# Patient Record
Sex: FEMALE | Race: WHITE | NOT HISPANIC OR LATINO | Employment: OTHER | URBAN - METROPOLITAN AREA
[De-identification: names, ages, dates, MRNs, and addresses within clinical notes are randomized per-mention and may not be internally consistent; named-entity substitution may affect disease eponyms.]

---

## 2017-01-03 ENCOUNTER — OFFICE VISIT - HEALTHEAST (OUTPATIENT)
Dept: ENDOCRINOLOGY | Facility: CLINIC | Age: 82
End: 2017-01-03

## 2017-01-03 DIAGNOSIS — E03.9 HYPOTHYROIDISM, UNSPECIFIED TYPE: ICD-10-CM

## 2017-01-03 DIAGNOSIS — M81.8 IDIOPATHIC OSTEOPOROSIS: ICD-10-CM

## 2017-01-13 ENCOUNTER — COMMUNICATION - HEALTHEAST (OUTPATIENT)
Dept: ENDOCRINOLOGY | Facility: CLINIC | Age: 82
End: 2017-01-13

## 2017-01-16 ENCOUNTER — COMMUNICATION - HEALTHEAST (OUTPATIENT)
Dept: FAMILY MEDICINE | Facility: CLINIC | Age: 82
End: 2017-01-16

## 2017-01-17 ENCOUNTER — OFFICE VISIT - HEALTHEAST (OUTPATIENT)
Dept: FAMILY MEDICINE | Facility: CLINIC | Age: 82
End: 2017-01-17

## 2017-01-17 DIAGNOSIS — N39.0 UTI (URINARY TRACT INFECTION): ICD-10-CM

## 2017-01-17 DIAGNOSIS — R35.0 URINE FREQUENCY: ICD-10-CM

## 2017-01-20 ENCOUNTER — COMMUNICATION - HEALTHEAST (OUTPATIENT)
Dept: FAMILY MEDICINE | Facility: CLINIC | Age: 82
End: 2017-01-20

## 2017-01-24 ENCOUNTER — HOSPITAL ENCOUNTER (OUTPATIENT)
Dept: CT IMAGING | Facility: HOSPITAL | Age: 82
Discharge: HOME | End: 2017-01-24
Attending: NEUROLOGICAL SURGERY

## 2017-01-24 DIAGNOSIS — M79.606 LEG PAIN: ICD-10-CM

## 2017-01-25 ENCOUNTER — COMMUNICATION - HEALTHEAST (OUTPATIENT)
Dept: FAMILY MEDICINE | Facility: CLINIC | Age: 82
End: 2017-01-25

## 2017-01-25 ENCOUNTER — AMBULATORY - HEALTHEAST (OUTPATIENT)
Dept: NEUROSURGERY | Facility: CLINIC | Age: 82
End: 2017-01-25

## 2017-01-25 ENCOUNTER — OFFICE VISIT - HEALTHEAST (OUTPATIENT)
Dept: NEUROSURGERY | Facility: CLINIC | Age: 82
End: 2017-01-25

## 2017-01-25 DIAGNOSIS — S12.9XXA: ICD-10-CM

## 2017-01-25 DIAGNOSIS — F33.42 MAJOR DEPRESSIVE DISORDER, RECURRENT EPISODE, IN FULL REMISSION (H): ICD-10-CM

## 2017-01-26 ENCOUNTER — COMMUNICATION - HEALTHEAST (OUTPATIENT)
Dept: ENDOCRINOLOGY | Facility: CLINIC | Age: 82
End: 2017-01-26

## 2017-01-26 DIAGNOSIS — M81.8 IDIOPATHIC OSTEOPOROSIS: ICD-10-CM

## 2017-01-26 DIAGNOSIS — S32.009A CLOSED FRACTURE OF LUMBAR VERTEBRA (H): ICD-10-CM

## 2017-01-27 ENCOUNTER — COMMUNICATION - HEALTHEAST (OUTPATIENT)
Dept: FAMILY MEDICINE | Facility: CLINIC | Age: 82
End: 2017-01-27

## 2017-01-27 ENCOUNTER — OFFICE VISIT - HEALTHEAST (OUTPATIENT)
Dept: FAMILY MEDICINE | Facility: CLINIC | Age: 82
End: 2017-01-27

## 2017-01-27 DIAGNOSIS — R35.0 URINARY FREQUENCY: ICD-10-CM

## 2017-02-01 ENCOUNTER — COMMUNICATION - HEALTHEAST (OUTPATIENT)
Dept: FAMILY MEDICINE | Facility: CLINIC | Age: 82
End: 2017-02-01

## 2017-02-01 ENCOUNTER — COMMUNICATION - HEALTHEAST (OUTPATIENT)
Dept: NEUROSURGERY | Facility: CLINIC | Age: 82
End: 2017-02-01

## 2017-02-21 ENCOUNTER — COMMUNICATION - HEALTHEAST (OUTPATIENT)
Dept: FAMILY MEDICINE | Facility: CLINIC | Age: 82
End: 2017-02-21

## 2017-02-21 DIAGNOSIS — F33.42 MAJOR DEPRESSIVE DISORDER, RECURRENT EPISODE, IN FULL REMISSION (H): ICD-10-CM

## 2017-02-27 ENCOUNTER — COMMUNICATION - HEALTHEAST (OUTPATIENT)
Dept: ADMINISTRATIVE | Facility: CLINIC | Age: 82
End: 2017-02-27

## 2017-02-28 ENCOUNTER — COMMUNICATION - HEALTHEAST (OUTPATIENT)
Dept: FAMILY MEDICINE | Facility: CLINIC | Age: 82
End: 2017-02-28

## 2017-02-28 DIAGNOSIS — G62.9 PERIPHERAL NEUROPATHY: ICD-10-CM

## 2017-03-01 ENCOUNTER — COMMUNICATION - HEALTHEAST (OUTPATIENT)
Dept: FAMILY MEDICINE | Facility: CLINIC | Age: 82
End: 2017-03-01

## 2017-03-01 DIAGNOSIS — I10 ESSENTIAL HYPERTENSION: ICD-10-CM

## 2017-03-16 ENCOUNTER — COMMUNICATION - HEALTHEAST (OUTPATIENT)
Dept: FAMILY MEDICINE | Facility: CLINIC | Age: 82
End: 2017-03-16

## 2017-03-24 ENCOUNTER — COMMUNICATION - HEALTHEAST (OUTPATIENT)
Dept: FAMILY MEDICINE | Facility: CLINIC | Age: 82
End: 2017-03-24

## 2017-03-24 DIAGNOSIS — F33.42 MAJOR DEPRESSIVE DISORDER, RECURRENT EPISODE, IN FULL REMISSION (H): ICD-10-CM

## 2017-04-29 ENCOUNTER — COMMUNICATION - HEALTHEAST (OUTPATIENT)
Dept: FAMILY MEDICINE | Facility: CLINIC | Age: 82
End: 2017-04-29

## 2017-04-29 DIAGNOSIS — F33.42 MAJOR DEPRESSIVE DISORDER, RECURRENT EPISODE, IN FULL REMISSION (H): ICD-10-CM

## 2017-05-19 ENCOUNTER — COMMUNICATION - HEALTHEAST (OUTPATIENT)
Dept: FAMILY MEDICINE | Facility: CLINIC | Age: 82
End: 2017-05-19

## 2017-05-19 DIAGNOSIS — G62.9 PERIPHERAL NEUROPATHY: ICD-10-CM

## 2017-05-23 ENCOUNTER — COMMUNICATION - HEALTHEAST (OUTPATIENT)
Dept: FAMILY MEDICINE | Facility: CLINIC | Age: 82
End: 2017-05-23

## 2017-05-23 DIAGNOSIS — E03.9 UNSPECIFIED HYPOTHYROIDISM: ICD-10-CM

## 2017-05-23 DIAGNOSIS — M62.838 MUSCLE SPASM: ICD-10-CM

## 2017-05-26 ENCOUNTER — COMMUNICATION - HEALTHEAST (OUTPATIENT)
Dept: FAMILY MEDICINE | Facility: CLINIC | Age: 82
End: 2017-05-26

## 2017-05-26 DIAGNOSIS — R25.1 TREMOR: ICD-10-CM

## 2017-06-26 ENCOUNTER — RECORDS - HEALTHEAST (OUTPATIENT)
Dept: ADMINISTRATIVE | Facility: OTHER | Age: 82
End: 2017-06-26

## 2017-06-30 ENCOUNTER — COMMUNICATION - HEALTHEAST (OUTPATIENT)
Dept: SCHEDULING | Facility: CLINIC | Age: 82
End: 2017-06-30

## 2017-06-30 ENCOUNTER — OFFICE VISIT - HEALTHEAST (OUTPATIENT)
Dept: FAMILY MEDICINE | Facility: CLINIC | Age: 82
End: 2017-06-30

## 2017-06-30 DIAGNOSIS — N89.8 VAGINAL DISCHARGE: ICD-10-CM

## 2017-06-30 DIAGNOSIS — R35.0 URINARY FREQUENCY: ICD-10-CM

## 2017-07-05 ENCOUNTER — COMMUNICATION - HEALTHEAST (OUTPATIENT)
Dept: FAMILY MEDICINE | Facility: CLINIC | Age: 82
End: 2017-07-05

## 2017-07-05 DIAGNOSIS — T85.43XA: ICD-10-CM

## 2017-07-05 DIAGNOSIS — H35.30 MACULAR DEGENERATION: ICD-10-CM

## 2017-07-17 ENCOUNTER — COMMUNICATION - HEALTHEAST (OUTPATIENT)
Dept: FAMILY MEDICINE | Facility: CLINIC | Age: 82
End: 2017-07-17

## 2017-07-20 ENCOUNTER — RECORDS - HEALTHEAST (OUTPATIENT)
Dept: ADMINISTRATIVE | Facility: OTHER | Age: 82
End: 2017-07-20

## 2017-07-25 ENCOUNTER — COMMUNICATION - HEALTHEAST (OUTPATIENT)
Dept: FAMILY MEDICINE | Facility: CLINIC | Age: 82
End: 2017-07-25

## 2017-07-26 ENCOUNTER — COMMUNICATION - HEALTHEAST (OUTPATIENT)
Dept: FAMILY MEDICINE | Facility: CLINIC | Age: 82
End: 2017-07-26

## 2017-07-27 ENCOUNTER — OFFICE VISIT - HEALTHEAST (OUTPATIENT)
Dept: FAMILY MEDICINE | Facility: CLINIC | Age: 82
End: 2017-07-27

## 2017-07-27 DIAGNOSIS — T85.43XA: ICD-10-CM

## 2017-07-27 DIAGNOSIS — G62.9 PERIPHERAL NEUROPATHY: ICD-10-CM

## 2017-07-27 DIAGNOSIS — T85.43XA RUPTURED LEFT BREAST IMPLANT: ICD-10-CM

## 2017-07-27 DIAGNOSIS — I10 HTN (HYPERTENSION): ICD-10-CM

## 2017-07-27 DIAGNOSIS — E03.9 HYPOTHYROIDISM: ICD-10-CM

## 2017-07-28 ENCOUNTER — COMMUNICATION - HEALTHEAST (OUTPATIENT)
Dept: FAMILY MEDICINE | Facility: CLINIC | Age: 82
End: 2017-07-28

## 2017-08-22 ENCOUNTER — COMMUNICATION - HEALTHEAST (OUTPATIENT)
Dept: FAMILY MEDICINE | Facility: CLINIC | Age: 82
End: 2017-08-22

## 2017-08-22 DIAGNOSIS — E03.9 UNSPECIFIED HYPOTHYROIDISM: ICD-10-CM

## 2017-08-22 DIAGNOSIS — M62.838 MUSCLE SPASM: ICD-10-CM

## 2017-09-13 ENCOUNTER — COMMUNICATION - HEALTHEAST (OUTPATIENT)
Dept: FAMILY MEDICINE | Facility: CLINIC | Age: 82
End: 2017-09-13

## 2017-09-13 DIAGNOSIS — I10 ESSENTIAL HYPERTENSION: ICD-10-CM

## 2017-10-16 ENCOUNTER — RECORDS - HEALTHEAST (OUTPATIENT)
Dept: ADMINISTRATIVE | Facility: OTHER | Age: 82
End: 2017-10-16

## 2017-10-25 ENCOUNTER — COMMUNICATION - HEALTHEAST (OUTPATIENT)
Dept: ADMINISTRATIVE | Facility: CLINIC | Age: 82
End: 2017-10-25

## 2017-10-30 ENCOUNTER — COMMUNICATION - HEALTHEAST (OUTPATIENT)
Dept: FAMILY MEDICINE | Facility: CLINIC | Age: 82
End: 2017-10-30

## 2017-10-30 DIAGNOSIS — F33.42 MAJOR DEPRESSIVE DISORDER, RECURRENT EPISODE, IN FULL REMISSION (H): ICD-10-CM

## 2017-11-21 ENCOUNTER — COMMUNICATION - HEALTHEAST (OUTPATIENT)
Dept: FAMILY MEDICINE | Facility: CLINIC | Age: 82
End: 2017-11-21

## 2017-11-21 DIAGNOSIS — M62.838 MUSCLE SPASM: ICD-10-CM

## 2017-11-24 ENCOUNTER — COMMUNICATION - HEALTHEAST (OUTPATIENT)
Dept: FAMILY MEDICINE | Facility: CLINIC | Age: 82
End: 2017-11-24

## 2017-11-24 DIAGNOSIS — G62.9 PERIPHERAL NEUROPATHY: ICD-10-CM

## 2017-11-25 ENCOUNTER — COMMUNICATION - HEALTHEAST (OUTPATIENT)
Dept: FAMILY MEDICINE | Facility: CLINIC | Age: 82
End: 2017-11-25

## 2017-11-25 DIAGNOSIS — F33.42 MAJOR DEPRESSIVE DISORDER, RECURRENT EPISODE, IN FULL REMISSION (H): ICD-10-CM

## 2017-12-05 ENCOUNTER — COMMUNICATION - HEALTHEAST (OUTPATIENT)
Dept: LAB | Facility: CLINIC | Age: 82
End: 2017-12-05

## 2017-12-05 ENCOUNTER — AMBULATORY - HEALTHEAST (OUTPATIENT)
Dept: LAB | Facility: CLINIC | Age: 82
End: 2017-12-05

## 2017-12-05 ENCOUNTER — COMMUNICATION - HEALTHEAST (OUTPATIENT)
Dept: FAMILY MEDICINE | Facility: CLINIC | Age: 82
End: 2017-12-05

## 2017-12-05 DIAGNOSIS — R30.0 DYSURIA: ICD-10-CM

## 2018-01-02 ENCOUNTER — AMBULATORY - HEALTHEAST (OUTPATIENT)
Dept: ENDOCRINOLOGY | Facility: CLINIC | Age: 83
End: 2018-01-02

## 2018-01-02 ENCOUNTER — COMMUNICATION - HEALTHEAST (OUTPATIENT)
Dept: ENDOCRINOLOGY | Facility: CLINIC | Age: 83
End: 2018-01-02

## 2018-01-02 DIAGNOSIS — E03.9 HYPOTHYROIDISM, UNSPECIFIED TYPE: ICD-10-CM

## 2018-01-02 DIAGNOSIS — M81.8 IDIOPATHIC OSTEOPOROSIS: ICD-10-CM

## 2018-01-18 ENCOUNTER — AMBULATORY - HEALTHEAST (OUTPATIENT)
Dept: LAB | Facility: CLINIC | Age: 83
End: 2018-01-18

## 2018-01-18 DIAGNOSIS — E03.9 HYPOTHYROIDISM, UNSPECIFIED TYPE: ICD-10-CM

## 2018-01-18 DIAGNOSIS — M81.8 IDIOPATHIC OSTEOPOROSIS: ICD-10-CM

## 2018-01-18 LAB
CALCIUM SERPL-MCNC: 9.2 MG/DL (ref 8.5–10.5)
T4 FREE SERPL-MCNC: 0.9 NG/DL (ref 0.7–1.8)
TSH SERPL DL<=0.005 MIU/L-ACNC: 2.32 UIU/ML (ref 0.3–5)

## 2018-01-19 LAB
25(OH)D3 SERPL-MCNC: 63.5 NG/ML (ref 30–80)
25(OH)D3 SERPL-MCNC: 63.5 NG/ML (ref 30–80)

## 2018-01-30 ENCOUNTER — COMMUNICATION - HEALTHEAST (OUTPATIENT)
Dept: FAMILY MEDICINE | Facility: CLINIC | Age: 83
End: 2018-01-30

## 2018-01-30 ENCOUNTER — AMBULATORY - HEALTHEAST (OUTPATIENT)
Dept: ENDOCRINOLOGY | Facility: CLINIC | Age: 83
End: 2018-01-30

## 2018-01-30 ENCOUNTER — OFFICE VISIT - HEALTHEAST (OUTPATIENT)
Dept: ENDOCRINOLOGY | Facility: CLINIC | Age: 83
End: 2018-01-30

## 2018-01-30 DIAGNOSIS — M81.8 IDIOPATHIC OSTEOPOROSIS: ICD-10-CM

## 2018-01-30 DIAGNOSIS — F33.42 MAJOR DEPRESSIVE DISORDER, RECURRENT EPISODE, IN FULL REMISSION (H): ICD-10-CM

## 2018-01-30 ASSESSMENT — MIFFLIN-ST. JEOR: SCORE: 1142.1

## 2018-02-05 ENCOUNTER — COMMUNICATION - HEALTHEAST (OUTPATIENT)
Dept: ADMINISTRATIVE | Facility: CLINIC | Age: 83
End: 2018-02-05

## 2018-02-05 ENCOUNTER — AMBULATORY - HEALTHEAST (OUTPATIENT)
Dept: ENDOCRINOLOGY | Facility: CLINIC | Age: 83
End: 2018-02-05

## 2018-02-05 DIAGNOSIS — E27.8 ADRENAL MASS, LEFT (H): ICD-10-CM

## 2018-02-05 DIAGNOSIS — E03.9 HYPOTHYROIDISM, UNSPECIFIED TYPE: ICD-10-CM

## 2018-02-08 ENCOUNTER — RECORDS - HEALTHEAST (OUTPATIENT)
Dept: ADMINISTRATIVE | Facility: OTHER | Age: 83
End: 2018-02-08

## 2018-02-14 ENCOUNTER — AMBULATORY - HEALTHEAST (OUTPATIENT)
Dept: LAB | Facility: CLINIC | Age: 83
End: 2018-02-14

## 2018-02-14 DIAGNOSIS — M81.8 IDIOPATHIC OSTEOPOROSIS: ICD-10-CM

## 2018-02-14 DIAGNOSIS — E27.8 ADRENAL MASS, LEFT (H): ICD-10-CM

## 2018-02-14 DIAGNOSIS — E03.9 HYPOTHYROIDISM, UNSPECIFIED TYPE: ICD-10-CM

## 2018-02-14 LAB
ANION GAP SERPL CALCULATED.3IONS-SCNC: 9 MMOL/L (ref 5–18)
CALCIUM SERPL-MCNC: 9.3 MG/DL (ref 8.5–10.5)
CHLORIDE BLD-SCNC: 101 MMOL/L (ref 98–107)
CO2 SERPL-SCNC: 27 MMOL/L (ref 22–31)
CORTIS SERPL-MCNC: 11.4 UG/DL
CREAT SERPL-MCNC: 0.79 MG/DL (ref 0.6–1.1)
GFR SERPL CREATININE-BSD FRML MDRD: >60 ML/MIN/1.73M2
POTASSIUM BLD-SCNC: 4.2 MMOL/L (ref 3.5–5)
SODIUM SERPL-SCNC: 137 MMOL/L (ref 136–145)
T4 FREE SERPL-MCNC: 0.9 NG/DL (ref 0.7–1.8)
TSH SERPL DL<=0.005 MIU/L-ACNC: 2.65 UIU/ML (ref 0.3–5)

## 2018-02-15 ENCOUNTER — RECORDS - HEALTHEAST (OUTPATIENT)
Dept: ADMINISTRATIVE | Facility: OTHER | Age: 83
End: 2018-02-15

## 2018-02-15 LAB
25(OH)D3 SERPL-MCNC: 65.9 NG/ML (ref 30–80)
25(OH)D3 SERPL-MCNC: 65.9 NG/ML (ref 30–80)

## 2018-02-16 LAB — ACTH PLAS-MCNC: <10 PG/ML

## 2018-02-18 LAB
ANNOTATION COMMENT IMP: NORMAL
METANEPHS SERPL-SCNC: 0.13 NMOL/L (ref 0–0.49)
NORMETANEPHRINE SERPL-SCNC: 0.33 NMOL/L (ref 0–0.89)

## 2018-02-19 ENCOUNTER — COMMUNICATION - HEALTHEAST (OUTPATIENT)
Dept: ENDOCRINOLOGY | Facility: CLINIC | Age: 83
End: 2018-02-19

## 2018-02-21 ENCOUNTER — COMMUNICATION - HEALTHEAST (OUTPATIENT)
Dept: FAMILY MEDICINE | Facility: CLINIC | Age: 83
End: 2018-02-21

## 2018-02-21 DIAGNOSIS — M62.838 MUSCLE SPASM: ICD-10-CM

## 2018-02-22 ENCOUNTER — COMMUNICATION - HEALTHEAST (OUTPATIENT)
Dept: FAMILY MEDICINE | Facility: CLINIC | Age: 83
End: 2018-02-22

## 2018-02-22 DIAGNOSIS — I10 ESSENTIAL HYPERTENSION: ICD-10-CM

## 2018-02-28 ENCOUNTER — RECORDS - HEALTHEAST (OUTPATIENT)
Dept: ADMINISTRATIVE | Facility: OTHER | Age: 83
End: 2018-02-28

## 2018-04-23 ENCOUNTER — COMMUNICATION - HEALTHEAST (OUTPATIENT)
Dept: FAMILY MEDICINE | Facility: CLINIC | Age: 83
End: 2018-04-23

## 2018-04-23 DIAGNOSIS — I10 ESSENTIAL HYPERTENSION: ICD-10-CM

## 2018-05-01 ENCOUNTER — COMMUNICATION - HEALTHEAST (OUTPATIENT)
Dept: FAMILY MEDICINE | Facility: CLINIC | Age: 83
End: 2018-05-01

## 2018-05-01 DIAGNOSIS — F33.42 MAJOR DEPRESSIVE DISORDER, RECURRENT EPISODE, IN FULL REMISSION (H): ICD-10-CM

## 2018-05-16 ENCOUNTER — COMMUNICATION - HEALTHEAST (OUTPATIENT)
Dept: FAMILY MEDICINE | Facility: CLINIC | Age: 83
End: 2018-05-16

## 2018-05-16 DIAGNOSIS — F33.42 MAJOR DEPRESSIVE DISORDER, RECURRENT EPISODE, IN FULL REMISSION (H): ICD-10-CM

## 2018-05-23 ENCOUNTER — COMMUNICATION - HEALTHEAST (OUTPATIENT)
Dept: FAMILY MEDICINE | Facility: CLINIC | Age: 83
End: 2018-05-23

## 2018-05-23 DIAGNOSIS — M62.838 MUSCLE SPASM: ICD-10-CM

## 2018-06-04 ENCOUNTER — RECORDS - HEALTHEAST (OUTPATIENT)
Dept: ADMINISTRATIVE | Facility: OTHER | Age: 83
End: 2018-06-04

## 2018-06-11 ENCOUNTER — RECORDS - HEALTHEAST (OUTPATIENT)
Dept: ADMINISTRATIVE | Facility: OTHER | Age: 83
End: 2018-06-11

## 2018-07-31 ENCOUNTER — COMMUNICATION - HEALTHEAST (OUTPATIENT)
Dept: FAMILY MEDICINE | Facility: CLINIC | Age: 83
End: 2018-07-31

## 2018-07-31 DIAGNOSIS — F33.42 MAJOR DEPRESSIVE DISORDER, RECURRENT EPISODE, IN FULL REMISSION (H): ICD-10-CM

## 2018-08-03 ENCOUNTER — COMMUNICATION - HEALTHEAST (OUTPATIENT)
Dept: FAMILY MEDICINE | Facility: CLINIC | Age: 83
End: 2018-08-03

## 2018-08-03 DIAGNOSIS — M62.838 MUSCLE SPASM: ICD-10-CM

## 2018-08-03 DIAGNOSIS — F33.42 MAJOR DEPRESSIVE DISORDER, RECURRENT EPISODE, IN FULL REMISSION (H): ICD-10-CM

## 2018-08-13 ENCOUNTER — COMMUNICATION - HEALTHEAST (OUTPATIENT)
Dept: FAMILY MEDICINE | Facility: CLINIC | Age: 83
End: 2018-08-13

## 2018-08-13 DIAGNOSIS — G62.9 PERIPHERAL NEUROPATHY: ICD-10-CM

## 2018-08-21 ENCOUNTER — COMMUNICATION - HEALTHEAST (OUTPATIENT)
Dept: FAMILY MEDICINE | Facility: CLINIC | Age: 83
End: 2018-08-21

## 2018-08-21 DIAGNOSIS — E03.9 HYPOTHYROIDISM: ICD-10-CM

## 2018-09-24 ENCOUNTER — RECORDS - HEALTHEAST (OUTPATIENT)
Dept: ADMINISTRATIVE | Facility: OTHER | Age: 83
End: 2018-09-24

## 2018-10-19 ENCOUNTER — COMMUNICATION - HEALTHEAST (OUTPATIENT)
Dept: SCHEDULING | Facility: CLINIC | Age: 83
End: 2018-10-19

## 2018-10-23 ENCOUNTER — OFFICE VISIT - HEALTHEAST (OUTPATIENT)
Dept: FAMILY MEDICINE | Facility: CLINIC | Age: 83
End: 2018-10-23

## 2018-10-23 DIAGNOSIS — I10 ESSENTIAL HYPERTENSION: ICD-10-CM

## 2018-10-23 DIAGNOSIS — M81.8 IDIOPATHIC OSTEOPOROSIS: ICD-10-CM

## 2018-10-23 DIAGNOSIS — F33.42 MAJOR DEPRESSIVE DISORDER, RECURRENT EPISODE, IN FULL REMISSION (H): ICD-10-CM

## 2018-10-23 DIAGNOSIS — R20.2 PARESTHESIAS: ICD-10-CM

## 2018-10-23 DIAGNOSIS — M48.062 LUMBAR STENOSIS WITH NEUROGENIC CLAUDICATION: ICD-10-CM

## 2018-10-23 DIAGNOSIS — G56.00 CARPAL TUNNEL SYNDROME: ICD-10-CM

## 2018-10-23 DIAGNOSIS — I10 HTN (HYPERTENSION): ICD-10-CM

## 2018-10-23 DIAGNOSIS — Z86.2 HISTORY OF ANEMIA: ICD-10-CM

## 2018-10-23 DIAGNOSIS — E03.9 HYPOTHYROIDISM: ICD-10-CM

## 2018-10-23 DIAGNOSIS — T85.43XA BREAST IMPLANT RUPTURE: ICD-10-CM

## 2018-10-23 DIAGNOSIS — C50.919 BREAST CANCER (H): ICD-10-CM

## 2018-10-23 DIAGNOSIS — G62.9 NEUROPATHY: ICD-10-CM

## 2018-10-23 DIAGNOSIS — Z90.13 S/P MASTECTOMY, BILATERAL: ICD-10-CM

## 2018-10-23 DIAGNOSIS — G62.9 PERIPHERAL NEUROPATHY: ICD-10-CM

## 2018-10-23 LAB
ALBUMIN SERPL-MCNC: 3.5 G/DL (ref 3.5–5)
ALP SERPL-CCNC: 69 U/L (ref 45–120)
ALT SERPL W P-5'-P-CCNC: 21 U/L (ref 0–45)
ANION GAP SERPL CALCULATED.3IONS-SCNC: 10 MMOL/L (ref 5–18)
AST SERPL W P-5'-P-CCNC: 23 U/L (ref 0–40)
BILIRUB SERPL-MCNC: 0.3 MG/DL (ref 0–1)
BUN SERPL-MCNC: 17 MG/DL (ref 8–28)
CALCIUM SERPL-MCNC: 9.1 MG/DL (ref 8.5–10.5)
CHLORIDE BLD-SCNC: 101 MMOL/L (ref 98–107)
CO2 SERPL-SCNC: 25 MMOL/L (ref 22–31)
CREAT SERPL-MCNC: 0.78 MG/DL (ref 0.6–1.1)
GFR SERPL CREATININE-BSD FRML MDRD: >60 ML/MIN/1.73M2
GLUCOSE BLD-MCNC: 90 MG/DL (ref 70–125)
POTASSIUM BLD-SCNC: 4.3 MMOL/L (ref 3.5–5)
PROT SERPL-MCNC: 6.1 G/DL (ref 6–8)
SODIUM SERPL-SCNC: 136 MMOL/L (ref 136–145)
TSH SERPL DL<=0.005 MIU/L-ACNC: 1.36 UIU/ML (ref 0.3–5)
VIT B12 SERPL-MCNC: >2000 PG/ML (ref 213–816)

## 2018-10-24 ENCOUNTER — COMMUNICATION - HEALTHEAST (OUTPATIENT)
Dept: FAMILY MEDICINE | Facility: CLINIC | Age: 83
End: 2018-10-24

## 2018-10-27 ENCOUNTER — COMMUNICATION - HEALTHEAST (OUTPATIENT)
Dept: FAMILY MEDICINE | Facility: CLINIC | Age: 83
End: 2018-10-27

## 2018-10-29 ENCOUNTER — COMMUNICATION - HEALTHEAST (OUTPATIENT)
Dept: FAMILY MEDICINE | Facility: CLINIC | Age: 83
End: 2018-10-29

## 2018-10-29 ENCOUNTER — RECORDS - HEALTHEAST (OUTPATIENT)
Dept: ADMINISTRATIVE | Facility: OTHER | Age: 83
End: 2018-10-29

## 2018-10-30 ENCOUNTER — COMMUNICATION - HEALTHEAST (OUTPATIENT)
Dept: FAMILY MEDICINE | Facility: CLINIC | Age: 83
End: 2018-10-30

## 2018-10-30 DIAGNOSIS — F33.42 MAJOR DEPRESSIVE DISORDER, RECURRENT EPISODE, IN FULL REMISSION (H): ICD-10-CM

## 2018-11-06 ENCOUNTER — COMMUNICATION - HEALTHEAST (OUTPATIENT)
Dept: FAMILY MEDICINE | Facility: CLINIC | Age: 83
End: 2018-11-06

## 2018-11-06 DIAGNOSIS — F33.42 MAJOR DEPRESSIVE DISORDER, RECURRENT EPISODE, IN FULL REMISSION (H): ICD-10-CM

## 2018-11-06 DIAGNOSIS — M62.838 MUSCLE SPASM: ICD-10-CM

## 2018-11-08 ENCOUNTER — COMMUNICATION - HEALTHEAST (OUTPATIENT)
Dept: FAMILY MEDICINE | Facility: CLINIC | Age: 83
End: 2018-11-08

## 2018-11-26 ENCOUNTER — COMMUNICATION - HEALTHEAST (OUTPATIENT)
Dept: FAMILY MEDICINE | Facility: CLINIC | Age: 83
End: 2018-11-26

## 2018-12-17 ENCOUNTER — RECORDS - HEALTHEAST (OUTPATIENT)
Dept: ADMINISTRATIVE | Facility: OTHER | Age: 83
End: 2018-12-17

## 2018-12-27 ENCOUNTER — COMMUNICATION - HEALTHEAST (OUTPATIENT)
Dept: FAMILY MEDICINE | Facility: CLINIC | Age: 83
End: 2018-12-27

## 2018-12-27 ENCOUNTER — AMBULATORY - HEALTHEAST (OUTPATIENT)
Dept: ENDOCRINOLOGY | Facility: CLINIC | Age: 83
End: 2018-12-27

## 2018-12-27 DIAGNOSIS — M81.8 IDIOPATHIC OSTEOPOROSIS: ICD-10-CM

## 2018-12-27 DIAGNOSIS — E03.9 HYPOTHYROIDISM, UNSPECIFIED TYPE: ICD-10-CM

## 2018-12-28 ENCOUNTER — OFFICE VISIT - HEALTHEAST (OUTPATIENT)
Dept: FAMILY MEDICINE | Facility: CLINIC | Age: 83
End: 2018-12-28

## 2018-12-28 DIAGNOSIS — M99.04 SOMATIC DYSFUNCTION OF SACRAL REGION: ICD-10-CM

## 2018-12-28 DIAGNOSIS — F33.42 MAJOR DEPRESSIVE DISORDER, RECURRENT EPISODE, IN FULL REMISSION (H): ICD-10-CM

## 2018-12-28 DIAGNOSIS — R10.9 FLANK PAIN: ICD-10-CM

## 2018-12-28 DIAGNOSIS — M99.02 SOMATIC DYSFUNCTION OF THORACIC REGION: ICD-10-CM

## 2018-12-28 DIAGNOSIS — M99.05 SOMATIC DYSFUNCTION OF PELVIS REGION: ICD-10-CM

## 2018-12-28 DIAGNOSIS — M99.06 SOMATIC DYSFUNCTION OF LOWER EXTREMITY: ICD-10-CM

## 2018-12-28 DIAGNOSIS — I10 ESSENTIAL HYPERTENSION: ICD-10-CM

## 2018-12-28 DIAGNOSIS — G62.9 PERIPHERAL NEUROPATHY: ICD-10-CM

## 2018-12-28 DIAGNOSIS — E03.9 HYPOTHYROIDISM: ICD-10-CM

## 2018-12-28 DIAGNOSIS — M99.03 SOMATIC DYSFUNCTION OF LUMBAR REGION: ICD-10-CM

## 2018-12-28 DIAGNOSIS — M62.838 MUSCLE SPASM: ICD-10-CM

## 2018-12-28 DIAGNOSIS — R53.83 FATIGUE, UNSPECIFIED TYPE: ICD-10-CM

## 2018-12-28 DIAGNOSIS — M54.42 BILATERAL LOW BACK PAIN WITH LEFT-SIDED SCIATICA, UNSPECIFIED CHRONICITY: ICD-10-CM

## 2018-12-28 LAB
ALBUMIN UR-MCNC: NEGATIVE MG/DL
APPEARANCE UR: CLEAR
BILIRUB UR QL STRIP: NEGATIVE
COLOR UR AUTO: YELLOW
GLUCOSE UR STRIP-MCNC: NEGATIVE MG/DL
HGB UR QL STRIP: NEGATIVE
KETONES UR STRIP-MCNC: NEGATIVE MG/DL
LEUKOCYTE ESTERASE UR QL STRIP: NEGATIVE
NITRATE UR QL: NEGATIVE
PH UR STRIP: 7 [PH] (ref 5–8)
SP GR UR STRIP: 1.01 (ref 1–1.03)
UROBILINOGEN UR STRIP-ACNC: NORMAL

## 2018-12-31 ENCOUNTER — COMMUNICATION - HEALTHEAST (OUTPATIENT)
Dept: FAMILY MEDICINE | Facility: CLINIC | Age: 83
End: 2018-12-31

## 2019-01-10 ENCOUNTER — COMMUNICATION - HEALTHEAST (OUTPATIENT)
Dept: FAMILY MEDICINE | Facility: CLINIC | Age: 84
End: 2019-01-10

## 2019-01-10 DIAGNOSIS — M54.5 ACUTE LOW BACK PAIN, UNSPECIFIED BACK PAIN LATERALITY, WITH SCIATICA PRESENCE UNSPECIFIED: ICD-10-CM

## 2019-01-24 ENCOUNTER — HOSPITAL ENCOUNTER (OUTPATIENT)
Dept: LAB | Age: 84
Setting detail: SPECIMEN
Discharge: HOME | End: 2019-01-24

## 2019-01-24 ENCOUNTER — AMBULATORY - HEALTHEAST (OUTPATIENT)
Dept: LAB | Facility: CLINIC | Age: 84
End: 2019-01-24

## 2019-01-24 DIAGNOSIS — E03.9 HYPOTHYROIDISM, UNSPECIFIED TYPE: ICD-10-CM

## 2019-01-24 DIAGNOSIS — M81.8 IDIOPATHIC OSTEOPOROSIS: ICD-10-CM

## 2019-01-24 LAB
CALCIUM SERPL-MCNC: 9.2 MG/DL (ref 8.5–10.5)
T4 FREE SERPL-MCNC: 0.9 NG/DL (ref 0.7–1.8)
TSH SERPL DL<=0.005 MIU/L-ACNC: 2.28 UIU/ML (ref 0.3–5)

## 2019-01-25 LAB
25(OH)D3 SERPL-MCNC: 57.1 NG/ML (ref 30–80)
25(OH)D3 SERPL-MCNC: 57.1 NG/ML (ref 30–80)

## 2019-02-04 ENCOUNTER — HOSPITAL ENCOUNTER (OUTPATIENT)
Dept: PHYSICAL MEDICINE AND REHAB | Facility: CLINIC | Age: 84
Discharge: HOME | End: 2019-02-04
Attending: FAMILY MEDICINE

## 2019-02-04 DIAGNOSIS — Z98.890 HISTORY OF LUMBAR SURGERY: ICD-10-CM

## 2019-02-04 DIAGNOSIS — M48.061 LUMBAR FORAMINAL STENOSIS: ICD-10-CM

## 2019-02-04 DIAGNOSIS — M53.3 SACROILIAC JOINT PAIN: ICD-10-CM

## 2019-02-04 DIAGNOSIS — M54.42 ACUTE MIDLINE LOW BACK PAIN WITH LEFT-SIDED SCIATICA: ICD-10-CM

## 2019-02-04 ASSESSMENT — MIFFLIN-ST. JEOR: SCORE: 1128.49

## 2019-02-05 ENCOUNTER — OFFICE VISIT - HEALTHEAST (OUTPATIENT)
Dept: ENDOCRINOLOGY | Facility: CLINIC | Age: 84
End: 2019-02-05

## 2019-02-05 ENCOUNTER — COMMUNICATION - HEALTHEAST (OUTPATIENT)
Dept: FAMILY MEDICINE | Facility: CLINIC | Age: 84
End: 2019-02-05

## 2019-02-05 DIAGNOSIS — M81.8 IDIOPATHIC OSTEOPOROSIS: ICD-10-CM

## 2019-02-05 DIAGNOSIS — M62.838 MUSCLE SPASM: ICD-10-CM

## 2019-02-05 ASSESSMENT — MIFFLIN-ST. JEOR: SCORE: 1140.74

## 2019-02-06 ENCOUNTER — AMBULATORY - HEALTHEAST (OUTPATIENT)
Dept: SCHEDULING | Facility: CLINIC | Age: 84
End: 2019-02-06

## 2019-02-06 DIAGNOSIS — M81.8 IDIOPATHIC OSTEOPOROSIS: ICD-10-CM

## 2019-02-07 ENCOUNTER — RECORDS - HEALTHEAST (OUTPATIENT)
Dept: ADMINISTRATIVE | Facility: OTHER | Age: 84
End: 2019-02-07

## 2019-02-12 ENCOUNTER — COMMUNICATION - HEALTHEAST (OUTPATIENT)
Dept: TELEHEALTH | Facility: CLINIC | Age: 84
End: 2019-02-12

## 2019-02-18 ENCOUNTER — OFFICE VISIT - HEALTHEAST (OUTPATIENT)
Dept: PHYSICAL THERAPY | Facility: REHABILITATION | Age: 84
End: 2019-02-18

## 2019-02-18 DIAGNOSIS — M62.81 GENERALIZED MUSCLE WEAKNESS: ICD-10-CM

## 2019-02-18 DIAGNOSIS — M53.86 DECREASED ROM OF LUMBAR SPINE: ICD-10-CM

## 2019-02-18 DIAGNOSIS — M54.50 LOW BACK PAIN: ICD-10-CM

## 2019-02-22 ENCOUNTER — RECORDS - HEALTHEAST (OUTPATIENT)
Dept: ADMINISTRATIVE | Facility: OTHER | Age: 84
End: 2019-02-22

## 2019-03-04 ENCOUNTER — COMMUNICATION - HEALTHEAST (OUTPATIENT)
Dept: PHYSICAL MEDICINE AND REHAB | Facility: CLINIC | Age: 84
End: 2019-03-04

## 2019-03-04 ENCOUNTER — OFFICE VISIT - HEALTHEAST (OUTPATIENT)
Dept: PHYSICAL THERAPY | Facility: REHABILITATION | Age: 84
End: 2019-03-04

## 2019-03-04 DIAGNOSIS — M53.86 DECREASED ROM OF LUMBAR SPINE: ICD-10-CM

## 2019-03-04 DIAGNOSIS — M62.81 GENERALIZED MUSCLE WEAKNESS: ICD-10-CM

## 2019-03-04 DIAGNOSIS — M54.50 LOW BACK PAIN: ICD-10-CM

## 2019-03-11 ENCOUNTER — RECORDS - HEALTHEAST (OUTPATIENT)
Dept: ADMINISTRATIVE | Facility: OTHER | Age: 84
End: 2019-03-11

## 2019-03-20 ENCOUNTER — OFFICE VISIT - HEALTHEAST (OUTPATIENT)
Dept: PHYSICAL THERAPY | Facility: REHABILITATION | Age: 84
End: 2019-03-20

## 2019-03-20 DIAGNOSIS — M54.50 ACUTE LOW BACK PAIN: ICD-10-CM

## 2019-03-20 DIAGNOSIS — M53.86 DECREASED ROM OF LUMBAR SPINE: ICD-10-CM

## 2019-03-20 DIAGNOSIS — M62.81 GENERALIZED MUSCLE WEAKNESS: ICD-10-CM

## 2019-03-27 ENCOUNTER — OFFICE VISIT - HEALTHEAST (OUTPATIENT)
Dept: PHYSICAL THERAPY | Facility: REHABILITATION | Age: 84
End: 2019-03-27

## 2019-03-27 DIAGNOSIS — M62.81 GENERALIZED MUSCLE WEAKNESS: ICD-10-CM

## 2019-03-27 DIAGNOSIS — M53.86 DECREASED ROM OF LUMBAR SPINE: ICD-10-CM

## 2019-03-27 DIAGNOSIS — M54.50 LOW BACK PAIN: ICD-10-CM

## 2019-04-01 ENCOUNTER — COMMUNICATION - HEALTHEAST (OUTPATIENT)
Dept: PHYSICAL MEDICINE AND REHAB | Facility: CLINIC | Age: 84
End: 2019-04-01

## 2019-04-08 ENCOUNTER — OFFICE VISIT - HEALTHEAST (OUTPATIENT)
Dept: PHYSICAL THERAPY | Facility: REHABILITATION | Age: 84
End: 2019-04-08

## 2019-04-08 DIAGNOSIS — M53.86 DECREASED ROM OF LUMBAR SPINE: ICD-10-CM

## 2019-04-08 DIAGNOSIS — M62.81 GENERALIZED MUSCLE WEAKNESS: ICD-10-CM

## 2019-04-08 DIAGNOSIS — M54.50 LOW BACK PAIN: ICD-10-CM

## 2019-04-17 ENCOUNTER — OFFICE VISIT - HEALTHEAST (OUTPATIENT)
Dept: PHYSICAL THERAPY | Facility: REHABILITATION | Age: 84
End: 2019-04-17

## 2019-04-17 DIAGNOSIS — M53.86 DECREASED ROM OF LUMBAR SPINE: ICD-10-CM

## 2019-04-17 DIAGNOSIS — M62.81 GENERALIZED MUSCLE WEAKNESS: ICD-10-CM

## 2019-04-17 DIAGNOSIS — M54.50 ACUTE BILATERAL LOW BACK PAIN WITHOUT SCIATICA: ICD-10-CM

## 2019-04-24 ENCOUNTER — OFFICE VISIT - HEALTHEAST (OUTPATIENT)
Dept: PHYSICAL THERAPY | Facility: REHABILITATION | Age: 84
End: 2019-04-24

## 2019-04-24 DIAGNOSIS — M62.81 GENERALIZED MUSCLE WEAKNESS: ICD-10-CM

## 2019-04-24 DIAGNOSIS — M54.50 ACUTE BILATERAL LOW BACK PAIN WITHOUT SCIATICA: ICD-10-CM

## 2019-04-24 DIAGNOSIS — M53.86 DECREASED ROM OF LUMBAR SPINE: ICD-10-CM

## 2019-05-13 ENCOUNTER — OFFICE VISIT - HEALTHEAST (OUTPATIENT)
Dept: PHYSICAL THERAPY | Facility: REHABILITATION | Age: 84
End: 2019-05-13

## 2019-05-13 DIAGNOSIS — M62.81 GENERALIZED MUSCLE WEAKNESS: ICD-10-CM

## 2019-05-13 DIAGNOSIS — M54.50 ACUTE BILATERAL LOW BACK PAIN WITHOUT SCIATICA: ICD-10-CM

## 2019-05-13 DIAGNOSIS — M53.86 DECREASED ROM OF LUMBAR SPINE: ICD-10-CM

## 2019-05-24 ENCOUNTER — COMMUNICATION - HEALTHEAST (OUTPATIENT)
Dept: FAMILY MEDICINE | Facility: CLINIC | Age: 84
End: 2019-05-24

## 2019-05-24 ENCOUNTER — RECORDS - HEALTHEAST (OUTPATIENT)
Dept: ADMINISTRATIVE | Facility: OTHER | Age: 84
End: 2019-05-24

## 2019-05-24 DIAGNOSIS — G62.9 PERIPHERAL NEUROPATHY: ICD-10-CM

## 2019-05-24 DIAGNOSIS — M62.838 MUSCLE SPASM: ICD-10-CM

## 2019-05-24 DIAGNOSIS — E03.9 HYPOTHYROIDISM: ICD-10-CM

## 2019-05-30 ENCOUNTER — COMMUNICATION - HEALTHEAST (OUTPATIENT)
Dept: FAMILY MEDICINE | Facility: CLINIC | Age: 84
End: 2019-05-30

## 2019-05-30 DIAGNOSIS — I10 ESSENTIAL HYPERTENSION: ICD-10-CM

## 2019-05-30 DIAGNOSIS — F33.42 MAJOR DEPRESSIVE DISORDER, RECURRENT EPISODE, IN FULL REMISSION (H): ICD-10-CM

## 2019-06-24 ENCOUNTER — OFFICE VISIT - HEALTHEAST (OUTPATIENT)
Dept: PHYSICAL THERAPY | Facility: REHABILITATION | Age: 84
End: 2019-06-24

## 2019-06-24 DIAGNOSIS — M62.81 GENERALIZED MUSCLE WEAKNESS: ICD-10-CM

## 2019-06-24 DIAGNOSIS — M54.50 ACUTE BILATERAL LOW BACK PAIN WITHOUT SCIATICA: ICD-10-CM

## 2019-06-24 DIAGNOSIS — M53.86 DECREASED ROM OF LUMBAR SPINE: ICD-10-CM

## 2019-09-09 ENCOUNTER — COMMUNICATION - HEALTHEAST (OUTPATIENT)
Dept: FAMILY MEDICINE | Facility: CLINIC | Age: 84
End: 2019-09-09

## 2019-09-09 DIAGNOSIS — M62.838 MUSCLE SPASM: ICD-10-CM

## 2019-10-01 ENCOUNTER — OFFICE VISIT - HEALTHEAST (OUTPATIENT)
Dept: FAMILY MEDICINE | Facility: CLINIC | Age: 84
End: 2019-10-01

## 2019-10-01 DIAGNOSIS — M15.9 GENERALIZED OSTEOARTHRITIS OF MULTIPLE SITES: ICD-10-CM

## 2019-10-01 DIAGNOSIS — M79.10 MYALGIA, UNSPECIFIED SITE: ICD-10-CM

## 2019-10-01 DIAGNOSIS — M62.838 MUSCLE SPASM: ICD-10-CM

## 2019-10-01 DIAGNOSIS — I10 ESSENTIAL HYPERTENSION: ICD-10-CM

## 2019-10-01 DIAGNOSIS — F33.42 MAJOR DEPRESSIVE DISORDER, RECURRENT EPISODE, IN FULL REMISSION (H): ICD-10-CM

## 2019-10-01 DIAGNOSIS — M89.9 DISORDER OF BONE AND CARTILAGE: ICD-10-CM

## 2019-10-01 DIAGNOSIS — M81.8 IDIOPATHIC OSTEOPOROSIS: ICD-10-CM

## 2019-10-01 DIAGNOSIS — M48.062 LUMBAR STENOSIS WITH NEUROGENIC CLAUDICATION: ICD-10-CM

## 2019-10-01 DIAGNOSIS — Z86.2 HISTORY OF ANEMIA: ICD-10-CM

## 2019-10-01 DIAGNOSIS — R11.0 NAUSEA: ICD-10-CM

## 2019-10-01 DIAGNOSIS — G62.9 PERIPHERAL POLYNEUROPATHY: ICD-10-CM

## 2019-10-01 DIAGNOSIS — D64.9 LOW HEMOGLOBIN: ICD-10-CM

## 2019-10-01 DIAGNOSIS — Z00.00 ENCOUNTER FOR MEDICARE ANNUAL WELLNESS EXAM: ICD-10-CM

## 2019-10-01 DIAGNOSIS — M94.9 DISORDER OF BONE AND CARTILAGE: ICD-10-CM

## 2019-10-01 DIAGNOSIS — E03.9 HYPOTHYROIDISM, UNSPECIFIED TYPE: ICD-10-CM

## 2019-10-01 LAB
ALBUMIN SERPL-MCNC: 3.7 G/DL (ref 3.5–5)
ALP SERPL-CCNC: 71 U/L (ref 45–120)
ALT SERPL W P-5'-P-CCNC: 20 U/L (ref 0–45)
ANION GAP SERPL CALCULATED.3IONS-SCNC: 10 MMOL/L (ref 5–18)
AST SERPL W P-5'-P-CCNC: 22 U/L (ref 0–40)
BILIRUB SERPL-MCNC: 0.5 MG/DL (ref 0–1)
BUN SERPL-MCNC: 18 MG/DL (ref 8–28)
CALCIUM SERPL-MCNC: 9.2 MG/DL (ref 8.5–10.5)
CHLORIDE BLD-SCNC: 100 MMOL/L (ref 98–107)
CO2 SERPL-SCNC: 26 MMOL/L (ref 22–31)
CREAT SERPL-MCNC: 0.85 MG/DL (ref 0.6–1.1)
ERYTHROCYTE [DISTWIDTH] IN BLOOD BY AUTOMATED COUNT: 12.5 % (ref 11–14.5)
FERRITIN SERPL-MCNC: 14 NG/ML (ref 10–130)
GFR SERPL CREATININE-BSD FRML MDRD: >60 ML/MIN/1.73M2
GLUCOSE BLD-MCNC: 83 MG/DL (ref 70–125)
HCT VFR BLD AUTO: 34.7 % (ref 35–47)
HGB BLD-MCNC: 11.3 G/DL (ref 12–16)
IRON SATN MFR SERPL: 13 % (ref 20–50)
IRON SERPL-MCNC: 61 UG/DL (ref 42–175)
MCH RBC QN AUTO: 30 PG (ref 27–34)
MCHC RBC AUTO-ENTMCNC: 32.5 G/DL (ref 32–36)
MCV RBC AUTO: 92 FL (ref 80–100)
PLATELET # BLD AUTO: 291 THOU/UL (ref 140–440)
PMV BLD AUTO: 7.3 FL (ref 7–10)
POTASSIUM BLD-SCNC: 4 MMOL/L (ref 3.5–5)
PROT SERPL-MCNC: 6.5 G/DL (ref 6–8)
RBC # BLD AUTO: 3.76 MILL/UL (ref 3.8–5.4)
SODIUM SERPL-SCNC: 136 MMOL/L (ref 136–145)
TIBC SERPL-MCNC: 457 UG/DL (ref 313–563)
TRANSFERRIN SERPL-MCNC: 366 MG/DL (ref 212–360)
TSH SERPL DL<=0.005 MIU/L-ACNC: 1.83 UIU/ML (ref 0.3–5)
VIT B12 SERPL-MCNC: 1501 PG/ML (ref 213–816)
WBC: 5.6 THOU/UL (ref 4–11)

## 2019-10-01 ASSESSMENT — MIFFLIN-ST. JEOR: SCORE: 1085.4

## 2019-10-01 ASSESSMENT — PATIENT HEALTH QUESTIONNAIRE - PHQ9: SUM OF ALL RESPONSES TO PHQ QUESTIONS 1-9: 3

## 2019-10-02 LAB
25(OH)D3 SERPL-MCNC: 61.6 NG/ML (ref 30–80)
25(OH)D3 SERPL-MCNC: 61.6 NG/ML (ref 30–80)

## 2019-10-03 ENCOUNTER — COMMUNICATION - HEALTHEAST (OUTPATIENT)
Dept: FAMILY MEDICINE | Facility: CLINIC | Age: 84
End: 2019-10-03

## 2019-10-03 DIAGNOSIS — R30.0 DYSURIA: ICD-10-CM

## 2019-10-10 ENCOUNTER — COMMUNICATION - HEALTHEAST (OUTPATIENT)
Dept: FAMILY MEDICINE | Facility: CLINIC | Age: 84
End: 2019-10-10

## 2019-10-10 ENCOUNTER — AMBULATORY - HEALTHEAST (OUTPATIENT)
Dept: LAB | Facility: CLINIC | Age: 84
End: 2019-10-10

## 2019-10-10 DIAGNOSIS — R30.0 DYSURIA: ICD-10-CM

## 2019-10-10 LAB
ALBUMIN UR-MCNC: NEGATIVE MG/DL
APPEARANCE UR: ABNORMAL
BACTERIA #/AREA URNS HPF: ABNORMAL HPF
BILIRUB UR QL STRIP: NEGATIVE
COLOR UR AUTO: YELLOW
GLUCOSE UR STRIP-MCNC: NEGATIVE MG/DL
HGB UR QL STRIP: NEGATIVE
KETONES UR STRIP-MCNC: NEGATIVE MG/DL
LEUKOCYTE ESTERASE UR QL STRIP: NEGATIVE
NITRATE UR QL: POSITIVE
PH UR STRIP: 7 [PH] (ref 5–8)
RBC #/AREA URNS AUTO: ABNORMAL HPF
SP GR UR STRIP: 1.01 (ref 1–1.03)
SQUAMOUS #/AREA URNS AUTO: ABNORMAL LPF
TRANS CELLS #/AREA URNS HPF: ABNORMAL LPF
UROBILINOGEN UR STRIP-ACNC: ABNORMAL
WBC #/AREA URNS AUTO: ABNORMAL HPF

## 2019-10-11 ENCOUNTER — COMMUNICATION - HEALTHEAST (OUTPATIENT)
Dept: FAMILY MEDICINE | Facility: CLINIC | Age: 84
End: 2019-10-11

## 2019-10-11 LAB — BACTERIA SPEC CULT: NO GROWTH

## 2019-10-14 ENCOUNTER — RECORDS - HEALTHEAST (OUTPATIENT)
Dept: ADMINISTRATIVE | Facility: OTHER | Age: 84
End: 2019-10-14

## 2019-10-16 ENCOUNTER — COMMUNICATION - HEALTHEAST (OUTPATIENT)
Dept: FAMILY MEDICINE | Facility: CLINIC | Age: 84
End: 2019-10-16

## 2019-10-23 ENCOUNTER — COMMUNICATION - HEALTHEAST (OUTPATIENT)
Dept: FAMILY MEDICINE | Facility: CLINIC | Age: 84
End: 2019-10-23

## 2019-10-23 ENCOUNTER — AMBULATORY - HEALTHEAST (OUTPATIENT)
Dept: ENDOCRINOLOGY | Facility: CLINIC | Age: 84
End: 2019-10-23

## 2019-10-23 DIAGNOSIS — E03.9 HYPOTHYROIDISM, UNSPECIFIED TYPE: ICD-10-CM

## 2019-12-12 ENCOUNTER — COMMUNICATION - HEALTHEAST (OUTPATIENT)
Dept: FAMILY MEDICINE | Facility: CLINIC | Age: 84
End: 2019-12-12

## 2019-12-12 DIAGNOSIS — I10 ESSENTIAL HYPERTENSION: ICD-10-CM

## 2019-12-12 DIAGNOSIS — E03.9 HYPOTHYROIDISM: ICD-10-CM

## 2019-12-17 ENCOUNTER — COMMUNICATION - HEALTHEAST (OUTPATIENT)
Dept: FAMILY MEDICINE | Facility: CLINIC | Age: 84
End: 2019-12-17

## 2020-01-09 ENCOUNTER — COMMUNICATION - HEALTHEAST (OUTPATIENT)
Dept: FAMILY MEDICINE | Facility: CLINIC | Age: 85
End: 2020-01-09

## 2020-01-13 ENCOUNTER — OFFICE VISIT - HEALTHEAST (OUTPATIENT)
Dept: FAMILY MEDICINE | Facility: CLINIC | Age: 85
End: 2020-01-13

## 2020-01-13 ENCOUNTER — COMMUNICATION - HEALTHEAST (OUTPATIENT)
Dept: FAMILY MEDICINE | Facility: CLINIC | Age: 85
End: 2020-01-13

## 2020-01-13 DIAGNOSIS — R05.3 PERSISTENT COUGH FOR 3 WEEKS OR LONGER: ICD-10-CM

## 2020-01-13 DIAGNOSIS — I10 ESSENTIAL HYPERTENSION: ICD-10-CM

## 2020-01-13 DIAGNOSIS — K21.9 GASTROESOPHAGEAL REFLUX DISEASE WITHOUT ESOPHAGITIS: ICD-10-CM

## 2020-01-13 DIAGNOSIS — R11.0 NAUSEA: ICD-10-CM

## 2020-01-13 DIAGNOSIS — G56.01 CARPAL TUNNEL SYNDROME OF RIGHT WRIST: ICD-10-CM

## 2020-01-13 DIAGNOSIS — R32 URINARY INCONTINENCE, UNSPECIFIED TYPE: ICD-10-CM

## 2020-01-13 DIAGNOSIS — E03.9 HYPOTHYROIDISM, UNSPECIFIED TYPE: ICD-10-CM

## 2020-01-13 DIAGNOSIS — F33.42 MAJOR DEPRESSIVE DISORDER, RECURRENT EPISODE, IN FULL REMISSION (H): ICD-10-CM

## 2020-01-13 LAB
T4 FREE SERPL-MCNC: 0.9 NG/DL (ref 0.7–1.8)
TSH SERPL DL<=0.005 MIU/L-ACNC: 1.43 UIU/ML (ref 0.3–5)

## 2020-01-13 RX ORDER — KRILL/OM-3/DHA/EPA/PHOSPHO/AST 500-110 MG
2 CAPSULE ORAL DAILY
Status: SHIPPED | COMMUNITY
Start: 2004-07-13 | End: 2023-01-19

## 2020-01-13 ASSESSMENT — PATIENT HEALTH QUESTIONNAIRE - PHQ9: SUM OF ALL RESPONSES TO PHQ QUESTIONS 1-9: 5

## 2020-01-13 ASSESSMENT — ANXIETY QUESTIONNAIRES
4. TROUBLE RELAXING: NOT AT ALL
7. FEELING AFRAID AS IF SOMETHING AWFUL MIGHT HAPPEN: SEVERAL DAYS
1. FEELING NERVOUS, ANXIOUS, OR ON EDGE: SEVERAL DAYS
IF YOU CHECKED OFF ANY PROBLEMS ON THIS QUESTIONNAIRE, HOW DIFFICULT HAVE THESE PROBLEMS MADE IT FOR YOU TO DO YOUR WORK, TAKE CARE OF THINGS AT HOME, OR GET ALONG WITH OTHER PEOPLE: SOMEWHAT DIFFICULT
GAD7 TOTAL SCORE: 3
3. WORRYING TOO MUCH ABOUT DIFFERENT THINGS: NOT AT ALL
5. BEING SO RESTLESS THAT IT IS HARD TO SIT STILL: NOT AT ALL
2. NOT BEING ABLE TO STOP OR CONTROL WORRYING: NOT AT ALL
6. BECOMING EASILY ANNOYED OR IRRITABLE: SEVERAL DAYS

## 2020-01-28 ENCOUNTER — COMMUNICATION - HEALTHEAST (OUTPATIENT)
Dept: ENDOCRINOLOGY | Facility: CLINIC | Age: 85
End: 2020-01-28

## 2020-01-29 ENCOUNTER — COMMUNICATION - HEALTHEAST (OUTPATIENT)
Dept: FAMILY MEDICINE | Facility: CLINIC | Age: 85
End: 2020-01-29

## 2020-01-30 ENCOUNTER — COMMUNICATION - HEALTHEAST (OUTPATIENT)
Dept: FAMILY MEDICINE | Facility: CLINIC | Age: 85
End: 2020-01-30

## 2020-02-04 ENCOUNTER — OFFICE VISIT - HEALTHEAST (OUTPATIENT)
Dept: ENDOCRINOLOGY | Facility: CLINIC | Age: 85
End: 2020-02-04

## 2020-02-04 DIAGNOSIS — M81.8 IDIOPATHIC OSTEOPOROSIS: ICD-10-CM

## 2020-02-04 ASSESSMENT — MIFFLIN-ST. JEOR: SCORE: 1074.06

## 2020-03-02 ENCOUNTER — RECORDS - HEALTHEAST (OUTPATIENT)
Dept: ADMINISTRATIVE | Facility: OTHER | Age: 85
End: 2020-03-02

## 2020-03-10 ENCOUNTER — RECORDS - HEALTHEAST (OUTPATIENT)
Dept: GENERAL RADIOLOGY | Facility: CLINIC | Age: 85
End: 2020-03-10

## 2020-03-10 ENCOUNTER — OFFICE VISIT - HEALTHEAST (OUTPATIENT)
Dept: FAMILY MEDICINE | Facility: CLINIC | Age: 85
End: 2020-03-10

## 2020-03-10 DIAGNOSIS — M75.41 SUBACROMIAL IMPINGEMENT OF RIGHT SHOULDER: ICD-10-CM

## 2020-03-10 DIAGNOSIS — M25.511 ACUTE PAIN OF RIGHT SHOULDER: ICD-10-CM

## 2020-03-10 DIAGNOSIS — M89.8X2 PAIN OF RIGHT HUMERUS: ICD-10-CM

## 2020-03-10 DIAGNOSIS — M25.511 PAIN IN RIGHT SHOULDER: ICD-10-CM

## 2020-03-10 DIAGNOSIS — M79.621 PAIN IN RIGHT UPPER ARM: ICD-10-CM

## 2020-03-12 ENCOUNTER — COMMUNICATION - HEALTHEAST (OUTPATIENT)
Dept: FAMILY MEDICINE | Facility: CLINIC | Age: 85
End: 2020-03-12

## 2020-03-21 ENCOUNTER — COMMUNICATION - HEALTHEAST (OUTPATIENT)
Dept: FAMILY MEDICINE | Facility: CLINIC | Age: 85
End: 2020-03-21

## 2020-03-21 DIAGNOSIS — G62.9 PERIPHERAL NEUROPATHY: ICD-10-CM

## 2020-03-30 ENCOUNTER — COMMUNICATION - HEALTHEAST (OUTPATIENT)
Dept: FAMILY MEDICINE | Facility: CLINIC | Age: 85
End: 2020-03-30

## 2020-03-30 DIAGNOSIS — M62.838 MUSCLE SPASM: ICD-10-CM

## 2020-03-30 DIAGNOSIS — R11.0 NAUSEA: ICD-10-CM

## 2020-03-30 DIAGNOSIS — M25.511 ACUTE PAIN OF RIGHT SHOULDER: ICD-10-CM

## 2020-04-04 ENCOUNTER — COMMUNICATION - HEALTHEAST (OUTPATIENT)
Dept: FAMILY MEDICINE | Facility: CLINIC | Age: 85
End: 2020-04-04

## 2020-04-04 DIAGNOSIS — G62.9 PERIPHERAL NEUROPATHY: ICD-10-CM

## 2020-04-22 ENCOUNTER — OFFICE VISIT - HEALTHEAST (OUTPATIENT)
Dept: FAMILY MEDICINE | Facility: CLINIC | Age: 85
End: 2020-04-22

## 2020-04-22 DIAGNOSIS — M25.511 ACUTE PAIN OF RIGHT SHOULDER: ICD-10-CM

## 2020-05-15 ENCOUNTER — DOCUMENTATION ONLY (OUTPATIENT)
Dept: OTHER | Facility: CLINIC | Age: 85
End: 2020-05-15

## 2020-05-15 ENCOUNTER — AMBULATORY - HEALTHEAST (OUTPATIENT)
Dept: OTHER | Facility: CLINIC | Age: 85
End: 2020-05-15

## 2020-06-28 ENCOUNTER — COMMUNICATION - HEALTHEAST (OUTPATIENT)
Dept: FAMILY MEDICINE | Facility: CLINIC | Age: 85
End: 2020-06-28

## 2020-06-28 DIAGNOSIS — F33.42 MAJOR DEPRESSIVE DISORDER, RECURRENT EPISODE, IN FULL REMISSION (H): ICD-10-CM

## 2020-07-29 ENCOUNTER — COMMUNICATION - HEALTHEAST (OUTPATIENT)
Dept: FAMILY MEDICINE | Facility: CLINIC | Age: 85
End: 2020-07-29

## 2020-07-29 DIAGNOSIS — M25.511 ACUTE PAIN OF RIGHT SHOULDER: ICD-10-CM

## 2020-09-16 ENCOUNTER — COMMUNICATION - HEALTHEAST (OUTPATIENT)
Dept: FAMILY MEDICINE | Facility: CLINIC | Age: 85
End: 2020-09-16

## 2020-09-30 ENCOUNTER — COMMUNICATION - HEALTHEAST (OUTPATIENT)
Dept: FAMILY MEDICINE | Facility: CLINIC | Age: 85
End: 2020-09-30

## 2020-10-01 ENCOUNTER — COMMUNICATION - HEALTHEAST (OUTPATIENT)
Dept: FAMILY MEDICINE | Facility: CLINIC | Age: 85
End: 2020-10-01
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
## 2020-10-01 DIAGNOSIS — M62.838 MUSCLE SPASM: ICD-10-CM

## 2020-10-01 DIAGNOSIS — S06.0X0A CONCUSSION WITHOUT LOSS OF CONSCIOUSNESS, INITIAL ENCOUNTER: ICD-10-CM

## 2020-10-01 DIAGNOSIS — F33.42 MAJOR DEPRESSIVE DISORDER, RECURRENT EPISODE, IN FULL REMISSION (H): ICD-10-CM

## 2020-10-01 DIAGNOSIS — M25.572 PAIN IN JOINT, ANKLE AND FOOT, LEFT: ICD-10-CM

## 2020-10-01 DIAGNOSIS — R51.9 ACUTE NONINTRACTABLE HEADACHE, UNSPECIFIED HEADACHE TYPE: ICD-10-CM

## 2020-10-01 DIAGNOSIS — E03.9 HYPOTHYROIDISM: ICD-10-CM

## 2020-10-01 DIAGNOSIS — R26.89 UNSTABLE BALANCE: ICD-10-CM

## 2020-10-02 ENCOUNTER — RECORDS - HEALTHEAST (OUTPATIENT)
Dept: GENERAL RADIOLOGY | Facility: CLINIC | Age: 85
End: 2020-10-02

## 2020-10-02 ENCOUNTER — OFFICE VISIT - HEALTHEAST (OUTPATIENT)
Dept: FAMILY MEDICINE | Facility: CLINIC | Age: 85
End: 2020-10-02

## 2020-10-02 ENCOUNTER — COMMUNICATION - HEALTHEAST (OUTPATIENT)
Dept: FAMILY MEDICINE | Facility: CLINIC | Age: 85
End: 2020-10-02

## 2020-10-02 DIAGNOSIS — M81.0 AGE-RELATED OSTEOPOROSIS WITHOUT CURRENT PATHOLOGICAL FRACTURE: ICD-10-CM

## 2020-10-02 DIAGNOSIS — Z23 NEED FOR VACCINATION: ICD-10-CM

## 2020-10-02 DIAGNOSIS — E03.9 HYPOTHYROIDISM, UNSPECIFIED TYPE: ICD-10-CM
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
## 2020-10-02 DIAGNOSIS — M25.579 PAIN IN UNSPECIFIED ANKLE AND JOINTS OF UNSPECIFIED FOOT: ICD-10-CM

## 2020-10-02 DIAGNOSIS — D50.0 IRON DEFICIENCY ANEMIA DUE TO CHRONIC BLOOD LOSS: ICD-10-CM

## 2020-10-02 DIAGNOSIS — R35.0 URINARY FREQUENCY: ICD-10-CM

## 2020-10-02 DIAGNOSIS — E56.9 VITAMIN DEFICIENCY: ICD-10-CM

## 2020-10-02 DIAGNOSIS — R26.89 UNSTABLE BALANCE: ICD-10-CM

## 2020-10-02 DIAGNOSIS — M25.579 PAIN IN JOINT, ANKLE AND FOOT, UNSPECIFIED LATERALITY: ICD-10-CM

## 2020-10-02 DIAGNOSIS — S09.90XA CLOSED HEAD INJURY, INITIAL ENCOUNTER: ICD-10-CM

## 2020-10-02 DIAGNOSIS — D64.9 ANEMIA, UNSPECIFIED TYPE: ICD-10-CM

## 2020-10-02 DIAGNOSIS — S06.0X0A CONCUSSION WITHOUT LOSS OF CONSCIOUSNESS, INITIAL ENCOUNTER: ICD-10-CM

## 2020-10-02 DIAGNOSIS — R26.89 OTHER ABNORMALITIES OF GAIT AND MOBILITY: ICD-10-CM

## 2020-10-02 LAB
ALBUMIN SERPL-MCNC: 3.8 G/DL (ref 3.5–5)
ALP SERPL-CCNC: 76 U/L (ref 45–120)
ALT SERPL W P-5'-P-CCNC: 24 U/L (ref 0–45)
ANION GAP SERPL CALCULATED.3IONS-SCNC: 10 MMOL/L (ref 5–18)
AST SERPL W P-5'-P-CCNC: 22 U/L (ref 0–40)
BILIRUB SERPL-MCNC: 0.3 MG/DL (ref 0–1)
BUN SERPL-MCNC: 17 MG/DL (ref 8–28)
CALCIUM SERPL-MCNC: 8.9 MG/DL (ref 8.5–10.5)
CHLORIDE BLD-SCNC: 102 MMOL/L (ref 98–107)
CK SERPL-CCNC: 99 U/L (ref 30–190)
CO2 SERPL-SCNC: 24 MMOL/L (ref 22–31)
CREAT SERPL-MCNC: 0.72 MG/DL (ref 0.6–1.1)
ERYTHROCYTE [DISTWIDTH] IN BLOOD BY AUTOMATED COUNT: 13.1 % (ref 11–14.5)
GFR SERPL CREATININE-BSD FRML MDRD: >60 ML/MIN/1.73M2
GLUCOSE BLD-MCNC: 97 MG/DL (ref 70–125)
HCT VFR BLD AUTO: 28.5 % (ref 35–47)
HGB BLD-MCNC: 9.1 G/DL (ref 12–16)
IRON SATN MFR SERPL: 3 % (ref 20–50)
IRON SERPL-MCNC: 15 UG/DL (ref 42–175)
MCH RBC QN AUTO: 25 PG (ref 27–34)
MCHC RBC AUTO-ENTMCNC: 32 G/DL (ref 32–36)
MCV RBC AUTO: 78 FL (ref 80–100)
PLATELET # BLD AUTO: 395 THOU/UL (ref 140–440)
PMV BLD AUTO: 7.2 FL (ref 7–10)
POTASSIUM BLD-SCNC: 4.4 MMOL/L (ref 3.5–5)
PROT SERPL-MCNC: 6.5 G/DL (ref 6–8)
RBC # BLD AUTO: 3.64 MILL/UL (ref 3.8–5.4)
SODIUM SERPL-SCNC: 136 MMOL/L (ref 136–145)
TIBC SERPL-MCNC: 475 UG/DL (ref 313–563)
TRANSFERRIN SERPL-MCNC: 380 MG/DL (ref 212–360)
TSH SERPL DL<=0.005 MIU/L-ACNC: 1.64 UIU/ML (ref 0.3–5)
VIT B12 SERPL-MCNC: 1753 PG/ML (ref 213–816)
WBC: 5.5 THOU/UL (ref 4–11)

## 2020-10-02 RX ORDER — LEVOTHYROXINE SODIUM 75 UG/1
TABLET ORAL
Qty: 90 TABLET | Refills: 3 | Status: SHIPPED | OUTPATIENT
Start: 2020-10-02 | End: 2021-10-01

## 2020-10-02 RX ORDER — FLUOXETINE 10 MG/1
CAPSULE ORAL
Qty: 270 CAPSULE | Refills: 3 | Status: SHIPPED | OUTPATIENT
Start: 2020-10-02 | End: 2021-09-07

## 2020-10-05 LAB
25(OH)D3 SERPL-MCNC: 79.9 NG/ML (ref 30–80)
25(OH)D3 SERPL-MCNC: 79.9 NG/ML (ref 30–80)

## 2020-10-06 LAB — PYRIDOXAL PHOS SERPL-SCNC: 318.3 NMOL/L (ref 20–125)

## 2020-10-08 ENCOUNTER — AMBULATORY - HEALTHEAST (OUTPATIENT)
Dept: NEUROSURGERY | Facility: CLINIC | Age: 85
End: 2020-10-08

## 2020-10-08 DIAGNOSIS — M54.2 NECK PAIN: ICD-10-CM

## 2020-10-08 DIAGNOSIS — M54.9 BACK PAIN: ICD-10-CM

## 2020-10-09 ENCOUNTER — HOSPITAL ENCOUNTER (OUTPATIENT)
Dept: RADIOLOGY | Facility: HOSPITAL | Age: 85
Discharge: HOME | End: 2020-10-09
Attending: NEUROLOGICAL SURGERY

## 2020-10-09 ENCOUNTER — AMBULATORY - HEALTHEAST (OUTPATIENT)
Dept: LAB | Facility: CLINIC | Age: 85
End: 2020-10-09

## 2020-10-09 DIAGNOSIS — R35.0 URINARY FREQUENCY: ICD-10-CM

## 2020-10-09 DIAGNOSIS — M54.2 NECK PAIN: ICD-10-CM

## 2020-10-09 DIAGNOSIS — M54.9 BACK PAIN: ICD-10-CM

## 2020-10-12 ENCOUNTER — AMBULATORY - HEALTHEAST (OUTPATIENT)
Dept: NEUROSURGERY | Facility: CLINIC | Age: 85
End: 2020-10-12

## 2020-10-12 ENCOUNTER — COMMUNICATION - HEALTHEAST (OUTPATIENT)
Dept: NEUROSURGERY | Facility: CLINIC | Age: 85
End: 2020-10-12

## 2020-10-12 DIAGNOSIS — M54.9 BACK PAIN: ICD-10-CM

## 2020-10-12 DIAGNOSIS — M54.2 NECK PAIN: ICD-10-CM

## 2020-10-12 DIAGNOSIS — R51.9 HEADACHE: ICD-10-CM

## 2020-10-16 ENCOUNTER — HOSPITAL ENCOUNTER (OUTPATIENT)
Dept: CT IMAGING | Facility: CLINIC | Age: 85
Discharge: HOME | End: 2020-10-16
Attending: NEUROLOGICAL SURGERY

## 2020-10-16 ENCOUNTER — HOSPITAL ENCOUNTER (OUTPATIENT)
Dept: MRI IMAGING | Facility: CLINIC | Age: 85
Discharge: HOME | End: 2020-10-16
Attending: NEUROLOGICAL SURGERY

## 2020-10-16 DIAGNOSIS — M54.2 NECK PAIN: ICD-10-CM

## 2020-10-16 DIAGNOSIS — M54.9 BACK PAIN: ICD-10-CM

## 2020-10-16 DIAGNOSIS — R51.9 HEADACHE: ICD-10-CM

## 2020-10-20 ENCOUNTER — OFFICE VISIT - HEALTHEAST (OUTPATIENT)
Dept: NEUROSURGERY | Facility: CLINIC | Age: 85
End: 2020-10-20

## 2020-10-20 ENCOUNTER — COMMUNICATION - HEALTHEAST (OUTPATIENT)
Dept: FAMILY MEDICINE | Facility: CLINIC | Age: 85
End: 2020-10-20
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
## 2020-10-20 DIAGNOSIS — R26.89 IMBALANCE: ICD-10-CM

## 2020-10-20 DIAGNOSIS — S06.0X0A CONCUSSION WITHOUT LOSS OF CONSCIOUSNESS, INITIAL ENCOUNTER: ICD-10-CM

## 2020-10-20 DIAGNOSIS — R26.89 UNSTABLE BALANCE: ICD-10-CM

## 2020-10-20 ASSESSMENT — MIFFLIN-ST. JEOR: SCORE: 1053.64

## 2020-10-23 ENCOUNTER — COMMUNICATION - HEALTHEAST (OUTPATIENT)
Dept: HOME HEALTH SERVICES | Facility: HOME HEALTH | Age: 85
End: 2020-10-23
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
## 2020-10-23 DIAGNOSIS — R26.89 UNSTABLE BALANCE: ICD-10-CM

## 2020-10-23 DIAGNOSIS — S06.0X0A CONCUSSION WITHOUT LOSS OF CONSCIOUSNESS, INITIAL ENCOUNTER: ICD-10-CM

## 2020-10-27 ENCOUNTER — COMMUNICATION - HEALTHEAST (OUTPATIENT)
Dept: FAMILY MEDICINE | Facility: CLINIC | Age: 85
End: 2020-10-27

## 2020-10-30 ENCOUNTER — RECORDS - HEALTHEAST (OUTPATIENT)
Dept: ADMINISTRATIVE | Facility: OTHER | Age: 85
End: 2020-10-30

## 2020-11-02 ENCOUNTER — COMMUNICATION - HEALTHEAST (OUTPATIENT)
Dept: HOME HEALTH SERVICES | Facility: HOME HEALTH | Age: 85
End: 2020-11-02

## 2020-11-10 ENCOUNTER — RECORDS - HEALTHEAST (OUTPATIENT)
Dept: ADMINISTRATIVE | Facility: OTHER | Age: 85
End: 2020-11-10

## 2020-11-27 ENCOUNTER — RECORDS - HEALTHEAST (OUTPATIENT)
Dept: ADMINISTRATIVE | Facility: OTHER | Age: 85
End: 2020-11-27

## 2020-11-29 ENCOUNTER — COMMUNICATION - HEALTHEAST (OUTPATIENT)
Dept: FAMILY MEDICINE | Facility: CLINIC | Age: 85
End: 2020-11-29

## 2020-12-01 ENCOUNTER — OFFICE VISIT - HEALTHEAST (OUTPATIENT)
Dept: FAMILY MEDICINE | Facility: CLINIC | Age: 85
End: 2020-12-01

## 2020-12-01 DIAGNOSIS — R39.11 URINARY HESITANCY: ICD-10-CM

## 2020-12-01 DIAGNOSIS — D50.9 IRON DEFICIENCY ANEMIA, UNSPECIFIED IRON DEFICIENCY ANEMIA TYPE: ICD-10-CM

## 2020-12-01 DIAGNOSIS — R26.89 UNSTABLE BALANCE: ICD-10-CM

## 2020-12-01 DIAGNOSIS — E03.9 HYPOTHYROIDISM, UNSPECIFIED TYPE: ICD-10-CM

## 2020-12-01 DIAGNOSIS — R11.0 NAUSEA: ICD-10-CM

## 2020-12-01 DIAGNOSIS — D50.0 IRON DEFICIENCY ANEMIA DUE TO CHRONIC BLOOD LOSS: ICD-10-CM

## 2020-12-01 DIAGNOSIS — I10 ESSENTIAL HYPERTENSION: ICD-10-CM

## 2020-12-01 DIAGNOSIS — F33.42 RECURRENT MAJOR DEPRESSIVE DISORDER, IN FULL REMISSION (H): ICD-10-CM

## 2020-12-01 DIAGNOSIS — Z00.00 MEDICARE ANNUAL WELLNESS VISIT, SUBSEQUENT: ICD-10-CM

## 2020-12-01 LAB
ANION GAP SERPL CALCULATED.3IONS-SCNC: 10 MMOL/L (ref 5–18)
BUN SERPL-MCNC: 15 MG/DL (ref 8–28)
CALCIUM SERPL-MCNC: 9.1 MG/DL (ref 8.5–10.5)
CHLORIDE BLD-SCNC: 103 MMOL/L (ref 98–107)
CO2 SERPL-SCNC: 25 MMOL/L (ref 22–31)
CREAT SERPL-MCNC: 0.74 MG/DL (ref 0.6–1.1)
GFR SERPL CREATININE-BSD FRML MDRD: >60 ML/MIN/1.73M2
GLUCOSE BLD-MCNC: 93 MG/DL (ref 70–125)
IRON SATN MFR SERPL: 69 % (ref 20–50)
IRON SERPL-MCNC: 257 UG/DL (ref 42–175)
POTASSIUM BLD-SCNC: 3.8 MMOL/L (ref 3.5–5)
SODIUM SERPL-SCNC: 138 MMOL/L (ref 136–145)
TIBC SERPL-MCNC: 371 UG/DL (ref 313–563)
TRANSFERRIN SERPL-MCNC: 296 MG/DL (ref 212–360)

## 2020-12-01 RX ORDER — ONDANSETRON 4 MG/1
TABLET, ORALLY DISINTEGRATING ORAL
Qty: 30 TABLET | Refills: 1 | Status: SHIPPED | OUTPATIENT
Start: 2020-12-01 | End: 2021-09-02

## 2020-12-01 ASSESSMENT — MIFFLIN-ST. JEOR: SCORE: 1034.59

## 2020-12-01 ASSESSMENT — ANXIETY QUESTIONNAIRES
IF YOU CHECKED OFF ANY PROBLEMS ON THIS QUESTIONNAIRE, HOW DIFFICULT HAVE THESE PROBLEMS MADE IT FOR YOU TO DO YOUR WORK, TAKE CARE OF THINGS AT HOME, OR GET ALONG WITH OTHER PEOPLE: SOMEWHAT DIFFICULT
3. WORRYING TOO MUCH ABOUT DIFFERENT THINGS: SEVERAL DAYS
5. BEING SO RESTLESS THAT IT IS HARD TO SIT STILL: NOT AT ALL
1. FEELING NERVOUS, ANXIOUS, OR ON EDGE: SEVERAL DAYS
4. TROUBLE RELAXING: NOT AT ALL
2. NOT BEING ABLE TO STOP OR CONTROL WORRYING: NOT AT ALL
6. BECOMING EASILY ANNOYED OR IRRITABLE: NOT AT ALL
GAD7 TOTAL SCORE: 2
7. FEELING AFRAID AS IF SOMETHING AWFUL MIGHT HAPPEN: NOT AT ALL

## 2020-12-01 ASSESSMENT — PATIENT HEALTH QUESTIONNAIRE - PHQ9: SUM OF ALL RESPONSES TO PHQ QUESTIONS 1-9: 5

## 2020-12-02 ENCOUNTER — AMBULATORY - HEALTHEAST (OUTPATIENT)
Dept: LAB | Facility: CLINIC | Age: 85
End: 2020-12-02

## 2020-12-02 DIAGNOSIS — R39.11 URINARY HESITANCY: ICD-10-CM

## 2020-12-02 LAB
ALBUMIN UR-MCNC: NEGATIVE MG/DL
APPEARANCE UR: ABNORMAL
BILIRUB UR QL STRIP: NEGATIVE
COLOR UR AUTO: YELLOW
GLUCOSE UR STRIP-MCNC: NEGATIVE MG/DL
HGB UR QL STRIP: NEGATIVE
KETONES UR STRIP-MCNC: NEGATIVE MG/DL
LEUKOCYTE ESTERASE UR QL STRIP: ABNORMAL
NITRATE UR QL: NEGATIVE
PH UR STRIP: 6 [PH] (ref 5–8)
SP GR UR STRIP: 1.01 (ref 1–1.03)
UROBILINOGEN UR STRIP-ACNC: ABNORMAL

## 2020-12-03 ENCOUNTER — COMMUNICATION - HEALTHEAST (OUTPATIENT)
Dept: FAMILY MEDICINE | Facility: CLINIC | Age: 85
End: 2020-12-03

## 2020-12-03 ENCOUNTER — AMBULATORY - HEALTHEAST (OUTPATIENT)
Dept: FAMILY MEDICINE | Facility: CLINIC | Age: 85
End: 2020-12-03

## 2020-12-03 LAB — BACTERIA SPEC CULT: NORMAL

## 2020-12-04 LAB
CAP COMMENT: NORMAL
LAB AP CHARGES (HE HISTORICAL CONVERSION): NORMAL
LAB MED GENERAL PATH INTERP (HE HISTORICAL CONVERSION): NORMAL
PATH REPORT.COMMENTS IMP SPEC: NORMAL
PATH REPORT.FINAL DX SPEC: NORMAL
PATH REPORT.MICROSCOPIC SPEC OTHER STN: NORMAL
PATH REPORT.RELEVANT HX SPEC: NORMAL
SPECIMEN DESCRIPTION: NORMAL

## 2020-12-29 ENCOUNTER — COMMUNICATION - HEALTHEAST (OUTPATIENT)
Dept: FAMILY MEDICINE | Facility: CLINIC | Age: 85
End: 2020-12-29

## 2020-12-29 DIAGNOSIS — I10 ESSENTIAL HYPERTENSION: ICD-10-CM

## 2020-12-30 RX ORDER — LOSARTAN POTASSIUM 50 MG/1
TABLET ORAL
Qty: 180 TABLET | Refills: 3 | Status: SHIPPED | OUTPATIENT
Start: 2020-12-30 | End: 2021-10-20

## 2021-01-05 ENCOUNTER — COMMUNICATION - HEALTHEAST (OUTPATIENT)
Dept: FAMILY MEDICINE | Facility: CLINIC | Age: 86
End: 2021-01-05

## 2021-01-05 DIAGNOSIS — D50.0 IRON DEFICIENCY ANEMIA DUE TO CHRONIC BLOOD LOSS: ICD-10-CM

## 2021-01-06 RX ORDER — FERROUS SULFATE 325(65) MG
1 TABLET ORAL
Qty: 90 TABLET | Refills: 2 | Status: SHIPPED | OUTPATIENT
Start: 2021-01-06 | End: 2021-10-01

## 2021-01-08 ENCOUNTER — COMMUNICATION - HEALTHEAST (OUTPATIENT)
Dept: FAMILY MEDICINE | Facility: CLINIC | Age: 86
End: 2021-01-08

## 2021-01-14 ENCOUNTER — AMBULATORY - HEALTHEAST (OUTPATIENT)
Dept: ENDOCRINOLOGY | Facility: CLINIC | Age: 86
End: 2021-01-14

## 2021-01-14 DIAGNOSIS — E03.9 HYPOTHYROIDISM, UNSPECIFIED TYPE: ICD-10-CM

## 2021-01-14 DIAGNOSIS — M81.8 IDIOPATHIC OSTEOPOROSIS: ICD-10-CM

## 2021-01-18 ENCOUNTER — RECORDS - HEALTHEAST (OUTPATIENT)
Dept: ADMINISTRATIVE | Facility: OTHER | Age: 86
End: 2021-01-18

## 2021-01-28 ENCOUNTER — AMBULATORY - HEALTHEAST (OUTPATIENT)
Dept: LAB | Facility: CLINIC | Age: 86
End: 2021-01-28

## 2021-01-28 DIAGNOSIS — E03.9 HYPOTHYROIDISM, UNSPECIFIED TYPE: ICD-10-CM

## 2021-01-28 DIAGNOSIS — M81.8 IDIOPATHIC OSTEOPOROSIS: ICD-10-CM

## 2021-01-28 LAB — T4 FREE SERPL-MCNC: 1 NG/DL (ref 0.7–1.8)

## 2021-01-29 LAB — 25(OH)D3 SERPL-MCNC: 63.3 NG/ML (ref 30–80)

## 2021-02-01 ENCOUNTER — COMMUNICATION - HEALTHEAST (OUTPATIENT)
Dept: FAMILY MEDICINE | Facility: CLINIC | Age: 86
End: 2021-02-01

## 2021-02-01 DIAGNOSIS — M25.511 ACUTE PAIN OF RIGHT SHOULDER: ICD-10-CM

## 2021-02-02 ENCOUNTER — OFFICE VISIT - HEALTHEAST (OUTPATIENT)
Dept: ENDOCRINOLOGY | Facility: CLINIC | Age: 86
End: 2021-02-02

## 2021-02-02 DIAGNOSIS — M81.8 IDIOPATHIC OSTEOPOROSIS: ICD-10-CM

## 2021-02-02 DIAGNOSIS — E03.9 HYPOTHYROIDISM, UNSPECIFIED TYPE: ICD-10-CM

## 2021-02-02 RX ORDER — GABAPENTIN 100 MG/1
CAPSULE ORAL
Qty: 270 CAPSULE | Refills: 5 | Status: SHIPPED | OUTPATIENT
Start: 2021-02-02 | End: 2022-05-24

## 2021-02-03 ENCOUNTER — AMBULATORY - HEALTHEAST (OUTPATIENT)
Dept: SCHEDULING | Facility: CLINIC | Age: 86
End: 2021-02-03

## 2021-02-03 ENCOUNTER — COMMUNICATION - HEALTHEAST (OUTPATIENT)
Dept: ENDOCRINOLOGY | Facility: CLINIC | Age: 86
End: 2021-02-03

## 2021-02-03 ENCOUNTER — AMBULATORY - HEALTHEAST (OUTPATIENT)
Dept: ENDOCRINOLOGY | Facility: CLINIC | Age: 86
End: 2021-02-03

## 2021-02-03 DIAGNOSIS — M81.8 IDIOPATHIC OSTEOPOROSIS: ICD-10-CM

## 2021-02-16 ENCOUNTER — OFFICE VISIT - HEALTHEAST (OUTPATIENT)
Dept: FAMILY MEDICINE | Facility: CLINIC | Age: 86
End: 2021-02-16

## 2021-02-16 DIAGNOSIS — L60.9 NAIL ABNORMALITY: ICD-10-CM

## 2021-02-16 DIAGNOSIS — F33.42 MAJOR DEPRESSIVE DISORDER, RECURRENT EPISODE, IN FULL REMISSION (H): ICD-10-CM

## 2021-02-16 DIAGNOSIS — R26.89 UNSTABLE BALANCE: ICD-10-CM

## 2021-02-16 DIAGNOSIS — M75.41 SUBACROMIAL IMPINGEMENT OF RIGHT SHOULDER: ICD-10-CM

## 2021-02-16 DIAGNOSIS — G56.01 CARPAL TUNNEL SYNDROME OF RIGHT WRIST: ICD-10-CM

## 2021-02-16 DIAGNOSIS — I10 ESSENTIAL HYPERTENSION: ICD-10-CM

## 2021-02-16 DIAGNOSIS — M65.30 TRIGGER FINGER, ACQUIRED: ICD-10-CM

## 2021-02-17 ENCOUNTER — COMMUNICATION - HEALTHEAST (OUTPATIENT)
Dept: FAMILY MEDICINE | Facility: CLINIC | Age: 86
End: 2021-02-17

## 2021-02-17 ENCOUNTER — AMBULATORY - HEALTHEAST (OUTPATIENT)
Dept: NURSING | Facility: CLINIC | Age: 86
End: 2021-02-17

## 2021-02-22 ENCOUNTER — COMMUNICATION - HEALTHEAST (OUTPATIENT)
Dept: CARE COORDINATION | Facility: CLINIC | Age: 86
End: 2021-02-22

## 2021-03-10 ENCOUNTER — AMBULATORY - HEALTHEAST (OUTPATIENT)
Dept: NURSING | Facility: CLINIC | Age: 86
End: 2021-03-10

## 2021-03-24 ENCOUNTER — RECORDS - HEALTHEAST (OUTPATIENT)
Dept: ADMINISTRATIVE | Facility: OTHER | Age: 86
End: 2021-03-24

## 2021-03-27 ENCOUNTER — COMMUNICATION - HEALTHEAST (OUTPATIENT)
Dept: FAMILY MEDICINE | Facility: CLINIC | Age: 86
End: 2021-03-27

## 2021-03-27 DIAGNOSIS — F33.42 MAJOR DEPRESSIVE DISORDER, RECURRENT EPISODE, IN FULL REMISSION (H): ICD-10-CM

## 2021-03-27 DIAGNOSIS — K21.9 GASTROESOPHAGEAL REFLUX DISEASE WITHOUT ESOPHAGITIS: ICD-10-CM

## 2021-03-27 DIAGNOSIS — M62.838 MUSCLE SPASM: ICD-10-CM

## 2021-03-27 RX ORDER — BUPROPION HYDROCHLORIDE 150 MG/1
TABLET, EXTENDED RELEASE ORAL
Qty: 90 TABLET | Refills: 2 | Status: SHIPPED | OUTPATIENT
Start: 2021-03-27 | End: 2021-07-20

## 2021-03-27 RX ORDER — BACLOFEN 10 MG/1
TABLET ORAL
Qty: 270 TABLET | Refills: 1 | Status: SHIPPED | OUTPATIENT
Start: 2021-03-27 | End: 2022-05-24

## 2021-04-08 ENCOUNTER — RECORDS - HEALTHEAST (OUTPATIENT)
Dept: ADMINISTRATIVE | Facility: OTHER | Age: 86
End: 2021-04-08

## 2021-04-15 ENCOUNTER — COMMUNICATION - HEALTHEAST (OUTPATIENT)
Dept: ENDOCRINOLOGY | Facility: CLINIC | Age: 86
End: 2021-04-15

## 2021-05-13 ENCOUNTER — COMMUNICATION - HEALTHEAST (OUTPATIENT)
Dept: FAMILY MEDICINE | Facility: CLINIC | Age: 86
End: 2021-05-13

## 2021-05-17 ENCOUNTER — RECORDS - HEALTHEAST (OUTPATIENT)
Dept: ADMINISTRATIVE | Facility: OTHER | Age: 86
End: 2021-05-17

## 2021-05-17 ENCOUNTER — OFFICE VISIT - HEALTHEAST (OUTPATIENT)
Dept: FAMILY MEDICINE | Facility: CLINIC | Age: 86
End: 2021-05-17

## 2021-05-17 DIAGNOSIS — E83.19 IRON OVERLOAD: ICD-10-CM

## 2021-05-17 DIAGNOSIS — R41.89 COGNITIVE CHANGES: ICD-10-CM

## 2021-05-17 DIAGNOSIS — E27.8 ADRENAL MASS, LEFT (H): ICD-10-CM

## 2021-05-17 DIAGNOSIS — E03.9 HYPOTHYROIDISM, UNSPECIFIED TYPE: ICD-10-CM

## 2021-05-17 DIAGNOSIS — I10 ESSENTIAL HYPERTENSION: ICD-10-CM

## 2021-05-17 DIAGNOSIS — R06.89 OTHER ABNORMALITIES OF BREATHING: ICD-10-CM

## 2021-05-17 DIAGNOSIS — R40.0 UNCONTROLLED DAYTIME SOMNOLENCE: ICD-10-CM

## 2021-05-17 DIAGNOSIS — M62.81 GENERALIZED MUSCLE WEAKNESS: ICD-10-CM

## 2021-05-17 DIAGNOSIS — R71.8 ELEVATED MCV: ICD-10-CM

## 2021-05-17 DIAGNOSIS — R60.0 PERIPHERAL EDEMA: ICD-10-CM

## 2021-05-17 LAB
ALBUMIN SERPL-MCNC: 3.6 G/DL (ref 3.5–5)
ALP SERPL-CCNC: 83 U/L (ref 45–120)
ALT SERPL W P-5'-P-CCNC: 35 U/L (ref 0–45)
ANION GAP SERPL CALCULATED.3IONS-SCNC: 12 MMOL/L (ref 5–18)
AST SERPL W P-5'-P-CCNC: 25 U/L (ref 0–40)
BASOPHILS # BLD AUTO: 0 THOU/UL (ref 0–0.2)
BASOPHILS NFR BLD AUTO: 1 % (ref 0–2)
BILIRUB SERPL-MCNC: 0.4 MG/DL (ref 0–1)
BNP SERPL-MCNC: 169 PG/ML (ref 0–167)
BUN SERPL-MCNC: 20 MG/DL (ref 8–28)
C REACTIVE PROTEIN LHE: <0.1 MG/DL (ref 0–0.8)
CALCIUM SERPL-MCNC: 8.8 MG/DL (ref 8.5–10.5)
CHLORIDE BLD-SCNC: 101 MMOL/L (ref 98–107)
CO2 SERPL-SCNC: 24 MMOL/L (ref 22–31)
CREAT SERPL-MCNC: 0.66 MG/DL (ref 0.6–1.1)
EOSINOPHIL # BLD AUTO: 0.1 THOU/UL (ref 0–0.4)
EOSINOPHIL NFR BLD AUTO: 2 % (ref 0–6)
ERYTHROCYTE [DISTWIDTH] IN BLOOD BY AUTOMATED COUNT: 12.8 % (ref 11–14.5)
ERYTHROCYTE [SEDIMENTATION RATE] IN BLOOD BY WESTERGREN METHOD: 7 MM/HR (ref 0–20)
FERRITIN SERPL-MCNC: 28 NG/ML (ref 10–130)
GFR SERPL CREATININE-BSD FRML MDRD: >60 ML/MIN/1.73M2
GLUCOSE BLD-MCNC: 99 MG/DL (ref 70–125)
HCT VFR BLD AUTO: 37.8 % (ref 35–47)
HGB BLD-MCNC: 12 G/DL (ref 12–16)
IMM GRANULOCYTES # BLD: 0 THOU/UL
IMM GRANULOCYTES NFR BLD: 1 %
IRON SATN MFR SERPL: 24 % (ref 20–50)
IRON SERPL-MCNC: 87 UG/DL (ref 42–175)
LYMPHOCYTES # BLD AUTO: 1.5 THOU/UL (ref 0.8–4.4)
LYMPHOCYTES NFR BLD AUTO: 25 % (ref 20–40)
MCH RBC QN AUTO: 32.3 PG (ref 27–34)
MCHC RBC AUTO-ENTMCNC: 31.7 G/DL (ref 32–36)
MCV RBC AUTO: 102 FL (ref 80–100)
MONOCYTES # BLD AUTO: 0.6 THOU/UL (ref 0–0.9)
MONOCYTES NFR BLD AUTO: 10 % (ref 2–10)
NEUTROPHILS # BLD AUTO: 3.6 THOU/UL (ref 2–7.7)
NEUTROPHILS NFR BLD AUTO: 62 % (ref 50–70)
PLATELET # BLD AUTO: 259 THOU/UL (ref 140–440)
PMV BLD AUTO: 9.3 FL (ref 7–10)
POTASSIUM BLD-SCNC: 3.9 MMOL/L (ref 3.5–5)
PROT SERPL-MCNC: 6.3 G/DL (ref 6–8)
RBC # BLD AUTO: 3.71 MILL/UL (ref 3.8–5.4)
SODIUM SERPL-SCNC: 137 MMOL/L (ref 136–145)
TIBC SERPL-MCNC: 363 UG/DL (ref 313–563)
TRANSFERRIN SERPL-MCNC: 291 MG/DL (ref 212–360)
TSH SERPL DL<=0.005 MIU/L-ACNC: 1.45 UIU/ML (ref 0.3–5)
WBC: 5.8 THOU/UL (ref 4–11)

## 2021-05-17 ASSESSMENT — ANXIETY QUESTIONNAIRES
3. WORRYING TOO MUCH ABOUT DIFFERENT THINGS: SEVERAL DAYS
2. NOT BEING ABLE TO STOP OR CONTROL WORRYING: NOT AT ALL
IF YOU CHECKED OFF ANY PROBLEMS ON THIS QUESTIONNAIRE, HOW DIFFICULT HAVE THESE PROBLEMS MADE IT FOR YOU TO DO YOUR WORK, TAKE CARE OF THINGS AT HOME, OR GET ALONG WITH OTHER PEOPLE: NOT DIFFICULT AT ALL
IF YOU CHECKED OFF ANY PROBLEMS ON THIS QUESTIONNAIRE, HOW DIFFICULT HAVE THESE PROBLEMS MADE IT FOR YOU TO DO YOUR WORK, TAKE CARE OF THINGS AT HOME, OR GET ALONG WITH OTHER PEOPLE: NOT DIFFICULT AT ALL
5. BEING SO RESTLESS THAT IT IS HARD TO SIT STILL: SEVERAL DAYS
1. FEELING NERVOUS, ANXIOUS, OR ON EDGE: SEVERAL DAYS
5. BEING SO RESTLESS THAT IT IS HARD TO SIT STILL: SEVERAL DAYS
7. FEELING AFRAID AS IF SOMETHING AWFUL MIGHT HAPPEN: NOT AT ALL
2. NOT BEING ABLE TO STOP OR CONTROL WORRYING: NOT AT ALL
1. FEELING NERVOUS, ANXIOUS, OR ON EDGE: SEVERAL DAYS
7. FEELING AFRAID AS IF SOMETHING AWFUL MIGHT HAPPEN: NOT AT ALL
GAD7 TOTAL SCORE: 3
6. BECOMING EASILY ANNOYED OR IRRITABLE: NOT AT ALL
4. TROUBLE RELAXING: NOT AT ALL
4. TROUBLE RELAXING: NOT AT ALL
6. BECOMING EASILY ANNOYED OR IRRITABLE: NOT AT ALL
3. WORRYING TOO MUCH ABOUT DIFFERENT THINGS: SEVERAL DAYS

## 2021-05-17 ASSESSMENT — PATIENT HEALTH QUESTIONNAIRE - PHQ9: SUM OF ALL RESPONSES TO PHQ QUESTIONS 1-9: 5

## 2021-05-19 ENCOUNTER — AMBULATORY - HEALTHEAST (OUTPATIENT)
Dept: LAB | Facility: CLINIC | Age: 86
End: 2021-05-19

## 2021-05-19 DIAGNOSIS — M62.81 GENERALIZED MUSCLE WEAKNESS: ICD-10-CM

## 2021-05-19 DIAGNOSIS — R40.0 UNCONTROLLED DAYTIME SOMNOLENCE: ICD-10-CM

## 2021-05-19 DIAGNOSIS — R41.89 COGNITIVE CHANGES: ICD-10-CM

## 2021-05-19 LAB
ALBUMIN UR-MCNC: NEGATIVE G/DL
APPEARANCE UR: ABNORMAL
BACTERIA #/AREA URNS HPF: ABNORMAL /[HPF]
BILIRUB UR QL STRIP: NEGATIVE
COLOR UR AUTO: YELLOW
FOLATE SERPL-MCNC: 16.8 NG/ML
GLUCOSE UR STRIP-MCNC: NEGATIVE MG/DL
HGB UR QL STRIP: NEGATIVE
KETONES UR STRIP-MCNC: NEGATIVE MG/DL
LEUKOCYTE ESTERASE UR QL STRIP: NEGATIVE
NITRATE UR QL: POSITIVE
PH UR STRIP: 7 [PH] (ref 5–8)
RBC #/AREA URNS AUTO: ABNORMAL HPF
SP GR UR STRIP: 1.01 (ref 1–1.03)
SQUAMOUS #/AREA URNS AUTO: ABNORMAL LPF
TRANS CELLS #/AREA URNS HPF: ABNORMAL LPF
UROBILINOGEN UR STRIP-ACNC: ABNORMAL
VIT B12 SERPL-MCNC: 1062 PG/ML (ref 213–816)
WBC #/AREA URNS AUTO: ABNORMAL HPF

## 2021-05-20 ENCOUNTER — COMMUNICATION - HEALTHEAST (OUTPATIENT)
Dept: FAMILY MEDICINE | Facility: CLINIC | Age: 86
End: 2021-05-20
Payer: MEDICARE

## 2021-05-20 ENCOUNTER — COMMUNICATION - HEALTHEAST (OUTPATIENT)
Dept: FAMILY MEDICINE | Facility: CLINIC | Age: 86
End: 2021-05-20

## 2021-05-21 ENCOUNTER — COMMUNICATION - HEALTHEAST (OUTPATIENT)
Dept: FAMILY MEDICINE | Facility: CLINIC | Age: 86
End: 2021-05-21

## 2021-05-21 ENCOUNTER — COMMUNICATION - HEALTHEAST (OUTPATIENT)
Dept: FAMILY MEDICINE | Facility: CLINIC | Age: 86
End: 2021-05-21
Payer: MEDICARE

## 2021-05-21 DIAGNOSIS — R42 DIZZINESS: ICD-10-CM

## 2021-05-21 DIAGNOSIS — R41.89 ACUTE COGNITIVE DECLINE: ICD-10-CM

## 2021-05-21 DIAGNOSIS — R41.89 COGNITIVE DECLINE: ICD-10-CM

## 2021-05-21 DIAGNOSIS — N39.0 URINARY TRACT INFECTION WITHOUT HEMATURIA, SITE UNSPECIFIED: ICD-10-CM

## 2021-05-21 LAB
BACTERIA SPEC CULT: ABNORMAL
VIT B1 PYROPHOSHATE BLD-SCNC: 105 NMOL/L (ref 70–180)

## 2021-05-21 RX ORDER — LORAZEPAM 0.5 MG/1
0.5 TABLET ORAL ONCE
Qty: 1 TABLET | Refills: 0 | Status: SHIPPED | OUTPATIENT
Start: 2021-05-21 | End: 2021-07-13

## 2021-05-24 ENCOUNTER — COMMUNICATION - HEALTHEAST (OUTPATIENT)
Dept: SCHEDULING | Facility: CLINIC | Age: 86
End: 2021-05-24

## 2021-05-25 ENCOUNTER — OFFICE VISIT - HEALTHEAST (OUTPATIENT)
Dept: FAMILY MEDICINE | Facility: CLINIC | Age: 86
End: 2021-05-25

## 2021-05-25 ENCOUNTER — RECORDS - HEALTHEAST (OUTPATIENT)
Dept: GENERAL RADIOLOGY | Facility: CLINIC | Age: 86
End: 2021-05-25

## 2021-05-25 ENCOUNTER — COMMUNICATION - HEALTHEAST (OUTPATIENT)
Dept: FAMILY MEDICINE | Facility: CLINIC | Age: 86
End: 2021-05-25
Payer: MEDICARE

## 2021-05-25 ENCOUNTER — RECORDS - HEALTHEAST (OUTPATIENT)
Dept: ADMINISTRATIVE | Facility: OTHER | Age: 86
End: 2021-05-25

## 2021-05-25 DIAGNOSIS — M62.81 MUSCLE WEAKNESS (GENERALIZED): ICD-10-CM

## 2021-05-25 DIAGNOSIS — R53.83 FATIGUE, UNSPECIFIED TYPE: ICD-10-CM

## 2021-05-25 DIAGNOSIS — M54.2 CERVICALGIA: ICD-10-CM
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
## 2021-05-25 DIAGNOSIS — R26.89 INABILITY TO BEAR WEIGHT: ICD-10-CM
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
## 2021-05-25 DIAGNOSIS — R27.9 DISCOORDINATION: ICD-10-CM

## 2021-05-25 DIAGNOSIS — Z86.018 HISTORY OF ADRENAL ADENOMA: ICD-10-CM

## 2021-05-25 DIAGNOSIS — R26.89 ANTALGIC GAIT: ICD-10-CM

## 2021-05-25 DIAGNOSIS — I95.89 OTHER SPECIFIED HYPOTENSION: ICD-10-CM

## 2021-05-25 DIAGNOSIS — M54.50 LOW BACK PAIN POTENTIALLY ASSOCIATED WITH RADICULOPATHY: ICD-10-CM

## 2021-05-25 RX ORDER — LORAZEPAM 0.5 MG/1
0.5 TABLET ORAL ONCE
Qty: 1 TABLET | Refills: 0 | Status: SHIPPED | OUTPATIENT
Start: 2021-05-25 | End: 2021-07-13

## 2021-05-26 ENCOUNTER — COMMUNICATION - HEALTHEAST (OUTPATIENT)
Dept: FAMILY MEDICINE | Facility: CLINIC | Age: 86
End: 2021-05-26
Payer: MEDICARE

## 2021-05-26 RX ORDER — PREDNISONE 5 MG/1
5 TABLET ORAL EVERY MORNING
Qty: 30 TABLET | Refills: 0 | Status: SHIPPED | OUTPATIENT
Start: 2021-05-26 | End: 2021-09-02

## 2021-05-26 ASSESSMENT — PATIENT HEALTH QUESTIONNAIRE - PHQ9: SUM OF ALL RESPONSES TO PHQ QUESTIONS 1-9: 3

## 2021-05-27 VITALS
HEART RATE: 70 BPM | OXYGEN SATURATION: 96 % | DIASTOLIC BLOOD PRESSURE: 66 MMHG | TEMPERATURE: 98.3 F | RESPIRATION RATE: 14 BRPM | SYSTOLIC BLOOD PRESSURE: 161 MMHG

## 2021-05-27 VITALS — WEIGHT: 147.2 LBS

## 2021-05-27 NOTE — TELEPHONE ENCOUNTER
"Phone call to patient to check on status after completing more PT. \"It is great. I am standing up straighter. I can stand longer now. It was just the right thing to do.\"     Encouraged pt to call nurse navigation line if questions or concerns arise or should her pain return. Stated understanding and appreciation for call.  "

## 2021-05-28 ENCOUNTER — INFUSION - HEALTHEAST (OUTPATIENT)
Dept: INFUSION THERAPY | Facility: HOSPITAL | Age: 86
End: 2021-05-28

## 2021-05-28 ENCOUNTER — COMMUNICATION - HEALTHEAST (OUTPATIENT)
Dept: FAMILY MEDICINE | Facility: CLINIC | Age: 86
End: 2021-05-28
Payer: MEDICARE

## 2021-05-28 DIAGNOSIS — M62.81 MUSCLE WEAKNESS (GENERALIZED): ICD-10-CM

## 2021-05-28 DIAGNOSIS — Z86.018 HISTORY OF ADRENAL ADENOMA: ICD-10-CM

## 2021-05-28 DIAGNOSIS — R26.89 ANTALGIC GAIT: ICD-10-CM

## 2021-05-28 DIAGNOSIS — R53.83 FATIGUE, UNSPECIFIED TYPE: ICD-10-CM

## 2021-05-28 LAB
ANION GAP SERPL CALCULATED.3IONS-SCNC: 10 MMOL/L (ref 5–18)
BUN SERPL-MCNC: 22 MG/DL (ref 8–28)
CALCIUM SERPL-MCNC: 8.9 MG/DL (ref 8.5–10.5)
CHLORIDE BLD-SCNC: 102 MMOL/L (ref 98–107)
CK SERPL-CCNC: 101 U/L (ref 30–190)
CO2 SERPL-SCNC: 25 MMOL/L (ref 22–31)
CORTICOSTER 1H P 250 UG ACTH SERPL-SCNC: 26.5 UG/DL
CORTICOSTER 30M P 250 UG ACTH SERPL-SCNC: 22.8 UG/DL
CORTISOL, PRE-DOSE - HISTORICAL: 7.3 UG/DL
CREAT SERPL-MCNC: 0.75 MG/DL (ref 0.6–1.1)
GFR SERPL CREATININE-BSD FRML MDRD: >60 ML/MIN/1.73M2
GLUCOSE BLD-MCNC: 91 MG/DL (ref 70–125)
POTASSIUM BLD-SCNC: 3.8 MMOL/L (ref 3.5–5)
SODIUM SERPL-SCNC: 137 MMOL/L (ref 136–145)
TIME OF COSYNTROPIN INJECTION: 0

## 2021-05-28 ASSESSMENT — ANXIETY QUESTIONNAIRES
GAD7 TOTAL SCORE: 2
GAD7 TOTAL SCORE: 3

## 2021-05-29 ENCOUNTER — COMMUNICATION - HEALTHEAST (OUTPATIENT)
Dept: FAMILY MEDICINE | Facility: CLINIC | Age: 86
End: 2021-05-29
Payer: MEDICARE

## 2021-05-29 ENCOUNTER — HOSPITAL ENCOUNTER (OUTPATIENT)
Dept: MRI IMAGING | Facility: HOSPITAL | Age: 86
Discharge: HOME | End: 2021-05-29
Attending: FAMILY MEDICINE
Payer: MEDICARE

## 2021-05-29 DIAGNOSIS — R26.89 ANTALGIC GAIT: ICD-10-CM

## 2021-05-29 DIAGNOSIS — M54.50 LOW BACK PAIN POTENTIALLY ASSOCIATED WITH RADICULOPATHY: ICD-10-CM

## 2021-05-29 DIAGNOSIS — R42 DIZZINESS: ICD-10-CM

## 2021-05-29 DIAGNOSIS — R41.89 ACUTE COGNITIVE DECLINE: ICD-10-CM

## 2021-05-29 DIAGNOSIS — R26.89 INABILITY TO BEAR WEIGHT: ICD-10-CM

## 2021-05-29 DIAGNOSIS — R27.9 DISCOORDINATION: ICD-10-CM

## 2021-05-29 NOTE — TELEPHONE ENCOUNTER
RN cannot approve Refill Request    RN can NOT refill this medication med is not covered by policy/route to provider     . Last office visit: 12/28/2018 Whitney Velasquez DO Last Physical: Visit date not found Last MTM visit: Visit date not found Last visit same specialty: 12/28/2018 Whitney Velasquez DO.  Next visit within 3 mo: Visit date not found  Next physical within 3 mo: Visit date not found      Charline Acevedo, [facility] Triage/Med Refill 5/24/2019    Requested Prescriptions   Pending Prescriptions Disp Refills     baclofen (LIORESAL) 10 MG tablet 270 tablet 0     Sig: Take 1 tablet (10 mg total) by mouth 3 (three) times a day.       There is no refill protocol information for this order

## 2021-05-29 NOTE — TELEPHONE ENCOUNTER
Refill Request  Did you contact pharmacy: pharmacy was calling to check on refill status  Medication name:   1) bupropion (wellbutrin sr) 12 hr tablet  150 mg sig: Take 1 tablet (150 mg total) by mouth daily    2) losartan (cozaar) tablet 50 mg  Sig: Take 1 tablet (50 mg total) by mouth 2 (two) times a day.    Who prescribed the medication: Dr. Abbott  Pharmacy Name and Location:  [facility]  [phone]  Is patient out of medication: unknown  Pharmacy states these request were faxed twice already.   Patient notified refills processed in 72 hours:    Okay to leave a detailed message: yes

## 2021-05-29 NOTE — TELEPHONE ENCOUNTER
Refill Approved    Rx renewed per Medication Renewal Policy. Medication was last renewed on 12/28/18.    Last office visit 12/28/18    Jason Sheridan, [facility] Connection Triage/Med Refill 5/30/2019     Requested Prescriptions   Pending Prescriptions Disp Refills     losartan (COZAAR) 50 MG tablet 180 tablet 1     Sig: Take 1 tablet (50 mg total) by mouth 2 (two) times a day.       Angiotensin Receptor Blocker Protocol Passed - 5/30/2019  3:26 PM        Passed - PCP or prescribing provider visit in past 12 months       Last office visit with prescriber/PCP: 12/28/2018 Whitney Velasquez DO OR same dept: 12/28/2018 Whitney Velasquez DO OR same specialty: 12/28/2018 Whitney Velasquez DO  Last physical: Visit date not found Last MTM visit: Visit date not found   Next visit within 3 mo: Visit date not found  Next physical within 3 mo: Visit date not found  Prescriber OR PCP: Whitney Velasquez DO  Last diagnosis associated with med order: 1. Essential hypertension  - losartan (COZAAR) 50 MG tablet; Take 1 tablet (50 mg total) by mouth 2 (two) times a day.  Dispense: 180 tablet; Refill: 1    2. 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  Dispense: 90 tablet; Refill: 1    If protocol passes may refill for 12 months if within 3 months of last provider visit (or a total of 15 months).             Passed - Serum potassium within the past 12 months     Lab Results   Component Value Date    Potassium 4.3 10/23/2018             Passed - Blood pressure filed in past 12 months     BP Readings from Last 1 Encounters:   02/05/19 138/70             Passed - Serum creatinine within the past 12 months     Creatinine   Date Value Ref Range Status   10/23/2018 0.78 0.60 - 1.10 mg/dL Final             buPROPion (WELLBUTRIN SR) 150 MG 12 hr tablet 90 tablet 1     Sig: Take 1 tablet (150 mg total) by mouth daily.       Tricyclics/Misc  Antidepressant/Antianxiety Meds Refill Protocol Passed - 5/30/2019  3:26 PM        Passed - PCP or prescribing provider visit in last year     Last office visit with prescriber/PCP: 12/28/2018 Whitney Velasquez DO OR same dept: 12/28/2018 Whitney Velasquez DO OR same specialty: 12/28/2018 Whitney Velasquez DO  Last physical: Visit date not found Last MTM visit: Visit date not found   Next visit within 3 mo: Visit date not found  Next physical within 3 mo: Visit date not found  Prescriber OR PCP: Whitney Velasquez DO  Last diagnosis associated with med order: 1. Essential hypertension  - losartan (COZAAR) 50 MG tablet; Take 1 tablet (50 mg total) by mouth 2 (two) times a day.  Dispense: 180 tablet; Refill: 1    2. 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  Dispense: 90 tablet; Refill: 1    If protocol passes may refill for 12 months if within 3 months of last provider visit (or a total of 15 months).

## 2021-05-29 NOTE — TELEPHONE ENCOUNTER
Refill Approved    Rx renewed per Medication Renewal Policy. Medication was last renewed on 12/28/18    Charline Acevedo, [facility] Triage/Med Refill 5/24/2019     Requested Prescriptions   Pending Prescriptions Disp Refills     gabapentin (NEURONTIN) 300 MG capsule 270 capsule 1     Sig: Take 1 capsule (300 mg total) by mouth 3 (three) times a day.       Gabapentin/Levetiracetam/Tiagabine Refill Protocol  Passed - 5/24/2019 10:42 AM        Passed - PCP or prescribing provider visit in past 12 months or next 3 months     Last office visit with prescriber/PCP: 12/28/2018 Whitney Velasquez DO OR same dept: 12/28/2018 Whitney Velasquez DO OR same specialty: 12/28/2018 Whitney Velasquez DO  Last physical: Visit date not found Last MTM visit: Visit date not found   Next visit within 3 mo: Visit date not found  Next physical within 3 mo: Visit date not found  Prescriber OR PCP: Whitney Velasquez DO  Last diagnosis associated with med order: 1. Peripheral neuropathy  - gabapentin (NEURONTIN) 300 MG capsule; Take 1 capsule (300 mg total) by mouth 3 (three) times a day.  Dispense: 270 capsule; Refill: 1    2. Hypothyroidism  - levothyroxine (SYNTHROID, LEVOTHROID) 75 MCG tablet; TAKE 1 TABLET(75 MCG) BY MOUTH DAILY  Dispense: 90 tablet; Refill: 1    If protocol passes may refill for 12 months if within 3 months of last provider visit (or a total of 15 months).             levothyroxine (SYNTHROID, LEVOTHROID) 75 MCG tablet 90 tablet 1     Sig: TAKE 1 TABLET(75 MCG) BY MOUTH DAILY       Thyroid Hormones Protocol Passed - 5/24/2019 10:42 AM        Passed - Provider visit in past 12 months or next 3 months     Last office visit with prescriber/PCP: 12/28/2018 Whitney Velasquez DO OR [facility] de[facility]: 12/28/2018 Whitney Velasquez DO OR same specialty: 12/28/2018 Whitney Velasquez DO  Last physical: Visit date not found Last MTM visit: Visit date not found   Next visit within 3 mo: Visit date not found  Next  physical within 3 mo: Visit date not found  Prescriber OR PCP: Whitney Velasquez DO  Last diagnosis associated with med order: 1. Peripheral neuropathy  - gabapentin (NEURONTIN) 300 MG capsule; Take 1 capsule (300 mg total) by mouth 3 (three) times a day.  Dispense: 270 capsule; Refill: 1    2. Hypothyroidism  - levothyroxine (SYNTHROID, LEVOTHROID) 75 MCG tablet; TAKE 1 TABLET(75 MCG) BY MOUTH DAILY  Dispense: 90 tablet; Refill: 1    If protocol passes may refill for 12 months if within 3 months of last provider visit (or a total of 15 months).             Passed - TSH on file in past 12 months for patient age 12 & older     TSH   Date Value Ref Range Status   01/24/2019 2.28 0.30 - 5.00 uIU/mL Final

## 2021-05-30 ENCOUNTER — COMMUNICATION - HEALTHEAST (OUTPATIENT)
Dept: FAMILY MEDICINE | Facility: CLINIC | Age: 86
End: 2021-05-30
Payer: MEDICARE

## 2021-05-30 VITALS — BODY MASS INDEX: 25.69 KG/M2 | WEIGHT: 164 LBS

## 2021-05-30 VITALS — WEIGHT: 164.6 LBS | BODY MASS INDEX: 25.78 KG/M2

## 2021-05-30 LAB — ALDOST SERPL-MCNC: 5.4 NG/DL

## 2021-05-31 VITALS — BODY MASS INDEX: 23.81 KG/M2 | WEIGHT: 152 LBS

## 2021-05-31 VITALS — BODY MASS INDEX: 23.95 KG/M2 | WEIGHT: 152.9 LBS

## 2021-05-31 VITALS — HEIGHT: 67 IN | WEIGHT: 152 LBS | BODY MASS INDEX: 23.86 KG/M2

## 2021-06-01 ENCOUNTER — COMMUNICATION - HEALTHEAST (OUTPATIENT)
Dept: FAMILY MEDICINE | Facility: CLINIC | Age: 86
End: 2021-06-01
Payer: MEDICARE

## 2021-06-01 NOTE — PROGRESS NOTES
Assessment and Plan:   Ellis was seen today for annual wellness visit and pain.    Diagnoses and all orders for this visit:    Encounter for Medicare annual wellness exam-reviewed preventative maintenance including advanced care directive which is on file, reviewed immunizations and other prevention    Generalized osteoarthritis of multiple sites/Myalgia, unspecified site-see instructions for recommendations for pain control.  So far has been tolerating the baclofen and gabapentin without adverse reaction.  No falls.  Normal kidney function.    Lumbar stenosis with neurogenic claudication -see below for recommendations of pain control    Peripheral polyneuropathy-secondary to lumbar stenosis likely but notes that homeopathic frankincense has been helpful    Major depressive disorder, recurrent episode, in full remission (H)-stable continue current dosing  -     buPROPion (WELLBUTRIN SR) 150 MG 12 hr tablet; Take 1 tablet (150 mg total) by mouth daily.  -     FLUoxetine (PROZAC) 10 MG capsule; Take 3 capsules (30 mg total) by mouth daily.    History of anemia-will ensure that she is in normal range especially with all the medications she is on  -     HM2(CBC w/o Differential)    Idiopathic osteoporosis-following with endocrinology.  Has upcoming bone density scan to decide if she needs to continue therapy    Hypothyroidism, unspecified type-taking thyroid support but also levothyroxine will reassess levels  -     Thyroid Cascade    Essential hypertension-blood pressure has been mildly elevated but for her age I think is appropriate in the 140s which is where it is at today.  -     Comprehensive Metabolic Panel    Muscle spasm  -     baclofen (LIORESAL) 10 MG tablet; Take 1 tablet (10 mg total) by mouth 3 (three) times a day.    Recurrent Major Depression In Full Remission  -     buPROPion (WELLBUTRIN SR) 150 MG 12 hr tablet; Take 1 tablet (150 mg total) by mouth daily.  -     FLUoxetine (PROZAC) 10 MG capsule; Take  3 capsules (30 mg total) by mouth daily.    Nausea  -     ondansetron (ZOFRAN ODT) 4 MG disintegrating tablet; Take 1 tablet (4 mg total) by mouth every 8 (eight) hours as needed for nausea.    Disorder of bone and cartilage  -     Vitamin D, Total (25-Hydroxy)    Low hemoglobin  -     Iron and Transferrin Iron Binding Capacity  -     Ferritin  -     Vitamin B12    Other orders  -     Influenza High Dose, Seasonal 65+ yrs      Patient instructions-for pain control - I would recommend continuing on baclofen 10mg 3 x daily, gabapentin 300mg 3x daily.   If having a worse day- could consider taking 2 gabapentin at once for total of 600mg to see if helpful but NOTE could cause increased grogginess  -would avoid NSAIDS, but since aleve is helpful, can use 1 tablet on REALLY bad days once to twice a week. But take with food to avoid stomach upset or stomach bleed.   -let me review your labs when they come back -will send you and email about eliminating some of them   -consider shingrix for shingles prevention- if you want the booster can get from your pharmacy - 2 doses (2-6 months apart.   -get bone density scan done   -for now keep blood pressure medication the same and will follow up in 6 months   -continue fluoxetine and wellbutrin   The patient's current medical problems were reviewed.    The following health maintenance schedule was reviewed with the patient and provided in printed form in the after visit summary:   Health Maintenance   Topic Date Due     DEPRESSION FOLLOW UP  04/[birth year]/1931     ZOSTER VACCINES (2 of 3) 06/20/2016     DXA SCAN  10/27/2019     MEDICARE ANNUAL WELLNESS VISIT  10/01/2020     FALL RISK ASSESSMENT  10/01/2020     ADVANCE CARE PLANNING  10/01/2024     TD 18+ HE  04/25/2026     PNEUMOCOCCAL IMMUNIZATION 65+ LOW/MEDIUM RISK  Completed     INFLUENZA VACCINE RULE BASED  Completed        Subjective:   Chief Complaint: Ellis Em is an 88 y.o. female here for an Annual Wellness visit along with  pain management, mental health, recurrent major depression, diet, GI issues, hypertension, and health maintenance.   HPI:    Pain management: Patient takes 500 mg tablet of Tylenol every 6 hours, Gabapentin 300mg  2 times a day, and Baclofen twice a day for body aches. Her arthritis and cold weather exacerbates her pain due to chronic polyarthritis  2 days out of the week is when the body aches pain increases.  She took Aleve 2 days straight but stopped once hearing it is not healthy to take continuously for her age. Liver and Kidney functions are normal. She is wondering about better pain control.     Mental Health: Patient expressed she does not do much every day except [employer] on Sunday. She independently lives in the [facility] in a private apartment  home doing the cooking, laundry, and paying bills. Her usual routine is breakfast and devotions at 10:30 AM, then naps for an hour. She watches TV after supper. Her daughters alternate with helping around the house. 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 Clip-on earrings mess with the hearing aids, but she wears it when needed. She is unembarrassed by it since no one comments on it.      Recurrent Major Depression: It is well controlled with the Wellbutrin and Fluoxetine. She expresses some anxiety about when to take the medications and doing things her way/the right way. She denies depression.     Diet: She believes her diet is healthy. She avoids junk food. She tries not to cook anymore and snacks on fruits. Chicken is her protein. Her daughter pays and gets her groceries so she does not need to go out to shop. She eats prebiotic shakes to prevent infection.       GI Issues: Patient has digestive problems with the onset of 6 months, and it is improving. She has been taking supplements from Dr. Skinner that is seen on television and online. Daughters do not see any concerns with  that. She follows how much they would suggest she would need to take. She drinks prune juice for the stomach. She denies digestive and stomach issues.      These are the supplements/medications that she is taking:in addition to others on her med list-she is not sure if all of these medications are necessary but she does note that she feels that they are helpful to preventing urinary tract infections, bone health.  Has noticed an improvement of her hair.  Feels that her thyroid function does not work as well on regular levothyroxine.  Bowel habits have been better on the DrDonny Jacques , takes eye support as recommended by her eye doctor-but all these medications can be quite expensive.  -Dr. Skinner Prebiothrive -marivel, vital Reds (has b23 and folate), and as well as Lectin shield.   -Algaecal plus-800IU. 360mg calcium  + Strontium Boost 680 mg   -Cranberry 4200mg + C  -Preservision AREDS 2 for macular degeneration from ophthalmologist.   -Eder-mag citrate- 1000mg vitamin D, 1000mg calcium +  Magnesium  500mg  -Ultimate Bone Support- vitamin D 400 IU   -Here We Grow- for the hair: biotin 1700 mcg. It has folic acid, b12 850mcg, A D3 35 mcg.  -Peak adrenal - It has Vit C, sensoril, eleuthero, Schisandra, licorice.  -Thyroid support has b12 100mcg, iodine, magnesium, zinc, selenium, copper, manganese, L tyrosine, ashwagandha, etc.   -Resprin to catch short of breath- herbal supplements : helped with bronchial dry cough.  -Alphalipoic acid 600mg  -Prilosec for acid reflux but not daily and uses this for reflux if she has eaten something  -Zofran for nausea, taken once every 3 weeks. -Not sure if it is iatrogenic from medications she is on  -10 mg of Melatonin every night.      Hypertension: She has not been monitoring her BP. Recently, it was 150. She did not tolerate the increased dose of amlodipine. This morning, she took her medications except for thyroid medications because her daughter said it could affect her  blood work. Patient fast this morning.      Patient has a history of osteoporosis and chronic compression fractures.  Currently indicates she is not on Prolia.  Was seen by osteoporosis clinic.  And she notes that she takes supplements for bone health.  Used to be in the [employer].  Is due for bone density scan which is scheduled for January.    Review of Systems:    Positives: Shortness of breath with cleaning and walking long distances-but no other symptoms of heart failure.. Hx of ulcer. Bladder leaks intermittently when she drinks excessive water-otherwise urinary symptoms not bothersome.  She has no current symptoms of urinary tract infection which she has had before.. Patient applies Frankincense topically on foot along with elastic pressure socks for foot pain and it has improved some of her neuropathy symptoms..    Negatives: Hx of stomach bleed. Hematochezia. Depression.     Please see above.  The rest of the review of systems are negative for all systems.    Patient Care Team:  Whitney Velasquez DO as PCP - General (Family Medicine)  Whitney Velasquez DO as Assigned PCP     Patient Active Problem List   Diagnosis     Hypothyroidism     Major depressive disorder, recurrent episode, in full remission (H)     Spinal Meningeal Cyst     Idiopathic osteoporosis     Neck pain     Adrenal mass, left (H)     Lumbar stenosis with neurogenic claudication     Status post lumbar laminectomy     Essential hypertension     Closed fracture of lumbar vertebra without mention of spinal cord injury     Generalized osteoarthritis of multiple sites     Myalgia, unspecified site     Peripheral neuropathy     Allergic rhinitis     History of anemia     Muscle spasm     C1 cervical fracture (H)     Closed fracture of cervical vertebra, sequela     Tinnitus     Macular degeneration mild      Breast implant rupture bilateral      Past Medical History:   Diagnosis Date     Bowel obstruction (H) 1974     Cancer (H) breast, 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, salivary gland     Cancer of skin     \"half dollar size area removed from scalp\", precancer in abd (gallbladder & 1/2 of stomach removed\"     Carpal tunnel syndrome      Chronic pain      Cyst of spinal meninges      Dermatitis      Gastric ulcer 1983    precancerous     Hip fracture (H) 2007     History of appendectomy     1943, 12 years old     History of fractured pelvis 1985     Hyponatremia      Hypothyroidism      Leg pain, bilateral     weakness bilaterally     Major depression      Miscarriage 1962    x2     Neck fracture (H) 09/10/2015     Nephrolithiasis      Osteoarthritis      Osteoporosis      Panic attack      Peripheral neuropathy      Reactive confusion      Renal insufficiency      Rib fracture 2008    x2, with pelvix fx     Shingles      UTI (urinary tract infection)       Past Surgical History:   Procedure Laterality Date     ABDOMINAL SURGERY  06/30/1983    1/2 of stomach organ removed due to precancerous ulcer     APPENDECTOMY  1943     BREAST RECONSTRUCTION Bilateral 11/11/1984    with implants, nipple attachment 1/13/1985     CATARACT EXTRACTION, BILATERAL       CERVICAL LAMINOPLASTY Bilateral 5/2/2013    C1-3 cervical laminoplasty, removal of intracanaalicular extradural ventrally located cyst on 5/2/2013 by Dr. Corbin at [facility] for treatment of spinal cord compression and myelopathy     CHOLECYSTECTOMY  06/30/1983     COMBINED MAMMAPLASTY REVISION W/ ABDOMINOPLASTY  07/1998     DILATION AND CURETTAGE OF UTERUS  09/19/1986     ENDOMETRIAL BIOPSY  09/09/1986     FRACTURE SURGERY  1985    Plate in Left hip; pins in then removed from right hip     JOINT REPLACEMENT      left total knee     LUMBAR LAMINECTOMY N/A 11/6/2014    Procedure: BILATERAL DECOMPRESSIVE LAMINECTOMY L1-L5 (REDO L4 L5);  Surgeon: Trice Corbin MD;  Location: [facility];  Service:      MASTECTOMY Bilateral      OVARIAN CYST REMOVAL  1943    chocolate cysts removed     partial " colectomy       OR ARTHROPLASTY TIBIAL PLATEAU Left     Description: Knee Replacement;  Recorded: 05/23/2013;     OR QUIGLEY W/O FACETEC FORAMOT/DSKC 1/2 VRT SEG, LUMBAR      Description: Laminectomy Decompressive Up To Two Lumbar Segments;  Recorded: 11/10/2014;     ROOT CANAL  09/14/1998     SALIVARY GLAND SURGERY  12/05/1984    salivary gland and neck tumor removed     SKIN CANCER EXCISION  08/15/2003    ear     TOTAL HIP ARTHROPLASTY Bilateral     armature installed     TOTAL KNEE ARTHROPLASTY  10/20/2005     TUBAL LIGATION  1962     TUMOR EXCISION  05/01/2013    in spine      Family History   Problem Relation Age of Onset     Cancer Mother      Kidney disease Father      Alcohol abuse Brother      Diabetes Brother      Dementia Brother       Social History     Socioeconomic History     Marital status: Single     Spouse name: Not on file     Number of children: Not on file     Years of education: Not on file     Highest education level: Not on file   Occupational History     Not on file   Social Needs     Financial resource strain: Not on file     Food insecurity:     Worry: Not on file     Inability: Not on file     Transportation needs:     Medical: Not on file     Non-medical: Not on file   Tobacco Use     Smoking status: Never Smoker     Smokeless tobacco: Never Used   Substance and Sexual Activity     Alcohol use: No     Drug use: No     Sexual activity: Not on file   Lifestyle     Physical activity:     Days per week: Not on file     Minutes per session: Not on file     Stress: Not on file   Relationships     Social connections:     Talks on phone: Not on file     Gets together: Not on file     Attends [employer] service: Not on file     Active member of club or organization: Not on file     Attends meetings of clubs or organizations: Not on file     Relationship status: Not on file     Intimate partner violence:     Fear of current or ex partner: Not on file     Emotionally abused: Not on file     Physically  "abused: Not on file     Forced sexual activity: Not on file   Other Topics Concern     Not on file   Social History Narrative     Not on file      Current Outpatient Medications   Medication Sig Dispense Refill     alpha lipoic acid 200 mg cap Take 1 capsule by mouth 2 (two) times a day.       baclofen (LIORESAL) 10 MG tablet Take 1 tablet (10 mg total) by mouth 3 (three) times a day. 270 tablet 1     buPROPion (WELLBUTRIN SR) 150 MG 12 hr tablet Take 1 tablet (150 mg total) by mouth daily. 90 tablet 1     FLUoxetine (PROZAC) 10 MG capsule Take 3 capsules (30 mg total) by mouth daily. 270 capsule 3     gabapentin (NEURONTIN) 300 MG capsule Take 1 capsule (300 mg total) by mouth 3 (three) times a day. 270 capsule 1     levothyroxine (SYNTHROID, LEVOTHROID) 75 MCG tablet TAKE 1 TABLET(75 MCG) BY MOUTH DAILY 90 tablet 1     losartan (COZAAR) 50 MG tablet Take 1 tablet (50 mg total) by mouth 2 (two) times a day. 180 tablet 1     melatonin 10 mg Tab Take 10 mg by mouth bedtime.       ondansetron (ZOFRAN ODT) 4 MG disintegrating tablet Take 1 tablet (4 mg total) by mouth every 8 (eight) hours as needed for nausea. 30 tablet 0     STRONTIUM CHLORIDE HEXAHYDRATE (STRONTIUM CHL HEXAHYD, BULK,) 100 % Kailyn Use As Directed.       VIT C/E/ZN/COPPR/LUTEIN/ZEAXAN (PRESERVISION AREDS 2 ORAL) Take 1 tablet by mouth 2 (two) times a day.        No current facility-administered medications for this visit.       Objective:   Vital Signs:   Visit Vitals  /70 (Patient Site: Left Arm, Patient Position: Sitting)   Pulse 72   Temp 98  F (36.7  C) (Oral)   Resp 20   Ht 5' 4\" (1.626 m)   Wt 150 lb (68 kg)   LMP  (LMP Unknown)   SpO2 99%   BMI 25.75 kg/m         VisionScreening:  No exam data present     PHYSICAL EXAM  General Appearance: Alert, cooperative, no distress, appears stated age  Head: Normocephalic, without obvious abnormality, atraumatic  Eyes: PERRL, conjunctiva/corneas clear, EOM's intact  Ears: Normal TM's and external ear " canals, both ears  Nose: Nares normal, septum midline,mucosa normal, no drainage  Throat: Lips, mucosa, and tongue normal; teeth and gums normal  Neck: Supple, symmetrical, trachea midline, no adenopathy;  thyroid: not enlarged, symmetric, no tenderness/mass/nodules; no carotid bruit or JVD  Back: Symmetric, no curvature, ROM normal, no CVA tenderness  Lungs: Clear to auscultation bilaterally, respirations unlabored   Heart: Regular rate and rhythm, S1 and S2 normal, no murmur, rub, or gallop,   Abdomen: Soft, non-tender, bowel sounds active all four quadrants,  no masses, no organomegaly  Extremities: Extremities normal, atraumatic, no cyanosis or edema  Skin: Skin color, texture, turgor normal, no rashes or lesions  Lymph nodes: Cervical, supraclavicular, and axillary nodes normal  Neurologic: Normal    exam deferred and not indicated at this time    Assessment Results 10/1/2019   Activities of Daily Living 1 - Full function   Instrumental Activities of Daily Living 2-4 - Moderate impairment   Get Up and Go Score (No Data)   Mini Cog Total Score 5   Some recent data might be hidden     A Mini-Cog score of 0-2 suggests the possibility of dementia, score of 3-5 suggests no dementia    Identified Health Risks:     The patient reports that she has difficulty with activities of daily living. I have asked that the patient make a follow up appointment in 6 months where this issue will be further evaluated and addressed.  The patient reports that she has difficulty with instrumental activities of daily living.  She was provided with a list of local organizations that provide support services and advised to make a follow up appointment in 6 mo to address this further.   Information on urinary incontinence and treatment options given to patient.  She is at risk for falling and has been provided with information to reduce the risk of falling at home.    ADDITIONAL HISTORY SUMMARIZED (2): Reviewed last office note from  osteoporosis clinic  DECISION TO OBTAIN EXTRA INFORMATION (1): None.   RADIOLOGY TESTS (1): Reviewed last bone density scan  LABS (1): Ordered Thyroid and HM2 lab today for history of anemia and other fasting labs.   MEDICINE TESTS (1): None.  INDEPENDENT REVIEW (2 each): None.     The visit lasted a total of 42 minutes face to face with the patient. Over 50% of the time was spent counseling and educating the patient about pain management, mental health, recurrent major depression, diet, GI issues, hypertension, and health maintenance.    Tsering ELLIS, am scribing for and in the presence of, Dr. Velasquez.    IDr. Velasquez, personally performed the services described in this documentation, as scribed by Tsering Gill in my presence, and it is both accurate and complete.    Total data points: 4

## 2021-06-02 ENCOUNTER — COMMUNICATION - HEALTHEAST (OUTPATIENT)
Dept: FAMILY MEDICINE | Facility: CLINIC | Age: 86
End: 2021-06-02

## 2021-06-02 VITALS — BODY MASS INDEX: 23.39 KG/M2 | HEIGHT: 67 IN | WEIGHT: 149 LBS

## 2021-06-02 VITALS — WEIGHT: 149.8 LBS | BODY MASS INDEX: 23.46 KG/M2

## 2021-06-02 VITALS — BODY MASS INDEX: 23.81 KG/M2 | WEIGHT: 151.7 LBS | HEIGHT: 67 IN

## 2021-06-02 VITALS — BODY MASS INDEX: 24.28 KG/M2 | WEIGHT: 155 LBS

## 2021-06-02 DIAGNOSIS — N39.0 RECURRENT UTI: ICD-10-CM

## 2021-06-02 NOTE — TELEPHONE ENCOUNTER
Ellis called back and writer below could not find the ADARTIS message.   I called Ellis back and she did not answer.  I will try again before I leave, otherwise I gave her my direct line to call me back this one time.  [phone]      Stacey Hanson LPN     10/10/19 5:01 PM   Note      Test Results  Who is calling?:  Patient   Who ordered the test:  Whitney Velasquez DO  Type of test: Lab  Date of test:  10/10/19  [facility]   What are your questions/concerns?:  Patient is requesting for results .  etailed message?:  No

## 2021-06-02 NOTE — TELEPHONE ENCOUNTER
Test Results  Who is calling?:  Patient   Who ordered the test:  Whitney Velasquez DO  Type of test: Lab  Date of test:  10/10/19  Where was the test performed:  [facility]   What are your questions/concerns?:  Patient is requesting for results .  etailed message?:  No

## 2021-06-02 NOTE — TELEPHONE ENCOUNTER
Spoke with Taisha (daughter).  They have already picked up the prescription.  I relayed Dr. Velasquez's message from below.

## 2021-06-02 NOTE — TELEPHONE ENCOUNTER
Please call patient and let her know urine looks consistent with UTI and I called in cephalexin 500mg twice daily x 7 days. Will wait for the culture and call if the antibiotics are resistant

## 2021-06-03 ENCOUNTER — AMBULATORY - HEALTHEAST (OUTPATIENT)
Dept: LAB | Facility: CLINIC | Age: 86
End: 2021-06-03

## 2021-06-03 VITALS
BODY MASS INDEX: 25.61 KG/M2 | OXYGEN SATURATION: 99 % | WEIGHT: 150 LBS | DIASTOLIC BLOOD PRESSURE: 70 MMHG | HEART RATE: 72 BPM | RESPIRATION RATE: 20 BRPM | SYSTOLIC BLOOD PRESSURE: 147 MMHG | HEIGHT: 64 IN | TEMPERATURE: 98 F

## 2021-06-03 DIAGNOSIS — N39.0 RECURRENT UTI: ICD-10-CM

## 2021-06-03 LAB
ALBUMIN UR-MCNC: NEGATIVE G/DL
APPEARANCE UR: CLEAR
BILIRUB UR QL STRIP: NEGATIVE
COLOR UR AUTO: YELLOW
GLUCOSE UR STRIP-MCNC: NEGATIVE MG/DL
HGB UR QL STRIP: NEGATIVE
KETONES UR STRIP-MCNC: NEGATIVE MG/DL
LEUKOCYTE ESTERASE UR QL STRIP: NEGATIVE
NITRATE UR QL: NEGATIVE
PH UR STRIP: 7 [PH] (ref 5–8)
SP GR UR STRIP: 1.01 (ref 1–1.03)
UROBILINOGEN UR STRIP-ACNC: NORMAL

## 2021-06-04 NOTE — TELEPHONE ENCOUNTER
Refill Approved    Rx renewed per Medication Renewal Policy. Medication was last renewed on 5/30/19 .    Luci Buchanan, [facility] Connection Triage/Med Refill 12/13/2019     Requested Prescriptions   Pending Prescriptions Disp Refills     losartan (COZAAR) 50 MG tablet 180 tablet 1     Sig: Take 1 tablet (50 mg total) by mouth 2 (two) times a day.       Angiotensin Receptor Blocker Protocol Passed - 12/12/2019  9:14 AM        Passed - PCP or prescribing provider visit in past 12 months       Last office visit with prescriber/PCP: 12/28/2018 Whitney Velasquez DO OR same dept: 12/28/2018 Whitney Velasquez DO OR same specialty: 12/28/2018 Whitney Velasquez DO  Last physical: 10/1/2019 Last MTM visit: Visit date not found   Next visit within 3 mo: Visit date not found  Next physical within 3 mo: Visit date not found  Prescriber OR PCP: Whitney Velasquez DO  Last diagnosis associated with med order: 1. Essential hypertension  - losartan (COZAAR) 50 MG tablet; Take 1 tablet (50 mg total) by mouth 2 (two) times a day.  Dispense: 180 tablet; Refill: 1    If protocol passes may refill for 12 months if within 3 months of last provider visit (or a total of 15 months).             Passed - Serum potassium within the past 12 months     Lab Results   Component Value Date    Potassium 4.0 10/01/2019             Passed - Blood pressure filed in past 12 months     BP Readings from Last 1 Encounters:   10/01/19 147/70             Passed - Serum creatinine within the past 12 months     Creatinine   Date Value Ref Range Status   10/01/2019 0.85 0.60 - 1.10 mg/dL Final

## 2021-06-04 NOTE — TELEPHONE ENCOUNTER
Refill Request  Did you contact pharmacy: No  Medication name:   Requested Prescriptions     Pending Prescriptions Disp Refills     losartan (COZAAR) 50 MG tablet 180 tablet 1     Sig: Take 1 tablet (50 mg total) by mouth 2 (two) times a day.     Who prescribed the medication: Whitney Velasquez DO   Pharmacy Name and Location: [facility] #[id number]  Is patient out of medication: n/a  Patient notified refills processed in 72 hours:  n/a  Okay to leave a detailed message: no

## 2021-06-04 NOTE — TELEPHONE ENCOUNTER
Refill Request  Did you contact pharmacy: No  Medication name:   Requested Prescriptions     Pending Prescriptions Disp Refills     levothyroxine (SYNTHROID, LEVOTHROID) 75 MCG tablet 90 tablet 1     Sig: TAKE 1 TABLET(75 MCG) BY MOUTH DAILY     Who prescribed the medication: Whitney Velasquez DO   Pharmacy Name and Location[facility] [place]  Is patient out of medication: n/a  Patient notified refills processed in 72 hours:  N/a    Okay to leave a detailed message: yes

## 2021-06-04 NOTE — TELEPHONE ENCOUNTER
Refill Approved    Rx renewed per Medication Renewal Policy. Medication was last renewed on 5/24/19  OV 10/1/19.    Luci Buchanan, [facility] Connection Triage/Med Refill 12/13/2019     Requested Prescriptions   Pending Prescriptions Disp Refills     levothyroxine (SYNTHROID, LEVOTHROID) 75 MCG tablet 90 tablet 1     Sig: TAKE 1 TABLET(75 MCG) BY MOUTH DAILY       Thyroid Hormones Protocol Passed - 12/12/2019 10:11 AM        Passed - Provider visit in past 12 months or next 3 months     Last office visit with prescriber/PCP: 12/28/2018 Whitney Velasquez DO OR same dept: 12/28/2018 Whitney Velasquez DO OR same specialty: 12/28/2018 Whitney Velasquez DO  Last physical: 10/1/2019 Last MTM visit: Visit date not found   Next visit within 3 mo: Visit date not found  Next physical within 3 mo: Visit date not found  Prescriber OR PCP: Whitney Velasquez DO  Last diagnosis associated with med order: 1. Hypothyroidism  - levothyroxine (SYNTHROID, LEVOTHROID) 75 MCG tablet; TAKE 1 TABLET(75 MCG) BY MOUTH DAILY  Dispense: 90 tablet; Refill: 1    If protocol passes may refill for 12 months if within 3 months of last provider visit (or a total of 15 months).             Passed - TSH on file in past 12 months for patient age 12 & older     TSH   Date Value Ref Range Status   10/01/2019 1.83 0.30 - 5.00 uIU/mL Final

## 2021-06-05 ENCOUNTER — RECORDS - HEALTHEAST (OUTPATIENT)
Dept: NEUROSURGERY | Facility: CLINIC | Age: 86
End: 2021-06-05

## 2021-06-05 ENCOUNTER — RECORDS - HEALTHEAST (OUTPATIENT)
Dept: NEUROLOGY | Facility: CLINIC | Age: 86
End: 2021-06-05

## 2021-06-05 ENCOUNTER — RECORDS - HEALTHEAST (OUTPATIENT)
Dept: FAMILY MEDICINE | Facility: CLINIC | Age: 86
End: 2021-06-05

## 2021-06-05 DIAGNOSIS — N28.9 DISORDER OF KIDNEY AND URETER: ICD-10-CM

## 2021-06-05 DIAGNOSIS — M48.061 SPINAL STENOSIS OF LUMBAR REGION WITHOUT NEUROGENIC CLAUDICATION: ICD-10-CM

## 2021-06-05 DIAGNOSIS — R52 GENERALIZED PAIN: ICD-10-CM

## 2021-06-05 DIAGNOSIS — G60.9 HEREDITARY AND IDIOPATHIC PERIPHERAL NEUROPATHY: ICD-10-CM

## 2021-06-05 NOTE — TELEPHONE ENCOUNTER
I put her in for Monday at 2pm if that works for them, if not I can see if I have another time but pretty limited next week since I am also leaving on vacation. Please call and ask

## 2021-06-05 NOTE — TELEPHONE ENCOUNTER
I called Rosalva and she states that you told her to see how her blood pressure was after Khadijah.  Rosalva states nothing has changed, but could not give me any numbers. I told her the last mention I have is for a follow up in April. She said no, it must have been in a [employer] message.    I reviewed her chart and messages.  This is the last time you saw her and advised to follow up in 6 mo(around April).      10/01/2019  Patient instructions-for pain control - I would recommend continuing on baclofen 10mg 3 x daily, gabapentin 300mg 3x daily.   If having a worse day- could consider taking 2 gabapentin at once for total of 600mg to see if helpful but NOTE could cause increased grogginess  -would avoid NSAIDS, but since aleve is helpful, can use 1 tablet on REALLY bad days once to twice a week. But take with food to avoid stomach upset or stomach bleed.   -let me review your labs when they come back -will send you and email about eliminating some of them   -consider shingrix for shingles prevention- if you want the booster can get from your pharmacy - 2 doses (2-6 months apart.   -get bone density scan done   -for now keep blood pressure medication the same and will follow up in 6 months   -continue fluoxetine and wellbutrin   The patient's current medical problems were reviewed.

## 2021-06-05 NOTE — TELEPHONE ENCOUNTER
Left message to call back for: Appointment  Information to relay to patient:  Left detailed message with patient's daughter Chu Blackwell to call back to confirm appointment for 1/13/20 at 2 pm.

## 2021-06-05 NOTE — PROGRESS NOTES
[facility]  ENDOCRINOLOGY    Osteoporosis Follow Up 2/6/2020    Ellis Em, 4/21/1931, [id number]          Reason for visit      1. Idiopathic osteoporosis        History     Ellis Em is a very pleasant 88 y.o. old female who presents for follow up.   SUMMARY:  1. Osteoporosis- This is a long-standing diagnosis for and she has been treated in the past with Actonel and Reclast. She has a history of L3 compression fracture Detected on VFA testing.It is not clear when this had happened.She has had laminectomy of L1 to L5.The patient used to get care in [place] and transferred care here about 45 years ago.  Lab investigation so far reveal a TSH of 0.58.She consumes about two servings of Dairy each day consisting of milk and cheese daily.  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 Trice Corbin. She presented to the ER on 10/12 with two week history of neck pain/headache. No clear history of fall or trauma. Cervical CT revealed C1 arch fracture.Patient is being managed in a [facility]. Main risk factors include age, gender, postmenopausal status. She's on levothyroxine, a high-risk medication, but no history of chronic medical conditions in a high risk.She is not currently on any treatment for osteoporosis.Latest DEXA scan is noted below. There is a remote history of kidney stones. There is a mention of an incidental left adrenal mass but is no further information. Patient is not aware of this diagnosis. I do not have records revealing any workup.      TODAY:  Ellis returns today in f/u for Osteoporosis. She finished Forteo about a year ago and has recently had a Dexa Scan that shows some improvement. Since the previous bone density dated  October 27, 2017, there has been  a +6.0 % change in the right total hip.  Additionally, there has been a 10.1%  increase in the bone density of the left distal radius. She is also taking Strontium and feels that this has been integral to her improvement as well. Her last Calcium level was 9.1 and Vit D level was 61. Ellis reports a little depression today. She is needing to reminisce.       Risk Factors         Past Medical History     Patient Active Problem List   Diagnosis     Hypothyroidism     Major depressive disorder, recurrent episode, in full remission (H)     Spinal Meningeal Cyst     Idiopathic osteoporosis     Neck pain     Adrenal mass, left (H)     Lumbar stenosis with neurogenic claudication     Status post lumbar laminectomy     Essential hypertension     Closed fracture of lumbar vertebra without mention of spinal cord injury     Generalized osteoarthritis of multiple sites     Myalgia, unspecified site     Peripheral neuropathy     Allergic rhinitis     History of anemia     Muscle spasm     C1 cervical fracture (H)     Closed fracture of cervical vertebra, sequela     Tinnitus     Macular degeneration mild      Breast implant rupture bilateral      Carpal tunnel syndrome of right wrist     ACP (advance care planning)     Actinic keratosis     Balance disorder     Benign neoplasm of spinal cord (H)     Cervical spondylosis with myelopathy     Spinal cord compression (H)     Cyst of ovary     Delirium     Depression     Disorder of bone and cartilage     Follow-up examination following surgery     Hearing loss     Hyperlipidemia     Ileus (H)     Malignant neoplasm of skin of parts of face     Measles     Varicella     Neoplasm of uncertain behavior of skin     Neurogenic bladder     Neurogenic bowel     Retention of urine       Family History       family history includes Alcohol abuse in her brother; Cancer in her mother; Dementia in her brother; Diabetes in her brother; Kidney disease in her father.    Social History      reports that she has never smoked. She has never used smokeless tobacco. She reports  "that she does not drink alcohol or use drugs.      Review of Systems     Patient denies current pain, limited mobility, fractures.   Remainder per HPI.      Vital Signs     /88 (Patient Site: Right Arm, Patient Position: Sitting, Cuff Size: Adult Regular)   Pulse 74   Ht 5' 4\" (1.626 m)   Wt 147 lb 8 oz (66.9 kg)   LMP  (LMP Unknown)   BMI 25.32 kg/m      Physical Exam     GENERAL:  Normal, NIRD, walks with walker.  EYES:  Pupils equal, round and reactive to light; no proptosis, lid lag or  periorbital edema.  THYROID:  Thyroid is normal.  No tenderness or bruit  NECK: No lymph nodes  MUSCULOSKELETAL: No joint abnormalities, FROM in all four extremities. mild kyphosis. Muscle strength grossly normal without evidence of wasting.  HEART:  Regular rate and rhythm without murmur.  LUNGS:  Clear to auscultation.  ABDOMEN:Soft, non-tender, no masses or organomegaly  NEURO:  Patella Reflexes were normal.No tremors  SKIN:  No acanthosis nigricans or vitiligo        Assessment     1. Idiopathic osteoporosis        Plan     Listened a lot today, which Ellis found helpful. She will continue on her supplements and we will consider another Dexa Scan next year.  F/u with me then as well.       Total visit minutes:25  Time spent counseling and coordination of care:23    Brittney Daley   Endocrinology  2/6/2020  6:29 AM      Current Medications     Outpatient Medications Prior to Visit   Medication Sig Dispense Refill     alpha lipoic acid 200 mg cap Take 1 capsule by mouth 2 (two) times a day.       baclofen (LIORESAL) 10 MG tablet Take 1 tablet (10 mg total) by mouth 3 (three) times a day. 270 tablet 1     buPROPion (WELLBUTRIN SR) 150 MG 12 hr tablet Take 1 tablet (150 mg total) by mouth daily. 90 tablet 1     cranberry fruit extract (CRANBERRY ORAL) Take by mouth.       FLUoxetine (PROZAC) 10 MG capsule Take 3 capsules (30 mg total) by mouth daily. 270 capsule 3     gabapentin (NEURONTIN) 300 MG capsule Take 1 " capsule (300 mg total) by mouth 3 (three) times a day. 270 capsule 1     levothyroxine (SYNTHROID, LEVOTHROID) 75 MCG tablet TAKE 1 TABLET(75 MCG) BY MOUTH DAILY 90 tablet 3     losartan (COZAAR) 50 MG tablet Take 1 tablet (50 mg total) by mouth 2 (two) times a day. 180 tablet 3     melatonin 10 mg Tab Take 10 mg by mouth bedtime.       omega-3 fatty acids 500 mg cap Take by mouth.       omeprazole (PRILOSEC) 20 MG capsule Take 1 capsule (20 mg total) by mouth daily before breakfast. 90 capsule 4     ondansetron (ZOFRAN ODT) 4 MG disintegrating tablet Take 1 tablet (4 mg total) by mouth every 8 (eight) hours as needed for nausea. 30 tablet 0     STRONTIUM CHLORIDE HEXAHYDRATE (STRONTIUM CHL HEXAHYD, BULK,) 100 % Kailyn Use As Directed.       VIT C/E/ZN/COPPR/LUTEIN/ZEAXAN (PRESERVISION AREDS 2 ORAL) Take 1 tablet by mouth 2 (two) times a day.        No facility-administered medications prior to visit.          Lab Results     TSH   Date Value Ref Range Status   01/13/2020 1.43 0.30 - 5.00 uIU/mL Final     Calcium   Date Value Ref Range Status   10/01/2019 9.2 8.5 - 10.5 mg/dL Final     Iron   Date Value Ref Range Status   10/01/2019 61 42 - 175 ug/dL Final           Imaging Results   Last DEXA scan:  Results for orders placed in visit on 02/06/19   DXA Bone Density Scan    Narrative 1/20/2020      RE: Ellis Em  YOB: 1931        Dear Brittney Daley,    Patient Profile:  88 y.o. female, postmenopausal, is here for the follow up bone density   test.   History of fractures - Yes;  Lumbar vertebrae, Vertebra and Hip. Family   history of osteoporosis - None.  Family history of hip fracture: None.   Smoking history - No. Osteoporosis treatment past -  Yes;  HRT,   Bisphosphonates, Forteo and Evista. Osteoporosis treatment current - No.    Chronic medical problems - Breast cancer, Height loss, Hip replacement ,   Spine surgery and Thyroid disease. High risk medications -  Anti-seizure;    Yes, Currently and  Thyroid;  Yes, Currently.      Assessment:    1. The spine bone density L1-L4 is not able to be assessed due to the   presence of significant sclerotic changes, along with the previous   compression fractures.  2. Femoral bone density shows right total hip T- score -1.9.  3.  The left one third radius T score -3.1.      88 y.o. female with OSTEOPOROSIS and HIGH fracture risk.     Since the previous bone density dated  October 27, 2017, there has been  a   +6.0 % change in the right total hip.  Additionally, there has been a   10.1% increase in the bone density of the left distal radius.    Recommendations:  Appropriate ongoing evaluation and treatment is recommended with follow up   bone density scan in 2 years.      Bone densitometry was performed on your patient using our [employer]   densitometer. The results are summarized and a copy of the actual scans   are included for your review. In conformity with the International Society   of Clinical Densitometry's most recent position statement for DXA   interpretation (2015), the diagnosis will be made on the lowest measured   T-score of the lumbar spine, femoral neck, total proximal femur or 33%   radius. Note the change in terminology for diagnostic classification from   OSTEOPENIA to LOW BONE MASS. All trending for sequential exams will be   done using multiple vertebrae or the total proximal femur. Fracture risk   is based on the WHO Fracture Risk Assessment Tool (FRAX). If additional   information is needed or if you would like to discuss the results, please   do not hesitate to call me.       Thank you for referring this patient to [facility].   We are happy to be of service in support of you and your practice. If you   have any questions or suggestions to improve our service, please call me   at [phone].     Sincerely,     Brionna Zeng M.D. SHINCRUI.  Osteoporosis Services, [facility]

## 2021-06-05 NOTE — PROGRESS NOTES
ASSESSMENT & PLAN:  Ellis was seen today for follow up, urine leakage, heartburn and numbness.    Diagnoses and all orders for this visit:    Gastroesophageal reflux disease without esophagitis-patient is having intermittent flareup of this that results in cough.  Could be due to her fish oil supplementation.  I had advised her to try putting in the freezer.  Avoid greasy or citrus-based foods which could be a trigger.  I am okay with her using her omeprazole but due to osteoporosis I would prefer that she avoid this and try the Pepcid first.  Gave instructions as noted below  -     omeprazole (PRILOSEC) 20 MG capsule; Take 1 capsule (20 mg total) by mouth daily before breakfast.    Major depressive disorder, recurrent episode, in full remission (H)-stable on current regimen.  Patient notes that she likes the way the fluoxetine and Wellbutrin works.  Continue current medication regimen    Nausea-patient using very seldomly the Zofran but wants to keep on hand in the event that she needs it.  Prescription sent in since the prescription she has been using is from 2013.  -     ondansetron (ZOFRAN ODT) 4 MG disintegrating tablet; Take 1 tablet (4 mg total) by mouth every 8 (eight) hours as needed for nausea.    Persistent cough for 3 weeks or longer-lungs are clear on examination today and this cough has been present for at least 10 years that she notes is worse with her reflux.  Likely is due to that so we focused on lifestyle and dietary modification as well as H2 inhibitors to treat symptoms.  See instructions    Essential hypertension-blood pressure well controlled.  She was concerned about blood pressure readings in the 140s that she has been getting on her wrist blood pressure cuff.  Discussed that this is not always as accurate.  I am okay with her blood pressure being in the 140s.  Today it is 106 and in good range systolic.  Continue current medication regimen.    Urinary incontinence, unspecified type-last  time she came in for a urinalysis for similar symptoms there was no infection noted.  Patient is not concerned about it but just wanted us to be aware    Carpal tunnel syndrome of right wrist-I was able to induce symptoms on examination today.  I recommended to patient wearing a splint.  She would like to hold off for now and will let me know if she needs a prescription for this.  We also discussed the potential for cortisone injection which could also be helpful.  This could potentially help her quality of life    Other orders  -     Pneumococcal polysaccharide vaccine 23-valent 3 yo or older, subq/IM    Follow-up in 6 months for medication check.    Patient Instructions   -Consider splinting on right wrist for carpal tunnel. If not then consider injections.     -Continue your medication and follow up in 6 months.   -Avoid greasy food. Including French fries.       - for the acid reflux- I prefer if you try the pepcid first. And you can pretreat before a meal that triggers. - you can take up to 1 tablet twice daily as needed for heart burn/ or reflux.   - I will call in a refill of the omeprazole, but I prefer the pepcid first) can take when symptoms come on. Can take both the same day if needed    -fyi fish oil can make your acid reflux worse sometimes. Consider storing in the freezer to see if helps.     -consider other freezable meals like Lets Dish     -for the persistent cough we could consider a chest xray but I think the cough could be related to your acid reflux. I would focus on treating the reflux first, you could take 1 pepcid every night. You could elevate head of bed 6 inches. And you could avoid eating late at night.        Orders Placed This Encounter   Procedures     Pneumococcal polysaccharide vaccine 23-valent 3 yo or older, subq/IM     Medications Discontinued During This Encounter   Medication Reason     omeprazole (PRILOSEC) 20 MG capsule Reorder     ondansetron (ZOFRAN ODT) 4 MG disintegrating  tablet Reorder       Return in about 6 months (around 7/13/2020) for follow up med check (40min) .     CHIEF COMPLAINT:  Chief Complaint   Patient presents with     Follow Up     Follow up blood pressure     Urine Leakage     Started leaking urine in the past month.  Now wearing depends.      Heartburn     Has been taking omeprazole PRN, but daughter wondering about taking pepcid.      Numbness        HISTORY OF PRESENT ILLNESS:  Ellis is a 88 y.o. female presenting to the clinic today hypertension, finger numbness, acid reflux, urine leakage, nausea, mental health, cough, and health maintenance.     Hypertension: Patient's BP usually ranges in the 140s and occasionally higher. She owns a BP wrist cuff.   She was worried about her BP since it is not ideally under 120s and is traveling [place] next week for 5 day to see her daughter and wants to make sure she is not going to be ill while she is there s . She is usually in the house so she does not mind the weather. Today's BP: 108/55.    Finger Numbness: Patient c/o numbness on thumb, 1st digit, and 2nd digit on right hand. She notes difficulty of daily activities. Movements on that hand exacerbates it.  Has worn a brace in the past for carpal tunnel many years ago.  She does not like how it inhibits her daily activities.  She is right-handed.    Acid Reflux: Patient has been taking omeprazole PRN. Her daughter suggests patient to take pepcid 20 mg. Patient vomited when she ate sweet and sour chicken (TV Dinner) on Saturday. She hates to cook. She notes she likes to eat french fries. She denies daily acid reflux. She enjoys eating at [employer] and [employer].  She eats an orange a day every morning.  Symptoms are sometimes worse at night or when she first gets up in the morning.  She avoids eating late at night.    Urine Leakage: Patient c/o leaking urine onset 1 month. Laying down or just needing to urinate exacerbates it. She has not taken the fish oil for about a month and  thought it was related to that because it got better when she got back on it    Nausea: She has some leftover Zolfran but it is from 2013. There are 8 mg and 4 mg in the bottle. She took 4 mg of Zolfran a week ago to relieve the nausea.     Mental Health: Her Granddaughter is getting a divorce. There is a lot of health issues within the family. She is a bit down because no one comes to her for advice and she dislikes divorces. She is also worried for the great grandchildren.  She feels that her mental health is otherwise stable on her current cocktail of fluoxetine and Wellbutrin.    Cough: Patient c/o dry cough onset 10 years ago. She takes the resprinto to maintain it. She has mild shortness of breath when it comes to chores like putting the groceries away. It occurs mainly when asleep.  No fevers or chills or worsening symptoms.    Health Maintenance: She had the shingles before. She had the pneumonia vaccine booster in 2016. It has been over 10 years for the other pneumonia shot. She denies negative effects from vaccines in the past. DXA scan is scheduled on the 20th.  She will then follow up endocrinology on Feb 4th.      REVIEW OF SYSTEMS:   Patient has taken Wellbutrin, Fluoxitine, and gabapentin medications as per usual regimen. Patient reports tolerating them without any issues or concerns.   CBD cream lotion is effective for joint pain.   She is wearing essential oils on the bracelet to boost her immune system.       Positive for cough, urine leakage, acid reflux, and finger numbness.   All other systems are negative.     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 Corbin at [facility] for treatment of spinal cord compression and myelopathy     CHOLECYSTECTOMY  06/30/1983     COMBINED MAMMAPLASTY REVISION W/ ABDOMINOPLASTY  07/1998     DILATION AND CURETTAGE OF UTERUS  09/19/1986     ENDOMETRIAL BIOPSY  09/09/1986     FRACTURE SURGERY  1985    Plate in Left hip; pins in then removed from right hip     JOINT REPLACEMENT      left total knee     LUMBAR LAMINECTOMY N/A 11/6/2014    Procedure: BILATERAL DECOMPRESSIVE LAMINECTOMY L1-L5 (REDO L4 L5);  Surgeon: Trice Corbni MD;  Location: [facility] OR;  Service:      MASTECTOMY Bilateral      OVARIAN CYST REMOVAL  1943    chocolate cysts removed     partial colectomy       KS ARTHROPLASTY TIBIAL PLATEAU Left     Description: Knee Replacement;  Recorded: 05/23/2013;     KS QUIGLEY W/O FACETEC FORAMOT/DSKC 1/2 VRT SEG, LUMBAR      Description: Laminectomy Decompressive Up To Two Lumbar Segments;  Recorded: 11/10/2014;     ROOT CANAL  09/14/1998     SALIVARY GLAND SURGERY  12/05/1984    salivary gland and neck tumor removed     SKIN CANCER EXCISION  08/15/2003    ear     TOTAL HIP ARTHROPLASTY Bilateral     armature installed     TOTAL KNEE ARTHROPLASTY  10/20/2005     TUBAL LIGATION  1962     TUMOR EXCISION  05/01/2013    in spine     Her family history includes Alcohol abuse in her brother; Cancer in her mother; Dementia in her brother; Diabetes in her brother; Kidney disease in her father.  She  reports that she has never smoked. She has never used smokeless tobacco. She reports that she does not drink alcohol or use drugs.    TOBACCO USE:  Social History     Tobacco Use   Smoking Status Never Smoker   Smokeless Tobacco Never Used        VITALS:  Vitals:    01/13/20 1401   BP: 108/55   Pulse: 81   Resp: 20   TempSrc: Oral   SpO2: 96%   Weight: 147 lb 3.2 oz (66.8 kg)     Wt Readings from Last 3 Encounters:   01/13/20 147 lb 3.2 oz  (66.8 kg)   10/01/19 150 lb (68 kg)   02/05/19 151 lb 11.2 oz (68.8 kg)     Body mass index is 25.27 kg/m .  No LMP recorded (lmp unknown). Patient is postmenopausal.       MEDICATIONS:  Current Outpatient Medications   Medication Sig Dispense Refill     alpha lipoic acid 200 mg cap Take 1 capsule by mouth 2 (two) times a day.       baclofen (LIORESAL) 10 MG tablet Take 1 tablet (10 mg total) by mouth 3 (three) times a day. 270 tablet 1     buPROPion (WELLBUTRIN SR) 150 MG 12 hr tablet Take 1 tablet (150 mg total) by mouth daily. 90 tablet 1     cranberry fruit extract (CRANBERRY ORAL) Take by mouth.       FLUoxetine (PROZAC) 10 MG capsule Take 3 capsules (30 mg total) by mouth daily. 270 capsule 3     gabapentin (NEURONTIN) 300 MG capsule Take 1 capsule (300 mg total) by mouth 3 (three) times a day. 270 capsule 1     levothyroxine (SYNTHROID, LEVOTHROID) 75 MCG tablet TAKE 1 TABLET(75 MCG) BY MOUTH DAILY 90 tablet 3     losartan (COZAAR) 50 MG tablet Take 1 tablet (50 mg total) by mouth 2 (two) times a day. 180 tablet 3     melatonin 10 mg Tab Take 10 mg by mouth bedtime.       omega-3 fatty acids 500 mg cap Take by mouth.       omeprazole (PRILOSEC) 20 MG capsule Take 1 capsule (20 mg total) by mouth daily before breakfast. 90 capsule 4     ondansetron (ZOFRAN ODT) 4 MG disintegrating tablet Take 1 tablet (4 mg total) by mouth every 8 (eight) hours as needed for nausea. 30 tablet 0     STRONTIUM CHLORIDE HEXAHYDRATE (STRONTIUM CHL HEXAHYD, BULK,) 100 % Kailyn Use As Directed.       VIT C/E/ZN/COPPR/LUTEIN/ZEAXAN (PRESERVISION AREDS 2 ORAL) Take 1 tablet by mouth 2 (two) times a day.        No current facility-administered medications for this visit.        PHYSICAL EXAM:  GENERAL:  Reveals an alert 88 y.o. female in NAD.  Vitals:  Per nursing notes.  EYES: PERRLA. Extraocular movements intact. Normal conjunctiva and lids.   ENT:  Hearing grossly normal.  Normal appearance to ears and nose.  Bilateral TM s, external  canals, oropharynx normal. Normal lips, gums and teeth.  Normal nasal mucosa, septum and turbinates.  NECK:  Supple, thyroid not palpable.  CARDIAC:  Regular rate and rhythm without murmurs, rubs, or gallops. Legs without edema. Carotids without bruits.   LUNGS: Clear. Respiratory effort normal.  ABDOMEN:  Soft, non-tender, without hepatosplenomegaly, masses, or hernias.   BREASTS: Bilateral deflated breast implants. Using breast prothesis in her bra.   SKIN:   Without rash, bruise, or palpable lesions.  NEURO:  Cranial nerves II-XII intact.     PSYCH:  Mood appropriate, memory intact.      QUALITY MEASURES:  The following high BMI interventions were performed this visit: prescribed dietary intake    DATA REVIEWED:  ADDITIONAL HISTORY SUMMARIZED (2):    DECISION TO OBTAIN EXTRA INFORMATION (1): None.   RADIOLOGY TESTS (1): None.  LABS (1): Reviewed labs 10/1/19.   MEDICINE TESTS (1): None.  INDEPENDENT REVIEW (2 each): None.     The visit lasted a total of 35 minutes face to face with the patient. Over 50% of the time was spent counseling and educating the patient about hypertension, finger numbness, acid reflux, urine leakage, nausea, mental health, cough, and health maintenance.     Tsering ELLIS, am scribing for and in the presence of Dr. Velasquez.  Dr. Eva ELLIS, personally performed the services described in this documentation, as scribed by Tsering Gill in my presence, and it is both accurate and complete.          Total data points: 1

## 2021-06-05 NOTE — TELEPHONE ENCOUNTER
New Appointment Needed  What is the reason for the visit:  Rosalva calling for mom.  Patient's daughter calling for Mom who needs to get in for bloodpressure check 1/10/19 Friday. They are leaving to go on vacation, and Ellis the patient said that Dr. Velasquez wanted her to get in after Christmas.  Can Dr. Velasquez fit her in on Friday, 1/10? or on Monday 1/13?   Provider Preference: Eva  How soon do you need to be seen?: Friday, 1/10  Waitlist offered?: No  Okay to leave a detailed message:  Yes

## 2021-06-05 NOTE — PATIENT INSTRUCTIONS - HE
-Consider splinting on right wrist for carpal tunnel. If not then consider injections.     -Continue your medication and follow up in 6 months.   -Avoid greasy food. Including French fries.       - for the acid reflux- I prefer if you try the pepcid first. And you can pretreat before a meal that triggers. - you can take up to 1 tablet twice daily as needed for heart burn/ or reflux.   - I will call in a refill of the omeprazole, but I prefer the pepcid first) can take when symptoms come on. Can take both the same day if needed    -fyi fish oil can make your acid reflux worse sometimes. Consider storing in the freezer to see if helps.     -consider other freezable meals like Lets Dish     -for the persistent cough we could consider a chest xray but I think the cough could be related to your acid reflux. I would focus on treating the reflux first, you could take 1 pepcid every night. You could elevate head of bed 6 inches. And you could avoid eating late at night.

## 2021-06-06 NOTE — TELEPHONE ENCOUNTER
----- Message from Whitney Velasquez DO sent at 3/12/2020  8:55 AM CDT -----  Please call and let patient know that x-rays did not show any fracture.  Please find out if she is doing better since the injection.  Please find out if the diclofenac gel for pain was covered by her insurance.  Please let her know that her kidney function when we checked it last in October was very good and so taking ibuprofen 400 to 600 mg or naproxen which is Aleve over-the-counter intermittently as needed for pain is okay to do as long as she has food in her stomach.  Also she has an upcoming appointment 3/16 that was created before we could get her in sooner.  Please verify that we can cancel that appointment for her (but hold spot for ALY) do not leave open

## 2021-06-06 NOTE — TELEPHONE ENCOUNTER
I think we took care of the parking permit a month or so ago. I emailed her a few weeks back and confirmed but can double check. If she would like to hold off on PT for now due to covid, I am in agreement as long as she has pain relief. If you cancel the appt , please hold

## 2021-06-06 NOTE — PROGRESS NOTES
ASSESSMENT & PLAN:  Ellis was seen today for arm pain and neck pain.    Diagnoses and all orders for this visit:    Acute pain of right shoulder  -     XR Humerus Right 2 VWS; Future  -     XR Shoulder Right 2 or More VWS; Future  -     diclofenac sodium (VOLTAREN) 1 % Gel; Apply 2g of gel to upper R shoulder as needed for pain 4x daily  -     lidocaine 10 mg/mL (1 %) injection 4 mL  -     triamcinolone acetonide 40 mg/mL injection 40 mg (KENALOG-40)    Pain of right humerus  -     XR Humerus Right 2 VWS; Future  -     XR Shoulder Right 2 or More VWS; Future    Subacromial impingement of right shoulder  -     lidocaine 10 mg/mL (1 %) injection 4 mL  -     triamcinolone acetonide 40 mg/mL injection 40 mg (KENALOG-40)      Based on my evaluation today I suspected a subacromial impingement of the right shoulder given that she did still have some preserved muscle strength of the rotator cuff but decreased range of motion.  Images were obtained today to rule out fracture or other acute findings especially given her osteopenia and high risk of fracture and her potential bouncing into walls.  This was reviewed personally and was negative.  I discussed with him I would reach out to them if the radiologist commented otherwise.  I do not think her symptoms are consistent with a blood clot but I discussed I am happy to order one if they are concerned.  No high risk for blood clot and there was definite mechanism of injury of repeated laundering that seem to aggravate and provoke her symptoms.  There is also small mass in the upper arm that feels consistent potentially with a lipoma or calcification that is otherwise not concerning.  If no improvement of symptoms would consider orthopedic evaluation.  Would hold off on MRI for now.  I was going to order physical therapy as we discussed that increasing her range of motion and strengthening would be advised but would start with a corticosteroid injection which I discussed and  obtained verbal consent since I did not have a form available at which time I consented her for the injection indicating potential risk of hypopigmentation, infection and bleeding, increased pain or potentially unresolved pain from the injection.  She did have initial improvement after corticosteroid injection and also we contacted her a couple days after her visit and she indicated that she was feeling better and wanted to hold off on physical therapy so as to avoid unnecessary exposure and to doctors offices which I think is appropriate.  If symptoms are increasing again I would have her seen  There are no Patient Instructions on file for this visit.     Orders Placed This Encounter   Procedures     XR Humerus Right 2 VWS     Standing Status:   Future     Number of Occurrences:   1     Standing Expiration Date:   3/11/2021     Order Specific Question:   Reason for Exam (Describe Symptoms):     Answer:   pain in shoulder and humerus- possible tendon rupture / eval for fx humerus as well     Order Specific Question:   Can the procedure be changed per Radiologist protocol?     Answer:   Yes     XR Shoulder Right 2 or More VWS     Standing Status:   Future     Number of Occurrences:   1     Standing Expiration Date:   3/11/2021     Scheduling Instructions:      EXT/GRA     Order Specific Question:   Reason for Exam (Describe Symptoms):     Answer:   pain in shoulder and humerus- possible tendon rupture / eval for fx humerus as well     Order Specific Question:   Can the procedure be changed per Radiologist protocol?     Answer:   Yes     There are no discontinued medications.  Administrations This Visit     lidocaine 10 mg/mL (1 %) injection 4 mL     Admin Date  03/14/2020 Action  Given Dose  4 mL Route  Intra-articular Administered By  Whitney Velasquez DO          triamcinolone acetonide 40 mg/mL injection 40 mg (KENALOG-40)     Admin Date  03/14/2020 Action  Given Dose  40 mg Route  Intra-articular Administered  By  Whitney Velasquez, DO              No follow-ups on file.     CHIEF COMPLAINT:  Chief Complaint   Patient presents with     Arm Pain     pain/soreness in right arm x 3wks, pt states it hurts when lifting clothes     Neck Pain     right arm pain is causing the neck pain or stiffness.         HISTORY OF PRESENT ILLNESS:  Ellis is a 88 y.o. female presenting to the clinic today for arm and neck pain. 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  She is accompanied today by her daughter.    At the visit on 01/13/2020, Ellis's carpal tunnel syndrome was discussed. Dr. Marquez suggested getting a splint for carpal tunnel. patient denies getting a splint, but wears gloves which helps. Patients GERD has been improving.     Arm/ Neck pain: Ellis has pain and soreness in her right arm specifically in her muscle. This has spread to her neck and right hand. This has been onset for 3 weeks. Patient states hurts when lifting her clothes while doing laundry she thinks that potentially could have exacerbated it. She uses both arms while doing laundry.  Laundry is somewhat heavy for her.  Carries the items one by one and then puts it into a sac that she pushes on her walker.  Ellis rates her pain while sitting in clinic as a 5. At first the pain was only with movement, but now it occurs while just relaxing. She denies taking a fall to her right side or hearing a pop. The ongoing pain has warn her down.  Patient puts heat on the area of pain which helps with pain. Ellis denies sleep on her right side because of discomfort. Ellis takes 2 extra strength tylenol every 6 hours.  Not relieving the pain.  She has decreased range of motion.  Does bump into walls and they do think it may be appropriate to evaluate for fracture as she has had cervical spine fracture in the past that they were concerned about.  No previous injuries to the shoulder.  Is not interested in opiates.  She has stable kidney function    When   Eva presses in area, Ellis reports it feels tender and sore. Ellis has had a cortisone shot in her back and this did not improve symptoms. Patient would deny getting a surgery in the future to help with the pain. She would be interested in doing a cortisone shot or PT. Dr. Velasquez discussed possible solutions for the pain.     Health Maintenance: Ellis went to the dermatologist last week and had some moles removed.     Blood pressure well controlled on current regimen.  No dizziness    REVIEW OF SYSTEMS:   Positive for arm and neck pain   All other systems are negative.     PFSH:  Past Surgical History:   Procedure Laterality Date     ABDOMINAL SURGERY  06/30/1983 1/2 of stomach organ removed due to precancerous ulcer     APPENDECTOMY  1943     BREAST RECONSTRUCTION Bilateral 11/11/1984    with implants, nipple attachment 1/13/1985     CATARACT EXTRACTION, BILATERAL       CERVICAL LAMINOPLASTY Bilateral 5/2/2013    C1-3 cervical laminoplasty, removal of intracanaalicular extradural ventrally located cyst on 5/2/2013 by Dr. Corbin at [facility] for treatment of spinal cord compression and myelopathy     CHOLECYSTECTOMY  06/30/1983     COMBINED MAMMAPLASTY REVISION W/ ABDOMINOPLASTY  07/1998     DILATION AND CURETTAGE OF UTERUS  09/19/1986     ENDOMETRIAL BIOPSY  09/09/1986     FRACTURE SURGERY  1985    Plate in Left hip; pins in then removed from right hip     JOINT REPLACEMENT      left total knee     LUMBAR LAMINECTOMY N/A 11/6/2014    Procedure: BILATERAL DECOMPRESSIVE LAMINECTOMY L1-L5 (REDO L4 L5);  Surgeon: Trice Corbin MD;  Location: [facility];  Service:      MASTECTOMY Bilateral      OVARIAN CYST REMOVAL  1943    chocolate cysts removed     partial colectomy       RI ARTHROPLASTY TIBIAL PLATEAU Left     Description: Knee Replacement;  Recorded: 05/23/2013;     RI QUIGLEY W/O FACETEC FORAMOT/DSKC 1/2 VRT SEG, LUMBAR      Description: Laminectomy Decompressive Up To Two Lumbar  Segments;  Recorded: 11/10/2014;     ROOT CANAL  09/14/1998     SALIVARY GLAND SURGERY  12/05/1984    salivary gland and neck tumor removed     SKIN CANCER EXCISION  08/15/2003    ear     TOTAL HIP ARTHROPLASTY Bilateral     armature installed     TOTAL KNEE ARTHROPLASTY  10/20/2005     TUBAL LIGATION  1962     TUMOR EXCISION  05/01/2013    in spine     Her family history includes Alcohol abuse in her brother; Cancer in her mother; Dementia in her brother; Diabetes in her brother; Kidney disease in her father.  She  reports that she has never smoked. She has never used smokeless tobacco. She reports that she does not drink alcohol or use drugs.    TOBACCO USE:  Social History     Tobacco Use   Smoking Status Never Smoker   Smokeless Tobacco Never Used        VITALS:  Vitals:    03/10/20 1224   BP: 128/52   Patient Site: Left Arm   Patient Position: Sitting   Cuff Size: Adult Regular   Pulse: 76   Resp: 16   Weight: 148 lb 8 oz (67.4 kg)     Wt Readings from Last 3 Encounters:   03/10/20 148 lb 8 oz (67.4 kg)   02/04/20 147 lb 8 oz (66.9 kg)   01/13/20 147 lb 3.2 oz (66.8 kg)     Body mass index is 25.49 kg/m .  No LMP recorded (lmp unknown). Patient is postmenopausal.       MEDICATIONS:  Current Outpatient Medications   Medication Sig Dispense Refill     alpha lipoic acid 200 mg cap Take 1 capsule by mouth 2 (two) times a day.       baclofen (LIORESAL) 10 MG tablet Take 1 tablet (10 mg total) by mouth 3 (three) times a day. 270 tablet 1     buPROPion (WELLBUTRIN SR) 150 MG 12 hr tablet Take 1 tablet (150 mg total) by mouth daily. 90 tablet 1     cranberry fruit extract (CRANBERRY ORAL) Take by mouth.       FLUoxetine (PROZAC) 10 MG capsule Take 3 capsules (30 mg total) by mouth daily. 270 capsule 3     gabapentin (NEURONTIN) 300 MG capsule Take 1 capsule (300 mg total) by mouth 3 (three) times a day. 270 capsule 1     levothyroxine (SYNTHROID, LEVOTHROID) 75 MCG tablet TAKE 1 TABLET(75 MCG) BY MOUTH DAILY 90 tablet  3     losartan (COZAAR) 50 MG tablet Take 1 tablet (50 mg total) by mouth 2 (two) times a day. 180 tablet 3     melatonin 10 mg Tab Take 10 mg by mouth bedtime.       omega-3 fatty acids 500 mg cap Take by mouth.       omeprazole (PRILOSEC) 20 MG capsule Take 1 capsule (20 mg total) by mouth daily before breakfast. 90 capsule 4     ondansetron (ZOFRAN ODT) 4 MG disintegrating tablet Take 1 tablet (4 mg total) by mouth every 8 (eight) hours as needed for nausea. 30 tablet 0     STRONTIUM CHLORIDE HEXAHYDRATE (STRONTIUM CHL HEXAHYD, BULK,) 100 % Kailyn Use As Directed.       VIT C/E/ZN/COPPR/LUTEIN/ZEAXAN (PRESERVISION AREDS 2 ORAL) Take 1 tablet by mouth 2 (two) times a day.        diclofenac sodium (VOLTAREN) 1 % Gel Apply 2g of gel to upper R shoulder as needed for pain 4x daily 100 g 0     Current Facility-Administered Medications   Medication Dose Route Frequency Provider Last Rate Last Dose     [COMPLETED] triamcinolone acetonide 40 mg/mL injection 40 mg (KENALOG-40)  40 mg Intra-articular Once Whitney Velasquez DO   40 mg at 03/14/20 1321       PHYSICAL EXAM:  GENERAL:  Reveals an alert 88 y.o. female in NAD.   She is fatigued from being in the clinic  Vitals:  Per nursing notes.  EYES: PERRLA. Extraocular movements intact. Normal conjunctiva and lids.   ENT:  Hearing grossly normal.  Normal appearance to ears and nose.  Bilateral TM s, external canals, oropharynx normal. Normal lips, gums and teeth.  Normal nasal mucosa, septum and turbinates.  NECK:  Supple, thyroid not palpable.  CARDIAC:  Regular rate and rhythm without murmurs, rubs, or gallops. Legs without edema. Carotids without bruits.   LUNGS: Clear.  Respiratory effort normal.  SKIN:   Without rash, bruise, or palpable lesions.  NEURO:  Cranial nerves II-XII intact.     PSYCH:  Mood appropriate, memory intact.   Musculoskeletal - no cervical spine tenderness to palpation.  Soft, mobile 1 cm mass in the upper inner arm consistent with lipoma,  nontender.. Normal hand grasp bilateral. Normal flexion at elbow but increased muscle hypertrophy min compared to left arm, biceps mildly tender to palpation.. 5/5 bicep strength bilateral. Full flexion on left arm, 90 degrees only  with active and passive range of motion. 90 degrees abduction on right.  Normal adduction of the shoulder, positive Jobes testing, pain with internal range of motion.  Positive Guthrie of motion.   mild discomfort of the biceps muscle           Shoulder injection subacromial Procedure Note    Pre-operative Diagnosis: right subacromial bursitis     Post-operative Diagnosis: same    Indications: pain and decreased ROM    Procedure Details     Verbal consent was obtained for the procedure. The point of maximum tenderness was identified and marked. The skin prepped with alcohol swabs and ethyl chloride was used for topical anesthesia.  A 1.5 inch 25G was used to inject 4 cc of 1% lidocaine and 40 mg of Kenalog into right shoulder using posterior lateral approach . patient tolerated well and had.  No complications.  She had no bleeding and has improved symptoms immediately and range of motion to 100 degrees       QUALITY MEASURES:  I have had an Advance Directives discussion with the patient.    DATA REVIEWED:  ADDITIONAL HISTORY SUMMARIZED (2):   DECISION TO OBTAIN EXTRA INFORMATION (1): None.   RADIOLOGY TESTS (1): X-ray ordered.  LABS (1): I reviewed labs from previous visit that indicated normal kidney function.  MEDICINE TESTS (1):    INDEPENDENT REVIEW (2 each): X-reviewed personally reviewed and does not show any acute fracture     The visit lasted a total of 35 minutes face to face with the patient.  5 minutes of which was spent performing procedure and over 50% of the time was spent counseling and educating the patient about arm and neck pain.     IElsa, am scribing for and in the presence of Dr. Velasquez.  IDr. Velasquez, personally performed the services described in this  documentation, as scribed by Elsa Christina in my presence, and it is both accurate and complete.        Total data points:4

## 2021-06-06 NOTE — TELEPHONE ENCOUNTER
Her shoulder was better, but it is still aching.  She was able to get the gel ($9) and that feels pretty good.      She asked about PT.  She is not thrilled with the thought of going out in public with the Covid-19 floating around.  She wonders if PT is necessary?    The appt on the 16th says it was for a parking certificate.  Is this something that we can just fill out for her and send it to her?  Other than that she did not know what the appt was for anyways.

## 2021-06-07 NOTE — PROGRESS NOTES
"Ellis Em is a 89 y.o. female who is being evaluated via a billable telephone visit.      The patient has been notified of following:     \"This telephone visit will be conducted via a call between you and your physician/provider. We have found that certain health care needs can be provided without the need for a physical exam.  This service lets us provide the care you need with a short phone conversation.  If a prescription is necessary we can send it directly to your pharmacy.  If lab work is needed we can place an order for that and you can then stop by our lab to have the test done at a later time.    Telephone visits are billed at different rates depending on your insurance coverage. During this emergency period, for some insurers they may be billed the same as an in-person visit.  Please reach out to your insurance provider with any questions.    If during the course of the call the physician/provider feels a telephone visit is not appropriate, you will not be charged for this service.\"    Patient has given verbal consent to a Telephone visit? Yes    Patient would like to receive their AVS by AVS Preference: Rehana.    --------------------------------    Assessment/Plan:    Ellis Em is a 89 y.o. female who is being evaluated remotely for:    1. Acute pain of right shoulder  It sounds as though patient obtained some damage to her rotator cuff.  She does still have her normal range of motion although this is painful.  I have recommended icing the area several times daily.  She will continue the Tylenol which she is taking as well as using Advil sparingly.  Diclofenac gel was sent again to the pharmacy as this seemed to help her before.  She is adamant that she does not want any narcotic medication as she was on this previously which I think is reasonable.  We will have her increase her gabapentin a little bit throughout the day to see if this is helpful as well.  Discussed the risks and most common side " effects of her medications.    If she does not see any improvement by early next week she will contact me at which point we can certainly help her make an appointment to see someone face-to-face in the clinic.  - diclofenac sodium (VOLTAREN) 1 % Gel; APPLY 2 GRAMS OF GEL TO UPPER RIGHT SHOULDER FOUR TIMES DAILY AS NEEDED FOR PAIN  Dispense: 100 g; Refill: 0  - gabapentin (NEURONTIN) 100 MG capsule; Take 100-300 mg by mouth 3 (three) times a day as needed.  Dispense: 90 capsule; Refill: 0        Medications Discontinued During This Encounter   Medication Reason     diclofenac sodium (VOLTAREN) 1 % Gel Reorder           Chief Complaint:  Chief Complaint   Patient presents with     Shoulder Pain     R shoulder pain- started 6wks ago- got a Bautista. shot- rotator cuff- denies any injury       Subjective:   Ellis Em is a 89 y.o. female who is being evaluated via telephone visit today for concerns over right-sided shoulder pain.  The patient and her daughter, Rosalva, are both present    About 6 weeks ago patient was experiencing right-sided shoulder pain.  She seen at the clinic and x-rays were overall unremarkable other than some arthritis.  She was given a shoulder injection and diclofenac gel which seemed to help a great deal.  She was actually doing fairly well until yesterday when she was pushing something and had acute onset of her right shoulder pain again.  She has not noticed any swelling but her daughter Rosalva who is with her today states that she is wincing whenever she is moving her shoulder.    She did not sleep well last night due to the pain.  She does have retained range of motion but states that it is painful when she is moving it.  She describes the pain as the anterior and lateral aspect as well as somewhat in the posterior aspect more towards her shoulder than her neck.    She has not noticed any swelling or bruising.  She has been taking Tylenol 1 g every 6 hours.  She is been taking occasional Advil  which does seem to help as well.  She has used ice 2 times.    12 point review of systems completed and negative except for what has been described above    Social History     Tobacco Use   Smoking Status Never Smoker   Smokeless Tobacco Never Used       Current Outpatient Medications   Medication Sig     alpha lipoic acid 200 mg cap Take 1 capsule by mouth 2 (two) times a day.     baclofen (LIORESAL) 10 MG tablet TAKE 1 TABLET BY MOUTH THREE TIMES DAILY     buPROPion (WELLBUTRIN SR) 150 MG 12 hr tablet Take 1 tablet (150 mg total) by mouth daily.     cranberry fruit extract (CRANBERRY ORAL) Take by mouth.     diclofenac sodium (VOLTAREN) 1 % Gel APPLY 2 GRAMS OF GEL TO UPPER RIGHT SHOULDER FOUR TIMES DAILY AS NEEDED FOR PAIN     FLUoxetine (PROZAC) 10 MG capsule Take 3 capsules (30 mg total) by mouth daily.     gabapentin (NEURONTIN) 300 MG capsule Take 1 cap po three times a day prn neuropathy     levothyroxine (SYNTHROID, LEVOTHROID) 75 MCG tablet TAKE 1 TABLET(75 MCG) BY MOUTH DAILY     losartan (COZAAR) 50 MG tablet Take 1 tablet (50 mg total) by mouth 2 (two) times a day.     melatonin 10 mg Tab Take 10 mg by mouth bedtime.     omega-3 fatty acids 500 mg cap Take by mouth.     omeprazole (PRILOSEC) 20 MG capsule Take 1 capsule (20 mg total) by mouth daily before breakfast.     ondansetron (ZOFRAN-ODT) 4 MG disintegrating tablet DISSOLVE 1 TABLET(4 MG) ON THE TONGUE EVERY 8 HOURS AS NEEDED FOR NAUSEA     STRONTIUM CHLORIDE HEXAHYDRATE (STRONTIUM CHL HEXAHYD, BULK,) 100 % Kailyn Use As Directed.     VIT C/E/ZN/COPPR/LUTEIN/ZEAXAN (PRESERVISION AREDS 2 ORAL) Take 1 tablet by mouth 2 (two) times a day.      gabapentin (NEURONTIN) 100 MG capsule Take 100-300 mg by mouth 3 (three) times a day as needed.         Objective:  No vitals were done due to the remote nature of this visit    General: No acute distress, sounds well  Psych: Appropriate affect, pleasant     This note has been dictated and transcribed using voice  recognition software.   Any errors in transcription are unintentional and inherent to the software.      Phone call duration:  18 minutes    Juana Gama MD

## 2021-06-07 NOTE — TELEPHONE ENCOUNTER
RN cannot approve Refill Request    RN can NOT refill this medication med is not covered by policy/route to provider     . Last office visit: 3/10/2020 Whitney Velasquez DO Last Physical: 10/1/2019 Last MTM visit: Visit date not found Last visit same specialty: 3/10/2020 Whitney Velasquez DO.  Next visit within 3 mo: Visit date not found  Next physical within 3 mo: Visit date not found      Shirley Martino, [facility] Triage/Med Refill 3/31/2020    Requested Prescriptions   Pending Prescriptions Disp Refills     baclofen (LIORESAL) 10 MG tablet [Pharmacy Med Name: BACLOFEN 10MG TABLETS] 270 tablet 1     Sig: TAKE 1 TABLET BY MOUTH THREE TIMES DAILY       There is no refill protocol information for this order        ondansetron (ZOFRAN-ODT) 4 MG disintegrating tablet [Pharmacy Med Name: ONDANSETRON ODT 4MG TABLETS] 30 tablet 0     Sig: DISSOLVE 1 TABLET(4 MG) ON THE TONGUE EVERY 8 HOURS AS NEEDED FOR NAUSEA       There is no refill protocol information for this order        diclofenac sodium (VOLTAREN) 1 % Gel [Pharmacy Med Name: DICLOFENAC 1% GEL 100GM] 100 g 0     Sig: APPLY 2 GRAMS OF GEL TO UPPER RIGHT SHOULDER FOUR TIMES DAILY AS NEEDED FOR PAIN       There is no refill protocol information for this order

## 2021-06-08 ENCOUNTER — RECORDS - HEALTHEAST (OUTPATIENT)
Dept: ENDOCRINOLOGY | Facility: CLINIC | Age: 86
End: 2021-06-08

## 2021-06-08 ENCOUNTER — COMMUNICATION - HEALTHEAST (OUTPATIENT)
Dept: FAMILY MEDICINE | Facility: CLINIC | Age: 86
End: 2021-06-08
Payer: MEDICARE

## 2021-06-08 ENCOUNTER — COMMUNICATION - HEALTHEAST (OUTPATIENT)
Dept: FAMILY MEDICINE | Facility: CLINIC | Age: 86
End: 2021-06-08

## 2021-06-08 DIAGNOSIS — M81.8 OTHER OSTEOPOROSIS WITHOUT CURRENT PATHOLOGICAL FRACTURE: ICD-10-CM

## 2021-06-08 NOTE — PROGRESS NOTES
"Ellis Em comes today in f/u after prior for her CT of her cervical spine.  She was told to Continues to wear a cervical  collar for her fractured C1  which has not healed but she has elected to remove it since Christmas.   No pain in neck.   She is taking forteo and is under treatment from endocrine.    Leg pain better now  Wearing compression stockings, has some peripheral vascular disease.        LBP restricts activities but is using pain cream and tylenol.  Tolerates this.       The pts PMH, PSH, ROS, Meds, Allergies, SH, FH are all unchanged and summarized in the pts health history from last visit   Continues to have UTIs and now something brochial.  (coughing up yellow phelm.        PHYSICAL EXAM:   Constitutional:        Visit Vitals     /76     Pulse 94     Ht 5' 7\" (1.702 m)     Wt 160 lb (72.6 kg)     SpO2 96%     BMI 25.06 kg/m2          Mental Status: A & O in no acute distress. Affect is appropriate. Speech is fluent. Recent and remote memory are intact. Attention span and concentration are normal.      Cranial Nerves: CN1: grossly intact per patient recall. CN2: No funduscopic exam performed. CN3,4 & 6: Pupillary light response, lateral and vertical gaze normal. No nystagmus. Visual fields are full to confrontation. CN5: Intact to touch CN7: No facial weakness, smile, facial symmetry intact. CN8: Intact to spoken voice. CN9&10: Gag reflex, uvula midline, palate rises with phonation. CN11: Shoulder shrug 5/5 intact bilaterally. CN12: Tongue midline and moves freely from side to side.      Motor: No pronator drift of upper extremity. Normal bulk and tone all muscle groups of upper and lower extremities. Atrophy in hand intrinsics      Sensory: Sensation intact bilaterally to light touch.       Coordination: Walking unsteady uses walker     Reflexes; supinator, biceps, triceps, knee/ ankle jerk No  hoffmans/ No  babinski/ clonus.     IMAGING: I personally reviewed all radiographic images      MRI " lumbar with canal open      CT cervical with  fracture line better  Still visible.    Mild vascular insufficiency on PVD studies        CONSULTATION ASSESSMENT AND PLAN     The C1 fx hasn't  Completely healed maybe a little better on CT but still present.  On meds for bone with endocrine. She has elected to not wear any collar (not my recommendation) .  She is unsteady in her gait and uses a cane at home and the walker at night.    Understands risk for fall as does family. Doesn't need home PT / OT but will call if she wants this ordered in future.     She would like to see   the fracture on CT a year from now.       She has no circulation palpable in left leg and I will send her for vascular assessment. She is already taking  Krill oil, a blood thinner. May be a risk with her falls.           I spent more than 30  minutes in this apt, examining the pt, reviewing the scans, reviewing notes from chart, discussing treatment options with risks and benefits and coordinating care. >50 % clinic time was spent in face to face counseling and coordinating care     Trice Corbin        CC:      Nadya Velasquez, DO  [address] [facility] [address]

## 2021-06-08 NOTE — PROGRESS NOTES
"Ellis Em was last seen on 7/21/2016 for her non healed C1 fracture as well as L3 fracture.  She had an endocrinology and a vascular work up done in the interim.  Today she returns with an updated cervical CT scan.  She is accompanied by her daughter. She reports a minimal neck discomfort - \"my neck feels tired\". Denies radicular symptoms in UE. Her back \"hurts\" with walking, gets seldom pain in both legs. Gait and balance are stable. On Krill Oil.  NDI score is 54%  Alysha Roberts RN, CNRN    "

## 2021-06-08 NOTE — PROGRESS NOTES
[facility]  ENDOCRINOLOGY    Osteoporosis Follow Up 1/4/2017    Ellis Em, 4/21/1931, [id number]          Reason for visit      1. Idiopathic osteoporosis    2. Hypothyroidism, unspecified type        History     Ellis Em is a very pleasant 85 y.o. old female who presents for follow up.   SUMMARY:  1. Osteoporosis- This is a long-standing diagnosis for and she has been treated in the past with Actonel and Reclast. She has a history of L3 compression fracture Detected on VFA testing.It is not clear when this had happened.She has had laminectomy of L1 to L5.The patient used to get care in [place] and transferred care here about 45 years ago.  Lab investigation so far reveal a TSH of 0.58.She consumes about two servings of Dairy each day consisting of milk and cheese daily.  She's not very active although she is independent in living.  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 Trice Corbin. She presented to the ER on 10/12 with two week history of neck pain/headache. No clear history of fall or trauma. Cervical CT revealed C1 arch fracture.Patient is being managed in a [place] J collar. Main risk factors include age, gender, postmenopausal status. She's on levothyroxine, a high-risk medication, but no history of chronic medical conditions in a high risk.  She is not currently on any treatment for osteoporosis.Latest DEXA scan is noted below.  There is a remote history of kidney stones  There is a mention of an incidental left adrenal mass but is no further information. Patient is not aware of this diagnosis. I do not have records revealing any workup.    TODAY:  Ellis is here today in f/u for Osteoporosis.  She is feeling well.  She is here with her daughter and is ambulating with a wheeled walker.  She reports that she wore the [place] J collar for a whole year before she decided that she was just done with it and  was not going to spend the rest of her days wearing it.  She has been taking Forteo for the last year and states that she is having no problem with it.  She is having no side effects of which she is aware.  She continues to do strengthening exercises, as well as taking Calcium, Vit D and Strontium supplements. Her Forteo has been secured for the rest of her treatment.    Risk Factors     The following high- risk conditions have been ruled out: celiac disease, eating disorders, gastric bypass, hyperparathyroidism, inflammatory bowel disease, hyperthyroidism, rheumatoid arthritis, lupus, chronic kidney disease.    Ellis Em has the following risk factors: Age, Female gender,  and Low BMI    She is not on high risk medications such as glucocorticoids, anti-coagulants, anti-convulsants, chemotherapy or levothyroxine.    Patient deniesHysterectomy, Oophrectomy, Breast cancer and Family history of breast cancer.        Past Medical History     Patient Active Problem List   Diagnosis     Hypothyroidism     Recurrent Major Depression In Full Remission     Spinal Meningeal Cyst     Idiopathic osteoporosis     Neck pain     Neuropathy     Adrenal mass, left     Lumbar stenosis with neurogenic claudication     Status post lumbar laminectomy     Essential hypertension     Closed fracture of lumbar vertebra without mention of spinal cord injury     Fatigue     Generalized osteoarthrosis, unspecified site     Lumbago     Myalgia and myositis, unspecified     Peripheral neuropathy     Allergic rhinitis     History of anemia     Muscle spasm     C1 cervical fracture     Accelerated hypertension     Closed fracture of cervical vertebra, sequela     Tinnitus     Cough due to ACE inhibitor       Family History       family history includes Alcohol abuse in her brother; Cancer in her mother; Dementia in her brother; Diabetes in her brother; Kidney disease in her father.    Social History      reports that she has never  smoked. She has never used smokeless tobacco. She reports that she does not drink alcohol or use illicit drugs.      Review of Systems     Patient denies current pain, limited mobility, fractures.   Remainder per HPI.      Vital Signs     Visit Vitals     /68 (Patient Site: Left Arm, Patient Position: Sitting, Cuff Size: Adult Regular)     Pulse 88     Wt 164 lb 9.6 oz (74.7 kg)     LMP  (LMP Unknown)     BMI 25.78 kg/m2       Physical Exam     GENERAL:  Normal, NIRD, walks with a walker for stability  EYES:  Pupils equal, round and reactive to light; no proptosis, lid lag or  periorbital edema.  THYROID:  Thyroid is normal.  No tenderness or bruit  NECK: No lymph nodes  MUSCULOSKELETAL: No joint abnormalities, FROM in all four extremities. No kyphosis. Muscle strength grossly normal without evidence of wasting.  HEART:  Regular rate and rhythm without murmur.  LUNGS:  Clear to auscultation.  ABDOMEN:Soft, non-tender, no masses or organomegaly  NEURO:  Patella Reflexes were normal.No tremors  SKIN:  No acanthosis nigricans or vitiligo        Assessment     1. Idiopathic osteoporosis    2. Hypothyroidism, unspecified type        Plan     Will continue with the Forteo injections and get another Dexa Scan after they are completed.  She will continue to do her exercises, as well as taking her supplements.      Total visit minutes: 20  Time spent counseling and coordination of care:18    Brittney DUQUE Endocrinology  1/4/2017  2:18 PM        Current Medications     Outpatient Medications Prior to Visit   Medication Sig Dispense Refill     alpha lipoic acid 200 mg cap Take 1 capsule by mouth 2 (two) times a day.       amLODIPine (NORVASC) 5 MG tablet TAKE 1 TABLET BY MOUTH TWICE DAILY 180 tablet 3     baclofen (LIORESAL) 10 MG tablet TAKE 1 TABLET BY MOUTH THREE TIMES DAILY 270 tablet 1     buPROPion (WELLBUTRIN SR) 150 MG 12 hr tablet Take 1 tablet (150 mg total) by mouth daily. 90 tablet 3     CALCIUM  "CARBONATE/VITAMIN D3 (LIQUID CALCIUM WITH VITAMIN D ORAL) Take by mouth.       FLUoxetine (PROZAC) 10 MG capsule Take 3 capsules by mouth daily (pharmacy - discontinue the 20mg caps and 40mg caps on file) 90 capsule 2     gabapentin (NEURONTIN) 100 MG capsule TAKE 1-2 CAPSULES BY MOUTH THREE TIMES DAILY 180 capsule 5     HERBAL COMPLEX NO.205 (TOTAL BODY CLEANSE ORAL) Take 1 capsule by mouth 2 (two) times a day. Contains vitamin C 100 mg, folic acid 400 mcg, tart cherry, celery, etc.       KRILL OIL ORAL Take 2 capsules by mouth daily.       levothyroxine (SYNTHROID, LEVOTHROID) 75 MCG tablet Take 1 tablet (75 mcg total) by mouth daily. 90 tablet 3     losartan (COZAAR) 50 MG tablet TAKE 1 TABLET(50 MG) BY MOUTH DAILY 90 tablet 0     melatonin 10 mg Tab Take 10 mg by mouth bedtime.       omeprazole (PRILOSEC) 20 MG capsule TAKE ONE CAPSULE BY MOUTH DAILY 90 capsule 3     pen needle, diabetic (BD ULTRA-FINE PHILLIP PEN NEEDLES) 32 gauge x 5/32\" Ndle One daily 100 each 3     STRONTIUM CHLORIDE HEXAHYDRATE (STRONTIUM CHL HEXAHYD, BULK,) 100 % Kailyn Use As Directed.       teriparatide (FORTEO) 20 mcg/dose - 600 mcg/2.4 mL injection Inject 0.08 mL (20 mcg total) under the skin daily. 7.2 mL 3     UNABLE TO FIND Take 2 capsules by mouth every morning. Med Name: Actalin Thyroid Booster. Contains iodine.        UNABLE TO FIND Apply topically 3 (three) times a day as needed. Med Name: Arthritis Pain Cream. Contains lidocaine.       UNABLE TO FIND Med Name: Saccharomyces Boulardii + MOS.  Probiotic       UNABLE TO FIND Med Name: MentaFit       VIT C/E/ZN/COPPR/LUTEIN/ZEAXAN (PRESERVISION AREDS 2 ORAL) Take 1 tablet by mouth 2 (two) times a day.        docusate sodium (COLACE) 100 MG capsule Take 100 mg by mouth 2 (two) times a day as needed for constipation.       UNABLE TO FIND Med Name: Allerist       No facility-administered medications prior to visit.          Lab Results     TSH   Date Value Ref Range Status   11/15/2016 " 2.54 0.30 - 5.00 uIU/mL Final     CALCIUM   Date Value Ref Range Status   11/15/2016 9.4 8.5 - 10.5 mg/dL Final           Imaging Results   Last DEXA scan:  Results for orders placed in visit on 11/04/15   DXA Bone Density Scan    Narrative 11/8/2015      Ashia Garza    RE: Ellis Em  YOB: 1931      Dear Dr. Ashia Garza,    Bone densitometry was performed on your patient using our Visualase   densitometer. The results are summarized and a copy of the actual scans   are included for your review. In conformity with the International Society   of Clinical Densitometry's most recent position statement for DXA   interpretation (2013), the diagnosis will be made on the lowest measured   T-score of the lumbar spine, femoral neck, total proximal femur or 33%   radius. Note the change in terminology for diagnostic classification from   OSTEOPENIA to LOW BONE MASS. All trending for sequential exams will be   done using multiple vertebrae or the total proximal femur. If additional   information is needed or if you would like to discuss the results, please   do not hesitate to call me.     Patient Profile:  84 -year-old female is here for the bone density test. History of   fractures - both hips, cervical vertebrae. Family history of osteoporosis   - none. Osteoporosis medications in the past - Fosamax, Boniva, Prolia -   off all medications for 3 years now. Chronic medical problems and risk   medications - levothyroxine, breast cancer.    Conclusions:  The spine bone density L1-L2 with T-score -0.5.  Femoral bone densities shows right total hip T-score -2.7.  VFA was done because of the significant height loss and showed severe L3   vertebral compression fracture.  Trabecular bone score indicates good trabecular bone architecture.    84  -year-old female with OSTEOPOROSIS and L3 vertebral compression   fracture.   Appropriate evaluation and treatment recommended with follow up bone    density scan in 1 year.    Thank you for referring this patient to [facility] Osteoporosis [facility].   We are happy to be of service in support of you and your practice. If you   have any questions or suggestions to improve our service, please call me   at [phone].     Sincerely,     [place] Bobby M.D. SHINCRUI.  Osteoporosis Services, [facility]

## 2021-06-09 ENCOUNTER — TRANSFERRED RECORDS (OUTPATIENT)
Dept: HEALTH INFORMATION MANAGEMENT | Facility: CLINIC | Age: 86
End: 2021-06-09

## 2021-06-09 ENCOUNTER — RECORDS - HEALTHEAST (OUTPATIENT)
Dept: ADMINISTRATIVE | Facility: OTHER | Age: 86
End: 2021-06-09

## 2021-06-09 LAB — TSH SERPL-ACNC: 1.16 MIU/L (ref 0.4–4.5)

## 2021-06-09 NOTE — TELEPHONE ENCOUNTER
Refill Approved    Rx renewed per Medication Renewal Policy. Medication was last renewed on 10/1/19.    Shirley Martino, [facility] Connection Triage/Med Refill 6/29/2020     Requested Prescriptions   Pending Prescriptions Disp Refills     buPROPion (WELLBUTRIN SR) 150 MG 12 hr tablet [Pharmacy Med Name: BUPROPION SR 150MG TABLETS (12 H)] 90 tablet 1     Sig: TAKE 1 TABLET BY MOUTH EVERY DAY       Tricyclics/Misc Antidepressant/Antianxiety Meds Refill Protocol Passed - 6/28/2020 11:13 AM        Passed - PCP or prescribing provider visit in last year     Last office visit with prescriber/PCP: 3/10/2020 Whitney Velasquez DO OR same dept: 3/10/2020 Whitney Velasquez DO OR same specialty: 3/10/2020 Whitney Velasquez DO  Last physical: 10/1/2019 Last MTM visit: Visit date not found   Next visit within 3 mo: Visit date not found  Next physical within 3 mo: Visit date not found  Prescriber OR PCP: Whitney Velasquez DO  Last diagnosis associated with med order: 1. Recurrent Major Depression In Full Remission  - buPROPion (WELLBUTRIN SR) 150 MG 12 hr tablet [Pharmacy Med Name: BUPROPION SR 150MG TABLETS (12 H)]; TAKE 1 TABLET BY MOUTH EVERY DAY  Dispense: 90 tablet; Refill: 1    If protocol passes may refill for 12 months if within 3 months of last provider visit (or a total of 15 months).

## 2021-06-10 ENCOUNTER — COMMUNICATION - HEALTHEAST (OUTPATIENT)
Dept: FAMILY MEDICINE | Facility: CLINIC | Age: 86
End: 2021-06-10

## 2021-06-10 DIAGNOSIS — M62.81 MUSCLE WEAKNESS (GENERALIZED): ICD-10-CM

## 2021-06-10 DIAGNOSIS — R27.9 DISCOORDINATION: ICD-10-CM

## 2021-06-10 DIAGNOSIS — R26.89 ANTALGIC GAIT: ICD-10-CM

## 2021-06-10 NOTE — TELEPHONE ENCOUNTER
FYI - Status Update  Who is Calling: Patient  Update: Patient stated she is out of gabapentin 100 mg capsule and would like a refill sent today.  Okay to leave a detailed message?:  No

## 2021-06-10 NOTE — TELEPHONE ENCOUNTER
Refill Approved    Rx renewed per Medication Renewal Policy. Medication was last renewed on 4/22/20.    Shirley Martino, [facility] Connection Triage/Med Refill 7/30/2020     Requested Prescriptions   Pending Prescriptions Disp Refills     gabapentin (NEURONTIN) 100 MG capsule [Pharmacy Med Name: GABAPENTIN 100MG CAPSULES] 90 capsule 0     Sig: TAKE 1 TO 3 CAPSULES BY MOUTH THREE TIMES DAILY AS NEEDED       Gabapentin/Levetiracetam/Tiagabine Refill Protocol  Passed - 7/30/2020 12:34 PM        Passed - PCP or prescribing provider visit in past 12 months or next 3 months     Last office visit with prescriber/PCP: Visit date not found OR same dept: 3/10/2020 Whitney Velasquez DO OR same specialty: 3/10/2020 Whitney Velasquez DO  Last physical: Visit date not found Last MTM visit: Visit date not found   Next visit within 3 mo: Visit date not found  Next physical within 3 mo: Visit date not found  Prescriber OR PCP: Juana Gama MD  Last diagnosis associated with med order: 1. Acute pain of right shoulder  - gabapentin (NEURONTIN) 100 MG capsule [Pharmacy Med Name: GABAPENTIN 100MG CAPSULES]; TAKE 1 TO 3 CAPSULES BY MOUTH THREE TIMES DAILY AS NEEDED  Dispense: 90 capsule; Refill: 0    If protocol passes may refill for 12 months if within 3 months of last provider visit (or a total of 15 months).

## 2021-06-11 NOTE — PROGRESS NOTES
"Subjective:      Patient ID: Ellis Em is a 86 y.o. female.    Chief Complaint:    HPI Ellis Em is a 86 y.o. female who presents today complaining of urinary frequency and low abdominal dyscomfort ×2 weeks.  She reports that it is difficult to start urinating when she sits to go.  She also has bowel movements that are very sudden.  She was wearing a panty liner that had a yellow discharge on it she also thought that there was an odor. She has been seen previously for urinary frequency without any urinary tract infection. Patient has been having will seeds daily and a shake for the past 6 months this is to keep her regular because she suffers from hemorrhoids, she may have overdone it to make her more loose with BM. She is having 3-4 loose stools per day. She has a bowel obstruction many years ago, she had a procedure which shortened her colon and she wonders if that is causing some of her bowel frequency.  Patient has had UTIs in the past, but this feels more like it's difficult to start her urine, which is not typical for her UTIs. Patient drinks one cup of coffee in the morning and that is her daily caffiene intake. Patient denies any recent camping, traveling, or new foods. She has a history of breast cancer and has not had a previous hysterectomy before. She denies other vaginal or urinary problems such as dysuria, vaginal itching, flank pain, or fevers. The patient is not sexually active. The patient brought in a panty liner that had a small amount of a discharge on it. It was light yellow and appeared like it could have been a drop of urine.         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, salivary gland     Cancer of skin     \"half dollar size area removed from scalp\", precancer in abd (gallbladder & 1/2 of stomach removed\"     Carpal tunnel syndrome      Chronic pain      Cyst of spinal meninges      Dermatitis      Gastric ulcer 1983    " precancerous     Hip fracture 2007     History of appendectomy     1943, 12 years old     History of fractured pelvis 1985     Hyponatremia      Hypothyroidism      Leg pain, bilateral     weakness bilaterally     Major depression      Miscarriage 1962    x2     Neck fracture 09/10/2015     Nephrolithiasis      Osteoarthritis      Osteoporosis      Panic attack      Peripheral neuropathy      Reactive confusion      Renal insufficiency      Rib fracture 2008    x2, with pelvix fx     Shingles      UTI (urinary tract infection)        Past Surgical History:   Procedure Laterality Date     ABDOMINAL SURGERY  06/30/1983    1/2 of stomach organ removed due to precancerous ulcer     APPENDECTOMY  1943     BREAST RECONSTRUCTION Bilateral 11/11/1984    with implants, nipple attachment 1/13/1985     CATARACT EXTRACTION, BILATERAL       CERVICAL LAMINOPLASTY Bilateral 5/2/2013    C1-3 cervical laminoplasty, removal of intracanaalicular extradural ventrally located cyst on 5/2/2013 by Dr. Corbin at [facility] for treatment of spinal cord compression and myelopathy     CHOLECYSTECTOMY  06/30/1983     COMBINED MAMMAPLASTY REVISION W/ ABDOMINOPLASTY  07/1998     DILATION AND CURETTAGE OF UTERUS  09/19/1986     ENDOMETRIAL BIOPSY  09/09/1986     FRACTURE SURGERY  1985    Plate in Left hip; pins in then removed from right hip     JOINT REPLACEMENT      left total knee     LUMBAR LAMINECTOMY N/A 11/6/2014    Procedure: BILATERAL DECOMPRESSIVE LAMINECTOMY L1-L5 (REDO L4 L5);  Surgeon: Trice Corbin MD;  Location: [facility];  Service:      MASTECTOMY Bilateral      OVARIAN CYST REMOVAL  1943    chocolate cysts removed     partial colectomy       DC ARTHROPLASTY TIBIAL PLATEAU Left     Description: Knee Replacement;  Recorded: 05/23/2013;     DC QUIGLEY W/O FACETEC FORAMOT/[facility]KC 1/2 VRT SEG, LUMBAR      Description: Laminectomy Decompressive Up To Two Lumbar Segments;  Recorded: 11/10/2014;     ROOT CANAL   09/14/1998     SALIVARY GLAND SURGERY  12/05/1984    salivary gland and neck tumor removed     SKIN CANCER EXCISION  08/15/2003    ear     TOTAL HIP ARTHROPLASTY Bilateral     armature installed     TOTAL KNEE ARTHROPLASTY  10/20/2005     TUBAL LIGATION  1962     TUMOR EXCISION  05/01/2013    in spine       Family History   Problem Relation Age of Onset     Cancer Mother      Kidney disease Father      Alcohol abuse Brother      Diabetes Brother      Dementia Brother        Social History   Substance Use Topics     Smoking status: Never Smoker     Smokeless tobacco: Never Used     Alcohol use No       Review of Systems   Constitutional: Negative for chills and fever.   HENT: Negative for sore throat.    Gastrointestinal: Positive for abdominal pain and diarrhea. Negative for blood in stool, constipation, nausea and vomiting.   Genitourinary: Positive for difficulty urinating, frequency, pelvic pain and vaginal discharge. Negative for dysuria, genital sores, hematuria, vaginal bleeding and vaginal pain.       Objective:     /72  Pulse 69  Temp 98.7  F (37.1  C) (Oral)   Resp 12  Wt 152 lb 14.4 oz (69.4 kg)  LMP  (LMP Unknown)  SpO2 96%  BMI 23.95 kg/m2    Physical Exam   Constitutional: She appears well-developed and well-nourished. No distress.   HENT:   Head: Normocephalic and atraumatic.   Right Ear: External ear normal.   Left Ear: External ear normal.   Eyes: Conjunctivae are normal.   Pulmonary/Chest: Effort normal. No respiratory distress.   Abdominal: Soft. She exhibits no distension and no mass. There is no tenderness. There is no rebound and no guarding.   Genitourinary: There is no rash, tenderness or lesion on the right labia. There is no rash, tenderness or lesion on the left labia. No erythema, tenderness or bleeding in the vagina. No foreign body in the vagina. No signs of injury around the vagina. No vaginal discharge found.   Genitourinary Comments: Labia minor appear mildly enlarged     Skin: She is not diaphoretic.   Psychiatric: She has a normal mood and affect. Her behavior is normal. Judgment and thought content normal.   Nursing note and vitals reviewed.    Labs:  Recent Results (from the past 24 hour(s))   Urinalysis-UC if Indicated   Result Value Ref Range    Color, UA Yellow Colorless, Yellow, Straw, Light Yellow    Clarity, UA Clear Clear    Glucose, UA Negative Negative    Bilirubin, UA Negative Negative    Ketones, UA Negative Negative    Specific Gravity, UA 1.010 1.005 - 1.030    Blood, UA Negative Negative    pH, UA 6.0 5.0 - 8.0    Protein, UA Negative Negative mg/dL    Urobilinogen, UA 0.2 E.U./dL 0.2 E.U./dL, 1.0 E.U./dL    Nitrite, UA Negative Negative    Leukocytes, UA Negative Negative   Wet Prep, Vaginal   Result Value Ref Range    Yeast Result No yeast seen No yeast seen    Trichomonas No Trichomonas seen No Trichomonas seen    Clue Cells, Wet Prep No Clue cells seen No Clue cells seen     Clinical Decision Making:  There were no signs of infection on UA or wet prep today. The patients symptoms could be explained by normal age related degeneration. Her external vaginal exam appeared normal today. I recommended following up with her primary care provider to discuss her symptoms for none urgent referral to urology specialists.   Procedures      Assessment / Plan:     1. Urinary frequency  Urinalysis-UC if Indicated   2. Vaginal discharge  Wet Prep, Vaginal         Patient Instructions   1. Today there are no signs of UTI or vaginal infections. This may be normal age related changes.   2. I do recommend following up with your primary care provider to discuss if specialty referral would be necessary.

## 2021-06-12 NOTE — TELEPHONE ENCOUNTER
I called patient to schedule cervical and lumbar MRI's prior to seeing Dr. Huitron. Patient stated she hit her head when she fell. She reports a left sides headache and it's tender to the touch as well.     Patient would like a head MRI too if Dr. Hein feels that's reasonable. Please advise.     If you need to reach patient, call [phone].

## 2021-06-12 NOTE — TELEPHONE ENCOUNTER
Requesting orders for OT in the home 1w1, 2w3 to improve strength and function of R UE, decrease concussion symptoms, increase activity tolerance, and modify environment to optimize safety and independence in the home.     Please respond with verbal approval.    Thank you,  Nae Hardwick, OTR/L

## 2021-06-12 NOTE — TELEPHONE ENCOUNTER
Patient Returning Call  Reason for call:  Patient called back.  Information relayed to patient:  n/a  Patient has additional questions:  Yes  If YES, what are your questions/concerns:  States the home care needs another order due to it  after 24 hours.  Please call patient and clarify.  Okay to leave a detailed message?: Yes

## 2021-06-12 NOTE — TELEPHONE ENCOUNTER
Orders being requested: OT evaluation; PT two times a week for four weeks  Reason service is needed/diagnosis: ADL's, cognition and home safety.  Strengthening, balance, mobility and pain management.  When are orders needed by: With 24 hours  Where to send Orders: Phone:  Please verbal order with   Okay to leave detailed message?  Yes

## 2021-06-12 NOTE — PROGRESS NOTES
Had the pleasure of seeing Ellis Em in consultation in my neurosurgery clinic.  As you are well aware Ellis is a very pleasant 89-year-old who underwent a cervical laminoplasty and a lumbar laminectomy by my partner Dr. Rene for him in the past.  About 3 weeks ago Ellis had a fall and since then she states that she has had difficulty with balance and has been wobbly and generalized fatigue.  She occasionally has headaches but complains of pain in the left side of her neck but no new numbness weakness or tingling other than the generalized weakness as detailed above.    Exam patient is sitting in a wheelchair but very pleasant and interactive  Strength is full throughout the upper and lower extremities  DTRs are intact and symmetric at the biceps triceps brachioradialis patellar and Achilles tendons  Negative Digna's bilaterally  No myoclonus is noted bilaterally  No pronator drift is noted bilaterally  Finger-to-nose touch is intact with mild dysmetria bilaterally  Rapid alternating movements of the upper extremities are intact and symmetric    X-ray of the lumbar spine shows spondylosis with spondylolisthesis at L3-4 and L4-5 please refer to the radiology read on patient's electronic medical record.  Cervical spine x-ray shows interbody fusion at 4 5 and acute kyphosis at this level and laminoplasty hardware stable at C1-2 with severe to space narrowing at C5-6.  On lumbar MRI dated 10/12/2020 patient has scoliosis of the lumbar spine from L1-4 with chronic compression fracture at L3 and L3-4 100 to severe spinal canal stenosis and multiple levels of spondylosis.  Cervical spine MRI without contrast dated 10/12/2020 suggestive of postsurgical changes with laminoplasty otherwise diffuse spondylosis noted.  No signal changes noted in the cervical spinal cord.  Full details please refer to patient's electronic medical record.    Assessement and plan:  Ellis is a very pleasant 89-year-old status post fall.  Her exam  is very reassuring as she does not have any upper motor neuron deficits or dysmetria or ataxia that is severe.  I will refer her to physical therapy for evaluation and treatment.  No surgical interventions indicated at this time.   Thank you for giving me the opportunity to see this very pleasant patient.  If you have any questions about her or any other patients please feel free to contact me.  Of note I spent greater than 45 minutes counseling the patient regarding his pathology and treatment plan, greater than 50 percent of which is in direct counseling.  Jagdeep Huitron MD, FAANS

## 2021-06-12 NOTE — TELEPHONE ENCOUNTER
Dr. Velasquez-   home care received a referral for home care services for Ellis. We have a PT that is able to do an assessment on her next week either Monday or Tuesday. Are you okay if the PT assessment for home care services is done Monday 26th or Tuesday 27th? Thank you!  Nadya Bay RN   [phone]

## 2021-06-12 NOTE — PROGRESS NOTES
ASSESSMENT and PLAN:  1. Ruptured left breast implant  -Face-to-face encounter for mastectomy bra.  Order already submitted but I will contact them and submit the face-to-face necessary for Medicare.  We also discussed that likely having had a rupture this poses no health concern to patient however the bag does appear uncomfortable beneath the thin skin.  I will have her evaluated by plastic surgery just for that consultation visit and they can determine from there if she needs any procedure to remove it.  She prefers not to if possible.  - Ambulatory referral to Plastic Surgery    2. Breast implant leak right  -Patient also appears to have a slow leak on the right on my exam.  Mastectomy bra would be appropriate for such as well if we are expecting a deflation soon  - Ambulatory referral to Plastic Surgery    3. Peripheral neuropathy  -Stable and tolerating gabapentin 300 mg 3 times daily  - gabapentin (NEURONTIN) 300 MG capsule; TAKE 1 CAPSULE BY MOUTH THREE TIMES DAILY  Dispense: 270 capsule; Refill: 2    4. Hypothyroidism  -Stable on levothyroxine 75 mcg daily.  Updating labs today.  - Thyroid [place]    5. HTN (hypertension)  -Patient was able to wean off her amlodipine.  Complete metabolic panel obtained today.  I am okay with her taking supplements for her adrenal but we did discuss that they are not FDA regulated and if she is feeling well and it is effective then she should continue.  Incidental adrenal lesion apparently in 2015 was unchanged and since lab work has been stable and no reason to repeat imaging at this time  - Comprehensive Metabolic Panel          There are no Patient Instructions on file for this visit.    Orders Placed This Encounter   Procedures     Thyroid [place]     Comprehensive Metabolic Panel     Ambulatory referral to Plastic Surgery     Referral Priority:   Routine     Referral Type:   Surgical     Referral Reason:   Evaluation and Treatment     Requested Specialty:   Plastic  Surgery     Number of Visits Requested:   1     Medications Discontinued During This Encounter   Medication Reason     amLODIPine (NORVASC) 5 MG tablet Therapy completed     buPROPion (WELLBUTRIN SR) 150 MG 12 hr tablet Therapy completed     gabapentin (NEURONTIN) 300 MG capsule Therapy completed     gabapentin (NEURONTIN) 100 MG capsule Therapy completed       No Follow-up on file.    DATA REVIEWED:  Additional History from Old Records Summarized (2): -Reviewed recent walk-in care notes for urinary tract intact  Decision to Obtain Records (1): -  Radiology Tests Summarized or Ordered (1): -Reviewed previous CT scan of adrenals  Labs Reviewed or Ordered (1): -Labs updated today    4 pt     The visit lasted a total of 25 minutes face to face with the patient. Over 50% of the time was spent counseling and educating the patient     CHIEF COMPLAINT:  Chief Complaint   Patient presents with     Face to Face     Needs face to face documentation for mastectomy bra. Is it ok to leave the implant shell in her body?        HISTORY OF PRESENT ILLNESS:  Ellis Em is a 86 y.o. female who presents today for a face-to-face encounter in order to purchase a mastectomy bra.  Patient had a double mastectomy back in 1984 and at that time had silicone breast implants placed during her reconstruction.  5 years later she had that removed and replaced with saline.  Recently, she has noticed a slow leak on the left and now is completely deflated.  This occurred over a period of approximately 2 weeks.  She no longer has mammograms and there was no insult to the breast.  She also thinks that perhaps the right side is deflating as well as it feels slightly dimpled.  She has not had any pain or associated skin conditions or changes.  She has noticed the dimpling and during that course of deflation felt fairly fatigued and just not well.  She wonders if some of it is a change in her self-esteem and self image.  She would like to have an order  for a mastectomy bra which we have already submitted however, a face-to-face was required within 90 days.  Initially I had gone back to addend my note from seeing her in November because I thought that she had brought it up at that time and that I had missed putting it into my note however she notes that this is more recent.  When she first had her implants she states that she joked with the physician because she was a 34 AA make me like Lydia Guthrie.  She came out of surgery as a 40c and notes that this is giving her a lot of back problems over the years.  She recently ordered a bra that is a 40B.  She would be interested in seeing a plastic surgeon because wants to ensure as do her daughters that she is safe to leave the bag in her body.  She does not want to have surgery unless she needs to  Regarding her back she still is slowly healing and still has some discomfort.  She continues to take the baclofen and gabapentin when she is taking 300 mg 3 times a day.    -We also have reviewed patient's other health history and her blood pressure has been very well controlled.  She actually has gotten off of her amlodipine.  Blood pressure seemed to improve when she started supplements over-the-counter with [place] which supposedly is supposed to be targeting her adrenals.  She started it when she noticed that her adrenal gland had an incidental lesion noted on it in 2015.  I reviewed the CT report with her and since it was shown to not have changed much and did not need any further evaluation at that time.  Continues to have venous insufficiency wears her compression stockings and does well when she is wearing them.  -Mental health has been stable and she feels the best that she has been in a while in terms of energy etc.  She may be due to have her thyroid level checked to ensure she is in good range.    REVIEW OF SYSTEMS:    Comprehensive review of systems is negative except as noted in the HPI.     PFSH:  Tobacco use  and medications reviewed below.     TOBACCO USE:  History   Smoking Status     Never Smoker   Smokeless Tobacco     Never Used       VITALS:  Vitals:    07/27/17 1619   BP: 136/76   Pulse: 66   Resp: 14   Temp: 98.2  F (36.8  C)   TempSrc: Oral   Weight: 152 lb (68.9 kg)     Wt Readings from Last 3 Encounters:   07/27/17 152 lb (68.9 kg)   06/30/17 152 lb 14.4 oz (69.4 kg)   01/17/17 164 lb (74.4 kg)       PHYSICAL EXAM:  Constitutional:  Reveals a 86 y.o. female in NAD.  Vitals:  Per nursing notes.  Neck:  Supple, thyroid not palpable.  Cardiac:  Regular rate and rhythm without murmurs, rubs, or gallops. Carotids without bruits. Legs without edema. Palpation of the radial pulse regular.  Lungs: Clear.  Respiratory effort normal.  Skin/breast:   Without rash, bruise, or palpable lesions.  Left breast implant is completely deflated.  No surrounding skin changes.  Bag is palpable and appears that it could be uncomfortable beneath the skin-crumpled.  Right side also with some dimpling and crinkling although not completely deflated.  Nontender  Rheumatologic: Normal joints and nails of the hands.  Neurologic:  Cranial nerves II-XII intact.     Psychiatric:  Mood appropriate, memory intact.          MEDICATIONS:  Current Outpatient Prescriptions   Medication Sig Dispense Refill     alpha lipoic acid 200 mg cap Take 1 capsule by mouth 2 (two) times a day.       baclofen (LIORESAL) 10 MG tablet TAKE 1 TABLET BY MOUTH THREE TIMES DAILY 270 tablet 0     buPROPion (WELLBUTRIN SR) 150 MG 12 hr tablet TAKE 1 TABLET BY MOUTH DAILY. 90 tablet 1     CALCIUM CARBONATE/VITAMIN D3 (LIQUID CALCIUM WITH VITAMIN D ORAL) Take by mouth.       FLUoxetine (PROZAC) 10 MG capsule TAKE 3 CAPSULES BY MOUTH DAILY 270 capsule 1     HERBAL COMPLEX NO.205 (TOTAL BODY CLEANSE ORAL) Take 1 capsule by mouth 2 (two) times a day. Contains vitamin C 100 mg, folic acid 400 mcg, tart cherry, celery, etc.       LECITHIN MISC Use As Directed.        "levothyroxine (SYNTHROID, LEVOTHROID) 75 MCG tablet TAKE 1 TABLET(75 MCG) BY MOUTH DAILY 90 tablet 0     losartan (COZAAR) 50 MG tablet TAKE 1 TABLET(50 MG) BY MOUTH DAILY 90 tablet 2     melatonin 10 mg Tab Take 10 mg by mouth bedtime.       NON FORMULARY        NON FORMULARY        omeprazole (PRILOSEC) 20 MG capsule TAKE ONE CAPSULE BY MOUTH DAILY (Patient taking differently: TAKE ONE CAPSULE BY MOUTH AS NEEDED) 90 capsule 3     pen needle, diabetic (BD ULTRA-FINE PHILLIP PEN NEEDLES) 32 gauge x 5/32\" Ndle One daily 100 each 3     STRONTIUM CHLORIDE HEXAHYDRATE (STRONTIUM CHL HEXAHYD, BULK,) 100 % Kailyn Use As Directed.       teriparatide (FORTEO) 20 mcg/dose - 600 mcg/2.4 mL injection Inject 0.08 mL (20 mcg total) under the skin daily. 7.2 mL 3     UNABLE TO FIND Take 2 capsules by mouth every morning. Med Name: Actalin Thyroid Booster. Contains iodine.        UNABLE TO FIND Apply topically 3 (three) times a day as needed. Med Name: Arthritis Pain Cream. Contains lidocaine.       UNABLE TO FIND Med Name: Saccharomyces Boulardii + MOS.  Probiotic       UNABLE TO FIND Med Name: MentaFit       UNABLE TO FIND Med Name:  Ashw[place]anda +Herbs 1 tab bid for adrenal health       UNABLE TO FIND Med Name:   herbs  1 tablet BID       VIT C/E/ZN/COPPR/LUTEIN/ZEAXAN (PRESERVISION AREDS 2 ORAL) Take 1 tablet by mouth 2 (two) times a day.        gabapentin (NEURONTIN) 300 MG capsule TAKE 1 CAPSULE BY MOUTH THREE TIMES DAILY 270 capsule 2     No current facility-administered medications for this visit.            "

## 2021-06-12 NOTE — PROGRESS NOTES
Ellis Em is status post cervical laminoplasty in 12013 followed by lumbar decompression in 2014 by Dr. Corbin.  Today her daughter called to report a fall that Ellis took last week. She denies any neurological symptoms for lElis.  Will do xrays with CC - recommendations will follow.  Alysha Roberts RN, CNRN

## 2021-06-13 ENCOUNTER — HEALTH MAINTENANCE LETTER (OUTPATIENT)
Age: 86
End: 2021-06-13

## 2021-06-13 NOTE — PROGRESS NOTES
Assessment and Plan:     Patient has been advised of split billing requirements and indicates understanding: Yes  Ellis was seen today for annual wellness visit and nail problem.    Diagnoses and all orders for this visit:    Medicare annual wellness visit, subsequent  -Reviewed again labs from October after last fall which were all stable.  We talked about safety in her home and to consider having a medical alert device in the event that she follows or to purchase an apple watch in order to  Alert family in the event that she falls.  She has an Dianna apparently.  We will keep a close eye on her.  Offered her referrals but she declined for the time being.  See her back in 3 to 6 months    Urinary hesitancy-patient has a hard time with outflow of urination.  May have a neurogenic bladder especially given her spinal issues.  Would consider having her see urology.  Could have a urinary tract infection but she was not able to leave a urine today  -     Cancel: Urinalysis-UC if Indicated  -     Urinalysis-UC if Indicated; Future    Unstable balance-prescription was given to update a walker since her current one does not have locks  -     Walker, Platform w/ Wheels    Hypothyroidism, unspecified type-last TSH level is normal continue current dosing    Iron deficiency anemia, unspecified iron deficiency anemia type-iron level too high.  We will advised patient to decrease her dose to once daily  -     Iron and Transferrin Iron Binding Capacity    Essential hypertension-blood pressure quite elevated today.  Typically in normal range.  Could be from recent bad evening she had after taking too much gabapentin.  We will continue to monitor at home and she will alert me if it continues to run high  -     Basic Metabolic Panel    Nausea-prescription given for nausea however I advised patient that if she is having nausea after swallowing all of her pills that she should take them one by one and not handfuls at a time.   Discussed that the Zofran would not be needed in this instance  -     ondansetron (ZOFRAN-ODT) 4 MG disintegrating tablet; DISSOLVE 1 TABLET(4 MG) ON THE TONGUE EVERY 8 HOURS AS NEEDED FOR NAUSEA    Recurrent major depressive disorder, in full remission (H)  -Has been stable on bupropion.  Continue current dosing    Unstable balance/recurrent falls/osteoporosis-unfortunately she has had several falls in the last couple months.  We are going to try to get her a better walker with brakes.  I am concerned about her living alone at home and we addressed the safety in her home.  She already did physical therapy at home for strengthening and felt it was somewhat helpful but does not want to continue doing it or allowing people into her home at this time due to COVID-19.  She is taking her own supplements for bone health    History of breast cancer in remission.  Bilateral mastectomy    Muscle spasms-patient continues to get pain into her lower legs likely due to her lumbar stenosis.  The gabapentin is quite helpful.  We did discuss the potential risks of worsening balance with these medications but she is adamant to stay on these medications for the time being because they are helpful    The patient's current medical problems were reviewed.    I have had an Advance Directives discussion with the patient.  The following health maintenance schedule was reviewed with the patient and provided in printed form in the after visit summary:   Health Maintenance   Topic Date Due     ZOSTER VACCINES (2 of 3) 06/20/2016     MEDICARE ANNUAL WELLNESS VISIT  12/01/2021     FALL RISK ASSESSMENT  12/01/2021     ADVANCE CARE PLANNING  05/15/2025     TD 18+ HE  04/25/2026     DEPRESSION ACTION PLAN  Completed     Pneumococcal Vaccine: 65+ Years  Completed     INFLUENZA VACCINE RULE BASED  Completed     Pneumococcal Vaccine: Pediatrics (0 to 5 Years) and At-Risk Patients (6 to 64 Years)  Aged Out     HEPATITIS B VACCINES  Aged Out         Subjective:   Chief Complaint: Ellis Em is an 89 y.o. female here for an Annual Wellness visit.   HPI:  Pt is here for annual wellness visit-  Here with daughter Rosalva. Fell recently x 2 right before thanksgiving once . Was in bathroom and misjudged the seat.  Landed on buttock and didn't hit head. Crawled to phone. Feels bad when has to call family over to get her picked up.   Hard to get legs to  legs when tells them to walk. PT/OT finished.   Yesterday gabapentin-took 3 yesterday instead of just 1 .  They had also fallen on the floor and when she picked them up to put them back in her pillbox she put the wrong quantity  Blood pressure up yesterday and headache. Had a terrible nightmare.  She usually is taking 100mg in the morning, 200mg at lunch and 100mg in the evening.  That was a decreased dose from what we previously had her on because I was concerned about risk of falls  Legs hurt if doesn't take extra at noon. Calfs are painful. Has had emg in past but was really painful. Doesn't want PT to come back into the home.  Helped a little for balance and strengthening after her last fall.  Does not want to go anywhere for physical therapy.  I reviewed  Previous MRI 10/2020 - Multilevel posterolateral disc disease and facet arthropathy with severe multilevel neural foraminal narrowing at L1/L2-L5/S1 with impingement of the exiting nerve roots as described above.    This morning had a bad headache.  Typically takes tylenol every 6 hours. Headache still there. 4/10.        Was iron deficient in oct and we stared iron supplement and felt stronger since. But stools are black. Had to increase prune juice. Taking twice daily.     Not doing omeprazole. If swallows all pills gets nausea and will vomit within 5 min.  She is requesting a refill of a prescription for nausea.      Review of Systems:     Please see above.  The rest of the review of systems are negative for all systems.    Patient Care  "Team:  Whitney Velasquez DO as PCP - General (Family Medicine)  Whitney Velasquez DO as Assigned PCP  Jagdeep Huitron MD as Assigned Neuroscience Provider     Patient Active Problem List   Diagnosis     Hypothyroidism     Major depressive disorder, recurrent episode, in full remission (H)     Idiopathic osteoporosis     Neck pain     Adrenal mass, left (H)     Lumbar stenosis with neurogenic claudication     Essential hypertension     Generalized osteoarthritis of multiple sites     Myalgia, unspecified site     Peripheral neuropathy     Allergic rhinitis     History of anemia     Muscle spasm     Tinnitus     Macular degeneration mild      Breast implant rupture bilateral      ACP (advance care planning)     Actinic keratosis     Balance disorder     Spinal stenosis of lumbar region with neurogenic claudication     Cyst of ovary     Recurrent major depressive disorder, in full remission (H)     Disorder of bone and cartilage     Follow-up examination following surgery     Hearing loss     Hyperlipidemia     Ileus (H)     Malignant neoplasm of skin of parts of face     Measles     Neoplasm of uncertain behavior of skin     Neurogenic bladder     Neurogenic bowel     Retention of urine     Unstable balance     Past Medical History:   Diagnosis Date     Bowel obstruction (H) 1974     C1 cervical fracture (H) 10/12/2015     Cancer (H) breast, 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, salivary gland     Cancer of skin     \"half dollar size area removed from scalp\", precancer in abd (gallbladder & 1/2 of stomach removed\"     Carpal tunnel syndrome      Cervical spondylosis with myelopathy 4/25/2013     Chronic pain      Closed fracture of cervical vertebra, sequela 10/21/2015     Replacement Utility updated for latest IMO load     Closed fracture of lumbar vertebra without mention of spinal cord injury 1/21/2015     Replacement Utility updated for latest IMO load     Cyst of spinal meninges      Dermatitis      Gastric ulcer 1983    " precancerous     Hip fracture (H) 2007     History of appendectomy     1943, 12 years old     History of fractured pelvis 1985     Hyponatremia      Hypothyroidism      Leg pain, bilateral     weakness bilaterally     Major depression      Miscarriage 1962    x2     Neck fracture (H) 09/10/2015     Nephrolithiasis      Osteoarthritis      Osteoporosis      Panic attack      Peripheral neuropathy      Reactive confusion      Renal insufficiency      Rib fracture 2008    x2, with pelvix fx     Shingles      Spinal Meningeal Cyst     Created by Conversion      Status post lumbar laminectomy 11/7/2014     UTI (urinary tract infection)      Varicella 7/14/2004    Overview:  LW Onset:  [id number] ; Varicella Zoster      Past Surgical History:   Procedure Laterality Date     ABDOMINAL SURGERY  06/30/1983    1/2 of stomach organ removed due to precancerous ulcer     APPENDECTOMY  1943     BREAST RECONSTRUCTION Bilateral 11/11/1984    with implants, nipple attachment 1/13/1985     CATARACT EXTRACTION, BILATERAL       CERVICAL LAMINOPLASTY Bilateral 5/2/2013    C1-3 cervical laminoplasty, removal of intracanaalicular extradural ventrally located cyst on 5/2/2013 by Dr. Corbin at [facility] for treatment of spinal cord compression and myelopathy     CHOLECYSTECTOMY  06/30/1983     COMBINED MAMMAPLASTY REVISION W/ ABDOMINOPLASTY  07/1998     DILATION AND CURETTAGE OF UTERUS  09/19/1986     ENDOMETRIAL BIOPSY  09/09/1986     FRACTURE SURGERY  1985    Plate in Left hip; pins in then removed from right hip     JOINT REPLACEMENT      left total knee     LUMBAR LAMINECTOMY N/A 11/6/2014    Procedure: BILATERAL DECOMPRESSIVE LAMINECTOMY L1-L5 (REDO L4 L5);  Surgeon: Trice Corbin MD;  Location: [facility];  Service:      MASTECTOMY Bilateral      OVARIAN CYST REMOVAL  1943    chocolate cysts removed     partial colectomy       NH ARTHROPLASTY TIBIAL PLATEAU Left     Description: Knee Replacement;   Recorded: 05/23/2013;     DK QUIGLEY W/O FACETEC FORAMOT/DSKC 1/2 VRT SEG, LUMBAR      Description: Laminectomy Decompressive Up To Two Lumbar Segments;  Recorded: 11/10/2014;     ROOT CANAL  09/14/1998     SALIVARY GLAND SURGERY  12/05/1984    salivary gland and neck tumor removed     SKIN CANCER EXCISION  08/15/2003    ear     TOTAL HIP ARTHROPLASTY Bilateral     armature installed     TOTAL KNEE ARTHROPLASTY  10/20/2005     TUBAL LIGATION  1962     TUMOR EXCISION  05/01/2013    in spine      Family History   Problem Relation Age of Onset     Cancer Mother      Kidney disease Father      Alcohol abuse Brother      Diabetes Brother      Dementia Brother       Social History     Socioeconomic History     Marital status: Single     Spouse name: Not on file     Number of children: Not on file     Years of education: Not on file     Highest education level: Not on file   Occupational History     Not on file   Social Needs     Financial resource strain: Not on file     Food insecurity     Worry: Not on file     Inability: Not on file     Transportation needs     Medical: Not on file     Non-medical: Not on file   Tobacco Use     Smoking status: Never Smoker     Smokeless tobacco: Never Used   Substance and Sexual Activity     Alcohol use: No     Drug use: No     Sexual activity: Not on file   Lifestyle     Physical activity     Days per week: Not on file     Minutes per session: Not on file     Stress: Not on file   Relationships     Social connections     Talks on phone: Not on file     Gets together: Not on file     Attends [employer] service: Not on file     Active member of club or organization: Not on file     Attends meetings of clubs or organizations: Not on file     Relationship status: Not on file     Intimate partner violence     Fear of current or ex partner: Not on file     Emotionally abused: Not on file     Physically abused: Not on file     Forced sexual activity: Not on file   Other Topics Concern     Not on  file   Social History Narrative     Not on file      Current Outpatient Medications   Medication Sig Dispense Refill     alpha lipoic acid 200 mg cap Take 1 capsule by mouth 2 (two) times a day.       baclofen (LIORESAL) 10 MG tablet Take 1 tablet (10 mg total) by mouth 3 (three) times a day. 270 tablet 1     buPROPion (WELLBUTRIN SR) 150 MG 12 hr tablet TAKE 1 TABLET BY MOUTH EVERY DAY 90 tablet 2     cranberry fruit extract (CRANBERRY ORAL) Take by mouth.       diclofenac sodium (VOLTAREN) 1 % Gel APPLY 2 GRAMS OF GEL TO UPPER RIGHT SHOULDER FOUR TIMES DAILY AS NEEDED FOR PAIN 100 g 0     ferrous sulfate 325 (65 FE) MG tablet Take 1 tablet (325 mg total) by mouth daily with breakfast. 60 tablet 1     FLUoxetine (PROZAC) 10 MG capsule TAKE 3 CAPSULES BY MOUTH DAILY 270 capsule 3     gabapentin (NEURONTIN) 100 MG capsule TAKE 1 TO 3 CAPSULES BY MOUTH THREE TIMES DAILY AS NEEDED (Patient taking differently: 1 capsule in the morning, 2 capsules in the afternoon and 1 capsule in the evening.) 270 capsule 5     HEMP OIL OR EXTRACT OR OTHER CBD CANNABINOID, NOT MEDICAL CANNABIS, Uses a cream for her leg pain       inositol/Mv Comb 1/bertha/betaine (CARDIOVASCULAR SUPPORT ORAL) Take by mouth.       levothyroxine (SYNTHROID, LEVOTHROID) 75 MCG tablet TAKE 1 TABLET BY MOUTH DAILY 90 tablet 3     losartan (COZAAR) 50 MG tablet Take 1 tablet (50 mg total) by mouth 2 (two) times a day. 180 tablet 3     melatonin 10 mg Tab Take 10 mg by mouth bedtime.       omega-3 fatty acids 500 mg cap Take by mouth.       ondansetron (ZOFRAN-ODT) 4 MG disintegrating tablet DISSOLVE 1 TABLET(4 MG) ON THE TONGUE EVERY 8 HOURS AS NEEDED FOR NAUSEA 30 tablet 1     STRONTIUM CHLORIDE HEXAHYDRATE (STRONTIUM CHL HEXAHYD, BULK,) 100 % Kailyn Use As Directed.       VIT C/E/ZN/COPPR/LUTEIN/ZEAXAN (PRESERVISION AREDS 2 ORAL) Take 1 tablet by mouth 2 (two) times a day.        No current facility-administered medications for this visit.       Objective:  "  Vital Signs:   Visit Vitals  /86   Pulse 68   Temp 96.8  F (36  C) (Oral)   Resp 16   Ht 5' 4\" (1.626 m)   Wt 138 lb 12.8 oz (63 kg)   LMP  (LMP Unknown)   SpO2 97%   BMI 23.82 kg/m           VisionScreening:  No exam data present     PHYSICAL EXAM     General Appearance:   Walks with a walker.  Slower speech alert, cooperative, no distress, appears stated age. frail   Head:    Normocephalic, without obvious abnormality, atraumatic   Eyes:    PERRL, conjunctiva/corneas clear, EOM's intact, fundi     benign, both eyes   Ears:    Normal TM's and external ear canals, both ears   Nose:   Nares normal, septum midline, mucosa normal, no drainage    or sinus tenderness   Throat:   Lips, mucosa, and tongue normal; teeth and gums normal   Neck:   Supple, symmetrical, trachea midline, no adenopathy;     thyroid:  no enlargement/tenderness/nodules; no carotid    bruit or JVD   Back:     Symmetric, no curvature, ROM normal, no CVA tenderness   Lungs:     Clear to auscultation bilaterally, respirations unlabored   Chest Wall:    No tenderness or deformity    Heart:    Regular rate and rhythm, S1 and S2 normal, no murmur, rub   or gallop   Breast Exam:    No tenderness, masses, or nipple abnormality. Bilateral mastectomy   Abdomen:     Soft, non-tender, bowel sounds active all four quadrants,     no masses, no organomegaly   Genitalia:  deferred   Rectal:     Extremities:   Extremities normal, atraumatic, no cyanosis or edema   Pulses:   2+ and symmetric all extremities   Skin:   Skin color, texture, turgor normal, no rashes or lesions   Lymph nodes:   Cervical, supraclavicular, and axillary nodes normal   Neurologic:   CNII-XII intact, normal strength, sensation and reflexes     throughout       Assessment Results 12/1/2020   Activities of Daily Living No help needed   Instrumental Activities of Daily Living 2-4 - Moderate impairment   Get Up and Go Score -   Falls Risk- Not Completed Medical Reasons   Mini Cog Total " Score -   Some recent data might be hidden     A Mini-Cog score of 0-2 suggests the possibility of dementia, score of 3-5 suggests no dementia    Identified Health Risks:     She is at risk for lack of exercise and has been provided with information to increase physical activity for the benefit of her well-being.  The patient reports that she has difficulty with instrumental activities of daily living.  She was provided with a list of local organizations that provide support services and advised to make a follow up appointment in 12 weeks to address this further.   Information on urinary incontinence and treatment options given to patient.  The patient's PHQ-9 score is consistent with mild depression. She was provided with information regarding depression and was advised to schedule a follow up appointment in 12 weeks to further address this issue.  She is at risk for falling and has been provided with information to reduce the risk of falling at home.  Patient's advanced directive was discussed and I am comfortable with the patient's wishes.

## 2021-06-13 NOTE — TELEPHONE ENCOUNTER
----- Message from Whitney Velasquez DO sent at 12/3/2020  9:01 AM CST -----  Please contact patient.  Let her know all blood work was normal.  You can also send a letter.  Iron levels however were too high.  I would like her to decrease her iron supplement to once daily instead of twice daily.  Also we are waiting on further results from her urine but preliminary studies does not show infection

## 2021-06-13 NOTE — TELEPHONE ENCOUNTER
Reason contacted:  Results  Information relayed:  Informed pt of message below. Patient states understanding.   Additional questions:  No  Further follow-up needed:  No  Okay to leave a detailed message:  No       Letter mailed

## 2021-06-14 ENCOUNTER — COMMUNICATION - HEALTHEAST (OUTPATIENT)
Dept: FAMILY MEDICINE | Facility: CLINIC | Age: 86
End: 2021-06-14
Payer: MEDICARE

## 2021-06-14 NOTE — PROGRESS NOTES
Ellis Em is a 89 y.o. female who is being evaluated via a billable video visit.      How would you like to obtain your AVS? mail  If dropped from the video visit, the video invitation should be resent by: [phone]  Will anyone else be joining your video visit? Daughter will be with pt.       Video Start Time: 1420      Reason for visit      1. Idiopathic osteoporosis        History     Ellis Em is a very pleasant 89 y.o. old female who presents for follow up.   SUMMARY:  1. Osteoporosis- This is a long-standing diagnosis for and she has been treated in the past with Actonel and Reclast. She has a history of L3 compression fracture Detected on VFA testing.It is not clear when this had happened.She has had laminectomy of L1 to L5.The patient used to get care in [place] and transferred care here about 45 years ago.  Lab investigation so far reveal a TSH of 0.58.She consumes about two servings of Dairy each day consisting of milk and cheese daily.  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 Trice Corbin. She presented to the ER on 10/12 with two week history of neck pain/headache. No clear history of fall or trauma. Cervical CT revealed C1 arch fracture.Patient is being managed in a [place] [facility] collar. Main risk factors include age, gender, postmenopausal status. She's on levothyroxine, a high-risk medication, but no history of chronic medical conditions in a high risk.She is not currently on any treatment for osteoporosis.Latest DEXA scan is noted below. There is a remote history of kidney stones. There is a mention of an incidental left adrenal mass but is no further information. Patient is not aware of this diagnosis. I do not have records revealing any workup.    TODAY:  Ellis is contacted today in f/u for Osteoporosis. She is 2 years post Forteo and  "her last Dexa Scan showed good improvement. She reports a fall in October that precipitated a concussion, for which she is really just finishing PT.  She states that it took her month to figure out that she had hit her head. She states that it was \"darned inconvenient\".  She did not break anything, however. Her current Vit D level is 63.3 and her Calcium level is 9.1.     Ellis is taking Levothyroxine 75 mcg daily. Her current TSH is 1.64 and fT4 is 1.0. She is feeling well and is having no problems referable to her neck at present.     Risk Factors         Past Medical History     Patient Active Problem List   Diagnosis     Hypothyroidism     Major depressive disorder, recurrent episode, in full remission (H)     Idiopathic osteoporosis     Adrenal mass, left (H)     Lumbar stenosis with neurogenic claudication     Essential hypertension     Generalized osteoarthritis of multiple sites     Peripheral neuropathy     Allergic rhinitis     Muscle spasm     Tinnitus     Macular degeneration mild      Breast implant rupture bilateral      ACP (advance care planning)     Actinic keratosis     Balance disorder     Spinal stenosis of lumbar region with neurogenic claudication     Cyst of ovary     Recurrent major depressive disorder, in full remission (H)     Hearing loss     Hyperlipidemia     Malignant neoplasm of skin of parts of face     Neurogenic bladder     Unstable balance       Family History       family history includes Alcohol abuse in her brother; Cancer in her mother; Dementia in her brother; Diabetes in her brother; Kidney disease in her father.    Social History      reports that she has never smoked. She has never used smokeless tobacco. She reports that she does not drink alcohol or use drugs.      Review of Systems     Patient denies current pain, limited mobility, fractures.   Remainder per HPI.      Vital Signs     LMP  (LMP Unknown)     Physical Exam     GENERAL:  Normal, NIRD  EYES:  Pupils equal, " round and reactive to light; no proptosis, lid lag or  periorbital edema  NECK: Neck appears normal  LUNGS:  No respiratory distress, normal chest wall excursions  NEURO: No tremors, alert and oriented x 3  SKIN:  No acanthosis nigricans or vitiligo        Assessment     1. Idiopathic osteoporosis        Plan     We will see Ellis madrigal in 1 year with another Dexa Scan.    She is currently bio-chemically and physically euthyroid. She will continue on her current dose of Levothyroxine and f/u with me in 1 year.       Brittney Daley   Endocrinology  2/3/2021  11:02 AM          Current Medications     Outpatient Medications Prior to Visit   Medication Sig Dispense Refill     alpha lipoic acid 200 mg cap Take 1 capsule by mouth 2 (two) times a day.       baclofen (LIORESAL) 10 MG tablet Take 1 tablet (10 mg total) by mouth 3 (three) times a day. 270 tablet 1     buPROPion (WELLBUTRIN SR) 150 MG 12 hr tablet TAKE 1 TABLET BY MOUTH EVERY DAY 90 tablet 2     cranberry fruit extract (CRANBERRY ORAL) Take by mouth.       diclofenac sodium (VOLTAREN) 1 % Gel APPLY 2 GRAMS OF GEL TO UPPER RIGHT SHOULDER FOUR TIMES DAILY AS NEEDED FOR PAIN 100 g 0     ferrous sulfate 325 (65 FE) MG tablet Take 1 tablet (325 mg total) by mouth daily with breakfast. 90 tablet 2     FLUoxetine (PROZAC) 10 MG capsule TAKE 3 CAPSULES BY MOUTH DAILY 270 capsule 3     gabapentin (NEURONTIN) 100 MG capsule 1 capsule in the morning, 2 capsules in the afternoon and 1 capsule in the evening. 270 capsule 5     HEMP OIL OR EXTRACT OR OTHER CBD CANNABINOID, NOT MEDICAL CANNABIS, Uses a cream for her leg pain       inositol/Mv Comb 1/bertha/betaine (CARDIOVASCULAR SUPPORT ORAL) Take by mouth.       levothyroxine (SYNTHROID, LEVOTHROID) 75 MCG tablet TAKE 1 TABLET BY MOUTH DAILY 90 tablet 3     losartan (COZAAR) 50 MG tablet TAKE 1 TABLET BY MOUTH TWICE DAILY 180 tablet 3     melatonin 10 mg Tab Take 10 mg by mouth bedtime.       omega-3 fatty acids 500 mg cap  Take by mouth.       ondansetron (ZOFRAN-ODT) 4 MG disintegrating tablet DISSOLVE 1 TABLET(4 MG) ON THE TONGUE EVERY 8 HOURS AS NEEDED FOR NAUSEA 30 tablet 1     STRONTIUM CHLORIDE HEXAHYDRATE (STRONTIUM CHL HEXAHYD, BULK,) 100 % Kailyn Use As Directed.       VIT C/E/ZN/COPPR/LUTEIN/ZEAXAN (PRESERVISION AREDS 2 ORAL) Take 1 tablet by mouth 2 (two) times a day.        No facility-administered medications prior to visit.          Lab Results     TSH   Date Value Ref Range Status   10/02/2020 1.64 0.30 - 5.00 uIU/mL Final     Calcium   Date Value Ref Range Status   12/01/2020 9.1 8.5 - 10.5 mg/dL Final     Iron   Date Value Ref Range Status   12/01/2020 257 (H) 42 - 175 ug/dL Final           Imaging Results   Last DEXA scan:  Results for orders placed in visit on 02/06/19   DXA Bone Density Scan    Narrative 1/20/2020      RE: Ellis Em  YOB: 1931        Dear Brittney Daley,    Patient Profile:  88 y.o. female, postmenopausal, is here for the follow up bone density   test.   History of fractures - Yes;  Lumbar vertebrae, Vertebra and Hip. Family   history of osteoporosis - None.  Family history of hip fracture: None.   Smoking history - No. Osteoporosis treatment past -  Yes;  HRT,   Bisphosphonates, Forteo and Evista. Osteoporosis treatment current - No.    Chronic medical problems - Breast cancer, Height loss, Hip replacement ,   Spine surgery and Thyroid disease. High risk medications -  Anti-seizure;    Yes, Currently and Thyroid;  Yes, Currently.      Assessment:    1. The spine bone density L1-L4 is not able to be assessed due to the   presence of significant sclerotic changes, along with the previous   compression fractures.  2. Femoral bone density shows right total hip T- score -1.9.  3.  The left one third radius T score -3.1.      88 y.o. female with OSTEOPOROSIS and HIGH fracture risk.     Since the previous bone density dated  October 27, 2017, there has been  a   +6.0 % change in the right  total hip.  Additionally, there has been a   10.1% increase in the bone density of the left distal radius.    Recommendations:  Appropriate ongoing evaluation and treatment is recommended with follow up   bone density scan in 2 years.      Bone densitometry was performed on your patient using our Mediamorph iDXA   densitometer. The results are summarized and a copy of the actual scans   are included for your review. In conformity with the International Society   of Clinical Densitometry's most recent position statement for DXA   interpretation (2015), the diagnosis will be made on the lowest measured   T-score of the lumbar spine, femoral neck, total proximal femur or 33%   radius. Note the change in terminology for diagnostic classification from   OSTEOPENIA to LOW BONE MASS. All trending for sequential exams will be   done using multiple vertebrae or the total proximal femur. Fracture risk   is based on the WHO Fracture Risk Assessment Tool (FRAX). If additional   information is needed or if you would like to discuss the results, please   do not hesitate to call me.       Thank you for referring this patient to [facility].   We are happy to be of service in support of you and your practice. If you   have any questions or suggestions to improve our service, please call me   at [phone].     Sincerely,     Brionna Zeng M.D. SHINCRUI.  Osteoporosis Services, [facility]       Video-Visit Details    Type of service:  Video Visit    Video End Time (time video stopped): 1500  Originating Location (pt. Location): Home    Distant Location (provider location):  [facility]     Platform used for Video Visit: [email or web address]

## 2021-06-14 NOTE — TELEPHONE ENCOUNTER
Refill Approved    Rx renewed per Medication Renewal Policy. Medication was last renewed on 12/13/19.    Shirley Martino, Care Connection Triage/Med Refill 12/30/2020     Requested Prescriptions   Pending Prescriptions Disp Refills     losartan (COZAAR) 50 MG tablet [Pharmacy Med Name: LOSARTAN 50MG TABLETS] 180 tablet 3     Sig: TAKE 1 TABLET BY MOUTH TWICE DAILY       Angiotensin Receptor Blocker Protocol Passed - 12/29/2020  3:26 AM        Passed - PCP or prescribing provider visit in past 12 months       Last office visit with prescriber/PCP: 10/2/2020 Whitney Velasquez DO OR same dept: 10/2/2020 Whitney Velasquez DO OR same specialty: 10/2/2020 Whitney Velasquez DO  Last physical: 12/1/2020 Last MTM visit: Visit date not found   Next visit within 3 mo: Visit date not found  Next physical within 3 mo: Visit date not found  Prescriber OR PCP: Whitney Velasquez DO  Last diagnosis associated with med order: 1. Essential hypertension  - losartan (COZAAR) 50 MG tablet [Pharmacy Med Name: LOSARTAN 50MG TABLETS]; TAKE 1 TABLET BY MOUTH TWICE DAILY  Dispense: 180 tablet; Refill: 3    If protocol passes may refill for 12 months if within 3 months of last provider visit (or a total of 15 months).             Passed - Serum potassium within the past 12 months     Lab Results   Component Value Date    Potassium 3.8 12/01/2020             Passed - Blood pressure filed in past 12 months     BP Readings from Last 1 Encounters:   12/01/20 168/86             Passed - Serum creatinine within the past 12 months     Creatinine   Date Value Ref Range Status   12/01/2020 0.74 0.60 - 1.10 mg/dL Final

## 2021-06-14 NOTE — TELEPHONE ENCOUNTER
RN cannot approve Refill Request    RN can NOT refill this medication Patient reports taking differently. Last office visit: 10/2/2020 Whitney Velasquez DO Last Physical: 2020 Last MTM visit: Visit date not found Last visit same specialty: 10/2/2020 Whitney Velasquez DO.  Next visit within 3 mo: Visit date not found  Next physical within 3 mo: Visit date not found      Bassem Johnson, [facility] Connection Triage/Med Refill 2021    Requested Prescriptions   Pending Prescriptions Disp Refills     gabapentin (NEURONTIN) 100 MG capsule 270 capsule 5     Si capsule in the morning, 2 capsules in the afternoon and 1 capsule in the evening.       Gabapentin/Levetiracetam/Tiagabine Refill Protocol  Passed - 2021  5:43 PM        Passed - PCP or prescribing provider visit in past 12 months or next 3 months     Last office visit with prescriber/PCP: 10/2/2020 Whitney Velasquez DO OR same dept: 10/2/2020 Whitney Velasquez DO OR same specialty: 10/2/2020 Whitney Velasquez DO  Last physical: 2020 Last MTM visit: Visit date not found   Next visit within 3 mo: Visit date not found  Next physical within 3 mo: Visit date not found  Prescriber OR PCP: Whitney Velasquez DO  Last diagnosis associated with med order: 1. Acute pain of right shoulder  - gabapentin (NEURONTIN) 100 MG capsule; 1 capsule in the morning, 2 capsules in the afternoon and 1 capsule in the evening.  Dispense: 270 capsule; Refill: 5    If protocol passes may refill for 12 months if within 3 months of last provider visit (or a total of 15 months).

## 2021-06-14 NOTE — TELEPHONE ENCOUNTER
Refill Approved    Rx renewed per Medication Renewal Policy. Medication was last renewed on 12/3/20.    Shirley Martino, [facility] Connection Triage/Med Refill 1/6/2021     Requested Prescriptions   Pending Prescriptions Disp Refills     ferrous sulfate 325 (65 FE) MG tablet 60 tablet 1     Sig: Take 1 tablet (325 mg total) by mouth daily with breakfast.       Iron Supplements Refill Protocol Passed - 1/5/2021  8:17 AM        Passed - PCP or prescribing provider visit in past 12 months       Last office visit with prescriber/PCP: 10/2/2020 Whitney Velasquez DO OR same dept: 10/2/2020 Whiteny Velasquez DO OR same specialty: 10/2/2020 Whitney Velasquez DO  Last physical: 12/1/2020 Last MTM visit: Visit date not found   Next visit within 3 mo: Visit date not found  Next physical within 3 mo: Visit date not found  Prescriber OR PCP: Whitney Velasquez DO  Last diagnosis associated with med order: 1. Iron deficiency anemia due to chronic blood loss  - ferrous sulfate 325 (65 FE) MG tablet; Take 1 tablet (325 mg total) by mouth daily with breakfast.  Dispense: 60 tablet; Refill: 1    If protocol passes may refill for 12 months if within 3 months of last provider visit (or a total of 15 months).             Passed - Hemoglobin or Hematocrit in last 12 months     Hemoglobin   Date Value Ref Range Status   10/02/2020 9.1 (L) 12.0 - 16.0 g/dL Final     Hematocrit   Date Value Ref Range Status   10/02/2020 28.5 (L) 35.0 - 47.0 % Final

## 2021-06-15 ENCOUNTER — COMMUNICATION - HEALTHEAST (OUTPATIENT)
Dept: FAMILY MEDICINE | Facility: CLINIC | Age: 86
End: 2021-06-15

## 2021-06-15 ENCOUNTER — COMMUNICATION - HEALTHEAST (OUTPATIENT)
Dept: FAMILY MEDICINE | Facility: CLINIC | Age: 86
End: 2021-06-15
Payer: MEDICARE

## 2021-06-15 DIAGNOSIS — M54.50 LOW BACK PAIN POTENTIALLY ASSOCIATED WITH RADICULOPATHY: ICD-10-CM

## 2021-06-15 DIAGNOSIS — R41.89 COGNITIVE CHANGES: ICD-10-CM

## 2021-06-15 NOTE — TELEPHONE ENCOUNTER
Please call pt to [facility] a f/u appt with Lorene in 1 year with lab prior.     Order already placed.

## 2021-06-15 NOTE — TELEPHONE ENCOUNTER
Reason contacted:  [place] orders  Information relayed:  Ok for verbal orders.  Additional questions:  No  Further follow-up needed:  No  Okay to leave a detailed message:  No

## 2021-06-15 NOTE — TELEPHONE ENCOUNTER
[facility] unable to complete PT Admission visit until 2/22. Need verbal ok to change date of order.     Serena LOCKHART RN Clinical Coordinator  [facility]  [phone]

## 2021-06-15 NOTE — PATIENT INSTRUCTIONS - HE
[phone] to schedule bone scan.     Referrals placed for [facility]   Referral to see sports medicine for injection Right shoulder for subacromial impingement, and trigger finger and carpal tunnel   [facility]  PHONE: [phone]

## 2021-06-15 NOTE — PROGRESS NOTES
ASSESSMENT and PLAN:  1. Subacromial impingement of right shoulder  -Patient continues to suffer from pain from her right shoulder.  Has polyarthralgia.  In the past has benefited from corticosteroid injection but since she likely needs injections of her trigger finger and possibly carpal tunnel I am just going to send her to sports medicine at [facility] to see if they can do all areas for her.  She may need updated imaging it has been 1 year.  Also recommended physical therapy and since she cannot leave the home without a caregiver will order home health care  - Ambulatory referral to Orthopedics  - Ambulatory referral to Home Health    2. Trigger finger, acquired  Again we discussed potential for cortisone injection  - Ambulatory referral to Orthopedics    3. Carpal tunnel syndrome of right wrist  Wearing a splint would be too difficult for this patient to get around with her walker.  We talked about options which would include a cortisone injection for conservative therapy so referral has been placed  - Ambulatory referral to Orthopedics    4. Essential hypertension  Blood pressure has been extremely elevated for the last several times the patient is in clinic but she brings in blood pressure readings from home which are normotensive.  I recommended not increasing the dose of her medications so that we do not put her at higher risk for falls    5. Major depressive disorder, recurrent episode, in full remission (H)  Patient continues to have times where she is feeling down and depressed.  Her daughter and her agree that part of it has to do likely with Covid and being shut in her home and perhaps as they are all getting immunized and is the warm weather thaws she will be able to get out and see neighbors again.  We talked about doing things to stimulate including listening to music, devotions and even considering finger painting since she is not able to  anything.  She continues on fluoxetine and Wellbutrin which  she thinks helped.  She would like to continue on this regimen.    -Daughter was concerned that there is still some cognitive concerns off and on and I explained to her again that I am concerned more so with polypharmacy and potentially the baclofen and gabapentin which she relies on for pain control could be contributing    6. Unstable balance  -She feels that she did improve when she was getting physical therapy and would like to explore that again so orders were placed for balance and gait training and strengthening  - Ambulatory referral to Home Health    7. Nail abnormality  -Unclear cause to the chipping of her nail.  The medication that was placed by her daughter to soften the nail has caused the nail to dissolve and come off but it appears that potentially new nail is coming in.  We talked about that we could consider sending her to dermatology if she continues to have concerns or podiatry to remove the nail      Patient Instructions   [phone] to schedule bone scan.     Referrals placed for [facility]   Referral to see sports medicine for injection Right shoulder for subacromial impingement, and trigger finger and carpal tunnel   [facility]  PHONE: [phone]      Orders Placed This Encounter   Procedures     Ambulatory referral to Orthopedics     Referral Priority:   Routine     Referral Type:   Consultation     Referral Reason:   Evaluation and Treatment     Requested Specialty:   Orthopedics     Number of Visits Requested:   1     Ambulatory referral to Home Health     Referral Priority:   Routine     Referral Type:   Home Health Care     Referral Reason:   Evaluation and Treatment     Requested Specialty:   Home Health Services     Number of Visits Requested:   1     There are no discontinued medications.    Return in about 5 months (around 8/2/2021) for Annual physical.        The visit lasted a total of 60 minutes spent with patient face-to-face, documentation, chart review and  reviewing results    CHIEF COMPLAINT:  Chief Complaint   Patient presents with     Follow-up     Follow up on blood pressure and toenails and overall weakness     Shoulder Pain     Right shoulder pain.  Injury a long time aga and is not starting to hurt again.      Hand Pain     Hands go numb and one finger that locks.  Starting to interfer with daily activities.        HISTORY OF PRESENT ILLNESS:  Ellis Em is a 89 y.o. female who presents for chronic medical conditions and her daughter Rosalva is accompanying her.  Blood pressure been stable at home 130-150s/ 70s. No dizziness. No dizziness. No falls.   Right shoulder aches a lot. Not sure if has to do with hands. First 3 finger on right numb quite often  Trigger finger and locks on the right. Neck bothersome always.  Carpal tunnel also acting up and cannot  anything.    Doing meals on wheels. And makes breakfast and lunch .   Shoulder bothersome over a year. Getting dressed and such. Not as faithful with exercise . Improved a little with PT and would like to have come back. Balance has been   Feels like not thinking as clearly- psychological isolation. Daughters not over as often.  No contact with anyone.   More days feels like burden- feels like what is the point. More often than not.  Turns to devotions. Has CBD chewables that help when really down for the day. Brightens attitude for a little.  Daughter concerned about cognition.  Talks to therapist every 3 weeks. Doesn't have the energy and doesn't have the exercise.  Seems very bothered about her physical appearance especially her ruptured implants.  Reading for length of time still bothersome from concussion, used to pain .  We talked audiobooks.  [facility] -independent living.   Uses SAD lamp -sept to may.  166/96 on repeat today      REVIEW OF SYSTEMS:    Comprehensive review of systems is negative except as noted in the HPI.     PFSH:  Tobacco use and medications reviewed below.      TOBACCO USE:  Social History     Tobacco Use   Smoking Status Never Smoker   Smokeless Tobacco Never Used       VITALS:  Vitals:    02/16/21 1108   BP: 177/81   Pulse: 67   Resp: 12   Temp: 98  F (36.7  C)   TempSrc: Oral   SpO2: 99%   Weight: 146 lb 3.2 oz (66.3 kg)     Wt Readings from Last 3 Encounters:   02/16/21 146 lb 3.2 oz (66.3 kg)   12/01/20 138 lb 12.8 oz (63 kg)   10/20/20 143 lb (64.9 kg)       PHYSICAL EXAM:  Constitutional:  Reveals a 89 y.o. female in NAD.  Vitals:  Per nursing notes.  Neck:  Supple, thyroid not palpable.  Cardiac:  Regular rate and rhythm without murmurs, rubs, or gallops. Carotids without bruits. Legs without edema. Palpation of the radial pulse regular.  Lungs: Clear.  Respiratory effort normal.  Skin:   Softened broken nail on the great toe on the left.  Might be growing back at matrix.  Rheumatologic: Hypertrophied joints trigger finger on the right.  Carpal tunnel syndrome on the right.  Decreased range of motion right shoulder.   Neurologic:  Cranial nerves II-XII intact.     Psychiatric:  Mood appropriate, memory intact.          MEDICATIONS:  Current Outpatient Medications   Medication Sig Dispense Refill     alpha lipoic acid 200 mg cap Take 1 capsule by mouth 2 (two) times a day.       baclofen (LIORESAL) 10 MG tablet Take 1 tablet (10 mg total) by mouth 3 (three) times a day. 270 tablet 1     buPROPion (WELLBUTRIN SR) 150 MG 12 hr tablet TAKE 1 TABLET BY MOUTH EVERY DAY 90 tablet 2     cranberry fruit extract (CRANBERRY ORAL) Take by mouth.       diclofenac sodium (VOLTAREN) 1 % Gel APPLY 2 GRAMS OF GEL TO UPPER RIGHT SHOULDER FOUR TIMES DAILY AS NEEDED FOR PAIN 100 g 0     ferrous sulfate 325 (65 FE) MG tablet Take 1 tablet (325 mg total) by mouth daily with breakfast. 90 tablet 2     FLUoxetine (PROZAC) 10 MG capsule TAKE 3 CAPSULES BY MOUTH DAILY 270 capsule 3     gabapentin (NEURONTIN) 100 MG capsule 1 capsule in the morning, 2 capsules in the afternoon and 1  capsule in the evening. 270 capsule 5     HEMP OIL OR EXTRACT OR OTHER CBD CANNABINOID, NOT MEDICAL CANNABIS, Uses a cream for her leg pain       inositol/Mv Comb 1/bertha/betaine (CARDIOVASCULAR SUPPORT ORAL) Take by mouth.       levothyroxine (SYNTHROID, LEVOTHROID) 75 MCG tablet TAKE 1 TABLET BY MOUTH DAILY 90 tablet 3     losartan (COZAAR) 50 MG tablet TAKE 1 TABLET BY MOUTH TWICE DAILY 180 tablet 3     melatonin 10 mg Tab Take 10 mg by mouth bedtime.       omega-3 fatty acids 500 mg cap Take by mouth.       ondansetron (ZOFRAN-ODT) 4 MG disintegrating tablet DISSOLVE 1 TABLET(4 MG) ON THE TONGUE EVERY 8 HOURS AS NEEDED FOR NAUSEA 30 tablet 1     STRONTIUM CHLORIDE HEXAHYDRATE (STRONTIUM CHL HEXAHYD, BULK,) 100 % Kailyn Use As Directed.       VIT C/E/ZN/COPPR/LUTEIN/ZEAXAN (PRESERVISION AREDS 2 ORAL) Take 1 tablet by mouth 2 (two) times a day.        No current facility-administered medications for this visit.

## 2021-06-15 NOTE — TELEPHONE ENCOUNTER
Ok for verbal to delay start of care   V-Y Flap Text: The defect edges were debeveled with a #15 scalpel blade.  Given the location of the defect, shape of the defect and the proximity to free margins a V-Y flap was deemed most appropriate.  Using a sterile surgical marker, an appropriate advancement flap was drawn incorporating the defect and placing the expected incisions within the relaxed skin tension lines where possible.    The area thus outlined was incised deep to adipose tissue with a #15 scalpel blade.  The skin margins were undermined to an appropriate distance in all directions utilizing iris scissors.

## 2021-06-15 NOTE — TELEPHONE ENCOUNTER
Pt seen for SOC today. Recommending:  PT 1w1, 2w3 for strength, ROM  OT eval for cognition, DME, ADLs    Please reply to message if in agreement with plan?    Rula Huertas PT, DTP  Lic# [id number]  [facility]  [phone]

## 2021-06-15 NOTE — PROGRESS NOTES
[facility]  ENDOCRINOLOGY    Osteoporosis Follow Up 1/31/2018    Ellis Em, 4/21/1931, [id number]          Reason for visit      1. Idiopathic osteoporosis        History     Ellis Em is a very pleasant 86 y.o. old female who presents for follow up.   SUMMARY:  1. Osteoporosis- This is a long-standing diagnosis for and she has been treated in the past with Actonel and Reclast. She has a history of L3 compression fracture Detected on VFA testing.It is not clear when this had happened.She has had laminectomy of L1 to L5.The patient used to get care in [place] and transferred care here about 45 years ago.  Lab investigation so far reveal a TSH of 0.58.She consumes about two servings of Dairy each day consisting of milk and cheese daily.  She's not very active although she is independent in living.  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 Trice Corbin. She presented to the ER on 10/12 with two week history of neck pain/headache. No clear history of fall or trauma. Cervical CT revealed C1 arch fracture.Patient is being managed in a [place] collar. Main risk factors include age, gender, postmenopausal status. She's on levothyroxine, a high-risk medication, but no history of chronic medical conditions in a high risk.  She is not currently on any treatment for osteoporosis.Latest DEXA scan is noted below.  There is a remote history of kidney stones  There is a mention of an incidental left adrenal mass but is no further information. Patient is not aware of this diagnosis. I do not have records revealing any workup.    TODAY:  Ellis is here today in f/u for Osteoporosis.  She is here with her trusty walking stick at her side, and no daughter.  She brought her, but they both feel that Ellis is with it enough that she doesn't need a  today.  Ellis has almost completed her Forteo Therapy.  She has done well  with it. It is standard procedure that we start a patient on a Bisphosphonate after this therapy, but she is having none of it.  She has had some significant improvement with the Forteo, as shown in her Dexa Scan that was done in October of last year.  She continues to be at risk for fracture, however.  Her current Vit D level is 63.5 and Calcium level is 9.2.  She is taking Strontium daily.    Risk Factors     The following high- risk conditions have been ruled out: celiac disease, eating disorders, gastric bypass, hyperparathyroidism, inflammatory bowel disease, hyperthyroidism, rheumatoid arthritis, lupus, chronic kidney disease.    Ellis Em has the following risk factors: Age, Female gender,  and Low BMI    She is not on high risk medications such as glucocorticoids, anti-coagulants, anti-convulsants, chemotherapy.    Patient deniesHysterectomy, Oophrectomy, Breast cancer and Family history of breast cancer.        Past Medical History     Patient Active Problem List   Diagnosis     Hypothyroidism     Recurrent Major Depression In Full Remission     Spinal Meningeal Cyst     Idiopathic osteoporosis     Neck pain     Neuropathy     Adrenal mass, left     Lumbar stenosis with neurogenic claudication     Status post lumbar laminectomy     Essential hypertension     Closed fracture of lumbar vertebra without mention of spinal cord injury     Fatigue     Generalized osteoarthrosis, unspecified site     Lumbago     Myalgia and myositis, unspecified     Peripheral neuropathy     Allergic rhinitis     History of anemia     Muscle spasm     C1 cervical fracture     Accelerated hypertension     Closed fracture of cervical vertebra, sequela     Tinnitus     Cough due to ACE inhibitor     Macular degeneration mild      Ruptured left breast implant     Breast implant leak right       Family History       family history includes Alcohol abuse in her brother; Cancer in her mother; Dementia in her brother; Diabetes  "in her brother; Kidney disease in her father.    Social History      reports that she has never smoked. She has never used smokeless tobacco. She reports that she does not drink alcohol or use illicit drugs.      Review of Systems     Patient denies current pain, limited mobility, fractures.   Remainder per HPI.      Vital Signs     /88  Ht 5' 7\" (1.702 m)  Wt 152 lb (68.9 kg)  LMP  (LMP Unknown)  BMI 23.81 kg/m2    Physical Exam     GENERAL:  Normal, NIRD  EYES:  Pupils equal, round and reactive to light; no proptosis, lid lag or  periorbital edema.  THYROID:  Thyroid is normal.  No tenderness or bruit  NECK: No lymph nodes  MUSCULOSKELETAL: No joint abnormalities, FROM in all four extremities. No kyphosis. Muscle strength grossly normal without evidence of wasting.  HEART:  Regular rate and rhythm without murmur.  LUNGS:  Clear to auscultation.  ABDOMEN:Soft, non-tender, no masses or organomegaly  NEURO:  Patella Reflexes were normal.No tremors  SKIN:  No acanthosis nigricans or vitiligo        Assessment     1. Idiopathic osteoporosis        Plan       We spent some time discussing the need to continue therapy to keep the new bone in good stead, but she is not having any of it.  She does not want to go back on anything after she completes the Forteo (which I have directed her to do).  We will get another Dexa Scan completed in January of next year to see how much ground that she loses.  She will continue with her supplements, and Strontium.     Total visit minutes:25  Time spent counseling and coordination of care:23    Brittney DUQUE Endocrinology  1/31/2018  9:17 AM      Current Medications     Outpatient Medications Prior to Visit   Medication Sig Dispense Refill     alpha lipoic acid 200 mg cap Take 1 capsule by mouth 2 (two) times a day.       baclofen (LIORESAL) 10 MG tablet TAKE 1 TABLET BY MOUTH THREE TIMES DAILY 270 tablet 0     buPROPion (WELLBUTRIN SR) 150 MG 12 hr tablet TAKE 1 TABLET " "BY MOUTH DAILY. 90 tablet 1     CALCIUM CARBONATE/VITAMIN D3 (LIQUID CALCIUM WITH VITAMIN D ORAL) Take by mouth.       FLUoxetine (PROZAC) 10 MG capsule TAKE 3 CAPSULES BY MOUTH DAILY 270 capsule 0     gabapentin (NEURONTIN) 300 MG capsule TAKE 1 CAPSULE BY MOUTH THREE TIMES DAILY 270 capsule 2     LECITHIN MISC Use As Directed.       levothyroxine (SYNTHROID, LEVOTHROID) 75 MCG tablet TAKE 1 TABLET(75 MCG) BY MOUTH DAILY 90 tablet 0     losartan (COZAAR) 50 MG tablet TAKE 1 TABLET(50 MG) BY MOUTH DAILY 90 tablet 2     melatonin 10 mg Tab Take 10 mg by mouth bedtime.       pen needle, diabetic (BD ULTRA-FINE PHILLIP PEN NEEDLES) 32 gauge x 5/32\" Ndle One daily 100 each 3     STRONTIUM CHLORIDE HEXAHYDRATE (STRONTIUM CHL HEXAHYD, BULK,) 100 % Kailyn Use As Directed.       VIT C/E/ZN/COPPR/LUTEIN/ZEAXAN (PRESERVISION AREDS 2 ORAL) Take 1 tablet by mouth 2 (two) times a day.        NON FORMULARY        NON FORMULARY        UNABLE TO FIND Take 2 capsules by mouth every morning. Med Name: Actalin Thyroid Booster. Contains iodine.        UNABLE TO FIND Apply topically 3 (three) times a day as needed. Med Name: Arthritis Pain Cream. Contains lidocaine.       UNABLE TO FIND Med Name: Saccharomyces Boulardii + MOS.  Probiotic       UNABLE TO FIND Med Name:  Ashwaganda +Herbs 1 tab bid for adrenal health       UNABLE TO FIND Med Name:   herbs  1 tablet BID       baclofen (LIORESAL) 10 MG tablet TAKE 1 TABLET BY MOUTH THREE TIMES DAILY 270 tablet 0     baclofen (LIORESAL) 10 MG tablet TAKE 1 TABLET BY MOUTH THREE TIMES DAILY 270 tablet 0     buPROPion (WELLBUTRIN SR) 150 MG 12 hr tablet TAKE 1 TABLET BY MOUTH DAILY. 90 tablet 1     HERBAL COMPLEX NO.205 (TOTAL BODY CLEANSE ORAL) Take 1 capsule by mouth 2 (two) times a day. Contains vitamin C 100 mg, folic acid 400 mcg, tart cherry, celery, etc.       levothyroxine (SYNTHROID, LEVOTHROID) 75 MCG tablet TAKE 1 TABLET(75 MCG) BY MOUTH DAILY 90 tablet 3     losartan (COZAAR) 50 MG " tablet TAKE 1 TABLET(50 MG) BY MOUTH DAILY 90 tablet 2     omeprazole (PRILOSEC) 20 MG capsule TAKE ONE CAPSULE BY MOUTH DAILY (Patient taking differently: TAKE ONE CAPSULE BY MOUTH AS NEEDED) 90 capsule 3     UNABLE TO FIND Med Name: Jacoby       No facility-administered medications prior to visit.          Lab Results     TSH   Date Value Ref Range Status   01/18/2018 2.32 0.30 - 5.00 uIU/mL Final     Calcium   Date Value Ref Range Status   01/18/2018 9.2 8.5 - 10.5 mg/dL Final           Imaging Results   Last DEXA scan:  Results for orders placed in visit on 10/26/17   DXA Bone Density Scan    Narrative 10/27/2017      RE: Ellis Em  YOB: 1931        Dear Brittney Daley,    Patient Profile:  86 y.o. female, postmenopausal, is here for the follow up bone density   test.   History of fractures - Yes;  Hip. Family history of osteoporosis - None.    Family history of hip fracture: None. Smoking history - No. Osteoporosis   treatment past -  Yes;  Bisphosphonates and Evista. Osteoporosis treatment   current - Yes;  Forteo.  Chronic medical problems - Breast cancer, Chronic   low back problems, Hip replacement , History of kidney stones, Spine   surgery and Thyroid disease. High risk medications -  Thyroid;  Yes,   Currently.      Assessment:    1. The spine bone density L1-L2 with T-score 1.7.  Compression fracture   seen at L3. Additionally degenerative changes are seen which artifactually   elevate the bone density.  2. Femoral bone density shows right femoral neck T-score -2.2.  3. Left One third Radius T-score -3.8.      86 y.o. female with severe OSTEOPOROSIS currently receiving Forteo.    Since the previous bone density dated 11/04/2015, there has been a  17.5%   increase in the bone density of the spine.  Additionally there has been a   9.3 % increase in the left total hip.    Recommendations:  Appropriate monitoring and reevaluation of current therapy is recommended   with follow up bone  density scan in 2 years.      Bone densitometry was performed on your patient using our [employer]   densitometer. The results are summarized and a copy of the actual scans   are included for your review. In conformity with the International Society   of Clinical Densitometry's most recent position statement for DXA   interpretation (2015), the diagnosis will be made on the lowest measured   T-score of the lumbar spine, femoral neck, total proximal femur or 33%   radius. Note the change in terminology for diagnostic classification from   OSTEOPENIA to LOW BONE MASS. All trending for sequential exams will be   done using multiple vertebrae or the total proximal femur. Fracture risk   is based on the WHO Fracture Risk Assessment Tool (FRAX). If additional   information is needed or if you would like to discuss the results, please   do not hesitate to call me.       Thank you for referring this patient to [facility] Osteoporosis Services.   We are happy to be of service in support of you and your practice. If you   have any questions or suggestions to improve our service, please call me   at [phone].     Sincerely,     Brionna Zeng M.D. [email or web address].  Osteoporosis Services, [facility]

## 2021-06-16 ENCOUNTER — COMMUNICATION - HEALTHEAST (OUTPATIENT)
Dept: FAMILY MEDICINE | Facility: CLINIC | Age: 86
End: 2021-06-16

## 2021-06-16 ENCOUNTER — MEDICAL CORRESPONDENCE (OUTPATIENT)
Dept: HEALTH INFORMATION MANAGEMENT | Facility: CLINIC | Age: 86
End: 2021-06-16

## 2021-06-16 PROBLEM — H35.30 MACULAR DEGENERATION: Status: ACTIVE | Noted: 2017-07-06

## 2021-06-16 PROBLEM — R26.89 UNSTABLE BALANCE: Status: ACTIVE | Noted: 2020-12-03

## 2021-06-16 PROBLEM — T85.43XA BREAST IMPLANT RUPTURE: Status: ACTIVE | Noted: 2017-07-27

## 2021-06-16 PROBLEM — R26.89 ANTALGIC GAIT: Status: ACTIVE | Noted: 2021-05-25

## 2021-06-16 PROBLEM — Z86.018 HISTORY OF ADRENAL ADENOMA: Status: ACTIVE | Noted: 2021-05-25

## 2021-06-16 PROBLEM — M62.81 MUSCLE WEAKNESS (GENERALIZED): Status: ACTIVE | Noted: 2021-05-25

## 2021-06-16 PROBLEM — L60.9 NAIL ABNORMALITY: Status: ACTIVE | Noted: 2021-02-16

## 2021-06-16 NOTE — TELEPHONE ENCOUNTER
RN cannot approve Refill Request    RN can NOT refill this medication Protocol failed and NO refill given. Last office visit: 2/16/2021 Whitney Velasquez DO Last Physical: 12/1/2020 Last MTM visit: Visit date not found Last visit same specialty: 2/16/2021 Whitney Velasquez DO.  Next visit within 3 mo: Visit date not found  Next physical within 3 mo: Visit date not found      Lea Martinez, [facility] Triage/Med Refill 3/27/2021    Requested Prescriptions   Pending Prescriptions Disp Refills     omeprazole (PRILOSEC) 20 MG capsule [Pharmacy Med Name: OMEPRAZOLE 20MG CAPSULES] 90 capsule 4     Sig: TAKE 1 CAPSULE(20 MG) BY MOUTH DAILY BEFORE BREAKFAST       GI Medications Refill Protocol Passed - 3/27/2021  3:25 AM        Passed - PCP or prescribing provider visit in last 12 or next 3 months.     Last office visit with prescriber/PCP: 2/16/2021 Whitney Velasquez DO OR same dept: 2/16/2021 Whitney Velasquez DO OR same specialty: 2/16/2021 Whitney Velasquez DO  Last physical: 12/1/2020 Last MTM visit: Visit date not found   Next visit within 3 mo: Visit date not found  Next physical within 3 mo: Visit date not found  Prescriber OR PCP: Whitney Velasquez DO  Last diagnosis associated with med order: 1. Gastroesophageal reflux disease without esophagitis  - omeprazole (PRILOSEC) 20 MG capsule [Pharmacy Med Name: OMEPRAZOLE 20MG CAPSULES]; TAKE 1 CAPSULE(20 MG) BY MOUTH DAILY BEFORE BREAKFAST  Dispense: 90 capsule; Refill: 4    2. Muscle spasm  - baclofen (LIORESAL) 10 MG tablet [Pharmacy Med Name: BACLOFEN 10MG TABLETS]; TAKE 1 TABLET(10 MG) BY MOUTH THREE TIMES DAILY  Dispense: 270 tablet; Refill: 1    3. Recurrent Major Depression In Full Remission  - buPROPion (WELLBUTRIN SR) 150 MG 12 hr tablet [Pharmacy Med Name: BUPROPION SR 150MG TABLETS (12 H)]; TAKE 1 TABLET BY MOUTH EVERY DAY  Dispense: 90 tablet; Refill: 2    If protocol passes may refill for 12 months if within 3 months of last  provider visit (or a total of 15 months).                baclofen (LIORESAL) 10 MG tablet [Pharmacy Med Name: BACLOFEN 10MG TABLETS] 270 tablet 1     Sig: TAKE 1 TABLET(10 MG) BY MOUTH THREE TIMES DAILY       There is no refill protocol information for this order        buPROPion (WELLBUTRIN SR) 150 MG 12 hr tablet [Pharmacy Med Name: BUPROPION SR 150MG TABLETS (12 H)] 90 tablet 2     Sig: TAKE 1 TABLET BY MOUTH EVERY DAY       Tricyclics/Misc Antidepressant/Antianxiety Meds Refill Protocol Passed - 3/27/2021  3:25 AM        Passed - PCP or prescribing provider visit in last year     Last office visit with prescriber/PCP: 2/16/2021 Whitney Velasquez DO OR same dept: 2/16/2021 Whitney Velasquez DO OR same specialty: 2/16/2021 Whitney Velasquez DO  Last physical: 12/1/2020 Last MTM visit: Visit date not found   Next visit within 3 mo: Visit date not found  Next physical within 3 mo: Visit date not found  Prescriber OR PCP: Whitney Velasquez DO  Last diagnosis associated with med order: 1. Gastroesophageal reflux disease without esophagitis  - omeprazole (PRILOSEC) 20 MG capsule [Pharmacy Med Name: OMEPRAZOLE 20MG CAPSULES]; TAKE 1 CAPSULE(20 MG) BY MOUTH DAILY BEFORE BREAKFAST  Dispense: 90 capsule; Refill: 4    2. Muscle spasm  - baclofen (LIORESAL) 10 MG tablet [Pharmacy Med Name: BACLOFEN 10MG TABLETS]; TAKE 1 TABLET(10 MG) BY MOUTH THREE TIMES DAILY  Dispense: 270 tablet; Refill: 1    3. Recurrent Major Depression In Full Remission  - buPROPion (WELLBUTRIN SR) 150 MG 12 hr tablet [Pharmacy Med Name: BUPROPION SR 150MG TABLETS (12 H)]; TAKE 1 TABLET BY MOUTH EVERY DAY  Dispense: 90 tablet; Refill: 2    If protocol passes may refill for 12 months if within 3 months of last provider visit (or a total of 15 months).

## 2021-06-17 ENCOUNTER — COMMUNICATION - HEALTHEAST (OUTPATIENT)
Dept: SCHEDULING | Facility: CLINIC | Age: 86
End: 2021-06-17

## 2021-06-17 ENCOUNTER — TRANSFERRED RECORDS (OUTPATIENT)
Dept: HEALTH INFORMATION MANAGEMENT | Facility: CLINIC | Age: 86
End: 2021-06-17

## 2021-06-17 ENCOUNTER — COMMUNICATION - HEALTHEAST (OUTPATIENT)
Dept: CARE COORDINATION | Facility: CLINIC | Age: 86
End: 2021-06-17

## 2021-06-17 ENCOUNTER — RECORDS - HEALTHEAST (OUTPATIENT)
Dept: ADMINISTRATIVE | Facility: OTHER | Age: 86
End: 2021-06-17

## 2021-06-17 NOTE — PATIENT INSTRUCTIONS - HE
Patient Instructions by Saskia Koehler PT at 3/4/2019  2:30 PM     Author: Saskia Koehler PT Service: -- Author Type: Physical Therapist    Filed: 3/4/2019  3:00 PM Encounter Date: 3/4/2019 Status: Signed    : Saskia Koehler PT (Physical Therapist)

## 2021-06-17 NOTE — PATIENT INSTRUCTIONS - HE
Patient Instructions by Tsering Gill Scribe at 10/1/2019  9:00 AM     Author: Tsering Gill Scribe Service: -- Author Type: Bebeto    Filed: 10/1/2019 10:11 AM Encounter Date: 10/1/2019 Status: Addendum    : Whitney Velasquez DO (Physician)    Related Notes: Original Note by Whitney Velasquez DO (Physician) filed at 10/1/2019 10:10 AM       -for pain control - I would recommend continuing on baclofen 10mg 3 x daily, gabapentin 300mg 3x daily.   If having a worse day- could consider taking 2 gabapentin at once for total of 600mg to see if helpful but NOTE could cause increased grogginess  -would avoid NSAIDS, but since aleve is helpful, can use 1 tablet on REALLY bad days once to twice a week. But take with food to avoid stomach upset or stomach bleed.   -let me review your labs when they come back -will send you and email about eliminating some of them   -consider shingrix for shingles prevention- if you want the booster can get from your pharmacy - 2 doses (2-6 months apart.   -get bone density scan done   -for now keep blood pressure medication the same and will follow up in 6 months   -continue fluoxetine and wellbutrin     Patient Education   Activities of Daily Living  Your Health Risk Assessment indicates you have difficulties with activities of daily living such as eating, getting dressed, grooming, bathing, walking, or using the toilet. Please make a follow up appointment for us to address this issue in more detail.     Patient Education   Instrumental Activities of Daily Living  Your Health Risk Assessment indicates you have difficulties with instrumental activities of daily living which include laundry, housekeeping, banking, shopping, using the telephone, food preparation, transportation, or taking your own medications. Please make a follow up appointment for us to address this issue in more detail.    [employer] has resources available on the following website:  https://www.[facility].org/caregivers.[email or web address]     Also, here is a local agency that provides help with meals and other assistance:   [facility] Line: [phone]     Patient Education   Urinary Incontinence, Female (Adult)  Urinary incontinence means loss of control of the bladder. This problem affects many women, especially as they get older. If you have incontinence, you may be embarrassed to ask for help. But know that this problem can be treated.  Types of Incontinence  There are different types of incontinence. Two of the main types are described here. You can have more than one type.    Stress incontinence. With this type, urine leaks when pressure (stress) is put on the bladder. This may happen when you cough, sneeze, or laugh. Stress incontinence most often occurs because the pelvic floor muscles that support the bladder and urethra are weak. This can happen after pregnancy and vaginal childbirth or a hysterectomy. It can also be due to excess body weight or hormone changes.    Urge incontinence (also called overactive bladder). With this type, a sudden urge to urinate is felt often. This may happen even though there may not be much urine in the bladder. The need to urinate often during the night is common. Urge incontinence most often occurs because of bladder spasms. This may be due to bladder irritation or infection. Damage to bladder nerves or pelvic muscles, constipation, and certain medicines can also lead to urge incontinence.  Treatment of urinary incontinence depends on the cause. Further evaluation is needed to find the type you have. This will likely include an exam and certain tests. Based on the results, you and your healthcare provider can then plan treatment. Until a diagnosis is made, the home care tips below can help relieve symptoms.  Home care    Do pelvic floor muscle exercises, if they are prescribed. The pelvic floor muscles help support the bladder and urethra. Many women find that  their symptoms improve when doing special exercises that strengthen these muscles. To do the exercises contract the muscles you would use to stop your stream of urine, but do this when youre not urinating. Hold for 10 seconds, then relax. Repeat 10 to 20 times in a row, at least 3 times a day. Your provider may give you other instructions for how to do the exercises and how often.    Keep a bladder diary. This helps track how often and how much you urinate over a set period of time. Bring this diary with you to your next visit with the provider. The information can help your provider learn more about your bladder problem.    Lose weight, if advised to by your provider. Excess weight puts pressure on the bladder. Your provider can help you create a weight-loss plan thats right for you. This may include exercising more and making certain diet changes.    Don't consume foods and drinks that may irritate the bladder. These can include alcohol and caffeinated drinks.    Quit smoking. Smoking and other tobacco use can lead to chronic cough that strains the pelvic floor muscles. Smoking may also damage the bladder and urethra. Talk with your provider about treatments or methods you can use to quit smoking.    If drinking large amounts of fluid causes you to have symptoms, you may be advised to limit your fluid intake. You may also be advised to drink most of your fluids during the day and to limit fluids at night.    If youre worried about urine leakage or accidents, you may wear absorbent pads to catch urine. Change the pads often. This helps reduce discomfort. It may also reduce the risk of skin or bladder infections.  Follow-up care  Follow up with your healthcare provider, or as directed. It may take some to find the right treatment for your problem. Your treatment plan may include special therapies or medicines. Certain procedures or surgery may also be options. Be sure to discuss any questions you have with your  provider.  When to seek medical advice  Call the healthcare provider right away if any of these occur:    Fever of 100.4 F (38 C) or higher, or as directed by your provider    Bladder pain or fullness    Abdominal swelling    Nausea or vomiting    Back pain    Weakness, dizziness or fainting  Date Last Reviewed: 10/1/2017    1129-7064 [employer]. [address] [place] [address]. All rights reserved. This information is not intended as a substitute for professional medical care. Always follow your healthcare professional's instructions.     Patient Education   Preventing Falls in the Home  As you get older, falls are more likely. Thats because your reaction time slows. Your muscles and joints may also get stiffer, making them less flexible. Illness, medications, and vision changes can also affect your balance. A fall could leave you unable to live on your own. To make your home safer, follow these tips:    Floors    Put nonskid pads under area rugs.    Remove throw rugs.    Replace worn floor coverings.    Tack carpets firmly to each step on carpeted stairs. Put nonskid strips on the edges of uncarpeted stairs.    Keep floors and stairs free of clutter and cords.    Arrange furniture so there are clear pathways.    Clean up any spills right away.    Bathrooms    Install grab bars in the tub or shower.    Apply nonskid strips or put a nonskid rubber mat in the tub or shower.    Sit on a bath chair to bathe.    Use bathmats with nonskid backing.    Lighting    Keep a flashlight in each room.    Put a nightlight along the pathway between the bedroom and the bathroom.    2492-2375 [employer] [address] [place] [address]. All rights reserved. This information is not intended as a substitute for professional medical care. Always follow your healthcare professional's instructions.           Advance Directive  Patients advance directive was discussed and I am comfortable  with the patients wishes.  Patient Education   Personalized Prevention Plan  You are due for the preventive services outlined below.  Your care team is available to assist you in scheduling these services.  If you have already completed any of these items, please share that information with your care team to update in your medical record.  Health Maintenance   Topic Date Due   ? DEPRESSION FOLLOW UP  04/[birth year]/1931   ? MEDICARE ANNUAL WELLNESS VISIT  04/21/1996   ? ZOSTER VACCINES (2 of 3) 06/20/2016   ? PNEUMOCOCCAL IMMUNIZATION 65+ LOW/MEDIUM RISK (2 of 2 - PPSV23) 04/25/2017   ? ADVANCE CARE PLANNING  07/03/2019   ? INFLUENZA VACCINE RULE BASED (1) 08/01/2019   ? FALL RISK ASSESSMENT  10/23/2019   ? DXA SCAN  10/27/2019   ? TD 18+ HE  04/25/2026

## 2021-06-17 NOTE — PATIENT INSTRUCTIONS - HE
-continue to hold afternoon gabapentin 2 capsules and baclofen dose. For now ok to continue the morning and evening dose but in a week we can reevaluate pulling back a little more.     - xrays today of your

## 2021-06-17 NOTE — PATIENT INSTRUCTIONS - HE
Patient Instructions by Saskia Koehler PT at 2/18/2019  2:00 PM     Author: Saskia Koehler PT Service: -- Author Type: Physical Therapist    Filed: 2/18/2019  5:19 PM Encounter Date: 2/18/2019 Status: Signed    : Saskia Koehler PT (Physical Therapist)

## 2021-06-17 NOTE — PATIENT INSTRUCTIONS - HE
Patient Instructions by Saskia Koehler PT at 4/17/2019  2:30 PM     Author: Saskia Koehler PT Service: -- Author Type: Physical Therapist    Filed: 4/17/2019  2:53 PM Encounter Date: 4/17/2019 Status: Signed    : Saskia Koehler PT (Physical Therapist)

## 2021-06-17 NOTE — PATIENT INSTRUCTIONS - HE
Patient Instructions by Saskia Koehler PT at 6/24/2019  2:30 PM     Author: Saskia Koehler PT Service: -- Author Type: Physical Therapist    Filed: 6/24/2019  2:56 PM Encounter Date: 6/24/2019 Status: Signed    : Saskia Koehler PT (Physical Therapist)

## 2021-06-17 NOTE — TELEPHONE ENCOUNTER
330 texted rosalva her daughter and said she felt weaker than her normal weak  Has been using walker. Saturday fall - fell and they rushed over. Then decided should be in her wheelchair until legs stronger. Seems not sure how to put one leg in front of other. When Rosalva got there.O2 was 88 and she freaked out and fluctuated back up to 100 %. Didn't stay down to 88% . Daughter worked on CNA and was talking to oncall to figure out what to do. Has been eating. Started antibiotics Friday  For UTI  Did hit her head the other day and said back of head hurts a little.   In and out confusion. Seems like concussive symptoms    No pain in legs. Can stand.  We discussed I will see her tomorrow at 1040 am (40 min )  They do not want to go to ER

## 2021-06-17 NOTE — TELEPHONE ENCOUNTER
Patient's granddaughter Iqra calling requesting on call provider be paged. States patient's daughter called earlier regarding and patient refused to go to the emergency department. Patient gave verbal consent to speak with Iqra.  Requested on call provider paged and call patient's daughter Rosalva to be called back with advise. RN paged on call provider Dr. Neal for [facility] to call RN directly at [phone].    Dr. Brambila called RN and advised patient to be seen at the emergency department. RN called patient's daughter Rosalva and advised patient to be seen at the emergency department. Verbalized understanding.     Anil Conley RN  [facility]

## 2021-06-17 NOTE — TELEPHONE ENCOUNTER
Reason for Call:  Other update     Detailed comments: pt daughter calling as pt is experiencing increased fatigue & confusion, started in March after cortisone injections. Missed thyroid medication most of April, restarted in May & symptoms are still ongoing.    Also has bilateral lower extremity weakness.  Sleeping twice as much as normal.  Chronic cough.    Appt scheduled for 5/17/21 with Dr Velasquez- please advise if needs to be seen sooner.    Has had both covid vaccines, last dose 3/10/21      Phone Number Patient can be reached at:   Cell number on file:    [phone] for daughter Rosalva     Best Time: na    Can we leave a detailed message on this number?: Yes    Call taken on 5/13/2021 at 9:57 AM by Marianne Estrella

## 2021-06-17 NOTE — PATIENT INSTRUCTIONS - HE
Patient Instructions by Saskia Koehler PT at 5/13/2019  2:00 PM     Author: Saskia Koehler PT Service: -- Author Type: Physical Therapist    Filed: 5/13/2019  2:34 PM Encounter Date: 5/13/2019 Status: Signed    : Saskia Koehler PT (Physical Therapist)

## 2021-06-17 NOTE — TELEPHONE ENCOUNTER
I likely need 40 min with this pt.can you please see if pt or daughter willing to move to Monday at 4? If that doesn't work, will leave appt where is. If so, then please move appt and hold appt time where she currently is.

## 2021-06-17 NOTE — TELEPHONE ENCOUNTER
She has been feeling very weak. UTI on Friday, started on antibiotics.  Fell on Sat due to weakness.  185/86 64  She has a headache and dizzyness , daughter says she is not thinking straight.  She is worse this afternoon than when she was there this morning.  Care advice is to call 911 and have them come out to evaluate her. Ellis does not want that.  She did that a few days ago and they didn't do anything for her.  Message to Dr Velasquez to let her know what is going on.  Clinic will call the family as soon as there is a recommendation from Dr Velasquez.    Kristie Almeida RN, Nurse Advisor      Reason for Disposition    SEVERE weakness (i.e., unable to walk or barely able to walk, requires support) and new onset or worsening    Protocols used: WEAKNESS (GENERALIZED) AND FATIGUE-A-OH

## 2021-06-17 NOTE — PROGRESS NOTES
Assessment & Plan     Uncontrolled daytime somnolence/cognitive changes/left adrenal mass  -The following labs will be ordered to rule out iron deficiency which patient has had before as well as iron overload.  Will consider other underlying sources of infection and so hemogram and urinalysis also obtained.  Most likely this is related to her thyroid and missing her dose for over 2 weeks.  Discussed that we would follow up on those results.  If no improvement will consider neuro imaging which daughter is concerned about and requesting.  We also spent some time discussing sleep hygiene and her overnight practices of waking up to have a bowel movement and to feed the cat.  I advised that she should try to sleep through that and move her prunes to a time of day so that she is having a bowel movement during the day instead of in the middle of the night.  If still further progression consider neurocognitive psych testing.  Does not seem to be in any imminent danger being sent back home at this time but we may need to be thinking about higher levels of care if not improving.  Although other medications have not been added or changed.  We discussed the potential for needing to cut back or wean on her gabapentin or baclofen that could be contributing.  Consider evaluating again adrenal mass which had been stable and thought to be benign if still no improvement with cognition. Consider evaluation for adrenal insufficiency given recent steroid use  - HM1(CBC and Differential)  - Iron and Transferrin Iron Binding Capacity  - Ferritin  - Sedimentation Rate  - C-Reactive Protein(CRP)  - Thiamin (Vitamin B1), Whole Blood  - Urinalysis-UC if Indicated    Essential hypertension  Blood pressure significantly elevated today however daughter and patient thinks that this is related to some of her distress over her appointment today and typically checks blood pressures at home that are in more normotensive range 130s to 150s  -  "Comprehensive Metabolic Panel    Hypothyroidism, unspecified type  Missed 2 weeks of medication.  We will reevaluate her thyroid levels  - Thyroid Cascade    Generalized muscle weakness  Had minimal improvement with physical therapy.  The following labs being ordered and also ruling out polymyalgia rheumatica which does not seem likely on examination today  - HM1(CBC and Differential)  - Iron and Transferrin Iron Binding Capacity  - Ferritin  - Sedimentation Rate  - C-Reactive Protein(CRP)  - Thiamin (Vitamin B1), Whole Blood  - Urinalysis-UC if Indicated    Iron overload  Recently elevated iron levels we will recheck  - Iron and Transferrin Iron Binding Capacity  - Ferritin    Peripheral edema/other abnormalities of breathing  I suspect that the increased peripheral edema may be related to her gabapentin.  No other signs of heart failure at this time  - BNP(B-type Natriuretic Peptide)      Elevated MCV     - Vitamin B12  - Folate, Serum      Review of the result(s) of each unique test - Labs per orders.  Will be considering MRI had  Ordering of each unique test   Reviewed notes from [facility]  48 minutes spent on the date of the encounter doing chart review, history and exam, documentation and further activities per the note        BMI:   Estimated body mass index is 25.27 kg/m  as calculated from the following:    Height as of 12/1/20: 5' 4\" (1.626 m).    Weight as of this encounter: 147 lb 3.2 oz (66.8 kg).   I have had an Advance Directives discussion with the patient.    No follow-ups on file.    Whitney Velasquez, [facility]    Subjective   Ellis Em is 89 y.o. and presents today for the following health issues   HPI   Patient is brought in today by her daughter Rosalva for evaluation of cognitive decline and weakness.  Seem to develop shortly after her injections at the end of March at [facility] where she received 4 at once.  Never has she done that before. "  Last Covid vaccine was may be 4 weeks prior to that.   Reviewed notes. Since then has been very tired. In April stopped thyroid (3 weeks)  medications accidentally and has since been back on them x 2 weeks. sleping twice as much as used to. Episodes of confusion  -daughter says pretty much every day. Not sure if because of the fatigue and not sure if having hard time working the brain. Feeling overwhelmed and hard time tracking changes. More visiting. Having a really hard time coordinating walking (seems like coming from hips). No recent falls or hitting head.   Eye exam a week and half ago was good because got new glasses.  Blood pressure 150s   Dozes all day.   When asked if she has been sleeping -Has had very strange and complicated dreams doesn't remember them but knows they are awful.   Sometimes hard getting back to bed. Goes to sleep at 10, and wakes at midnight to prune juice, then sleeps 4-5 hours then wakes up and has to have BM and feed cat at 4-5 am.  No new meds or anything that could be altering the mind.  This is what she has always done.  Did PT in February march-   Taking gabapentin 1 in the morning and 2 at noon for paining which got better, and 1 in the evening.   Complex is 50s plus. No services for assisted living and they know they might need to start thinking about that.  Doesn't want to move has long term care money and elderly waiver.  Daughter indicates they could consider getting help in the home.    Right foot started swelling out of the blue.   Has a cough since had flu 10 years ago with chronic bronchial   Cut back iron from twice daily to once daily.     Steroid injections helped the pain  Wt Readings from Last 3 Encounters:   02/16/21 146 lb 3.2 oz (66.3 kg)   12/01/20 138 lb 12.8 oz (63 kg)   10/20/20 143 lb (64.9 kg)         Review of Systems  Complete review systems reviewed in HPI or otherwise negative      Objective    LMP  (LMP Unknown)   There is no height or weight on file to  calculate BMI.  Physical Exam  General impression no acute distress  Heart regular rate and rhythm  Lungs clear to auscultation  Appears bright and no obvious confusion or dysarthria on examination today.  Abdomen soft nondistended nontender  5 out of 5 muscle strength testing upper and lower extremities no temporal pain

## 2021-06-17 NOTE — TELEPHONE ENCOUNTER
Incoming call from patient's daughter Rosalva stating patient went into the ED this AM for fall and confusion. Rosalva requesting Dr. Velasquez to review lab results and wondering if Dr. Velasquez can order MRI head. Please advise.

## 2021-06-18 ENCOUNTER — COMMUNICATION - HEALTHEAST (OUTPATIENT)
Dept: FAMILY MEDICINE | Facility: CLINIC | Age: 86
End: 2021-06-18

## 2021-06-18 DIAGNOSIS — M62.81 MUSCLE WEAKNESS (GENERALIZED): ICD-10-CM

## 2021-06-18 DIAGNOSIS — R27.9 DISCOORDINATION: ICD-10-CM

## 2021-06-18 DIAGNOSIS — R53.83 FATIGUE, UNSPECIFIED TYPE: ICD-10-CM

## 2021-06-18 NOTE — PATIENT INSTRUCTIONS - HE
"Patient Instructions by Whitney Velasquez DO at 10/2/2020 11:20 AM     Author: Whitney Velasquez DO Service: -- Author Type: Physician    Filed: 10/2/2020 12:49 PM Encounter Date: 10/2/2020 Status: Signed    : Whitney Velasquez DO (Physician)       Referral placed for concussion clinic   -check at home what you have for gabapaentin strength and if you have the 100mg or the 300mg and let me know, I would advise going down if you are on 300mg to 200mg 3x per day and I can give more instruction.      Patient Education     Concussion    A concussion can be caused by a direct blow to the head, neck, face, or somewhere else on the body with the force being transmitted to the head. This may cause you to lose consciousness - be \"knocked out\" - but not always. Depending on the severity of the blow, it will take from a few hours up to a few days to get better. Sometimes symptoms may last a few months or longer. This is called post-concussion syndrome.  At first, you may have a headache, nausea, vomiting, or dizziness. You may also have problems concentrating or remembering things. This is normal.  Symptoms should get better as the hours and days go by. Symptoms that get worse could be a sign of a more serious injury. This might be a bruise or bleeding in the brain. Thats why its important to watch for the warning signs listed below.  Home care  If your injury is mild and there are no serious signs or symptoms, your healthcare provider may recommend that you be monitored at home. If there is evidence that the injury is more serious, you will be monitored in the hospital. Follow these tips to help care for yourself at home:    After a concussion, your healthcare provider may recommend that a family member or friend monitor you for 12 to 24 hours. They may be told to wake you every few hours during sleep to check for the signs below.    If your face or scalp swells, apply an ice pack for 20 minutes every 1 to 2 " hours. Do this until the swelling starts to go down. You can make an ice pack by putting ice cubes in a plastic bag and wrapping the bag in a towel.    You may use acetaminophen to control pain, unless another pain medicine was prescribed. Do not use aspirin or ibuprofen after a head injury. If you have chronic liver or kidney disease, talk with your doctor before using these medicines. Also talk with your doctor if you ever had a stomach ulcer or gastrointestinal bleeding.    For the next 24 hours:  ? Dont drink alcohol or take sedatives or medicines that make you sleepy.  ? Dont drive or operate machinery.  ? Avoid doing anything strenuous. Dont lift or strain.    Dont return to sports or any activity that could cause you to hit your head until all symptoms are gone and you have been cleared by your doctor. A second head injury before fully recovering from the first one can lead to serious brain injury.    Avoid doing activities that require a lot of concentration or a lot of attention. This will allow your brain to rest and heal quicker.  Follow-up care  Follow up with your doctor in 1 week, or as directed.  Note: A radiologist will review any X-rays or CT scans that were taken. You will be told of any new findings that may affect your care.  When to seek medical advice  Call your healthcare provider right away if any of these occur:    Repeated vomiting    Headache or dizziness that is severe or gets worse    Loss of consciousness    Unusual drowsiness, or unable to wake up as usual    Weakness or decreased ability to walk or move any limb    Confusion, agitation, or change in behavior or speech, or memory loss    Blurred vision    Convulsion (seizure)    Swelling on the scalp or face that gets worse    Changes in pupil size (the black part of the eye)    Redness, warmth, or pus from the swollen area    Fluid draining from or bleeding from the nose or ears     Date Last Reviewed: 8/14/2015 2000-2017 [employer] [address] [place] [address], [place] [address]. All rights reserved. This information is not intended as a substitute for professional medical care. Always follow your healthcare professional's instructions.

## 2021-06-18 NOTE — PATIENT INSTRUCTIONS - HE
Patient Instructions by Whitney Velasquez DO at 12/1/2020 11:40 AM     Author: Whitney Velasquez DO Service: -- Author Type: Physician    Filed: 12/3/2020  6:10 AM Encounter Date: 12/1/2020 Status: Signed    : Whitney Velasquez DO (Physician)         Patient Education     Exercise for a Healthier Heart  You may wonder how you can improve the health of your heart. If youre thinking about exercise, youre on the right track. You dont need to become an athlete, but you do need a certain amount of brisk exercise to help strengthen your heart. If you have been diagnosed with a heart condition, your doctor may recommend exercise to help stabilize your condition. To help make exercise a habit, choose safe, fun activities.       Be sure to check with your health care provider before starting an exercise program.    Why exercise?  Exercising regularly offers many healthy rewards. It can help you do all of the following:    Improve your blood cholesterol levels to help prevent further heart trouble    Lower your blood pressure to help prevent a stroke or heart attack    Control diabetes, or reduce your risk of getting this disease    Improve your heart and lung function    Reach and maintain a healthy weight    Make your muscles stronger and more limber so you can stay active    Prevent falls and fractures by slowing the loss of bone mass (osteoporosis)    Manage stress better  Exercise tips  Ease into your routine. Set small goals. Then build on them.  Exercise on most days. Aim for a total of 150 or more minutes of moderate to  vigorous intensity activity each week. Consider 40 minutes, 3 to 4 times a week. For best results, activity should last for 40 minutes on average. It is OK to work up to the 40 minute period over time. Examples of moderate-intensity activity is walking one mile in 15 minutes or 30 to 45 minutes of yard work.  Step up your daily activity level. Along with your exercise program, try being  more active throughout the day. Walk instead of drive. Do more household tasks or yard work.  Choose one or more activities you enjoy. Walking is one of the easiest things you can do. You can also try swimming, riding a bike, or taking an exercise class.  Stop exercising and call your doctor if you:    Have chest pain or feel dizzy or lightheaded    Feel burning, tightness, pressure, or heaviness in your chest, neck, shoulders, back, or arms    Have unusual shortness of breath    Have increased joint or muscle pain    Have palpitations or an irregular heartbeat      7676-1623 [employer]. [address] [place] [address]69. All rights reserved. This information is not intended as a substitute for professional medical care. Always follow your healthcare professional's instructions.         Patient Education   Instrumental Activities of Daily Living  Your Health Risk Assessment indicates you have difficulties with instrumental activities of daily living which include laundry, housekeeping, banking, shopping, using the telephone, food preparation, transportation, or taking your own medications. Please make a follow up appointment for us to address this issue in more detail.    [employer] has resources available on the following website: [email or web address]     Also, here is a local agency that provides help with meals and other assistance:   [facility] Line: [phone]     Patient Education   Urinary Incontinence, Female (Adult)  Urinary incontinence means loss of control of the bladder. This problem affects many women, especially as they get older. If you have incontinence, you may be embarrassed to ask for help. But know that this problem can be treated.  Types of Incontinence  There are different types of incontinence. Two of the main types are described here. You can have more than one type.    Stress incontinence. With this type, urine leaks when pressure (stress)  is put on the bladder. This may happen when you cough, sneeze, or laugh. Stress incontinence most often occurs because the pelvic floor muscles that support the bladder and urethra are weak. This can happen after pregnancy and vaginal childbirth or a hysterectomy. It can also be due to excess body weight or hormone changes.    Urge incontinence (also called overactive bladder). With this type, a sudden urge to urinate is felt often. This may happen even though there may not be much urine in the bladder. The need to urinate often during the night is common. Urge incontinence most often occurs because of bladder spasms. This may be due to bladder irritation or infection. Damage to bladder nerves or pelvic muscles, constipation, and certain medicines can also lead to urge incontinence.  Treatment of urinary incontinence depends on the cause. Further evaluation is needed to find the type you have. This will likely include an exam and certain tests. Based on the results, you and your healthcare provider can then plan treatment. Until a diagnosis is made, the home care tips below can help relieve symptoms.  Home care    Do pelvic floor muscle exercises, if they are prescribed. The pelvic floor muscles help support the bladder and urethra. Many women find that their symptoms improve when doing special exercises that strengthen these muscles. To do the exercises contract the muscles you would use to stop your stream of urine, but do this when youre not urinating. Hold for 10 seconds, then relax. Repeat 10 to 20 times in a row, at least 3 times a day. Your provider may give you other instructions for how to do the exercises and how often.    Keep a bladder diary. This helps track how often and how much you urinate over a set period of time. Bring this diary with you to your next visit with the provider. The information can help your provider learn more about your bladder problem.    Lose weight, if advised to by your  provider. Excess weight puts pressure on the bladder. Your provider can help you create a weight-loss plan thats right for you. This may include exercising more and making certain diet changes.    Don't consume foods and drinks that may irritate the bladder. These can include alcohol and caffeinated drinks.    Quit smoking. Smoking and other tobacco use can lead to chronic cough that strains the pelvic floor muscles. Smoking may also damage the bladder and urethra. Talk with your provider about treatments or methods you can use to quit smoking.    If drinking large amounts of fluid causes you to have symptoms, you may be advised to limit your fluid intake. You may also be advised to drink most of your fluids during the day and to limit fluids at night.    If youre worried about urine leakage or accidents, you may wear absorbent pads to catch urine. Change the pads often. This helps reduce discomfort. It may also reduce the risk of skin or bladder infections.  Follow-up care  Follow up with your healthcare provider, or as directed. It may take some to find the right treatment for your problem. Your treatment plan may include special therapies or medicines. Certain procedures or surgery may also be options. Be sure to discuss any questions you have with your provider.  When to seek medical advice  Call the healthcare provider right away if any of these occur:    Fever of 100.4 F (38 C) or higher, or as directed by your provider    Bladder pain or fullness    Abdominal swelling    Nausea or vomiting    Back pain    Weakness, dizziness or fainting  Date Last Reviewed: 10/1/2017    4460-7152 [employer]. [address] [place] [address]25. All rights reserved. This information is not intended as a substitute for professional medical care. Always follow your healthcare professional's instructions.     Patient Education   Depression and Suicide in Older Adults  Nearly 2 million older Americans have  some type of depression. Sadly, some of them even take their own lives. Yet depression among older adults is often ignored. Learn the warning signs. You may help spare a loved one needless pain. You may also save a life.       What Is Depression?  Depression is a mood disorder that affects the way you think and feel. The most common symptom is a feeling of deep sadness. People who are depressed also may seem tired and listless. And nothing seems to give them pleasure. Its normal to grieve or be sad sometimes. But sadness lessens or passes with time. Depression rarely goes away or improves on its own. Other symptoms of depression are:    Sleeping more or less than normal    Eating more or less than normal    Having headaches, stomachaches, or other pains that dont go away    Feeling nervous, empty, or worthless    Crying a great deal    Thinking or talking about suicide or death    Feeling confused or forgetful  What Causes It?  The causes of depression arent fully known. Certain chemicals in the brain play a role. Depression does run in families. And life stresses can also trigger depression in some people. That may be the case with older adults. They often face great burdens, such as the death of friends or a spouse. They may have failing health. And they are more likely to be alone, lonely, or poor.  How You Can Help  Often, depressed people may not want to ask for help. When they do, they may be ignored. Or, they may receive the wrong treatment. You can help by showing parents and older friends love and support. If they seem depressed, help them find the right treatment. Talk to your doctor. Or contact a local mental health center, social service agency, or hospital. With modern treatment, no one has to suffer from depression.  Resources:    National [place] of Mental Health  455.178.1579  www.nimh.nih.gov    National [place] on Mental Illness  119.183.4399  www.barrington.org    Mental Health  Jackie  684.575.4206  www.[facility].org    National Suicide Hotline  443.681.7488 (800-SUICIDE)      3948-0867 [employer]. [address] [place] [address]. All rights reserved. This information is not intended as a substitute for professional medical care. Always follow your healthcare professional's instructions.         Patient Education   Preventing Falls in the Home  As you get older, falls are more likely. Thats because your reaction time slows. Your muscles and joints may also get stiffer, making them less flexible. Illness, medications, and vision changes can also affect your balance. A fall could leave you unable to live on your own. To make your home safer, follow these tips:    Floors    Put nonskid pads under area rugs.    Remove throw rugs.    Replace worn floor coverings.    Tack carpets firmly to each step on carpeted stairs. Put nonskid strips on the edges of uncarpeted stairs.    Keep floors and stairs free of clutter and cords.    Arrange furniture so there are clear pathways.    Clean up any spills right away.    Bathrooms    Install grab bars in the tub or shower.    Apply nonskid strips or put a nonskid rubber mat in the tub or shower.    Sit on a bath chair to bathe.    Use bathmats with nonskid backing.    Lighting    Keep a flashlight in each room.    Put a nightlight along the pathway between the bedroom and the bathroom.    7554-8342 [employer]. [address] [place] [address]. All rights reserved. This information is not intended as a substitute for professional medical care. Always follow your healthcare professional's instructions.           Advance Directive  Patients advance directive was discussed and I am comfortable with the patients wishes.  Patient Education   Personalized Prevention Plan  You are due for the preventive services outlined below.  Your care team is available to assist you in scheduling these services.  If you have already  completed any of these items, please share that information with your care team to update in your medical record.  Health Maintenance   Topic Date Due   ? ZOSTER VACCINES (2 of 3) 06/20/2016   ? MEDICARE ANNUAL WELLNESS VISIT  12/01/2021   ? FALL RISK ASSESSMENT  12/01/2021   ? ADVANCE CARE PLANNING  05/15/2025   ? TD 18+ HE  04/25/2026   ? DEPRESSION ACTION PLAN  Completed   ? Pneumococcal Vaccine: 65+ Years  Completed   ? INFLUENZA VACCINE RULE BASED  Completed   ? Pneumococcal Vaccine: Pediatrics (0 to 5 Years) and At-Risk Patients (6 to 64 Years)  Aged Out   ? HEPATITIS B VACCINES  Aged Out

## 2021-06-19 ENCOUNTER — COMMUNICATION - HEALTHEAST (OUTPATIENT)
Dept: FAMILY MEDICINE | Facility: CLINIC | Age: 86
End: 2021-06-19

## 2021-06-19 ENCOUNTER — COMMUNICATION - HEALTHEAST (OUTPATIENT)
Dept: FAMILY MEDICINE | Facility: CLINIC | Age: 86
End: 2021-06-19
Payer: MEDICARE

## 2021-06-19 DIAGNOSIS — R41.89 COGNITIVE CHANGES: ICD-10-CM

## 2021-06-19 DIAGNOSIS — R41.89 ACUTE COGNITIVE DECLINE: ICD-10-CM

## 2021-06-20 NOTE — LETTER
Letter by Whitney Velasquez DO at      Author: Wihtney Velasquez DO Service: -- Author Type: --    Filed:  Encounter Date: 3/12/2020 Status: (Other)         Ellis Em  [address]  [place] [address]             March 12, 2020         Dear Ms. Em,    Below are the results from your recent visit:    Resulted Orders   XR Shoulder Right 2 or More VWS    Narrative    EXAM: XR SHOULDER RIGHT 2 OR MORE VWS  LOCATION: [facility]  DATE/TIME: 3/10/2020 1:37 PM    INDICATION: pain in shoulder and humerus- possible tendon rupture / eval for fx humerus as well  COMPARISON: None.      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 Some degenerative loose bodies in the shoulder joint. No fractures or dislocations.        X-rays did not show any fracture.  Kidney function when we checked it last in October was very good and so taking ibuprofen 400 to 600 mg or naproxen which is Aleve over-the-counter intermittently as needed for pain is okay to do as long as she has food in her stomach.        Please call with questions or contact us using [email or web address].    Sincerely,        Electronically signed by Whitney Velasquez DO

## 2021-06-20 NOTE — LETTER
Letter by Whitney Velasquez DO at      Author: Whitney Velasquez DO Service: -- Author Type: --    Filed:  Encounter Date: 1/13/2020 Status: Signed                    My Depression Action Plan  Name: Ellis Em   Date of Birth 4/21/1931  Date: 1/13/2020    My Doctor: Wihtney Velasquez DO   My Clinic: [facility]  [address] [facility] [address]  [phone]          GREEN    ZONE   Good Control    What it looks like:     Things are going generally well. You have normal ups and downs. You may even feel depressed from time to time, but bad moods usually last less than a day.   What you need to do:  1. Continue to care for yourself (see self care plan)  2. Check your depression survival kit and update it as needed  3. Follow your physicians recommendations including any medication.  4. Do not stop taking medication unless you consult with your physician first.           YELLOW         ZONE Getting Worse    What it looks like:     Depression is starting to interfere with your life.     It may be hard to get out of bed; you may be starting to isolate yourself from others.    Symptoms of depression are starting to last most all day and this has happened for several days.     You may have suicidal thoughts but they are not constant.   What you need to do:     1. Call your care team, your response to treatment will improve if you keep your care team informed of your progress. Yellow periods are signs an adjustment may need to be made.     2. Continue your self-care, even if you have to fake it!    3. Talk to someone in your support network    4. Open up your depression survival kit           RED    ZONE Medical Alert - Get Help    What it looks like:     Depression is seriously interfering with your life.     You may experience these or other symptoms: You cant get out of bed most days, cant work or engage in other necessary activities, you have trouble taking care of basic  hygiene, or basic responsibilities, thoughts of suicide or death that will not go away, self-injurious behavior.     What you need to do:  1. Call your care team and request a same-day appointment. If they are not available (weekends or after hours) call your local crisis line, emergency room or 911.            Depression Self Care Plan / Survival Kit    Self-Care for Depression  Heres the deal. Your body and mind are really not as separate as most people think.  What you do and think affects how you feel and how you feel influences what you do and think. This means if you do things that people who feel good do, it will help you feel better.  Sometimes this is all it takes.  There is also a place for medication and therapy depending on how severe your depression is, so be sure to consult with your medical provider and/ or Behavioral Health Consultant if your symptoms are worsening or not improving.     In order to better manage my stress, I will:    Exercise  Get some form of exercise, every day. This will help reduce pain and release endorphins, the feel good chemicals in your brain. This is almost as good as taking antidepressants!  This is not the same as joining a gym and then never going! (they count on that by the way?) It can be as simple as just going for a walk or doing some gardening, anything that will get you moving.      Hygiene   Maintain good hygiene (Get out of bed in the morning, Make your bed, Brush your teeth, Take a shower, and Get dressed like you were going to work, even if you are unemployed).  If your clothes don't fit try to get ones that do.    Diet  I will strive to eat foods that are good for me, drink plenty of water, and avoid excessive sugar, caffeine, alcohol, and other mood-altering substances.  Some foods that are helpful in depression are: complex carbohydrates, B vitamins, flaxseed, fish or fish oil, fresh fruits and vegetables.    Psychotherapy  I agree to participate in  Individual Therapy (if recommended).    Medication  If prescribed medications, I agree to take them.  Missing doses can result in serious side effects.  I understand that drinking alcohol, or other illicit drug use, may cause potential side effects.  I will not stop my medication abruptly without first discussing it with my provider.    Staying Connected With Others  I will stay in touch with my friends, family members, and my primary care provider/team.    Use your imagination  Be creative.  We all have a creative side; it doesnt matter if its oil painting, sand castles, or mud pies! This will also kick up the endorphins.    Witness Beauty  (AKA stop and smell the roses) Take a look outside, even in mid-winter. Notice colors, textures. Watch the squirrels and birds.     Service to others  Be of service to others.  There is always someone else in need.  By helping others we can get out of ourselves and remember the really important things.  This also provides opportunities for practicing all the other parts of the program.    Humor  Laugh and be silly!  Adjust your TV habits for less news and crime-drama and more comedy.    Control your stress  Try breathing deep, massage therapy, biofeedback, and meditation. Find time to relax each day.     My support system    Clinic Contact:  Phone number:    Contact 1:  Phone number:    Contact 2:  Phone number:    [employer]/:  Phone number:    Therapist:  Phone number:    [facility] crisis center:    Phone number:    Other community support:  Phone number:

## 2021-06-20 NOTE — LETTER
Letter by Brittney Daley NP at      Author: Brittney Daley NP Service: -- Author Type: --    Filed:  Encounter Date: 1/28/2020 Status: (Other)         Ellis Em  [address]  [place] [address]             January 28, 2020         Dear Ms. Em,    Below are the results from your recent visit:    Resulted Orders   DXA Bone Density Scan    Narrative    1/20/2020      RE: Ellis Em  YOB: 1931        Dear Brittney Daley,    Patient Profile:  88 y.o. female, postmenopausal, is here for the follow up bone density   test.   History of fractures - Yes;  Lumbar vertebrae, Vertebra and Hip. Family   history of osteoporosis - None.  Family history of hip fracture: None.   Smoking history - No. Osteoporosis treatment past -  Yes;  HRT,   Bisphosphonates, Forteo and Evista. Osteoporosis treatment current - No.    Chronic medical problems - Breast cancer, Height loss, Hip replacement ,   Spine surgery and Thyroid disease. High risk medications -  Anti-seizure;    Yes, Currently and Thyroid;  Yes, Currently.      Assessment:    1. The spine bone density L1-L4 is not able to be assessed due to the   presence of significant sclerotic changes, along with the previous   compression fractures.  2. Femoral bone density shows right total hip T- score -1.9.  3.  The left one third radius T score -3.1.      88 y.o. female with OSTEOPOROSIS and HIGH fracture risk.     Since the previous bone density dated  October 27, 2017, there has been  a   +6.0 % change in the right total hip.  Additionally, there has been a   10.1% increase in the bone density of the left distal radius.    Recommendations:  Appropriate ongoing evaluation and treatment is recommended with follow up   bone density scan in 2 years.      Bone densitometry was performed on your patient using our [employer]   densitometer. The results are summarized and a copy of the actual scans   are included for your review. In conformity with  the International Society   of Clinical Densitometry's most recent position statement for DXA   interpretation (2015), the diagnosis will be made on the lowest measured   T-score of the lumbar spine, femoral neck, total proximal femur or 33%   radius. Note the change in terminology for diagnostic classification from   OSTEOPENIA to LOW BONE MASS. All trending for sequential exams will be   done using multiple vertebrae or the total proximal femur. Fracture risk   is based on the WHO Fracture Risk Assessment Tool (FRAX). If additional   information is needed or if you would like to discuss the results, please   do not hesitate to call me.       Thank you for referring this patient to [facility].   We are happy to be of service in support of you and your practice. If you   have any questions or suggestions to improve our service, please call me   at [phone].     Sincerely,     Brionna Zeng M.D. C.C.AMY.  Osteoporosis Services, [facility]     The test results show that your current treatment is working. Please continue your current medication and plan. We recommend that you repeat the above test(s) in 2 years.    Please call with questions or contact us using [email or web address].    Sincerely,        Electronically signed by Brittney Daley NP

## 2021-06-21 NOTE — LETTER
Letter by Whitney Velasquez DO at      Author: Whitney Velasquez DO Service: -- Author Type: --    Filed:  Encounter Date: 12/3/2020 Status: (Other)         Ellis Em  [address] [facility] [address] [facility] [address]             December 3, 2020         Dear Ms. Em,    Below are the results from your recent visit:    Resulted Orders   Iron and Transferrin Iron Binding Capacity   Result Value Ref Range    Iron 257 (H) 42 - 175 ug/dL    Transferrin 296 212 - 360 mg/dL    Transferrin Saturation, Calculated 69 (H) 20 - 50 %    Transferrin IBC, Calculated 371 313 - 563 ug/dL   Basic Metabolic Panel   Result Value Ref Range    Sodium 138 136 - 145 mmol/L    Potassium 3.8 3.5 - 5.0 mmol/L    Chloride 103 98 - 107 mmol/L    CO2 25 22 - 31 mmol/L    Anion Gap, Calculation 10 5 - 18 mmol/L    Glucose 93 70 - 125 mg/dL    Calcium 9.1 8.5 - 10.5 mg/dL    BUN 15 8 - 28 mg/dL    Creatinine 0.74 0.60 - 1.10 mg/dL    GFR MDRD Af Amer >60 >60 mL/min/1.73m2    GFR MDRD Non Af Amer >60 >60 mL/min/1.73m2    Narrative    Fasting Glucose reference range is 70-99 mg/dL per  American Diabetes Association (ADA) guidelines.        All blood work was normal.  Iron levels however were too high.  I would like you to decrease your iron  supplement to once daily instead of twice daily.  Also we are waiting on further results from your urine but  preliminary studies does not show infection     Please call with questions or contact us using [employer].    Sincerely,        Electronically signed by Whitney Velasquez DO

## 2021-06-21 NOTE — LETTER
Letter by Brittney Daley NP at      Author: Brittney Daley NP Service: -- Author Type: --    Filed:  Encounter Date: 4/15/2021 Status: (Other)         Ellis Em  [address]  [place] [address]             April 15, 2021         Dear Ms. Em,    Below are the results from your recent visit:    Resulted Orders   DXA Bone Density Scan    Narrative    4/13/2021      RE: Ellis Em  YOB: 1931        Dear Brittney Daley,    Patient Profile:  89 y.o. female, postmenopausal, is here for the follow up bone density   test.   History of fractures - Yes;  Vertebra and Hip. Family history of   osteoporosis - None.  Family history of hip fracture: None. Smoking   history - No. Osteoporosis treatment past -  Yes;  HRT, Bisphosphonates,   Forteo, Evista and Prolia. Osteoporosis treatment current - No.  Chronic   medical problems - Breast cancer, History of kidney stones, Spine surgery   and Thyroid disease, and his surgery on both hips but not replaced). High   risk medications -  Blood thinner (Coumadin or Heparin);  Yes, Currently   and Thyroid;  Yes, Currently.      Assessment:    1. The spine bone density L1-L4 is not reliable for interpretation due to   the presence of significant degenerative change, which has resulted in   artifactually elevated bone mineral density  2. Femoral bone density shows right total hip T-score -1.8.  3. Trabecular bone score indicates poor trabecular bone architecture.  4.  The left distal radius T score -3.0.    89 y.o. female with OSTEOPOROSIS and HIGH fracture risk, adjusted for the   TBS, with major osteoporotic fracture risk 15.5% and hip fracture risk   5.0%.     Since the previous bone density dated January 20, 2020, there has been no   statistically significant % change in the right total hip.  There has been   no statistically significant change in left the distal radius.    Recommendations:  Appropriate evaluation and treatment recommended with  follow up bone   density scan in 1 to 2 years.      Bone densitometry was performed on your patient using our NXT-ID iDXA   densitometer. The results are summarized and a copy of the actual scans   are included for your review. In conformity with the International Society   of Clinical Densitometry's most recent position statement for DXA   interpretation (2015), the diagnosis will be made on the lowest measured   T-score of the lumbar spine, femoral neck, total proximal femur or 33%   radius. Note the change in terminology for diagnostic classification from   OSTEOPENIA to LOW BONE MASS. All trending for sequential exams will be   done using multiple vertebrae or the total proximal femur. Fracture risk   is based on the WHO Fracture Risk Assessment Tool (FRAX). If additional   information is needed or if you would like to discuss the results, please   do not hesitate to call me.       Thank you for referring this patient to [facility]. We are happy to be of service in support of you and your   practice. If you have any questions or suggestions to improve our service,   please call me at [phone].     Sincerely,     Brionna Zeng M.D. [email or web address].  [facility]     You are stable!  That's always nice to hear, isn't it? The reader of your Dexa Scan is recommending that you be on treatment. I am not sure that that is something that you are interested in, are you?  Lipscomb this a bit and let me know what you think.     Please call with questions or contact us using [email or web address].    Sincerely,        Electronically signed by Brittney Daley NP

## 2021-06-21 NOTE — PROGRESS NOTES
ASSESSMENT and PLAN:  Ellis was seen today for numbness and blood pressure check.    Diagnoses and all orders for this visit:    Hypothyroidism  -     Thyroid Cascade    Paresthesias  -     Vitamin B12    Carpal tunnel syndrome    HTN (hypertension)  -     Comprehensive Metabolic Panel    Recurrent Major Depression In Full Remission    Lumbar stenosis with neurogenic claudication    Idiopathic osteoporosis    Breast implant rupture  -     Mastectomy Bra    History of anemia    Neuropathy (H)    Breast cancer (H)  -     Mastectomy Bra    S/P mastectomy, bilateral  -     Mastectomy Bra    Other orders  -     amLODIPine (NORVASC) 5 MG tablet; Take 1 tablet (5 mg total) by mouth daily. For blood pressure      Patient was seen for evaluation of chronic medical issues.  It was basically a complete examination.  -We discussed her blood pressure which for her age is not overly concerning but she has had a history of accelerated blood pressure in the past and we discussed just going back to amlodipine 5 mg once daily either in the morning or at night if wanting to prevent swelling and having blood pressure rechecked in the next couple weeks.  If any issues with lightheadedness etc. we can cut back the dose or discontinue again.  -We discussed her paresthesias.  Checking a B12 level but her symptoms seem consistent with carpal tunnel syndrome.  We discussed considering wrist braces but she thinks it would be impeding on her ability to ambulate with her walker and prefers not to.  She notes that it is not dramatic at this time and not affecting her day-to-day function.  It is something we can consider in the future if needed.  Differential also included coming from her cervical spine given previous fracture and severe arthritis.  -She has had her bilateral breast implants from breast cancer and double mastectomy> both which have now ruptured this year and we will work towards getting her new prosthesis that are no more than in  A cup. She has had spinal surgery in her neck and is suffering from increased pain from the B cup she was originally given in her left prosthesis. We will [facility] to order a new bilateral prosthesis and indicate it needs to be no more than A cup.   -Mental health well controlled right now on bupropion and fluoxetine.  Refill as needed  -Thyroid function being reevaluated today  -We discussed her pain control for her low back and legs and she is on gabapentin but sometimes only takes twice a day we discussed trying to take a second dose before her nap to see if that is helpful.  She might not even need the baclofen that she has been taken for the muscle cramps and she might consider not taking it to see if has worsening pain or cramps.  It is not causing any drowsiness or concern for her stability as of yet and she is aware of the risks of this medication and the gabapentin  -We discussed risk of falls and ensure that she has appropriate things in her home for emergencies    -We discussed following up in approximately 6 months or sooner if needed  Patient Instructions   -restart amlodipine 5mg just once daily for blood pressure in the morning and if having any dizziness or increased swelling in your legs let me know.   -continue losartan 50mg twice daily for blood pressure  -if you can email me your average readings in 2 weeks then I can update your chart.       - will contact [employer] to see about new bra and prosthesis if you dont hear anything within a month please let me know.    -lets try moving up your 2nd dose of gabapentin to taking at 11am before nap. And see if helps jumpy legs.      -you could always consider backing off the baclofen to see if it even is doing anything.     -labs drawn today       Orders Placed This Encounter   Procedures     Mastectomy Bra     Dispense mastectomy bra with prosthesis bilateral implant rupture- A cup desired     Comprehensive Metabolic Panel     Thyroid  Cascade     Vitamin B12     Medications Discontinued During This Encounter   Medication Reason     levothyroxine (SYNTHROID, LEVOTHROID) 75 MCG tablet Duplicate order     buPROPion (WELLBUTRIN SR) 150 MG 12 hr tablet Duplicate order     buPROPion (WELLBUTRIN SR) 150 MG 12 hr tablet Duplicate order     baclofen (LIORESAL) 10 MG tablet Duplicate order     baclofen (LIORESAL) 10 MG tablet Duplicate order     baclofen (LIORESAL) 10 MG tablet Duplicate order       No Follow-up on file.    DATA REVIEWED:  - labs drawn    The visit lasted a total of 30  minutes face to face with the patient. Over 50% of the time was spent counseling and educating the patient     CHIEF COMPLAINT:  Chief Complaint   Patient presents with     Numbness     Bilateral hands, numbness subsides with movement, x 6 months     Blood Pressure Check     Currently taking Losartan 50 mg BID       HISTORY OF PRESENT ILLNESS:  Ellis Em is a 87 y.o. female presents today alone for follow-up of chronic medical issues.  -not driving any more -daughter brought her in is in the waiting room  -bp has been 140-150/70-80 been waiting for it to go down. This morning was 111/ 60 when resting.   Taking losartan twice daily 50mg each.  She is tolerating medications well with no cough, dizziness etc.  She used to be on several different medications for blood pressure and we have been able to get her down to 1.  Amlodipine was the last thing that we have discontinued and she does not recall any adverse reaction to the medication  Takes bp1-2 x day  -doesn't use walker in house- too crowded. Doesn't use a cane but should.  Does have some balance instability and is careful not to do things that could put her at risk for falls.  Has an nicolás at home in bedroom and living room to call for help if she needs to.. Has phone at all times.   -stopped hctz in 2016 - was concerned about long term effects  -Over the last 6 months hands falling asleep  And weak gripping. Tips go  to sleep.  Any time not moving, seems to be worse.  Seems to affect all fingers.  She is not sure if it is coming from her neck.  Leg  -R breast now has ruptured, L ruptured the first time. The prosthesis that was ordered-Implant too large. Pulls on neck. Would like cup A- [facility].  B cup too heavy and now needs for other side.  - flu shot makes feel weak   -still has low back pain and legs been aching more- calcium and mag helps . Nap between 11-1 to help with leg pain   -takes gabapentin at 7am and usually gabapentin at noon but sometimes napping and forgets afternoon dose. On 300mg at a time and tolerates  -the baclofen has been used for her pain as well and she thinks it is helping  -on alpha lipoic acid for peripheral neuropathy  -depression and mental health well controlled on fluoxetine 30 mg daily . Sleep is ok  -no thyroid symptoms   -had been taken off meds for osteoporosis for risk of ca. After 2 years   -will change from [facility] in January and will need all prescriptions changed over.       REVIEW OF SYSTEMS:    Comprehensive review of systems is negative except as noted in the HPI.     PFSH:  Tobacco use and medications reviewed below.     TOBACCO USE:  History   Smoking Status     Never Smoker   Smokeless Tobacco     Never Used       VITALS:  Vitals:    10/23/18 1217   BP: 153/75   Patient Site: Left Arm   Patient Position: Sitting   Cuff Size: Adult Regular   Pulse: 66   Resp: 16   Temp: 96.6  F (35.9  C)   TempSrc: Oral   Weight: 155 lb (70.3 kg)     Wt Readings from Last 3 Encounters:   10/23/18 155 lb (70.3 kg)   01/30/18 152 lb (68.9 kg)   07/27/17 152 lb (68.9 kg)       PHYSICAL EXAM:  Constitutional:  Reveals a 87 y.o. female in NAD. Ambulating slowly with walker Vitals:  Per nursing notes.  Neck:  Supple, thyroid not palpable.  Cardiac:  Regular rate and rhythm without murmurs, rubs, or gallops. Carotids without bruits. Legs without edema. Palpation of the radial pulse regular.  Lungs:  "Clear.  Respiratory effort normal.  Skin:   Without rash, bruise, or palpable lesions.  Rheumatologic: Normal joints and nails of the hands.  Hand grasp strong bilateral  Neurologic:  Cranial nerves II-XII intact.    Positive Phalen and Tinel's test bilateral   psychiatric:  Mood appropriate, memory intact.    Chest: ruptured implant bilateral       MEDICATIONS:  Current Outpatient Prescriptions   Medication Sig Dispense Refill     alpha lipoic acid 200 mg cap Take 1 capsule by mouth 2 (two) times a day.       baclofen (LIORESAL) 10 MG tablet TAKE 1 TABLET BY MOUTH THREE TIMES DAILY 270 tablet 0     buPROPion (WELLBUTRIN SR) 150 MG 12 hr tablet TAKE 1 TABLET BY MOUTH DAILY. 90 tablet 0     CALCIUM CARBONATE/VITAMIN D3 (LIQUID CALCIUM WITH VITAMIN D ORAL) Take by mouth.       FLUoxetine (PROZAC) 10 MG capsule TAKE 3 CAPSULES BY MOUTH DAILY 270 capsule 0     gabapentin (NEURONTIN) 300 MG capsule Take 1 capsule (300 mg total) by mouth 3 (three) times a day. 270 capsule 0     LECITHIN MISC Use As Directed.       levothyroxine (SYNTHROID, LEVOTHROID) 75 MCG tablet TAKE 1 TABLET(75 MCG) BY MOUTH DAILY 90 tablet 1     losartan (COZAAR) 50 MG tablet Take 1 tablet (50 mg total) by mouth 2 (two) times a day. 180 tablet 1     melatonin 10 mg Tab Take 10 mg by mouth bedtime.       pen needle, diabetic (BD ULTRA-FINE PHILLIP PEN NEEDLES) 32 gauge x 5/32\" Ndle One daily 100 each 3     STRONTIUM CHLORIDE HEXAHYDRATE (STRONTIUM CHL HEXAHYD, BULK,) 100 % Kailyn Use As Directed.       VIT C/E/ZN/COPPR/LUTEIN/ZEAXAN (PRESERVISION AREDS 2 ORAL) Take 1 tablet by mouth 2 (two) times a day.        amLODIPine (NORVASC) 5 MG tablet Take 1 tablet (5 mg total) by mouth daily. For blood pressure 90 tablet 3     No current facility-administered medications for this visit.            "

## 2021-06-22 ENCOUNTER — COMMUNICATION - HEALTHEAST (OUTPATIENT)
Dept: FAMILY MEDICINE | Facility: CLINIC | Age: 86
End: 2021-06-22

## 2021-06-22 ENCOUNTER — RECORDS - HEALTHEAST (OUTPATIENT)
Dept: ADMINISTRATIVE | Facility: OTHER | Age: 86
End: 2021-06-22

## 2021-06-22 NOTE — PROGRESS NOTES
ASSESSMENT and PLAN:  Ellis was seen today for back pain.    Diagnoses and all orders for this visit:    Flank pain  -     Urinalysis-UC if Indicated    Muscle spasm  -     baclofen (LIORESAL) 10 MG tablet; Take 1 tablet (10 mg total) by mouth 3 (three) times a day.    Recurrent Major Depression In Full Remission  -     buPROPion (WELLBUTRIN SR) 150 MG 12 hr tablet; Take 1 tablet (150 mg total) by mouth daily.  -     FLUoxetine (PROZAC) 10 MG capsule; Take 3 capsules (30 mg total) by mouth daily.    Peripheral neuropathy  -     gabapentin (NEURONTIN) 300 MG capsule; Take 1 capsule (300 mg total) by mouth 3 (three) times a day.    Hypothyroidism  -     levothyroxine (SYNTHROID, LEVOTHROID) 75 MCG tablet; TAKE 1 TABLET(75 MCG) BY MOUTH DAILY    Essential hypertension  -     losartan (COZAAR) 50 MG tablet; Take 1 tablet (50 mg total) by mouth 2 (two) times a day.    Bilateral low back pain with left-sided sciatica, unspecified chronicity    Somatic dysfunction of thoracic region    Somatic dysfunction of lumbar region    Somatic dysfunction of sacral region    Somatic dysfunction of pelvis region    Somatic dysfunction of lower extremity    Fatigue, unspecified type    Other orders  -     traMADol (ULTRAM) 50 mg tablet; Take 1 tablet (50 mg total) by mouth every 6 (six) hours as needed for pain.      Patient was seen today mostly for evaluation of low back pain.  No evidence of nephrolithiasis or urinary tract infection as she has essentially clear urinalysis.  She was happy to hear that.  -We did also discuss her fatigue but it is not severe to the point that she is having any orthopnea or paroxysmal nocturnal dyspnea or even shortness of breath with minimal exertion.  We discussed that fatigue after 2 hours may be appropriate for her.  We discussed the potential for doing further cardiac evaluation such as echocardiogram but she would like to hold off for now  -We also discussed her blood pressure readings which was  normal today.  Repeat blood pressure was elevated but I did not document this.  I discussed with her that although our goal typically is to have blood pressures in the 120s-130s for her I am okay with tolerating her blood pressure in the 150s.  She does not need to check her blood pressure regularly.  She did not tolerate the addition of amlodipine to her regimen.  For now will just have her continue the losartan 50 mg twice daily  -In regards to her low back pain she has had a history of compression fracture.  I offered to do x-ray imaging today.  I also discussed with her that I do osteopathic manual therapy and she is a firm believer in chiropractic care and wanted to go that direction first.  I did perform an adjustment today discussed that she could have worsening pain for the first day or 2 prior to improvement.  She is going to update me on Monday on how she is doing with her low back.  If she was able to gain some improvement she can be seen for follow-up as needed either here in this clinic with me or I can refer her to some other DO's that do adjustments such as at the spine clinic with physical medicine rehabilitation.  If pain is not even improved she likely should have some imaging and can do at this clinic or we can do a referral to spine care as she is not sure if she wants to go back to Dr. Corbin who had performed some of her previous surgeries.  -For pain control in the meantime she can increase the dose of her acetaminophen to 1000 mg 4 times a day.  If she has to add in tramadol she may.  She is already on gabapentin and baclofen several times per day without any adverse reaction and she has been on tramadol in the past.    -All prescriptions were called into the [facility] for renewal after January 1, 2019 due to her insurance change with the exception of the tramadol.      Patient Instructions   -ok for pain control to increase tylenol to 2 tablets 4 x daily.   -if you are having breakthrough  "pain 7+/10  Can take a tramadol up to every 6 hours    -drinking lots of fluid for next couple days.  -let me know on Monday if pain is better, worse or the same- mychart email.- we can decide if we need repeat adjustment or referral to teo or appointment for xray        Orders Placed This Encounter   Procedures     Urinalysis-UC if Indicated     Medications Discontinued During This Encounter   Medication Reason     baclofen (LIORESAL) 10 MG tablet Duplicate order     buPROPion (WELLBUTRIN SR) 150 MG 12 hr tablet Duplicate order     CALCIUM CARBONATE/VITAMIN D3 (LIQUID CALCIUM WITH VITAMIN D ORAL) Therapy completed     LECITHIN MISC Ineffective     pen needle, diabetic (BD ULTRA-FINE PHILLIP PEN NEEDLES) 32 gauge x 5/32\" Ndle Therapy completed     baclofen (LIORESAL) 10 MG tablet Reorder     buPROPion (WELLBUTRIN SR) 150 MG 12 hr tablet Reorder     FLUoxetine (PROZAC) 10 MG capsule Reorder     gabapentin (NEURONTIN) 300 MG capsule Reorder     levothyroxine (SYNTHROID, LEVOTHROID) 75 MCG tablet Reorder     losartan (COZAAR) 50 MG tablet Reorder       No Follow-up on file.    DATA REVIEWED:  Additional History from Old Records Summarized (2): Reviewed outside blood pressure readings that patient has brought in    Radiology Tests Summarized or Ordered (1): Reviewed previous imaging of lumbar spine      The visit lasted a total of 35 minutes face to face with the patient.  At least 6 minutes spent performing osteopathic manual therapy.  over 50% of the time was spent counseling and educating the patient     CHIEF COMPLAINT:  Chief Complaint   Patient presents with     Back Pain     Back pain started about 2 weeks ago. Pain is more towards the side and is having trouble urinating.  Does not feel like her usual UTI's       HISTORY OF PRESENT ILLNESS:  Ellis Em is a 87 y.o. female  Presents today for eval of back pain   Terrific back pain both sides low back for 2 weeks. 7/10 when try to walk. Can only take " tylenol. Was on oxycodone over 2 years.   Went back on the baclofen first of month because legs were aching so bad. Now taking 3 x daily from 2 x daily.   -no falls/   - been lifting books in the last week after this started. Not too heavy unless try to take 2-3 at time. Couple times lifted boxes. No blood in urine. Trouble urinating which is new. Has to think about it and press on stomach.   -urine completely clear.   -pain in front of leg on left quad and down back leg on left leg.   -Last MRI from 2016 of the lumbar spine was reviewed.  Findings of disc degeneration and spondylosis but no obvious impingement. 2.  Stable compression fracture involving the L3 vertebra. There are moderately extensive enhancing Modic type I changes at the apposing endplates of L2-L3, primarily to the left of midline.  3.  Stable changes of decompressive laminectomy from L2-L3 through L4-L5 with decompressed central canal.  4.  Multilevel moderate to severe neural foraminal stenosis as described in detail above, most significant on the right at L4-L5.    -tylenol 500mg 4x daily.   -short of breath rapidly after 2 hours then have to rest. That is new. Housekeeping. Chore to get dressed with back brace - uses topicals capsicin.   - blood pressures have been high at home ,normal here. Extremely tired with high readings. Ranges anywhere 130s-180s.  Did not tolerate amlodipine.  Seemed out of sorts.      REVIEW OF SYSTEMS:    Comprehensive review of systems is negative except as noted in the HPI.     PFSH:  Tobacco use and medications reviewed below.     TOBACCO USE:  Social History     Tobacco Use   Smoking Status Never Smoker   Smokeless Tobacco Never Used       VITALS:  Vitals:    12/28/18 1522   BP: 133/70   Pulse: 73   Resp: 20   Temp: (!) 96.5  F (35.8  C)   TempSrc: Oral   SpO2: 97%   Weight: 149 lb 12.8 oz (67.9 kg)     Wt Readings from Last 3 Encounters:   12/28/18 149 lb 12.8 oz (67.9 kg)   10/23/18 155 lb (70.3 kg)   01/30/18  152 lb (68.9 kg)       PHYSICAL EXAM:  Constitutional:  Reveals a 87 y.o. female in NAD. Frail  Vitals:  Per nursing notes.  Neck:  Supple, thyroid not palpable.  Cardiac:  Regular rate and rhythm without murmurs, rubs, or gallops. Carotids without bruits. Legs without edema. Palpation of the radial pulse regular.  Lungs: Clear.  Respiratory effort normal.  Skin:   Without rash, bruise, or palpable lesions.  Rheumatologic: Normal joints and nails of the hands.  Neurologic:  Cranial nerves II-XII intact.     Psychiatric:  Mood appropriate, memory intact.   Musculoskeletal-no significant pain to palpation over lumbar spinous process.  Some discomfort paraspinal muscles greater on the left.  Negative straight leg raise bilateral.  Appropriate strength of lower extremities bilateral.  Sensation intact bilateral  OMT exam upslip on the left-, tight hamstrings bilateral and external rotators bilateral, T12 rotated left side bent left, L5-S1 rotated left side bent right    Procedure-verbal consent obtained.  Osteopathic manual therapy performed to 5 body regions including thoracic, lumbar, sacral regions as well as pelvis and lower extremities using combination of myofascial release and stills in direct technique.  Patient tolerated procedure well and had improvement of somatic dysfunction.      MEDICATIONS:  Current Outpatient Medications   Medication Sig Dispense Refill     alpha lipoic acid 200 mg cap Take 1 capsule by mouth 2 (two) times a day.       baclofen (LIORESAL) 10 MG tablet Take 1 tablet (10 mg total) by mouth 3 (three) times a day. 270 tablet 1     buPROPion (WELLBUTRIN SR) 150 MG 12 hr tablet Take 1 tablet (150 mg total) by mouth daily. 90 tablet 1     FLUoxetine (PROZAC) 10 MG capsule Take 3 capsules (30 mg total) by mouth daily. 270 capsule 3     gabapentin (NEURONTIN) 300 MG capsule Take 1 capsule (300 mg total) by mouth 3 (three) times a day. 270 capsule 1     levothyroxine (SYNTHROID, LEVOTHROID) 75 MCG  tablet TAKE 1 TABLET(75 MCG) BY MOUTH DAILY 90 tablet 1     losartan (COZAAR) 50 MG tablet Take 1 tablet (50 mg total) by mouth 2 (two) times a day. 180 tablet 1     melatonin 10 mg Tab Take 10 mg by mouth bedtime.       ondansetron (ZOFRAN ODT) 4 MG disintegrating tablet Take 1 tablet (4 mg total) by mouth every 8 (eight) hours as needed for nausea. 30 tablet 0     STRONTIUM CHLORIDE HEXAHYDRATE (STRONTIUM CHL HEXAHYD, BULK,) 100 % Kailyn Use As Directed.       VIT C/E/ZN/COPPR/LUTEIN/ZEAXAN (PRESERVISION AREDS 2 ORAL) Take 1 tablet by mouth 2 (two) times a day.        traMADol (ULTRAM) 50 mg tablet Take 1 tablet (50 mg total) by mouth every 6 (six) hours as needed for pain. 30 tablet 0     No current facility-administered medications for this visit.

## 2021-06-23 NOTE — PROGRESS NOTES
ASSESSMENT: Ellis Em is a 87 y.o. female  with a BMI of 23.34 with past medical history significant for hypertension, hypothyroidism, gout, neuropathy, depression, breast cancer, skin cancer, macular degeneration, gastric ulcer who presents today for new patient evaluation of acute flare of midline low back pain with left greater than right radicular leg symptoms.  The patient does have significant foraminal stenosis at the L3-4 level which could be the cause of her pain.  She does have a history of previous lumbar decompression surgery from L1 down to L5 but foraminal stenosis remains.  Sacroiliac joint pain is also in the differential diagnosis as she does have positive SI provocative maneuvers on exam today.  At this time she is neurologically intact and without any red flag symptoms.    MILA:  60%  WHO-5:  13 (The patient is not interested in behavioral health services)    PLAN:  A shared decision making model was used.  The patient's values and choices were respected.  The following represents what was discussed and decided upon by the physician and the patient.      1.  DIAGNOSTIC TESTS: The patient's MRI of the lumbar spine from July of 2016 was personally reviewed today by the physician with the physician performing her own interpretation.  Patient does have a compression fracture of the L3 vertebrae which is stable compared to her previous MRI of March 2016.  Patient does have surgical changes from L2-3 down to the L4-5 level with the decompressed central canal.  There is multilevel foraminal stenosis, most significant at the right L4-5 level but with bilateral L3-4 foraminal stenosis..  There is lateral recess stenosis seen a right L3-4 and L4-5 levels most prominently.  -Discussed with the patient and her daughter that getting an updated MRI is not necessary at this time given she does not have any concerning symptoms.  An up-to-date MRI would not necessarily be helpful with determining the exact  etiology of pain.  Given that she is not interested in pursuing injections or surgery, there is no need to order an MRI at this time.  Can consider an MRI in the future if her symptoms do not resolve or if they significantly worsen.  2.  PHYSICAL THERAPY: The patient was interested in returning to physical therapy so [facility]  Discussed that she will receive much better physical therapy on an outpatient physical therapy center as opposed to having in-home physical therapy.  [facility] is asked [facility] call her as there is no  available at the end of her appointment.  3.  MEDICATIONS: No changes to prescriptions were made today.  -The patient can continue to take the gabapentin 900 mg 3 times a day.  -She can continue to take baclofen 10 mg 3 times a day as needed for pain.  -Patient can continue to take Tylenol every 6 hours.  -Patient reports significant side effects to tramadol so she discontinued this medication.  -Given her age and history of gastric ulcers, NSAIDs are relatively contraindicated.  4.  INTERVENTIONS: The patient is not interested in injections at this time as she has had injections previously and they significantly increased her pain.  It would be difficult to know where to do injections to try and help with her pain.  If she wish to pursue injections, would potentially start with left versus bilateral sacroiliac joint injections.  If these fail to provide relief, could consider bilateral L3-4 transforaminal epidural steroid injections.  5.  PATIENT EDUCATION: Reassured the patient that her pain should improve with physical therapy.  -She is encouraged to use caution with lifting in the future.  -All of her and her daughter Taisha's questions were answered to their satisfaction today.  They were in agreement with the treatment plan.  6.  FOLLOW-UP:   Nurse navigation is asked to call the patient in 4 weeks.  If she is doing better at that time  she can follow-up as needed.  If she is not doing better, the nurse can help her schedule a follow-up appointment.  She and her daughter encouraged to call with any questions, concerns, or if she has any significant worsening of her pain prior to the nurse contacting her.    SUBJECTIVE:  Ellis Em  Is a 87 y.o. female who presents today for new patient evaluation of low back pain with radiation into the buttocks.  The patient reports that the pain started a little bit before Mcclellan time.  She is doing quite a bit of Mcclellan wrapping and lifting boxes when she began to have an increase in pain.  She does have a history of back surgery, but she reports that she did experience quite a bit of relief after the surgery, the pain was not completely resolved.  The pain currently is in the midline of her low back and then she gets some radiation of pain into the bilateral buttocks, worse on the left side compared to the right.  She did get some pain wrapping around into the anterior thighs.  Pain typically does not go below the knees.  She denies any numbness or tingling.  She does feel like her legs are weak.  She feels like they are going to give out on her, though they have never given out on her and she denies any history of falling.  The patient reports that the pain is worse with laundry, bending, reaching, lifting.  She feels better when she is lying down.  She is currently taking gabapentin 900 mg 3 times a day, baclofen 10 mg 3 times a day, Tylenol every 6 hours.  She had to stop taking tramadol as it made her loopy.  She does feel that the medications do help with the pain.  She did physical therapy after the surgery.  She was at [facility] for almost 6 [facility].  They did not think that she would never be able to live independently again, however she has been able to return to living independently.  States that she does do the exercises on her own, but only about twice a week now.  She admits that she should  probably be doing them every day.    Medications:  Reviewed and correct in the chart.      Allergies: Reviewed and the patient has several allergies listed in her chart.  Pertinent to this clinic are adhesive tape, codeine, latex..  Please see the other allergies listed in her chart.    PMH:  Reviewed and significant for hypothyroidism, depression, hypertension, peripheral neuropathy, gastric ulcer, gout, macular degeneration    PSH: Reviewed and significant for appendectomy, tubal ligation, cholecystectomy, partial gastrectomy, bilateral mastectomy with reconstruction and implants, removal of tumor and saliva gland, breast nipple attachment, left hip ORIF, removal of hip hardware, endometrial biopsy, D&C, nipple surgery, implant replacement, tummy tuck, left total knee arthroplasty, hip ORIF, tumor removal from the spine, lumbar decompression surgery, cervical laminoplasty.    Family History:  Reviewed and significant for cancer (unknown type), diabetes, dementia.      Social History: The patient lives independently and is .  She is not currently working.  She denies any tobacco, alcohol, illicit drug use.    ROS: Positive for changes in vision related to macular degeneration, chronic cough, shortness of breath with exertion, rash (seen dermatology this week), poor sleep quality, difficulty urinating (only at night), back pain, joint pain, leg pain, depression.   Otherwise 13 systems reviewed are negative.  Please see the patient's intake questionnaire from today for details.      OBJECTIVE:  PHYSICAL EXAMINATION:    CONSTITUTIONAL:  Vital signs as above.  No acute distress.  The patient is well nourished and well groomed.  PSYCHIATRIC:  The patient is awake, alert, oriented to person, place, time and answering questions appropriately with clear speech.    SKIN:  Skin over the face, bilateral lower extremities, and posterior torso is clean, dry, intact without rashes.    GAIT:  Gait is unsteady, but she is  able to walk with a cane independently.  STANDING EXAMINATION: The patient does not have any tenderness over the lumbar paraspinal over the SI joints, more tender over the left one compared to the right.  Patient has restricted range of motion of the lumbar spine with flexion, extension, bilateral sidebending, bilateral upper body trunk rotation.  She does report pain with all planes of motion testing.  MUSCLE STRENGTH:  The patient has 5/5 strength for the bilateral hip flexors, knee flexors/extensors, ankle dorsiflexors/plantar flexors, great toe extensors, ankle evertors/invertors.  NEUROLOGICAL: Trace patellar, medial hamstring, and achilles reflexes bilaterally.  Reactive/equivocal Babinski's bilaterally.  No ankle clonus bilaterally. Sensation to light touch is intact in the bilateral L4, L5, and S1 dermatomes.  VASCULAR:  2/4 dorsalis pedis pulses bilaterally.  Bilateral lower extremities are warm.  There is no pitting edema of the bilateral lower extremities.  ABDOMINAL:  Soft, non-distended, mildly tender in the bilateral lower quadrants.  No pulsatile mass palpated in the left lower quadrant.  LYMPH NODES:  No palpable or tender inguinal/popliteal lymph nodes.  MUSCULOSKELETAL: Straight leg raising test in the right side is negative.  The patient does not have any pain with right hip range of motion testing with the hip in a flexed position testing internal/external rotation.  Fabere's test on the right side does reproduce some right low back pain.  Straight leg raising test on the left side does reproduce some left low back pain.  The patient does have some mild pain with left hip range of motion testing with the hip in a flexed position testing internal and external rotation.  Fabere's test on the left side does reproduce some left low back pain.

## 2021-06-23 NOTE — PROGRESS NOTES
[facility]  ENDOCRINOLOGY    Osteoporosis Follow Up 2/6/2019    Ellis Em, 4/21/1931, [id number]          Reason for visit      1. Idiopathic osteoporosis        History     Ellis Em is a very pleasant 87 y.o. old female who presents for follow up.   SUMMARY:  1. Osteoporosis- This is a long-standing diagnosis for and she has been treated in the past with Actonel and Reclast. She has a history of L3 compression fracture Detected on VFA testing.It is not clear when this had happened.She has had laminectomy of L1 to L5.The patient used to get care in [place] and transferred care here about 45 years ago.  Lab investigation so far reveal a TSH of 0.58.She consumes about two servings of Dairy each day consisting of milk and cheese daily.  She's not very active although she is independent in living.  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 Trice Corbin. She presented to the ER on 10/12 with two week history of neck pain/headache. No clear history of fall or trauma. Cervical CT revealed C1 arch fracture.Patient is being managed in a [place] collar. Main risk factors include age, gender, postmenopausal status. She's on levothyroxine, a high-risk medication, but no history of chronic medical conditions in a high risk.  She is not currently on any treatment for osteoporosis.Latest DEXA scan is noted below.  There is a remote history of kidney stones  There is a mention of an incidental left adrenal mass but is no further information. Patient is not aware of this diagnosis. I do not have records revealing any workup.    TODAY:  Ellis returns today in f/u for Osteoporosis. She is here with her cane again today.  She has been without therapy for a year now and it would be appropriate to get another Dexa Scan done to see if she has lost any ground.  Her Vit D level is 57.1 and her Calcium level is 9.2. She continues to  work on her walking. She notes that she has been taking CBD oil for her back, and states that it has been helping, but leaves her feeling foggy.    Risk Factors     The following high- risk conditions have been ruled out: celiac disease, eating disorders, gastric bypass, hyperparathyroidism, inflammatory bowel disease, hyperthyroidism, rheumatoid arthritis, lupus, chronic kidney disease.     Ellis Em has the following risk factors: Age, Female gender,  and Low BMI     She is not on high risk medications such as glucocorticoids, anti-coagulants, anti-convulsants, chemotherapy.    Patient deniesHysterectomy, Oophrectomy, Breast cancer and Family history of breast cancer.         Past Medical History     Patient Active Problem List   Diagnosis     Hypothyroidism     Major depressive disorder, recurrent episode, in full remission (H)     Spinal Meningeal Cyst     Idiopathic osteoporosis     Neck pain     Neuropathy (H)     Adrenal mass, left (H)     Lumbar stenosis with neurogenic claudication     Status post lumbar laminectomy     Essential hypertension     Closed fracture of lumbar vertebra without mention of spinal cord injury     Generalized osteoarthrosis, unspecified site     Myalgia and myositis, unspecified     Peripheral neuropathy     Allergic rhinitis     History of anemia     Muscle spasm     C1 cervical fracture (H)     Closed fracture of cervical vertebra, sequela     Tinnitus     Macular degeneration mild      Breast implant rupture bilateral        Family History       family history includes Alcohol abuse in her brother; Cancer in her mother; Dementia in her brother; Diabetes in her brother; Kidney disease in her father.    Social History      reports that  has never smoked. she has never used smokeless tobacco. She reports that she does not drink alcohol or use drugs.      Review of Systems     Patient denies current pain, limited mobility, fractures.   Remainder per HPI.      Vital Signs  "    /70   Ht 5' 7\" (1.702 m)   Wt 151 lb 11.2 oz (68.8 kg)   LMP  (LMP Unknown)   BMI 23.76 kg/m      Physical Exam     GENERAL:  Normal, NIRD  EYES:  Pupils equal, round and reactive to light; no proptosis, lid lag or  periorbital edema.  THYROID:  Thyroid is normal.  No tenderness or bruit  NECK: No lymph nodes  MUSCULOSKELETAL: No joint abnormalities, No kyphosis. Muscle strength grossly normal without evidence of wasting.  HEART:  Regular rate and rhythm without murmur.  LUNGS:  Clear to auscultation.        Assessment     1. Idiopathic osteoporosis        Plan     We will get the Dexa Scan done and see what is happening.  She will otherwise f/u with me in 1 year.       Total visit minutes:25  Time spent counseling and coordination of care:23    Brittney Daley   Endocrinology  2/6/2019  2:50 PM        Current Medications     Outpatient Medications Prior to Visit   Medication Sig Dispense Refill     alpha lipoic acid 200 mg cap Take 1 capsule by mouth 2 (two) times a day.       baclofen (LIORESAL) 10 MG tablet Take 1 tablet (10 mg total) by mouth 3 (three) times a day. 270 tablet 1     buPROPion (WELLBUTRIN SR) 150 MG 12 hr tablet Take 1 tablet (150 mg total) by mouth daily. 90 tablet 1     FLUoxetine (PROZAC) 10 MG capsule Take 3 capsules (30 mg total) by mouth daily. 270 capsule 3     gabapentin (NEURONTIN) 300 MG capsule Take 1 capsule (300 mg total) by mouth 3 (three) times a day. 270 capsule 1     levothyroxine (SYNTHROID, LEVOTHROID) 75 MCG tablet TAKE 1 TABLET(75 MCG) BY MOUTH DAILY 90 tablet 1     losartan (COZAAR) 50 MG tablet Take 1 tablet (50 mg total) by mouth 2 (two) times a day. 180 tablet 1     melatonin 10 mg Tab Take 10 mg by mouth bedtime.       ondansetron (ZOFRAN ODT) 4 MG disintegrating tablet Take 1 tablet (4 mg total) by mouth every 8 (eight) hours as needed for nausea. 30 tablet 0     STRONTIUM CHLORIDE HEXAHYDRATE (STRONTIUM CHL HEXAHYD, BULK,) 100 % Kailyn Use As Directed.   "     VIT C/E/ZN/COPPR/LUTEIN/ZEAXAN (PRESERVISION AREDS 2 ORAL) Take 1 tablet by mouth 2 (two) times a day.        traMADol (ULTRAM) 50 mg tablet Take 1 tablet (50 mg total) by mouth every 6 (six) hours as needed for pain. 30 tablet 0     No facility-administered medications prior to visit.          Lab Results     TSH   Date Value Ref Range Status   01/24/2019 2.28 0.30 - 5.00 uIU/mL Final     Calcium   Date Value Ref Range Status   01/24/2019 9.2 8.5 - 10.5 mg/dL Final           Imaging Results   Last DEXA scan:  Results for orders placed in visit on 10/26/17   DXA Bone Density Scan    Narrative 10/27/2017      RE: Ellis Em  YOB: 1931        Dear Brittney Daley,    Patient Profile:  86 y.o. female, postmenopausal, is here for the follow up bone density   test.   History of fractures - Yes;  Hip. Family history of osteoporosis - None.    Family history of hip fracture: None. Smoking history - No. Osteoporosis   treatment past -  Yes;  Bisphosphonates and Evista. Osteoporosis treatment   current - Yes;  Forteo.  Chronic medical problems - Breast cancer, Chronic   low back problems, Hip replacement , History of kidney stones, Spine   surgery and Thyroid disease. High risk medications -  Thyroid;  Yes,   Currently.      Assessment:    1. The spine bone density L1-L2 with T-score 1.7.  Compression fracture   seen at L3. Additionally degenerative changes are seen which artifactually   elevate the bone density.  2. Femoral bone density shows right femoral neck T-score -2.2.  3. Left One third Radius T-score -3.8.      86 y.o. female with severe OSTEOPOROSIS currently receiving Forteo.    Since the previous bone density dated 11/04/2015, there has been a  17.5%   increase in the bone density of the spine.  Additionally there has been a   9.3 % increase in the left total hip.    Recommendations:  Appropriate monitoring and reevaluation of current therapy is recommended   with follow up bone density scan in  2 years.      Bone densitometry was performed on your patient using our [employer] iDXA   densitometer. The results are summarized and a copy of the actual scans   are included for your review. In conformity with the International Society   of Clinical Densitometry's most recent position statement for DXA   interpretation (2015), the diagnosis will be made on the lowest measured   T-score of the lumbar spine, femoral neck, total proximal femur or 33%   radius. Note the change in terminology for diagnostic classification from   OSTEOPENIA to LOW BONE MASS. All trending for sequential exams will be   done using multiple vertebrae or the total proximal femur. Fracture risk   is based on the WHO Fracture Risk Assessment Tool (FRAX). If additional   information is needed or if you would like to discuss the results, please   do not hesitate to call me.       Thank you for referring this patient to [facility].   We are happy to be of service in support of you and your practice. If you   have any questions or suggestions to improve our service, please call me   at [phone].     Sincerely,     Brionna Zeng M.D. CEverettCRUI.  Osteoporosis Services, [facility]

## 2021-06-23 NOTE — PATIENT INSTRUCTIONS - HE
An order for physical therapy has been provided today.  Someone will call you to schedule physical therapy.  It will be very important for you to do your physical therapy exercises on a regular basis to decrease your pain and prevent future flares of pain.      A nurse will call you in 4 weeks to see how you are doing.  If you are doing better at that time, you are welcome to follow-up as needed.  If you are not doing better, the nurse will relay this information to the physician and then further recommendations can be made. Please do not hesitate to contact the clinic at [phone] if you have any questions/concerns or any worsening of your pain prior to that time.  You are also welcome to contact Dr. Sanchez via [employer].

## 2021-06-23 NOTE — TELEPHONE ENCOUNTER
Spine will reach out to pt after they receive packet that they will be mailing out to pt to complete.

## 2021-06-23 NOTE — TELEPHONE ENCOUNTER
She was in clinic on 12/28/18 and waiting for this referral. Please process ASAP.     Referral Request  Type of referral:(Per Dr Velasquez's notation)  physical medicine and rehabilitation at the spine center   Who s requesting: Patient  Why the request: Per Provider suggestion  Have you been seen for this request: Yes  Does patient have a preference on a group/provider? Close to [facility]  Okay to leave a detailed message?  Yes

## 2021-06-23 NOTE — TELEPHONE ENCOUNTER
RN cannot approve Refill Request    RN can NOT refill this medication med is not covered by policy/route to provider.    Jason Sheridan, [facility] Triage/Med Refill 2/7/2019    Requested Prescriptions   Pending Prescriptions Disp Refills     baclofen (LIORESAL) 10 MG tablet [Pharmacy Med Name: BACLOFEN 10MG TABLETS] 270 tablet 0     Sig: TAKE 1 TABLET BY MOUTH THREE TIMES DAILY    There is no refill protocol information for this order

## 2021-06-24 NOTE — TELEPHONE ENCOUNTER
"Phone call to patient in follow 4 weeks from new patient appointment. Patient reports she has had 1 session of PT thus far, with her second one set for this afternoon. \"I really feel the PT and exercises along with this new supplement I started are going to make a big difference.\" Patient would like a follow up call in another 3-4 weeks. Task created. Did encourage patient to call or return sooner than 3-4 weeks if pain worsens or PT is not helpful. Stated understanding.     Patient wanted to inform provider she really enjoyed the appointment she had with her as well as the thank you note she received.       "

## 2021-06-25 NOTE — TELEPHONE ENCOUNTER
I tried calling [facility] again and it has a busy tone only. I will try again before I leave today.

## 2021-06-25 NOTE — PROGRESS NOTES
Progress Notes by Nadya Browning FNP at 1/17/2017  3:20 PM     Author: Nadya Browning FNP Service: -- Author Type: Nurse Practitioner    Filed: 1/18/2017  1:43 PM Encounter Date: 1/17/2017 Status: Signed    : Nadya Browning FNP (Nurse Practitioner)       Assessment & Plan:  1. Urine frequency  - Urinalysis-UC if Indicated  - Culture, Urine    2. UTI (urinary tract infection)  Based on UA suspect infection. Start abx as ordered below. Follow for culture result.   - nitrofurantoin, macrocrystal-monohydrate, (MACROBID) 100 MG capsule; Take 1 capsule (100 mg total) by mouth 2 (two) times a day for 7 days.  Dispense: 14 capsule; Refill: 0      Patient Instructions       Understanding Urinary Tract Infections (UTIs)  Most UTIs are caused by bacteria, although they may also be caused by viruses or fungi. Bacteria from the bowel are the most common source of infection. The infection may begin because of any of the following:    Sexual activity. During sex, germs can travel from the penis, vagina, or rectum into the urethra.     Germs on the skin outside the rectum may travel into the urethra. This is more common in women since the rectum and urethra are closer to each other than in men. Wiping from front to back after using the toilet and keeping the area clean can help prevent germs from getting to the urethra.    Blockage of urine flow through the urinary tract. If urine sits too long, germs may begin to grow out of control.      Parts of the urinary tract  The infection can occur in any part of the urinary tract.    The kidneys collect and store urine.    The ureters carry urine from the kidneys to the bladder.    The bladder holds urine until you are ready to let it out.    The urethra carries urine from the bladder out of the body. It is shorter in women, so bacteria can move through it more easily. The urethra is longer in men, so a UTI is less likely to reach the bladder or kidneys in men.    7782-0206 [facility]  [employer]. [address] [place] [id number]. All rights reserved. This information is not intended as a substitute for professional medical care. Always follow your healthcare professional's instructions.            Orders Placed This Encounter   Procedures   ? Culture, Urine   ? Urinalysis-UC if Indicated     There are no discontinued medications.      Chief Complaint:   Chief Complaint   Patient presents with   ? Urinary Tract Infection     x 1 wk       History of Present Illness:  Ellis is a 85 y.o. female presenting to the clinic today with urinary frequency, urgency and bladder leakage or incontinence occasionally over the past 6 days.  Symptoms have worsened over time.  She has been using cranberry juice treat the symptoms without much effect.  No back pain, no fever, no chills.  She has felt slightly achy.  She did have a recent urinary tract infection approximately the end of November.  Prior to that she has not had any other recent infections..     Review of Systems:  All other systems are negative except as noted above.    PFSH:  Reviewed and updated.    Tobacco Use:  History   Smoking Status   ? Never Smoker   Smokeless Tobacco   ? Never Used       Vitals:  Vitals:    01/17/17 1535   BP: 120/60   Patient Site: Left Arm   Patient Position: Sitting   Cuff Size: Adult Regular   Pulse: 68   Resp: 16   Weight: 164 lb (74.4 kg)     Wt Readings from Last 3 Encounters:   01/17/17 164 lb (74.4 kg)   01/03/17 164 lb 9.6 oz (74.7 kg)   11/28/16 170 lb (77.1 kg)       Physical Exam:  Constitutional:  Reveals an alert, cooperative, 85 year old female in no acute distress.  Vitals:  Per nursing notes.  Abdomen:  non-tender, non-distended.  Neither liver nor spleen palpable.  No CVA tenderness.    Data Reviewed:  Additional History from Old Records or Another Person Summarized (2 total): None.     Decision to Obtain Extra information (1 total): None.     Radiology Tests Summarized and Ordered  "(XRAY/CT/MRI/DXA) (1 total): None.    Labs Reviewed and Ordered (1 total): ua, uc    Medicine Tests Summarized and Ordered (EKG/ECHO/COLONOSCOPY/EGD) (1 total): None.    Independent Review of EKG or X-Ray (2 each): None.    The visit lasted a total of 25 minutes face to face with the patient. Over 50% of the time was spent counseling and educating the patient about plan of care.    Medications:  Current Outpatient Prescriptions   Medication Sig Dispense Refill   ? alpha lipoic acid 200 mg cap Take 1 capsule by mouth 2 (two) times a day.     ? amLODIPine (NORVASC) 5 MG tablet TAKE 1 TABLET BY MOUTH TWICE DAILY 180 tablet 3   ? baclofen (LIORESAL) 10 MG tablet TAKE 1 TABLET BY MOUTH THREE TIMES DAILY 270 tablet 1   ? buPROPion (WELLBUTRIN SR) 150 MG 12 hr tablet Take 1 tablet (150 mg total) by mouth daily. 90 tablet 3   ? CALCIUM CARBONATE/VITAMIN D3 (LIQUID CALCIUM WITH VITAMIN D ORAL) Take by mouth.     ? FLUoxetine (PROZAC) 10 MG capsule Take 3 capsules by mouth daily (pharmacy - discontinue the 20mg caps and 40mg caps on file) 90 capsule 2   ? gabapentin (NEURONTIN) 100 MG capsule TAKE 1-2 CAPSULES BY MOUTH THREE TIMES DAILY 180 capsule 5   ? HERBAL COMPLEX NO.205 (TOTAL BODY CLEANSE ORAL) Take 1 capsule by mouth 2 (two) times a day. Contains vitamin C 100 mg, folic acid 400 mcg, tart cherry, celery, etc.     ? KRILL OIL ORAL Take 2 capsules by mouth daily.     ? LECITHIN MISC Use As Directed.     ? levothyroxine (SYNTHROID, LEVOTHROID) 75 MCG tablet Take 1 tablet (75 mcg total) by mouth daily. 90 tablet 3   ? losartan (COZAAR) 50 MG tablet TAKE 1 TABLET(50 MG) BY MOUTH DAILY 90 tablet 0   ? melatonin 10 mg Tab Take 10 mg by mouth bedtime.     ? omeprazole (PRILOSEC) 20 MG capsule TAKE ONE CAPSULE BY MOUTH DAILY 90 capsule 3   ? pen needle, diabetic (BD ULTRA-FINE PHILLIP PEN NEEDLES) 32 gauge x 5/32\" Ndle One daily 100 each 3   ? STRONTIUM CHLORIDE HEXAHYDRATE (STRONTIUM CHL HEXAHYD, BULK,) 100 % Kailyn Use As " Directed.     ? teriparatide (FORTEO) 20 mcg/dose - 600 mcg/2.4 mL injection Inject 0.08 mL (20 mcg total) under the skin daily. 7.2 mL 3   ? UNABLE TO FIND Take 2 capsules by mouth every morning. Med Name: Actalin Thyroid Booster. Contains iodine.      ? UNABLE TO FIND Apply topically 3 (three) times a day as needed. Med Name: Arthritis Pain Cream. Contains lidocaine.     ? UNABLE TO FIND Med Name: Saccharomyces Boulardii + MOS.  Probiotic     ? UNABLE TO FIND Med Name: MentaFit     ? VIT C/E/ZN/COPPR/LUTEIN/ZEAXAN (PRESERVISION AREDS 2 ORAL) Take 1 tablet by mouth 2 (two) times a day.      ? nitrofurantoin, macrocrystal-monohydrate, (MACROBID) 100 MG capsule Take 1 capsule (100 mg total) by mouth 2 (two) times a day for 7 days. 14 capsule 0     No current facility-administered medications for this visit.        Total Data Points: 1    KIARRA Bolanos, CNP    This note has been dictated using voice recognition software. Any grammatical or context distortions are unintentional and inherent to the software

## 2021-06-25 NOTE — TELEPHONE ENCOUNTER
Incoming call from daughter Rosalva wanting to  supplies for repeat UA. I told her we will leave supplies at the  for . There is not a future order for UA so I pended one here

## 2021-06-25 NOTE — PROGRESS NOTES
Assessment & Plan     Ellis was seen today for follow up.    Diagnoses and all orders for this visit:    Fall, initial encounter  -     XR Cervical Spine 2 - 3 VWS; Future  -     XR Pelvis W 2 Vw Hips Bilateral; Future  -     XR Tibia and Fibula Right; Future  -     XR Tibia and Fibula Left; Future  -     XR Femur Left 2 VWS; Future  -     XR Femur Right 2 VWS; Future    Cervicalgia  -     XR Cervical Spine 2 - 3 VWS; Future    Antalgic gait  -     MR Lumbar Spine Without Contrast; Future  -     Ambulatory referral to Infusion Therapy  -     Ambulatory referral to Neurology    Discoordination  -     MR Lumbar Spine Without Contrast; Future  -     Ambulatory referral to Infusion Therapy  -     predniSONE (DELTASONE) 5 MG tablet; Take 5 mg by mouth daily before breakfast.  -     Ambulatory referral to Neurology    Low back pain potentially associated with radiculopathy  -     MR Lumbar Spine Without Contrast; Future  -     LORazepam (ATIVAN) 0.5 MG tablet; Take 1 tablet (0.5 mg total) by mouth once in imaging for anxiety. Fill at preferred pharmacy and bring to MRI appointment. Do not take prior to arriving. You will need a  to bring you home after your appointment.    Inability to bear weight  -     MR Lumbar Spine Without Contrast; Future  -     Ambulatory referral to Neurology    Muscle weakness (generalized)  -     Ambulatory referral to Infusion Therapy  -     predniSONE (DELTASONE) 5 MG tablet; Take 5 mg by mouth daily before breakfast.  -     Ambulatory referral to Neurology    Fatigue, unspecified type  -     Ambulatory referral to Infusion Therapy  -     predniSONE (DELTASONE) 5 MG tablet; Take 5 mg by mouth daily before breakfast.  -     Ambulatory referral to Neurology    History of adrenal adenoma  -     Ambulatory referral to Infusion Therapy    Other specified hypotension  -     Ambulatory referral to Infusion Therapy      -Had a long discussion with the daughter about differential diagnosis.   Her labs a week ago were unremarkable surprisingly for any acute findings that explain her cognitive decline.  Since she is having some discoordination and inability to ambulate I first wanted to ensure she did not have any acute bone fracture of her lower extremities so those were imaged today without any findings.  Also imaged her neck to rule out compression fracture since she had hit her head.  We did discuss the possibility that with her recent falls and head injury this last time but that could be contributing to some of the cognitive decline but daughter indicates that it began before she started falling.  -Since daughter indicates that the precipitation of her decline came after she receives her Covid vaccine then later for steroid injections in one office visit with orthopedics we discussed the potential for steroid-induced adrenal insufficiency.  We set patient up after the visit for cosyntropin stimulation test which we cannot get scheduled until Friday.  I did call the daughter the following day to indicate we should start prednisone and not wait for the results.  I advised that they must hold it the morning of her procedure so as not to affect the results of the study.  Study results came back normal with appropriate cortisol increase after cosyntropin.  I did advise that she stay on the prednisone for now to see if there is any improvement  -We discussed likely not related to the urinary tract infection as she is on appropriate treatment.  Daughter did call back wanting to repeat urine analysis to be sure and so that order had been placed  -We discussed that this likely could be stroke related with her discoordination and change in memory and function and that we would get the MRI of the brain on Saturday as scheduled and add on MRI of the lumbar spine as well to make sure no significant stenosis affecting peripheral function or other findings that would indicate issues with the CNS.  We discussed this  "imaging could potentially also look at an adrenal adenoma that was incidentally seen last time she had an MRI of the spine.  (Results are attached below from after our visit.  No remarkable findings to explain symptoms)  -Neurology referral had also been placed since I am uncertain what more to work-up and patient does not qualify for hospital admission.  -We are going to try to get home care out for skilled nursing physical therapy and Occupational Therapy  -We talked about weaning off some of her chronic medications that she has always tolerated including holding her afternoon gabapentin of 2 capsules  And baclofen for now.  In a week we can discuss pulling back even more.        Ordering of each unique test  Prescription drug management  60 minutes spent on the date of the encounter doing chart review, history and exam, documentation and further activities per the note- coordinating cosyntropin stim test        BMI:   Estimated body mass index is 25.27 kg/m  as calculated from the following:    Height as of 12/1/20: 5' 4\" (1.626 m).    Weight as of this encounter: 147 lb 3.2 oz (66.8 kg).        No follow-ups on file.    Whitney Velasquez, [facility]    Subjective   Ellis Em is 89 y.o. and presents today for the following health issues   HPI   Patient is brought in today by daughter Rosalav and grand daughter for concerns of progressive weakness and cognitive decline.   I spoke to them yesterday over the phone.      Fell again yesterday and hit head. Having discoordination that is progressive with falls. She has been advised not to get out of wheelchair.   Pulse 63. Blood pressure drop from previous blood pressure. Normotensive today but low for her.   .   MRI brain Saturday  -[facility] . 2pm - they are willing to go in sooner  Neck pain on the right since fall- hit occiput. Didn't go to the ER- they are trying to minimize this/  Has little goose egg. No bleeding.     We talked " about if her chronic meds could be contributing to worsening fatigue and decline. Labs last visit were all normal. Has been forgetting baclofen and gabapentin at noon because sleeping more. First usually at 7 oclock and night about the same. Isnt every day. Yesterday forgot . Only 2-3 days.   -right leg with a lot of pain.     Completing treatment UTI with cephalexin. Tolerating but otherwise no cognitive improvement.     Review of Systems     Complete ROS reviewed in HPI or otherwise negative        Objective    /71   Pulse 73   Temp 98.7  F (37.1  C) (Oral)   Resp 16   Wt 147 lb 3.2 oz (66.8 kg)   LMP  (LMP Unknown)   SpO2 97%   BMI 25.27 kg/m    Body mass index is 25.27 kg/m .  Physical Exam  Gen- weak and frail. Appears fatigued  Heart- RRR  Lungs- CTAB  Lower extremities-pain to palpation distal legs bilateral. Some pain with straight leg raise.   2+ edema on R   Neuro- alert and oriented x 3 but having cognitive decline per family   normal finger nose testing and cerebellar testing with alternate hand motion  Negative pronator drift.  Handgrip symmetric bilateral.  She is able to run her heel over her shin bilateral  Cranial nerves II through XII grossly intact  When trying to stand patient to ambulate her she has extreme discoordination of her movements and is unable to put 1 foot in front of the other      Recent Results (from the past 720 hour(s))   Thyroid [place]    Collection Time: 05/17/21  5:09 PM   Result Value Ref Range    TSH 1.45 0.30 - 5.00 uIU/mL   Comprehensive Metabolic Panel    Collection Time: 05/17/21  5:09 PM   Result Value Ref Range    Sodium 137 136 - 145 mmol/L    Potassium 3.9 3.5 - 5.0 mmol/L    Chloride 101 98 - 107 mmol/L    CO2 24 22 - 31 mmol/L    Anion Gap, Calculation 12 5 - 18 mmol/L    Glucose 99 70 - 125 mg/dL    BUN 20 8 - 28 mg/dL    Creatinine 0.66 0.60 - 1.10 mg/dL    GFR MDRD Af Amer >60 >60 mL/min/1.73m2    GFR MDRD Non Af Amer >60 >60 mL/min/1.73m2     Bilirubin, Total 0.4 0.0 - 1.0 mg/dL    Calcium 8.8 8.5 - 10.5 mg/dL    Protein, Total 6.3 6.0 - 8.0 g/dL    Albumin 3.6 3.5 - 5.0 g/dL    Alkaline Phosphatase 83 45 - 120 U/L    AST 25 0 - 40 U/L    ALT 35 0 - 45 U/L   Iron and Transferrin Iron Binding Capacity    Collection Time: 05/17/21  5:09 PM   Result Value Ref Range    Iron 87 42 - 175 ug/dL    Transferrin 291 212 - 360 mg/dL    Transferrin Saturation, Calculated 24 20 - 50 %    Transferrin IBC, Calculated 363 313 - 563 ug/dL   Ferritin    Collection Time: 05/17/21  5:09 PM   Result Value Ref Range    Ferritin 28 10 - 130 ng/mL   Sedimentation Rate    Collection Time: 05/17/21  5:09 PM   Result Value Ref Range    Sed Rate 7 0 - 20 mm/hr   C-Reactive Protein(CRP)    Collection Time: 05/17/21  5:09 PM   Result Value Ref Range    CRP <0.1 0.0 - 0.8 mg/dL   Thiamin (Vitamin B1), Whole Blood    Collection Time: 05/17/21  5:09 PM   Result Value Ref Range    Vitamin B1, Whole Blood 105 70 - 180 nmol/L   HM1 (CBC with Diff)    Collection Time: 05/17/21  5:09 PM   Result Value Ref Range    WBC 5.8 4.0 - 11.0 thou/uL    RBC 3.71 (L) 3.80 - 5.40 mill/uL    Hemoglobin 12.0 12.0 - 16.0 g/dL    Hematocrit 37.8 35.0 - 47.0 %     (H) 80 - 100 fL    MCH 32.3 27.0 - 34.0 pg    MCHC 31.7 (L) 32.0 - 36.0 g/dL    RDW 12.8 11.0 - 14.5 %    Platelets 259 140 - 440 thou/uL    MPV 9.3 7.0 - 10.0 fL    Neutrophils % 62 50 - 70 %    Lymphocytes % 25 20 - 40 %    Monocytes % 10 2 - 10 %    Eosinophils % 2 0 - 6 %    Basophils % 1 0 - 2 %    Immature Granulocyte % 1 (H) <=0 %    Neutrophils Absolute 3.6 2.0 - 7.7 thou/uL    Lymphocytes Absolute 1.5 0.8 - 4.4 thou/uL    Monocytes Absolute 0.6 0.0 - 0.9 thou/uL    Eosinophils Absolute 0.1 0.0 - 0.4 thou/uL    Basophils Absolute 0.0 0.0 - 0.2 thou/uL    Immature Granulocyte Absolute 0.0 <=0.0 thou/uL   BNP(B-type Natriuretic Peptide)    Collection Time: 05/17/21  5:09 PM   Result Value Ref Range     (H) 0 - 167 pg/mL    Vitamin B12    Collection Time: 05/17/21  5:09 PM   Result Value Ref Range    Vitamin B-12 1,062 (H) 213 - 816 pg/mL   Folate, Serum    Collection Time: 05/17/21  5:09 PM   Result Value Ref Range    Folate 16.8 >=3.5 ng/mL   Urinalysis-UC if Indicated    Collection Time: 05/19/21 12:51 PM   Result Value Ref Range    Color, UA Yellow Colorless, Yellow, Straw, Light Yellow    Clarity, UA Slightly Cloudy (!) Clear    Glucose, UA Negative Negative    Protein, UA Negative Negative    Bilirubin, UA Negative Negative    Urobilinogen, UA 0.2 E.U./dL 0.2 E.U./dL, 1.0 E.U./dL    pH, UA 7.0 5.0 - 8.0    Blood, UA Negative Negative    Ketones, UA Negative Negative    Nitrite, UA Positive (!) Negative    Leukocytes, UA Negative Negative    Specific Gravity, UA 1.015 1.005 - 1.030    RBC, UA 0-2 None Seen, 0-2 hpf    WBC, UA 0-5 None Seen, 0-5 hpf    Bacteria, UA Many (!) None Seen    Squam Epithel, UA 0-5 None Seen, 0-5 lpf    Trans Epithel, UA 0-5 (!) None Seen lpf   Culture, Urine    Collection Time: 05/19/21 12:51 PM    Specimen: Urine   Result Value Ref Range    Culture >100,000 col/ml Escherichia coli (!)        Susceptibility    Escherichia coli - JOHN     Ampicillin <=4 Sensitive      Cefazolin 2 Sensitive      Cefepime <=1 Sensitive      Ceftriaxone <=1 Sensitive      Ciprofloxacin <=0.25 Sensitive      Gentamicin <=2 Sensitive      Levofloxacin <=0.5 Sensitive      Meropenem <=0.5 Sensitive      Nitrofurantoin <=16 Sensitive      Tetracycline <=2 Sensitive      Tobramycin <=2 Sensitive      Trimethoprim + Sulfamethoxazole <=0.5 Sensitive    Basic Metabolic Panel    Collection Time: 05/20/21  5:25 AM   Result Value Ref Range    Sodium 137 136 - 145 mmol/L    Potassium 3.8 3.5 - 5.0 mmol/L    Chloride 102 98 - 107 mmol/L    CO2 27 22 - 31 mmol/L    Anion Gap, Calculation 8 5 - 18 mmol/L    Glucose 103 70 - 125 mg/dL    Calcium 9.1 8.5 - 10.5 mg/dL    BUN 17 8 - 28 mg/dL    Creatinine 0.72 0.60 - 1.10 mg/dL    GFR MDRD Af  Amer >60 >60 mL/min/1.73m2    GFR MDRD Non Af Amer >60 >60 mL/min/1.73m2   HM2 (CBC W/O DIFF)    Collection Time: 05/20/21  5:25 AM   Result Value Ref Range    WBC 6.2 4.0 - 11.0 thou/uL    RBC 3.75 (L) 3.80 - 5.40 mill/uL    Hemoglobin 12.3 12.0 - 16.0 g/dL    Hematocrit 38.9 35.0 - 47.0 %     (H) 80 - 100 fL    MCH 32.8 27.0 - 34.0 pg    MCHC 31.6 (L) 32.0 - 36.0 g/dL    RDW 12.7 11.0 - 14.5 %    Platelets 243 140 - 440 thou/uL    MPV 9.7 8.5 - 12.5 fL   CK Total    Collection Time: 05/28/21 10:23 AM   Result Value Ref Range    CK, Total 101 30 - 190 U/L   Aldosterone, Serum    Collection Time: 05/28/21 10:23 AM   Result Value Ref Range    Aldosterone 5.4 ng/dL   Cosyntropin Stimulation(Pre-Dose)Cortiso    Collection Time: 05/28/21 10:23 AM   Result Value Ref Range    Cortisol, Pre-dose 7.3 ug/dL   Cosyntropin Stimulation(60 Min Post-Dose    Collection Time: 05/28/21 10:23 AM   Result Value Ref Range    Cortisol, 60 min Post-dose 26.5 ug/dL    Time of Cortrosyn Injection 0000    Cosyntropin Stimulation(30 Min Post-Dose    Collection Time: 05/28/21 11:10 AM   Result Value Ref Range    Cortisol, 30 min Post-dose 22.8 ug/dL   Basic Metabolic Panel    Collection Time: 05/28/21 11:10 AM   Result Value Ref Range    Sodium 137 136 - 145 mmol/L    Potassium 3.8 3.5 - 5.0 mmol/L    Chloride 102 98 - 107 mmol/L    CO2 25 22 - 31 mmol/L    Anion Gap, Calculation 10 5 - 18 mmol/L    Glucose 91 70 - 125 mg/dL    Calcium 8.9 8.5 - 10.5 mg/dL    BUN 22 8 - 28 mg/dL    Creatinine 0.75 0.60 - 1.10 mg/dL    GFR MDRD Af Amer >60 >60 mL/min/1.73m2    GFR MDRD Non Af Amer >60 >60 mL/min/1.73m2     MR Lumbar Spine Without Contrast  Narrative: EXAM: MR LUMBAR SPINE WO CONTRAST  LOCATION: [facility]  DATE/TIME: 5/29/2021 1:56 PM    INDICATION: Back pain. Poor coordination when walking.  COMPARISON: None.  TECHNIQUE: Routine Lumbar Spine MRI without IV contrast.    FINDINGS:   Nomenclature is based on 5  lumbar type vertebral bodies. Rightward convex curvature of the lumbar spine with the apex at L2. 3 mm right lateral subluxation of L2 on L3 and 9 mm right lateral subluxation of L3 on L4. Exaggerated lumbar spine lordosis.   Chronic compression fracture deformity of the L3 vertebral body. Grade 1 retrolisthesis of L1 on L2 and L2 on L3. Grade 1 anterolisthesis of L4 on L5. Modic type I degenerative endplate changes involving the right lateral apposing endplates at L1-L2.   Normal distal spinal cord and cauda equina with conus medullaris at L1-L2. Multiple bilateral T2 hyperintense renal cysts. There are a few T2 hypointense lesions in the left kidney. Postsurgical changes involving the left hip.    T12-L1: Disc desiccation. Minimal loss of disc space height. Mild circumferential disc bulge. Mild facet arthropathy. No spinal canal stenosis. Mild bilateral neural foraminal stenosis.     L1-L2: Disc desiccation and mild loss of disc space height. Mild circumferential disc osteophyte complex. Laminectomy changes. Moderate facet arthropathy. No spinal canal stenosis. Severe bilateral neural foraminal stenosis.    L2-L3: Disc desiccation and mild-to-moderate loss of disc space height. Mild circumferential disc osteophyte complex. Laminectomy changes. No spinal canal stenosis. Mild right and moderate left neural foraminal stenosis.     L3-L4: Disc desiccation. Normal disc space height. Moderate circumferential disc bulge. There is right lateral recess narrowing with displacement of the descending right L4 nerve roots. Severe right and moderate left degenerative facet changes.   Laminectomy changes. Mild to moderate spinal canal stenosis. Severe bilateral neural foraminal stenosis.    L4-L5: Disc desiccation and moderate loss of disc space height. Laminectomy changes. Severe degenerative facet changes. No spinal canal stenosis. Severe right neural foraminal stenosis with deformity of the exiting right L4 nerve root. No  left neural   foraminal stenosis.    L5-S1: Disc desiccation and moderate loss of disc space height. Mild circumferential disc osteophyte complex. Left hemilaminectomy. Mild facet arthropathy. No spinal canal stenosis. Moderate right and severe left neural foraminal stenosis.  Impression: 1.  Moderate to advanced multilevel lumbar spondylitic changes with evidence of prior laminectomies at L1-L2 through L5-S1. There is no significant spinal canal stenosis in the lumbar region.  2.  At L5-S1, there is moderate right and severe left neural foraminal stenosis.  3.  At L4-L5, there is severe right neural foraminal stenosis with deformity of the exiting right L4 nerve root.  4.  At L3-L4, there is a moderate circumferential disc bulge with severe bilateral neural foraminal stenosis and right lateral recess narrowing.  5.  At L1-L2, there is severe bilateral neural foraminal stenosis.  6.  Moderate rightward convex curvature of the lumbar spine with the apex at L2.  MR Brain COW Carotid With and Without Contrast  Narrative: EXAM: MR BRAIN COW CAROTID W WO CONTRAST  LOCATION: [facility]  DATE/TIME: 5/29/2021 2:47 PM    INDICATION: Dizziness, non-specific cognitive decline.   COMPARISON: None.  CONTRAST: 7 mL Gadavist.  TECHNIQUE:   1) Routine multiplanar multisequence head MRI without and with intravenous contrast.  2) 3D time-of-flight head MRA without intravenous contrast.  3) Neck MRA without and with IV contrast. Stenosis measurements made according to NASCET criteria unless otherwise specified.    FINDINGS:  HEAD MRI:  INTRACRANIAL CONTENTS: No acute or subacute infarct. No mass, acute hemorrhage, or extra-axial fluid collections. Patchy nonspecific T2/FLAIR hyperintensities within the cerebral white matter and aníbal most consistent with mild to moderate chronic   microvascular ischemic change. Mild to moderate generalized cerebral atrophy. No hydrocephalus. Mild to moderate cerebellar atrophy.  No pathologic contrast enhancement.    SELLA: No abnormality accounting for technique.    OSSEOUS STRUCTURES/SOFT TISSUES: Normal marrow signal. The major intracranial vascular flow voids are maintained.     ORBITS: Prior bilateral cataract surgery. Visualized portions of the orbits are otherwise unremarkable.     SINUSES/MASTOIDS: No paranasal sinus mucosal disease. Complete/near complete opacification of the left mastoid air cells and middle ear cavity. No apparent mass in the posterior nasopharynx or skull base.     HEAD MRA:   ANTERIOR CIRCULATION: No stenosis/occlusion, aneurysm, or high flow vascular malformation. 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    POSTERIOR CIRCULATION: No stenosis/occlusion, aneurysm, or high flow vascular malformation. Balanced vertebral arteries supply a normal basilar artery.     NECK MRA:   RIGHT CAROTID: No measurable stenosis or dissection.    LEFT CAROTID: No measurable stenosis or dissection.    VERTEBRAL ARTERIES: No focal stenosis or dissection. Balanced vertebral arteries.    AORTIC ARCH: Classic aortic arch anatomy with no significant stenosis at the origin of the great vessels.  Impression: HEAD MRI:   1.  No acute infarct, acute intracranial hemorrhage, or intracranial mass.  2.  Mild to moderate burden presumed sequela of chronic small vessel ischemic disease involving the cerebral white matter and aníbal.  3.  Complete opacification of the left mastoid air cells and middle ear cavity.    HEAD MRA:   1.  No aneurysm, high flow AVM or significant stenosis identified.    NECK MRA:  1.  No significant stenosis of the bilateral internal carotid arteries based on NASCET criteria.

## 2021-06-25 NOTE — TELEPHONE ENCOUNTER
I reached out to [facility] - they are pretty booked this week for all locations. They were able to move patient up to 06/09 at 9:20 am at the [place] location with Dr. Simmons.     I tried calling Rosalva, but the line is busy. I will try again later.

## 2021-06-25 NOTE — TELEPHONE ENCOUNTER
Spoke to [facility] and she said that they have not been contacted about the home care order that was placed on 6/3. Kelsey is going to look into it and see what the problem was. She also said that [facility] had ordered for her to get an orthotic brace for her leg but they didn't give her any information on how to get it so I gave her the number for the [facility].

## 2021-06-25 NOTE — TELEPHONE ENCOUNTER
Pt has appt at [facility] tomorrow can you call them to confirm that they have our last office note and should have her imaging results of her head MRI as well as recent labs in 30 days

## 2021-06-25 NOTE — TELEPHONE ENCOUNTER
Thank you, order signed.   If you get time can you call [facility] to see if she can be on a cancellation list for sooner than the 16th of June? Im sure her daughter would go to any of the neurology locations at this point because of the rapid onset of cognitive decline and inability to ambulate. It is new over the last month. She has had imaging and a ton of labs but nothing is revealing

## 2021-06-25 NOTE — TELEPHONE ENCOUNTER
"I spoke to Edilson? with [facility], she stated they never got the order from \"the [facility] side\". They would need an updated order and if provider can place it and they will reach out patient. I did let her know to contact patient's daughter, Rosalva. She asked if provider can note that in the order so they know.   "

## 2021-06-25 NOTE — TELEPHONE ENCOUNTER
Incoming call from Rosalva following up to see if we were able to get patient in sooner with [facility]. Communicated to her that I tried calling but it kept giving me busy tones all day yesterday. Rosalva is not sure what happened with her phone - I told her it's fine. Informed her Micheallinda was able to move patient up to Wed 06/09 at 9:20 am next week at the [place] location. Rosalva states that it works out since she does not work next week.

## 2021-06-25 NOTE — PROGRESS NOTES
"Cynthia arrived A&O via w/c, accompanied by her daughter. Pt confirms she is here for cosyntropin stimulation test. POC reviewed, pt stated understanding and agreed to plan  Cynthia states \"my legs arent connected to my brain anymore\". dtr states cynthia fell 10/2020,and hit her head, and she has had many issues since then, mainly that she cannot walk, her legs are very weak and just not working.  Pt states she falls a lot at home, but that she \"knows how to fall without getting injured\" then she calls family who come help her off the floor. Cynthia lives alone in a senior [facility]  PIV to L AC w ease, excellent blood return and line easily flushed NS  Pre-cosyntropin labs drawn, line flushed NS20mL  1040 Cosyntropin 0.25mg IVP given over 2minutes; line flushed slowly.   1110 30min labs drawn; line flushed NS 10mL  1140 60min labs drawn;line flushed NS 10mL. Pt ate crackers with pb/j and drank 8oz orange juice.  1200 VS checked and BP was elevated over 200; at this time pt stated her right side felt \"puffy\" and had felt that way for almost an hour. Speech was normal, no facial droop, arms weak at her baseline, no vision changes; pt stated she had a headache but that she was due for her tylenol; writer contacted Ajay rodriguez MD pt has had many high BP, even in clinic, and advised that if pt wants to have eval in ED, or take APAP in infusion, monitor VS, but that Cynthia was ok to discharge home and take APAP ,  Check BP's at home and if sx persist/worsen then she needs to be evaluated in ED. Next , pt and her dtr chose to go home take APAP and monitor BP on her own. Dr Velasquez was updated with patients choice.   PIV dc'd, site covered with gauze/coban. AVS reviewed, pt and dtr stated understanding and that their needs were met  1300 Cynthia dc'd A&O via her w/c, accompanied by her daughter        "

## 2021-06-25 NOTE — TELEPHONE ENCOUNTER
Incoming call from patient's daughter, Rosalva wanting to talk to Dr. Velasquez or one of her clinical assistants in regards to some physical therapy orders placed for patient. She states that two different provider ordered it. She's unsure where to start or how to go about it. She would like a call back at [phone].

## 2021-06-25 NOTE — TELEPHONE ENCOUNTER
Reason for Call:  Home Health Care    Linsey with [facility] called regarding (reason for call): verbal orders    Orders are needed for this patient. PT, OT, Skilled Nursing and Home Health Aide    PT: Eval and treat    OT: Eval    Skilled Nursinx a week for 2 wks and 1x a week for 7 weeks    Home Health Aide: 1x a week for 9 weeks    Pt Provider: Dr. Velasquez    Phone Number Homecare Nurse can be reached at: [phone]    Can we leave a detailed message on this number? Yes   Call taken on 2021 at 2:29 PM by Kelsey Kauffman

## 2021-06-25 NOTE — TELEPHONE ENCOUNTER
That is perfect. Thank you! I will make sure her note is complete and when it is I will send a message so we can fax all labs from April and may as well as mri brain and lumbar spine and xray reports. (you could send those in advance if you would like)

## 2021-06-26 NOTE — TELEPHONE ENCOUNTER
Left detailed message for Caitie from [facility] with Dr. Velasquez's message below. Caitie to call back with any further questions or concerns.

## 2021-06-26 NOTE — TELEPHONE ENCOUNTER
Reason for Call: Request for an order : Verbal orders & med interactions    Order or referral being requested:   Verbal orders PT 1x for 1 week, 2x week for 7 weeks for strength, balance and gait     Med interactions :   Doxycycline, Rx for 14 days, started on Sunday 6/20/2021  - prescribed over the weekend, getting a level 2 interaction with Iron supplement and Preservision vitamin. Please advise.      Date needed: as soon as possible    Has the patient been seen by the PCP for this problem? Not Applicable    Additional comments: NA    Phone number Patient can be reached at:  Other phone number:  Caitie [phone]    Best Time:  NA    Can we leave a detailed message on this number?  Yes    Call taken on 6/22/2021 at 12:17 PM by Anabela Mckeon

## 2021-06-26 NOTE — TELEPHONE ENCOUNTER
Reason for Call:  Verbal orders    Orders are needed for this patient.     PT: NA    OT: 2x 4w     Skilled Nursing: NA    Pt Provider: Eva    Name of Homecare Nurse: Nae    Phone Number Homecare Nurse can be reached at: [phone]    Can we leave a detailed message on this number? Yes

## 2021-06-26 NOTE — TELEPHONE ENCOUNTER
Pt daughter called in ask medication question.  The question is answer.  Care advice given per protocol.  The mother states she will take the Pt to the [facility] tomorrow.  Patient agrees with care advice given.   Agreed to call back if he has additional symptoms or questions.    Jaswinder Burrell RN, Care Connection Triage/Med Refill 6/17/2021 4:39 PM

## 2021-06-26 NOTE — TELEPHONE ENCOUNTER
RN cannot approve Refill Request    RN can NOT refill this medication med is not covered by policy/route to provider. Last office visit: 5/25/2021 Whitney Velasquez DO Last Physical: 12/1/2020 Last MTM visit: Visit date not found Last visit same specialty: 5/25/2021 Whitney Velasquez DO.  Next visit within 3 mo: Visit date not found  Next physical within 3 mo: Visit date not found      Dinora Younger, [facility] Connection Triage/Med Refill 6/18/2021    Requested Prescriptions   Pending Prescriptions Disp Refills     predniSONE (DELTASONE) 5 MG tablet [Pharmacy Med Name: PREDNISONE 5 MG TABLET] 30 tablet 0     Sig: TAKE 5 MG BY MOUTH DAILY BEFORE BREAKFAST.       There is no refill protocol information for this order

## 2021-06-26 NOTE — TELEPHONE ENCOUNTER
"Conversation was had with daughters rowena and mayra    -set up with PT and OT-goal getting back to using walker.   -orthotic picked up on Monday  -will find out more about home health aide. Would be  2 x per week, different times. Random times. /different people  -made progress continuing care and financial   - elderly waiver and medical assistance. Percentage of cost paid for services possible.   -appt Dr. Fam July 13th neurological [facility].   -asked about [place] referral?   -they feel like she was thinking better when was on the antibiotic.   Physically she is different than a month ago where was falling asleep a lot.  Now she is more awake, more distinct naps . Will make statements like \" I dont know what Im supposed to do\" . Will be moments where will click in to sounding like old self.   -with OT scored 29/30.  -  -EMG Thursday, dont have resolves.   -when goes to sleep , asleep within seconds. Legs not jumping.  Goes to sleep earlier , same pattern during night happening. Gets up twice midnight prune juice and feed cat, 5 am to take meds and back to bed and wake 7/8.  Times they have been called was panic confusion in the middle of the night when was already awake     -last covid vaccine 3/10 2 weeks prior to injections at ortho    -we agreed I would send a letter to [facility] explaining changes I have seen, acquire EMG, they want rx cephalexin, they will wean to 2.5 prednisone, they want to wait on med changes on pain meds and referral to MTM  - encouraged cognitive stimulation during the day.     Total time on phone 45 min     "

## 2021-06-26 NOTE — TELEPHONE ENCOUNTER
Spoke to [facility] and EMG report was not complete yet. They sent message to the provider and will fax over once report is complete

## 2021-06-26 NOTE — TELEPHONE ENCOUNTER
Please try to get ahold of EMG report from [facility] neurology and have placed on my desk asap pls

## 2021-06-26 NOTE — TELEPHONE ENCOUNTER
Requesting new PT eval order for week of June 21st, not able to see pt week of June 14th due to pt request.    Please reply to message for verbal OK    Thank you,  Rula Huertas PT, DTP  Lic# [id number]  [facility]  [phone]

## 2021-06-27 NOTE — PROGRESS NOTES
Progress Notes by Saskia Koehler PT at 4/8/2019  2:30 PM     Author: Saskia Koehler PT Service: -- Author Type: Physical Therapist    Filed: 4/8/2019  4:13 PM Encounter Date: 4/8/2019 Status: Signed    : Saskia Koehler PT (Physical Therapist)       [facility] Daily Progress     Patient Name: Ellis Em  Date: 4/8/2019  Visit #: 5/12  PTA visit #:  0  Referral Diagnosis:  Acute midline low back pain with left-sided sciatica  M54.42 (ICD-10-CM) - Acute midline low back pain with left-sided sciatica   M99.83 (ICD-10-CM) - Lumbar foraminal stenosis   Z98.890 (ICD-10-CM) - History of lumbar surgery   M53.3 (ICD-10-CM) - Sacroiliac joint pain       Referring provider: Lavern Ch DO    Precautions / Restrictions : stenosis, depression,osteoporosis, neuropathy, s/p multiple surgeries,      Initial assessment: This 87 year old patient presents with low back pain for 10+ years with an exacerbation on 12/2018 after doing some lifting.  She has pain with movement and decreased ROM in the lumbar spine.  She has generalized weakness and is at risk for falls. She was sore after minimal manual therapy and exercise today. Symptoms may be consistent with strain/sprain in the lumbar area. Progress will be slow but pain can improve with gentle strengthening and mobility.  Impairments in  pain, posture, ROM, joint mobility, strength, gait/locomotion and balance  Prognosis to achieve goals is  good   Pt. is appropriate for skilled PT intervention as outlined in the Plan of Care (POC).    Visit Diagnosis:     ICD-10-CM    1. Low back pain M54.5    2. Decreased ROM of lumbar spine M53.86    3. Generalized muscle weakness M62.81             Assessment:   Pt is lethargic and reporting weakness and fatigue in general today. Pt unable to do her exercises on a regular basis due to illness.  She was encouraged to try and resume some of the exercises this week.   HEP/POC compliance is  good  .  Patient demonstrates understanding/independence with home program.  Patient is appropriate to continue with skilled physical therapy intervention, as indicated by initial plan of care.    Goal Status:  Pt. will demonstrate/verbalize independence in self-management of condition in : 12 weeks;Progressing toward  Pt. will be independent with home exercise program in : 12 weeks;Met  Pt. will report decreased intensity, frequency of : Pain;in 12 weeks;Met  Pt. will be able to walk : 20 minutes;with less difficulty;with less pain;for household mobility;for community mobility;in 12 weeks;Progressing toward  Patient will stand : other time;with less pain;with less difficultty;for home chores;in 12 weeks;Progressing toward  Other Time: 15 minutes        Plan for next visits:   Work on posture in standing and walking  Add band exercises  MT as needed  Retest strength            Subjective:   Was down one week with the flu.  She couldn't exercise and feels weaker and more painful.  Pain is mostly in the low back.    Pain at 4/10.         Objective:     Patient Outcome Measures :  2/18/2019  Modified Oswestry Low Back Pain Disablity Questionnaire  in %: 54     Scores range from 0-100%, where a score of 0% represents minimal pain and maximal function. The minimal clinically important difference is a score reduction of 12%.    Patient Outcome Measures :  3/27/2019  Modified Oswestry Low Back Pain Disablity Questionnaire  in %: 50          Involved side: Bilateral and More on left  Posture Observation:  Forward head, rounded shoulder girdles, right hip higher, right shoulder lower.  Lumbopelvic complex: Pelvic alignment: right iliac crest higher.     Lumbar ROM:     Date: 2/18/2019 3/27/2019      *Indicate scale  MAL= Maximally limited  MOL=Moderately limited  MIL= Minimally limited  - = no pain  += minimal pain  ++= moderate pain  +++= maximal pain AROM AROM AROM   Lumbar Flexion WNL- WNL-      Lumbar Extension MAL- MAL+         Right Left Right Left Right Left   Lumbar Sidebending MOL++ MOL+ MOL-  MOL-        Lumbar Rotation MOL++ MOL+ MOL-  MOL-           Lower Extremity Strength:      Date:         LE strength/5 Right Left Right Left Right Left   Hip Flexion (L1-3) 4 4           Hip Extension (L5-S1) 4 4           Hip Abduction (L4-5) 5 5           Hip Adduction (L2-3) 5 5           Hip External Rotation 4 4           Hip Internal Rotation 4 4           Knee Extension (L3-4) 4- 5           Knee Flexion 5 5           Ankle Dorsiflexion (L4-5) 4- 4-           Great Toe Extension (L5)               Ankle Plantar flexion (S1) 4 4           Abdominals                   Treatment Today      TREATMENT MINUTES COMMENTS   Evaluation     Self-care/ Home management     Manual therapy 15 AAROM to hips in supine with hamstring stretch, adductor stretch.  STM/MFR to lumbosacral area in right side lying.   Neuromuscular Re-education 2 Worked on activation of TrA and adding in contraction of pelvic floor muscles in supine, transfers, sitting, standing and walking; worked on good posture and standing tall.(standing one minute x2 and sitting one minute x 1.)   Therapeutic Activity     Therapeutic Exercises 8  Worked on resuming some of her exercises in the clinic.      Current exercises from previous PT:  Standing: hip abduction, hip extension, marching, forward leg lifts, mini squats, heel/toe raises, knee flexion, stand tall  Balance: SLS    Seated knee extension, march, abduction, ball squeeze, ankle pumps, butt squeezes, sit to stand.    Supine: ankle pumps, quad sets, glut sets, heels slides, hip abduction.    UE exercises: Shopulder flexion, extension, biceps, triceps, wrist flexion, extension, horizontal adduction, shoulder abducton, wrist pronation/supination, punches.    Neck: shoulder rolls, retraction, elevation      Exercise #1: Supine bridges x10  Comment #1: HEP  Exercise #2: TrA activation in supine x10  Comment #2: HEP  Exercise #3:  Sitting posture x 1 minute  Exercise #4: Standing posture x 1 minute  x10  See flowsheet for date performed.   Gait training     Modality__________________                Total 25    Blank areas are intentional and mean the treatment did not include these items.     Saskia Brantley PT, CLT  4/8/2019

## 2021-06-27 NOTE — PROGRESS NOTES
Progress Notes by Saskia Koehler PT at 6/24/2019  2:30 PM     Author: Saskia Koehler PT Service: -- Author Type: Physical Therapist    Filed: 6/24/2019  4:49 PM Encounter Date: 6/24/2019 Status: Signed    : Saskia Koehler PT (Physical Therapist)       [facility] Daily Progress/DISCHARGE NOTE     Patient Name: Ellis Em  Date: 6/24/2019  Visit #: 9/12  PTA visit #:  0  Referral Diagnosis:  Acute midline low back pain with left-sided sciatica  M54.42 (ICD-10-CM) - Acute midline low back pain with left-sided sciatica   M99.83 (ICD-10-CM) - Lumbar foraminal stenosis   Z98.890 (ICD-10-CM) - History of lumbar surgery   M53.3 (ICD-10-CM) - Sacroiliac joint pain       Referring provider: Lavern Ch DO      Initial assessment: This 87 year old patient presents with low back pain for 10+ years with an exacerbation on 12/2018 after doing some lifting.  She has pain with movement and decreased ROM in the lumbar spine.  She has generalized weakness and is at risk for falls. She was sore after minimal manual therapy and exercise today. Symptoms may be consistent with strain/sprain in the lumbar area. Progress will be slow but pain can improve with gentle strengthening and mobility.  Impairments in  pain, posture, ROM, joint mobility, strength, gait/locomotion and balance  Prognosis to achieve goals is  good   Pt. is appropriate for skilled PT intervention as outlined in the Plan of Care (POC).    Visit Diagnosis:     ICD-10-CM    1. Acute bilateral low back pain without sciatica M54.5    2. Decreased ROM of lumbar spine M53.86    3. Generalized muscle weakness M62.81             Assessment:   Pt wanted to return to review what she should be doing.  She continues to be managing her pain well with exercises and meds. She has occasional pain and morning stiffness.  She does her exercises fairly regularly.  Pt ready to d/c PT.   HEP/POC compliance is  good .  Patient demonstrates  understanding/independence with home program.      Goal Status:  Pt. will demonstrate/verbalize independence in self-management of condition in : 12 weeks;Met  Pt. will be independent with home exercise program in : 12 weeks;Met  Pt. will report decreased intensity, frequency of : Pain;in 12 weeks;Met  Pt. will be able to walk : 20 minutes;with less difficulty;with less pain;for household mobility;for community mobility;in 12 weeks;Met  Patient will stand : other time;with less pain;with less difficultty;for home chores;in 12 weeks;Met  Other Time: 15 minutes        Plan for next visits:   D/C PT            Subjective:   Pt states her memory is bad but she thinks her back continues to ache at times although she is not having pain currently.  She does feel she is better since coming to PT.   Pt has not been able to walk much.  Wants some information about the core stability..  She has switched to the smaller breast inserts which decreases the weight she carries around on her chest and therefore her pain.    Pain :0/10.   Can get up to 4/10 in the morning  And with activity.         Objective:     Patient Outcome Measures :  2/18/2019  Modified Oswestry Low Back Pain Disablity Questionnaire  in %: 54     Scores range from 0-100%, where a score of 0% represents minimal pain and maximal function. The minimal clinically important difference is a score reduction of 12%.      Patient Outcome Measures :  5/13/2019   Modified Oswestry Low Back Pain Disablity Questionnaire  in %: 26    Involved side: Bilateral and More on left  Posture Observation:  Forward head, rounded shoulder girdles, right hip higher, right shoulder lower.  Lumbopelvic complex: Pelvic alignment: right iliac crest higher.  Pt can correct her posture nicely by engaging core.     Lumbar ROM:     Date: 2/18/2019 3/27/2019      *Indicate scale  MAL= Maximally limited  MOL=Moderately limited  MIL= Minimally limited  - = no pain  += minimal pain  ++= moderate  pain  +++= maximal pain AROM AROM AROM   Lumbar Flexion WNL- WNL-      Lumbar Extension MAL- MAL+        Right Left Right Left Right Left   Lumbar Sidebending MOL++ MOL+ MOL-  MOL-        Lumbar Rotation MOL++ MOL+ MOL-  MOL-           Lower Extremity Strength:      Date: 2/18/19 5/13/2019      LE strength/5 Right Left Right Left Right Left   Hip Flexion (L1-3) 4 4  5 5        Hip Extension (L5-S1) 4 4  4 4        Hip Abduction (L4-5) 5 5           Hip Adduction (L2-3) 5 5           Hip External Rotation 4 4           Hip Internal Rotation 4 4           Knee Extension (L3-4) 4- 5  4 5        Knee Flexion 5 5           Ankle Dorsiflexion (L4-5) 4- 4-  5 5        Great Toe Extension (L5)               Ankle Plantar flexion (S1) 4 4 4  4        Abdominals                   Treatment Today      TREATMENT MINUTES COMMENTS   Evaluation     Self-care/ Home management 25 Discussed/ worked on posture and reviewed  HEP, TrA activation, self care. Given written material on the core and issued Level 2 band.      Manual therapy         Neuromuscular Re-education      Therapeutic Activity     Therapeutic Exercises      Current exercises from previous PT:  Standing: hip abduction, hip extension, marching, forward leg lifts, mini squats, heel/toe raises, knee flexion, stand tall  Balance: SLS    Seated knee extension, march, abduction, ball squeeze, ankle pumps, butt squeezes, sit to stand.    Supine: ankle pumps, quad sets, glut sets, heels slides, hip abduction.    UE exercises: Shoulder flexion, extension, biceps, triceps, wrist flexion, extension, horizontal adduction, shoulder abducton, wrist pronation/supination, punches.    Neck: shoulder rolls, retraction, elevation      Exercise #1: Supine bridges x10  Comment #1: HEP  Exercise #2: TrA activation in supine x10  Comment #2: HEP  Exercise #3: Sitting posture x 1 minute  Exercise #4: Standing posture x 1 minute  Exercise #5: Horizontal Abduction with yellow band   HEP  Exercise #6: Shoulder abduction with yellow band  HEP  Exercise #7: Sit to stand  Comment #7: HEP ( Added 5/13/19)  x10  See flowsheet for date performed.   Gait training     Modality__________________                Total 25    Blank areas are intentional and mean the treatment did not include these items.     Saskia Brantley PT, CLT  6/24/2019

## 2021-06-27 NOTE — PROGRESS NOTES
Progress Notes by Saskia Koehler PT at 5/13/2019  2:00 PM     Author: Saskia Koehler PT Service: -- Author Type: Physical Therapist    Filed: 5/13/2019  2:55 PM Encounter Date: 5/13/2019 Status: Addendum    : Saskia Koehler PT (Physical Therapist)    Related Notes: Original Note by Saskia Koehler PT (Physical Therapist) filed at 5/13/2019  2:49 PM       [facility] Daily Progress     Patient Name: Ellis Em  Date: 5/13/2019  Visit #: 8/12  PTA visit #:  0  Referral Diagnosis:  Acute midline low back pain with left-sided sciatica  M54.42 (ICD-10-CM) - Acute midline low back pain with left-sided sciatica   M99.83 (ICD-10-CM) - Lumbar foraminal stenosis   Z98.890 (ICD-10-CM) - History of lumbar surgery   M53.3 (ICD-10-CM) - Sacroiliac joint pain       Referring provider: Lavern Ch DO    Precautions / Restrictions : stenosis, depression,osteoporosis, neuropathy, s/p multiple surgeries,      Initial assessment: This 87 year old patient presents with low back pain for 10+ years with an exacerbation on 12/2018 after doing some lifting.  She has pain with movement and decreased ROM in the lumbar spine.  She has generalized weakness and is at risk for falls. She was sore after minimal manual therapy and exercise today. Symptoms may be consistent with strain/sprain in the lumbar area. Progress will be slow but pain can improve with gentle strengthening and mobility.  Impairments in  pain, posture, ROM, joint mobility, strength, gait/locomotion and balance  Prognosis to achieve goals is  good   Pt. is appropriate for skilled PT intervention as outlined in the Plan of Care (POC).    Visit Diagnosis:     ICD-10-CM    1. Acute bilateral low back pain without sciatica M54.5    2. Decreased ROM of lumbar spine M53.86    3. Generalized muscle weakness M62.81             Assessment:   MILA down again to 26% (total 28% change).  Goals are met.  Pt ready to d/c PT.   HEP/POC  "compliance is  good .  Patient demonstrates understanding/independence with home program.      Goal Status:  Pt. will demonstrate/verbalize independence in self-management of condition in : 12 weeks;Met  Pt. will be independent with home exercise program in : 12 weeks;Met  Pt. will report decreased intensity, frequency of : Pain;in 12 weeks;Met  Pt. will be able to walk : 20 minutes;with less difficulty;with less pain;for household mobility;for community mobility;in 12 weeks;Met  Patient will stand : other time;with less pain;with less difficultty;for home chores;in 12 weeks;Met  Other Time: 15 minutes        Plan for next visits:    Hold PT            Subjective:   \"It has been an interesting past two weeks.\"  She reports having ear trouble.  Tried to lift a anali litter bag with increased soreness in low back.  Still having fatigue, possibly from flu,  She cut her finger also.  She states she is doing exercises but they are not making her stronger.  Pt can stand about 15 minutes; walk for 20 minutes.     Pain :0/10.   Can get up to 4/10 with activity.         Objective:     Patient Outcome Measures :  2/18/2019  Modified Oswestry Low Back Pain Disablity Questionnaire  in %: 54     Scores range from 0-100%, where a score of 0% represents minimal pain and maximal function. The minimal clinically important difference is a score reduction of 12%.      Patient Outcome Measures :  5/13/2019   Modified Oswestry Low Back Pain Disablity Questionnaire  in %: 26    Involved side: Bilateral and More on left  Posture Observation:  Forward head, rounded shoulder girdles, right hip higher, right shoulder lower.  Lumbopelvic complex: Pelvic alignment: right iliac crest higher.  Pt can correct her posture nicely by engaging core.     Lumbar ROM:     Date: 2/18/2019 3/27/2019      *Indicate scale  MAL= Maximally limited  MOL=Moderately limited  MIL= Minimally limited  - = no pain  += minimal pain  ++= moderate pain  +++= maximal " pain AROM AROM AROM   Lumbar Flexion WNL- WNL-      Lumbar Extension MAL- MAL+        Right Left Right Left Right Left   Lumbar Sidebending MOL++ MOL+ MOL-  MOL-        Lumbar Rotation MOL++ MOL+ MOL-  MOL-           Lower Extremity Strength:      Date: 2/18/19 5/13/2019      LE strength/5 Right Left Right Left Right Left   Hip Flexion (L1-3) 4 4  5 5        Hip Extension (L5-S1) 4 4  4 4        Hip Abduction (L4-5) 5 5           Hip Adduction (L2-3) 5 5           Hip External Rotation 4 4           Hip Internal Rotation 4 4           Knee Extension (L3-4) 4- 5  4 5        Knee Flexion 5 5           Ankle Dorsiflexion (L4-5) 4- 4-  5 5        Great Toe Extension (L5)               Ankle Plantar flexion (S1) 4 4 4  4        Abdominals                   Treatment Today      TREATMENT MINUTES COMMENTS   Evaluation     Self-care/ Home management 25 Discussed/ worked on posture and reviewed  HEP, TrA activation, self care.      Manual therapy         Neuromuscular Re-education      Therapeutic Activity     Therapeutic Exercises 5 added sit to stand      Current exercises from previous PT:  Standing: hip abduction, hip extension, marching, forward leg lifts, mini squats, heel/toe raises, knee flexion, stand tall  Balance: SLS    Seated knee extension, march, abduction, ball squeeze, ankle pumps, butt squeezes, sit to stand.    Supine: ankle pumps, quad sets, glut sets, heels slides, hip abduction.    UE exercises: Shoulder flexion, extension, biceps, triceps, wrist flexion, extension, horizontal adduction, shoulder abducton, wrist pronation/supination, punches.    Neck: shoulder rolls, retraction, elevation      Exercise #1: Supine bridges x10  Comment #1: HEP  Exercise #2: TrA activation in supine x10  Comment #2: HEP  Exercise #3: Sitting posture x 1 minute  Exercise #4: Standing posture x 1 minute  Exercise #5: Horizontal Abduction with yellow band  HEP  Exercise #6: Shoulder abduction with yellow band  HEP  Exercise  #7: Sit to stand  Comment #7: HEP ( Added 5/13/19)  x10  See flowsheet for date performed.   Gait training     Modality__________________                Total 30    Blank areas are intentional and mean the treatment did not include these items.     Saskia Brantley PT, CLT  5/13/2019

## 2021-06-27 NOTE — PROGRESS NOTES
Progress Notes by Saskia Koehler PT at 4/17/2019  2:30 PM     Author: Saskia Koehler PT Service: -- Author Type: Physical Therapist    Filed: 4/17/2019  4:32 PM Encounter Date: 4/17/2019 Status: Signed    : Saskia Koehler PT (Physical Therapist)       [facility] Daily Progress     Patient Name: Ellis Em  Date: 4/17/2019  Visit #: 6/12  PTA visit #:  0  Referral Diagnosis:  Acute midline low back pain with left-sided sciatica  M54.42 (ICD-10-CM) - Acute midline low back pain with left-sided sciatica   M99.83 (ICD-10-CM) - Lumbar foraminal stenosis   Z98.890 (ICD-10-CM) - History of lumbar surgery   M53.3 (ICD-10-CM) - Sacroiliac joint pain       Referring provider: Lavern Ch DO    Precautions / Restrictions : stenosis, depression,osteoporosis, neuropathy, s/p multiple surgeries,      Initial assessment: This 87 year old patient presents with low back pain for 10+ years with an exacerbation on 12/2018 after doing some lifting.  She has pain with movement and decreased ROM in the lumbar spine.  She has generalized weakness and is at risk for falls. She was sore after minimal manual therapy and exercise today. Symptoms may be consistent with strain/sprain in the lumbar area. Progress will be slow but pain can improve with gentle strengthening and mobility.  Impairments in  pain, posture, ROM, joint mobility, strength, gait/locomotion and balance  Prognosis to achieve goals is  good   Pt. is appropriate for skilled PT intervention as outlined in the Plan of Care (POC).    Visit Diagnosis:     ICD-10-CM    1. Decreased ROM of lumbar spine M53.86    2. Generalized muscle weakness M62.81    3. Acute bilateral low back pain without sciatica M54.5             Assessment:   Pt is looking better and more energetic than last visit. Pt can correct sitting and standing posture without cues.   She was encouraged to continue to increase the exercises this week and continue  walking if it is good weather.   HEP/POC compliance is  good .  Patient demonstrates understanding/independence with home program.  Patient is appropriate to continue with skilled physical therapy intervention, as indicated by initial plan of care.    Goal Status:  Pt. will demonstrate/verbalize independence in self-management of condition in : 12 weeks;Progressing toward  Pt. will be independent with home exercise program in : 12 weeks;Met  Pt. will report decreased intensity, frequency of : Pain;in 12 weeks;Met  Pt. will be able to walk : 20 minutes;with less difficulty;with less pain;for household mobility;for community mobility;in 12 weeks;Progressing toward  Patient will stand : other time;with less pain;with less difficultty;for home chores;in 12 weeks;Progressing toward  Other Time: 15 minutes        Plan for next visits:   Work on posture in standing and walking  MT as needed  Retest strength  Recheck MILA            Subjective:   Still recovering from the flu.  Still very tired. Back has been hurting more the last couple days.  3/10 today.  Does the exercises on and off.  Pt can stand about 10 minutes but it hurts.     Pain :3/10         Objective:     Patient Outcome Measures :  2/18/2019  Modified Oswestry Low Back Pain Disablity Questionnaire  in %: 54     Scores range from 0-100%, where a score of 0% represents minimal pain and maximal function. The minimal clinically important difference is a score reduction of 12%.    Patient Outcome Measures :  3/27/2019  Modified Oswestry Low Back Pain Disablity Questionnaire  in %: 50          Involved side: Bilateral and More on left  Posture Observation:  Forward head, rounded shoulder girdles, right hip higher, right shoulder lower.  Lumbopelvic complex: Pelvic alignment: right iliac crest higher.     Lumbar ROM:     Date: 2/18/2019 3/27/2019      *Indicate scale  MAL= Maximally limited  MOL=Moderately limited  MIL= Minimally limited  - = no pain  += minimal  pain  ++= moderate pain  +++= maximal pain AROM AROM AROM   Lumbar Flexion WNL- WNL-      Lumbar Extension MAL- MAL+        Right Left Right Left Right Left   Lumbar Sidebending MOL++ MOL+ MOL-  MOL-        Lumbar Rotation MOL++ MOL+ MOL-  MOL-           Lower Extremity Strength:      Date:         LE strength/5 Right Left Right Left Right Left   Hip Flexion (L1-3) 4 4           Hip Extension (L5-S1) 4 4           Hip Abduction (L4-5) 5 5           Hip Adduction (L2-3) 5 5           Hip External Rotation 4 4           Hip Internal Rotation 4 4           Knee Extension (L3-4) 4- 5           Knee Flexion 5 5           Ankle Dorsiflexion (L4-5) 4- 4-           Great Toe Extension (L5)               Ankle Plantar flexion (S1) 4 4           Abdominals                   Treatment Today      TREATMENT MINUTES COMMENTS   Evaluation     Self-care/ Home management     Manual therapy 12 AAROM to hips in supine with hamstring stretch, adductor stretch.      Neuromuscular Re-education 0 Worked on activation of TrA and adding in contraction of pelvic floor muscles in supine, transfers, sitting, standing and walking; worked on good posture and standing tall.(standing one minute x2 and sitting one minute x 1.)   Therapeutic Activity     Therapeutic Exercises 12    Worked on posture and added upper back strength exercises.      Current exercises from previous PT:  Standing: hip abduction, hip extension, marching, forward leg lifts, mini squats, heel/toe raises, knee flexion, stand tall  Balance: SLS    Seated knee extension, march, abduction, ball squeeze, ankle pumps, butt squeezes, sit to stand.    Supine: ankle pumps, quad sets, glut sets, heels slides, hip abduction.    UE exercises: Shoulder flexion, extension, biceps, triceps, wrist flexion, extension, horizontal adduction, shoulder abducton, wrist pronation/supination, punches.    Neck: shoulder rolls, retraction, elevation      Exercise #1: Supine bridges x10  Comment #1:  HEP  Exercise #2: TrA activation in supine x10  Comment #2: HEP  Exercise #3: Sitting posture x 1 minute  Exercise #4: Standing posture x 1 minute  Exercise #5: Horizontal Abduction with yellow band  HEP  Exercise #6: Shoulder abduction with yellow band  HEP  x10  See flowsheet for date performed.   Gait training     Modality__________________                Total 24    Blank areas are intentional and mean the treatment did not include these items.     Saskia Brantley PT, CLT  4/17/2019

## 2021-06-27 NOTE — PROGRESS NOTES
Progress Notes by Saskia Koehler PT at 2/18/2019  2:00 PM     Author: Saskia Koehler PT Service: -- Author Type: Physical Therapist    Filed: 2/20/2019  7:30 AM Encounter Date: 2/18/2019 Status: Signed    : Lavern Ch DO (Physician)    Related Notes: Original Note by Saskia Koehler PT (Physical Therapist) filed at 2/18/2019  5:46 PM           [facility] Certification Request    February 18, 2019      Patient: Ellis Em  MR Number: [id number]  YOB: 1931  Date of Visit: 2/18/2019      Dear Dr. Greene:    Thank you for this referral.   We are seeing Ellis Em for Physical Therapy of low back.    Medicare and/or Medicaid requires physician review and approval of the treatment plan. Please review the plan of care and verify that you agree with the therapy plan of care by co-signing this note.      Plan of Care  Authorization / Certification Start Date: 02/18/19  Authorization / Certification End Date: 05/19/19  Authorization / Certification Number of Visits: 12  Communication with: Referral Source  Patient Related Instruction: Nature of Condition;Treatment plan and rationale;Self Care instruction;Basis of treatment;Body mechanics;Posture;Precautions;Next steps;Expected outcome  Times per Week: 1  Number of Visits: 12  Discharge Planning:  HEP  Precautions / Restrictions : stenosis, depression,osteoporosis, neuropathy, s/p multiple surgeries,  Therapeutic Exercise: ROM;Stretching;Strengthening  Neuromuscular Reeducation: posture;balance/proprioception;TNE;core  Manual Therapy: soft tissue mobilization;myofascial release;joint mobilization;muscle energy  Modalities: ultrasound  Functional Training (ADL's): self care  Gait Training: as needed for balance  Equipment: theraband      Goals:  Pt. will demonstrate/verbalize independence in self-management of condition in : 12 weeks  Pt. will be independent with home exercise program in : 12 weeks  Pt. will  report decreased intensity, frequency of : Pain;in 12 weeks  Pt. will be able to walk : 20 minutes;with less difficulty;with less pain;for household mobility;for community mobility;in 12 weeks  Patient will stand : other time;with less pain;with less difficultty;for home chores;in 12 weeks  Other Time: 15 minutes    No Data Recorded      If you have any questions or concerns, please don't hesitate to call.    Sincerely,      Saskia Koehler, PT        Physician recommendation:     ___ Follow therapist's recommendation        __x_ Modify therapy (up to 8 visits total)      *Physician co-signature indicates they certify the need for these services furnished within this plan and while under their care.    [facility]   [facility]-Pelvic Initial Evaluation    Patient Name: Ellis Em  Date of evaluation: 2/18/2019  Referral Diagnosis: Acute midline low back pain with left-sided sciatica  M54.42 (ICD-10-CM) - Acute midline low back pain with left-sided sciatica   M99.83 (ICD-10-CM) - Lumbar foraminal stenosis   Z98.890 (ICD-10-CM) - History of lumbar surgery   M53.3 (ICD-10-CM) - Sacroiliac joint pain       Referring provider: Lavern Ch DO  Visit Diagnosis:     ICD-10-CM    1. Low back pain M54.5    2. Decreased ROM of lumbar spine M53.86    3. Generalized muscle weakness M62.81        Assessment:    This 87 year old patient presents with low back pain for 10+ years with an exacerbation on 12/2018 after doing some lifting.  She has pain with movement and decreased ROM in the lumbar spine.  She has generalized weakness and is at risk for falls. She was sore after minimal manual therapy and exercise today. Symptoms may be consistent with strain/sprain in the lumbar area. Progress will be slow but pain can improve with gentle strengthening and mobility.  Impairments in  pain, posture, ROM, joint mobility, strength, gait/locomotion and balance  Prognosis to achieve goals is  good   Pt. is  appropriate for skilled PT intervention as outlined in the Plan of Care (POC).    Goals:  Pt. will demonstrate/verbalize independence in self-management of condition in : 12 weeks  Pt. will be independent with home exercise program in : 12 weeks  Pt. will report decreased intensity, frequency of : Pain;in 12 weeks  Pt. will be able to walk : 20 minutes;with less difficulty;with less pain;for household mobility;for community mobility;in 12 weeks  Patient will stand : other time;with less pain;with less difficultty;for home chores;in 12 weeks  Other Time: 15 minutes        Patient's expectations/goals are realistic.    Barriers to Learning or Achieving Goals:  Non- adherence to the home exercise program.  Chronicity of the problem.       Plan / Patient Instructions:        Plan of Care:   Authorization / Certification Start Date: 02/18/19  Authorization / Certification End Date: 05/19/19  Authorization / Certification Number of Visits: 12  Communication with: Referral Source  Patient Related Instruction: Nature of Condition;Treatment plan and rationale;Self Care instruction;Basis of treatment;Body mechanics;Posture;Precautions;Next steps;Expected outcome  Times per Week: 1  Number of Visits: 12  Discharge Planning:  HEP  Precautions / Restrictions : stenosis, depression,osteoporosis, neuropathy, s/p multiple surgeries,  Therapeutic Exercise: ROM;Stretching;Strengthening  Neuromuscular Reeducation: posture;balance/proprioception;TNE;core  Manual Therapy: soft tissue mobilization;myofascial release;joint mobilization;muscle energy  Modalities: ultrasound  Functional Training (ADL's): self care  Gait Training: as needed for balance  Equipment: theraband      Plan for next visit:   Continue gentle MT for increased mobility  Pt to bring old exercises  Advance core work     Subjective:         Social information:   Living Situation:has assistance Yes and at [place] [address] [place] without stairs   Occupation:retired, executive  "assistant   Work Status:NA   Equipment Available: SE cane and walker with 4 wheels.  Also has a motorized scooter    History of Present Illness:    Ellis is a 87 y.o. female who presents to therapy today with complaints of low back pain x 10 years. \" My back is deteriorating\".  Lifting boxes at [place] made it worse.  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  A year later () she had one laminectomy in low back (all levels on one side)  Pt has fractured both hips and has a TKA on the left. Date of onse of exacerbaton of low back symptoms is 2018 after lifting some boxes. Onset was gradual and related to trauma. Symptoms are not improving. She reports a 10+ year  history of similar symptoms. She describes their previous level of function as limited with multiple surgeries.  She also reports she has bad balance after breast reconstruction.  Her implants eventually ruptured and now she wears bilateral breast prostheses.  She reports she has restless legs mostly in the afternoon during her nap. Wears compression stockings every day.  Does LB and balance exercises but not every day.  She wears and likes a low back brace.    Pain Ratin  Pain rating at best: 3  Pain rating at worst: 8  Pain description: aching and dull    Functional limitations are described as occurring with:   lifting  sitting > one hour  standing > 15 minutes  transitional movements sit to stand  walking > 20 minutes    Patient reports benefit from:  pain cream         Objective:      Note: Items left blank indicates the item was not performed or not indicated at the time of the evaluation.    Patient Outcome Measures :    Modified Oswestry Low Back Pain Disablity Questionnaire  in %: 54     Scores range from 0-100%, where a score of 0% represents minimal pain and maximal function. The minimal clinically important difference is a score reduction of 12%.      Involved side: Bilateral and More on left  Posture Observation:  " Forward head, rounded shoulder girdles, right hip higher, right shoulder lower.     General sitting posture is  poor.  General standing posture is fair.  Lumbopelvic complex: Pelvic alignment: right iliac crest higher.    Lumbar ROM:     Date: 2/18/2019     *Indicate scale  MAL= Maximally limited  MOL=Moderately limited  MIL= Minimally limited  - = no pain  += minimal pain  ++= moderate pain  +++= maximal pain AROM AROM AROM   Lumbar Flexion WNL-     Lumbar Extension MAL-      Right Left Right Left Right Left   Lumbar Sidebending MOL++ MOL+       Lumbar Rotation MOL++ MOL+         Lower Extremity Strength:      Date:      LE strength/5 Right Left Right Left Right Left   Hip Flexion (L1-3) 4 4       Hip Extension (L5-S1) 4 4       Hip Abduction (L4-5) 5 5       Hip Adduction (L2-3) 5 5       Hip External Rotation 4 4       Hip Internal Rotation 4 4       Knee Extension (L3-4) 4- 5       Knee Flexion 5 5       Ankle Dorsiflexion (L4-5) 4- 4-       Great Toe Extension (L5)         Ankle Plantar flexion (S1) 4 4       Abdominals        Palpation: Tenderness left buttock, left lumbar paraspinals        Repeated Motion Testing:  No change with repeated flexion      Passive Mobility - Joint Integrity:  Not tested    LE Screen:  did not provoke    Treatment Today      TREATMENT MINUTES COMMENTS   Evaluation 30 moderate   Self-care/ Home management     Manual therapy 10 Grade I mobs in side lying (osscilatons) ;AAROM to hips   Neuromuscular Re-education     Therapeutic Activity     Therapeutic Exercises 15 Discussed nature of condition, basis of treatment, POC, precautions.Verbally reviewed standing exercises from previous therapy (pt to bring in instructions)  Added :  Exercises:  Exercise #1: Supine bridges  Comment #1: HEP          Gait training     Modality__________________                Total 55    Blank areas are intentional and mean the treatment did not include these items.     PT Evaluation Code: (Please list  factors)  Patient History/Comorbidities: stenosis, depression,osteoporosis, neuropathy, s/p multiple surgeries,  Examination: 4  Clinical Presentation: changing  Clinical Decision Making: moderate    Patient History/  Comorbidities Examination  (body structures and functions, activity limitations, and/or participation restrictions) Clinical Presentation Clinical Decision Making (Complexity)   No documented Comorbidities or personal factors 1-2 Elements Stable and/or uncomplicated Low   1-2 documented comorbidities or personal factor 3 Elements Evolving clinical presentation with changing characteristics Moderate   3-4 documented comorbidities or personal factors 4 or more Unstable and unpredictable High              Saskia Koehler  2/18/2019  2:20 PM

## 2021-06-27 NOTE — PROGRESS NOTES
Progress Notes by Saskia Koehler PT at 3/20/2019  2:30 PM     Author: Saskia Koehler PT Service: -- Author Type: Physical Therapist    Filed: 3/20/2019  3:57 PM Encounter Date: 3/20/2019 Status: Signed    : Saskia Koehler PT (Physical Therapist)       [facility] Daily Progress     Patient Name: Ellis Em  Date: 3/20/2019  Visit #: 3/12  PTA visit #:  0  Referral Diagnosis:  Acute midline low back pain with left-sided sciatica  M54.42 (ICD-10-CM) - Acute midline low back pain with left-sided sciatica   M99.83 (ICD-10-CM) - Lumbar foraminal stenosis   Z98.890 (ICD-10-CM) - History of lumbar surgery   M53.3 (ICD-10-CM) - Sacroiliac joint pain       Referring provider: Lavern Ch DO    Precautions / Restrictions : stenosis, depression,osteoporosis, neuropathy, s/p multiple surgeries,      Initial assessment: This 87 year old patient presents with low back pain for 10+ years with an exacerbation on 12/2018 after doing some lifting.  She has pain with movement and decreased ROM in the lumbar spine.  She has generalized weakness and is at risk for falls. She was sore after minimal manual therapy and exercise today. Symptoms may be consistent with strain/sprain in the lumbar area. Progress will be slow but pain can improve with gentle strengthening and mobility.  Impairments in  pain, posture, ROM, joint mobility, strength, gait/locomotion and balance  Prognosis to achieve goals is  good   Pt. is appropriate for skilled PT intervention as outlined in the Plan of Care (POC).    Visit Diagnosis:     ICD-10-CM    1. Low back pain M54.5    2. Decreased ROM of lumbar spine M53.86    3. Generalized muscle weakness M62.81             Assessment:   Pt reports that she has everything she needs but still feels unhappy.  She states she knows she needs more socialization and to get good sleep to make her happier.  She is unsure about how to get more socialization. Discussion using  open ended questions were used to see if pt could come up with ideas to socialize. Pt continues to do a considerable amount of exercise that she does on a regular basis and this was reinforced that she is doing a good thing for herself and that sore muscles mean she is getting stronger.   HEP/POC compliance is  good .  Patient demonstrates understanding/independence with home program.  Patient is appropriate to continue with skilled physical therapy intervention, as indicated by initial plan of care.    Goal Status:  Pt. will demonstrate/verbalize independence in self-management of condition in : 12 weeks;Progressing toward  Pt. will be independent with home exercise program in : 12 weeks;Progressing toward  Pt. will report decreased intensity, frequency of : Pain;in 12 weeks;Progressing toward  Pt. will be able to walk : 20 minutes;with less difficulty;with less pain;for household mobility;for community mobility;in 12 weeks;Progressing toward  Patient will stand : other time;with less pain;with less difficultty;for home chores;in 12 weeks;Progressing toward  Other Time: 15 minutes        Plan for next visits:   Advance core exercises as indicated; work on posture  MT as needed  Reinforce that she is doing well with the exercises  Recheck MILA      Subjective:   Thighs are sore.  Activating core more without thinking. She reports she feels PT is helping.   She is not wearing the brace and feeling good about it. She spent a considerable amount of time talking about her depression.  Pain Ratin-2/10 with meds       Objective:     Patient Outcome Measures :  2019  Modified Oswestry Low Back Pain Disablity Questionnaire  in %: 54     Scores range from 0-100%, where a score of 0% represents minimal pain and maximal function. The minimal clinically important difference is a score reduction of 12%.        Involved side: Bilateral and More on left  Posture Observation:  Forward head, rounded shoulder girdles, right  hip higher, right shoulder lower.  Lumbopelvic complex: Pelvic alignment: right iliac crest higher.     Lumbar ROM:     Date: 2/18/2019       *Indicate scale  MAL= Maximally limited  MOL=Moderately limited  MIL= Minimally limited  - = no pain  += minimal pain  ++= moderate pain  +++= maximal pain AROM AROM AROM   Lumbar Flexion WNL-       Lumbar Extension MAL-         Right Left Right Left Right Left   Lumbar Sidebending MOL++ MOL+           Lumbar Rotation MOL++ MOL+              Lower Extremity Strength:      Date:         LE strength/5 Right Left Right Left Right Left   Hip Flexion (L1-3) 4 4           Hip Extension (L5-S1) 4 4           Hip Abduction (L4-5) 5 5           Hip Adduction (L2-3) 5 5           Hip External Rotation 4 4           Hip Internal Rotation 4 4           Knee Extension (L3-4) 4- 5           Knee Flexion 5 5           Ankle Dorsiflexion (L4-5) 4- 4-           Great Toe Extension (L5)               Ankle Plantar flexion (S1) 4 4           Abdominals                   Treatment Today      TREATMENT MINUTES COMMENTS   Evaluation     Self-care/ Home management     Manual therapy 10 AAROM to hips in supine with hamstring stretch, adductor stretch.   Neuromuscular Re-education 15 Worked on activation of TrA and adding in contraction of pelvic floor muscles in supine, transfers, sitting, standing and walking; worked on good posture and standing tall.(standing one minute x2 and sitting one minute x 1.)   Therapeutic Activity     Therapeutic Exercises 0    Current exercises from previous PT:  Standing: hip abduction, hip extension, marching, forward leg lifts, mini squats, heel/toe raises, knee flexion, stand tall  Balance: SLS    Seated knee extension, march, abduction, ball squeeze, ankle pumps, butt squeezes, sit to stand.    Supine: ankle pumps, quad sets, glut sets, heels slides, hip abduction.    UE exercises: Shopulder flexion, extension, biceps, triceps, wrist flexion, extension, horizontal  adduction, shoulder abducton, wrist pronation/supination, punches.    Neck: shoulder rolls, retraction, elevation      Exercise #1: Supine bridges  Comment #1: HEP  Exercise #2: TrA activation in supine, sitting and standing  Comment #2: HEP  x10  See flowsheet for date performed.   Gait training     Modality__________________                Total 25    Blank areas are intentional and mean the treatment did not include these items.     Saskia Brantley PT, CLT  3/20/2019

## 2021-06-27 NOTE — PROGRESS NOTES
Progress Notes by Saskia Koehler PT at 3/4/2019  2:30 PM     Author: Saskia Koehler PT Service: -- Author Type: Physical Therapist    Filed: 3/4/2019  5:22 PM Encounter Date: 3/4/2019 Status: Signed    : Saskia Koehler PT (Physical Therapist)       [facility] Daily Progress     Patient Name: Ellis Em  Date: 3/4/2019  Visit #: 2/12  PTA visit #:  0  Referral Diagnosis:  Acute midline low back pain with left-sided sciatica  M54.42 (ICD-10-CM) - Acute midline low back pain with left-sided sciatica   M99.83 (ICD-10-CM) - Lumbar foraminal stenosis   Z98.890 (ICD-10-CM) - History of lumbar surgery   M53.3 (ICD-10-CM) - Sacroiliac joint pain       Referring provider: Lavern Ch DO    Precautions / Restrictions : stenosis, depression,osteoporosis, neuropathy, s/p multiple surgeries,      Initial assessment: This 87 year old patient presents with low back pain for 10+ years with an exacerbation on 12/2018 after doing some lifting.  She has pain with movement and decreased ROM in the lumbar spine.  She has generalized weakness and is at risk for falls. She was sore after minimal manual therapy and exercise today. Symptoms may be consistent with strain/sprain in the lumbar area. Progress will be slow but pain can improve with gentle strengthening and mobility.  Impairments in  pain, posture, ROM, joint mobility, strength, gait/locomotion and balance  Prognosis to achieve goals is  good   Pt. is appropriate for skilled PT intervention as outlined in the Plan of Care (POC).    Visit Diagnosis:     ICD-10-CM    1. Low back pain M54.5    2. Decreased ROM of lumbar spine M53.86    3. Generalized muscle weakness M62.81             Assessment:   Pt has a considerable amount of exercises that she does on a regular basis. She expressed frustration that her children don't trust her to make decisions and that she is doing the things she needs to do to stay healthy. Although she does  "not do all the exercises every day, she does get them in a few days.  She noted improvement since the last visit and seemed agreeable to a few more exercises to strengthen her core.  HEP/POC compliance is  good .  Patient demonstrates understanding/independence with home program.  Patient is appropriate to continue with skilled physical therapy intervention, as indicated by initial plan of care.    Goal Status:  Pt. will demonstrate/verbalize independence in self-management of condition in : 12 weeks;Progressing toward  Pt. will be independent with home exercise program in : 12 weeks;Progressing toward  Pt. will report decreased intensity, frequency of : Pain;in 12 weeks;Progressing toward  Pt. will be able to walk : 20 minutes;with less difficulty;with less pain;for household mobility;for community mobility;in 12 weeks;Progressing toward  Patient will stand : other time;with less pain;with less difficultty;for home chores;in 12 weeks;Progressing toward  Other Time: 15 minutes        Plan for next visits:   Advance core exercises as indicated; work on posture  MT as needed  Reinforce that she is doing well with the exercises      Subjective:   \"I feel better\".  Hasn't needed the back brace.  Standing or sitting in one place is the worst.  She brought in all her current exercises.  Pain Rating: 3       Objective:     Patient Outcome Measures :  2/18/2019  Modified Oswestry Low Back Pain Disablity Questionnaire  in %: 54     Scores range from 0-100%, where a score of 0% represents minimal pain and maximal function. The minimal clinically important difference is a score reduction of 12%.        Involved side: Bilateral and More on left  Posture Observation:  Forward head, rounded shoulder girdles, right hip higher, right shoulder lower.     General sitting posture is  poor.  General standing posture is fair.  Lumbopelvic complex: Pelvic alignment: right iliac crest higher.     Lumbar ROM:     Date: 2/18/2019     "   *Indicate scale  MAL= Maximally limited  MOL=Moderately limited  MIL= Minimally limited  - = no pain  += minimal pain  ++= moderate pain  +++= maximal pain AROM AROM AROM   Lumbar Flexion WNL-       Lumbar Extension MAL-         Right Left Right Left Right Left   Lumbar Sidebending MOL++ MOL+           Lumbar Rotation MOL++ MOL+              Lower Extremity Strength:      Date:         LE strength/5 Right Left Right Left Right Left   Hip Flexion (L1-3) 4 4           Hip Extension (L5-S1) 4 4           Hip Abduction (L4-5) 5 5           Hip Adduction (L2-3) 5 5           Hip External Rotation 4 4           Hip Internal Rotation 4 4           Knee Extension (L3-4) 4- 5           Knee Flexion 5 5           Ankle Dorsiflexion (L4-5) 4- 4-           Great Toe Extension (L5)               Ankle Plantar flexion (S1) 4 4           Abdominals            Palpation: Tenderness left buttock, left lumbar paraspinals     Repeated Motion Testing:  No change with repeated flexion     Passive Mobility - Joint Integrity:Not tested     LE Screen:did not provoke      Treatment Today      TREATMENT MINUTES COMMENTS   Evaluation     Self-care/ Home management     Manual therapy     Neuromuscular Re-education 15 Worked on activation of TrA in supine, transfers, sitting, standing and walking; worked on good posture and standing tall.   Therapeutic Activity     Therapeutic Exercises 15  Reviewed old exercises; added TrA activation Standing: hip abduction, hip extension, marching, forward leg lifts, mini squats, heel/toe raises, knee flexion, stand tall  Balance: SLS    Seated knee extension, march, abduction, ball squeeze, ankle pumps, butt squeezes, sit to stand.    Supine: ankle pumps, quad sets, glut sets, heels slides, hip abduction.    UE exercises: Shopulder flexion, extension, biceps, triceps, wrist flexion, extension, horizontal adduction, shoulder abducton, wrist pronation/supination, punches.    Neck: shoulder rolls, retraction,  elevation      Exercise #1: Supine bridges  Comment #1: HEP  Exercise #2: TrA activation in supine, sitting and standing  Comment #2: HEP  x10  See flowsheet for date performed.   Gait training     Modality__________________                Total 30    Blank areas are intentional and mean the treatment did not include these items.     Saskia Brantley PT, CLT  3/4/2019

## 2021-06-27 NOTE — PROGRESS NOTES
Progress Notes by Saskia Koehler PT at 3/27/2019  2:30 PM     Author: Saskia Koehler PT Service: -- Author Type: Physical Therapist    Filed: 3/27/2019  4:54 PM Encounter Date: 3/27/2019 Status: Signed    : Saskia Koehler PT (Physical Therapist)       [facility] Daily Progress     Patient Name: Ellis Em  Date: 3/27/2019  Visit #: 4/12  PTA visit #:  0  Referral Diagnosis:  Acute midline low back pain with left-sided sciatica  M54.42 (ICD-10-CM) - Acute midline low back pain with left-sided sciatica   M99.83 (ICD-10-CM) - Lumbar foraminal stenosis   Z98.890 (ICD-10-CM) - History of lumbar surgery   M53.3 (ICD-10-CM) - Sacroiliac joint pain       Referring provider: Lavern Ch DO    Precautions / Restrictions : stenosis, depression,osteoporosis, neuropathy, s/p multiple surgeries,      Initial assessment: This 87 year old patient presents with low back pain for 10+ years with an exacerbation on 12/2018 after doing some lifting.  She has pain with movement and decreased ROM in the lumbar spine.  She has generalized weakness and is at risk for falls. She was sore after minimal manual therapy and exercise today. Symptoms may be consistent with strain/sprain in the lumbar area. Progress will be slow but pain can improve with gentle strengthening and mobility.  Impairments in  pain, posture, ROM, joint mobility, strength, gait/locomotion and balance  Prognosis to achieve goals is  good   Pt. is appropriate for skilled PT intervention as outlined in the Plan of Care (POC).    Visit Diagnosis:     ICD-10-CM    1. Decreased ROM of lumbar spine M53.86    2. Generalized muscle weakness M62.81    3. Low back pain M54.5             Assessment:   Pt appears in a happier mood today.  She is working on her posture and she looks better in sitting, standing and walking.  Pt continues to do a considerable amount of exercise that she does on a regular basis and this was reinforced  that she is doing a good job and that practicing good posture will make her stronger.   HEP/POC compliance is  good .  Patient demonstrates understanding/independence with home program.  Patient is appropriate to continue with skilled physical therapy intervention, as indicated by initial plan of care.    Goal Status:  Pt. will demonstrate/verbalize independence in self-management of condition in : 12 weeks;Progressing toward  Pt. will be independent with home exercise program in : 12 weeks;Met  Pt. will report decreased intensity, frequency of : Pain;in 12 weeks;Met  Pt. will be able to walk : 20 minutes;with less difficulty;with less pain;for household mobility;for community mobility;in 12 weeks;Progressing toward  Patient will stand : other time;with less pain;with less difficultty;for home chores;in 12 weeks;Progressing toward  Other Time: 15 minutes        Plan for next visits:   Work on posture in standing and walking  Add band exercises  MT as needed  Retest strength            Subjective:   Pt reports she has been trying to stand up straighter. Doing the exercises in bed is helpful.   Has had some good days. Less pain in thighs.  Main concern is shortness of breath today for no known reason. Pt can stand for about 10 minutes. Pain varies day to day.   Pain Ratin-2/10 with meds       Objective:     Patient Outcome Measures :  2019  Modified Oswestry Low Back Pain Disablity Questionnaire  in %: 54     Scores range from 0-100%, where a score of 0% represents minimal pain and maximal function. The minimal clinically important difference is a score reduction of 12%.    Patient Outcome Measures :  3/27/2019  Modified Oswestry Low Back Pain Disablity Questionnaire  in %: 50          Involved side: Bilateral and More on left  Posture Observation:  Forward head, rounded shoulder girdles, right hip higher, right shoulder lower.  Lumbopelvic complex: Pelvic alignment: right iliac crest higher.     Lumbar ROM:      Date: 2/18/2019 3/27/2019      *Indicate scale  MAL= Maximally limited  MOL=Moderately limited  MIL= Minimally limited  - = no pain  += minimal pain  ++= moderate pain  +++= maximal pain AROM AROM AROM   Lumbar Flexion WNL- WNL-      Lumbar Extension MAL- MAL+        Right Left Right Left Right Left   Lumbar Sidebending MOL++ MOL+ MOL-  MOL-        Lumbar Rotation MOL++ MOL+ MOL-  MOL-           Lower Extremity Strength:      Date:         LE strength/5 Right Left Right Left Right Left   Hip Flexion (L1-3) 4 4           Hip Extension (L5-S1) 4 4           Hip Abduction (L4-5) 5 5           Hip Adduction (L2-3) 5 5           Hip External Rotation 4 4           Hip Internal Rotation 4 4           Knee Extension (L3-4) 4- 5           Knee Flexion 5 5           Ankle Dorsiflexion (L4-5) 4- 4-           Great Toe Extension (L5)               Ankle Plantar flexion (S1) 4 4           Abdominals                   Treatment Today      TREATMENT MINUTES COMMENTS   Evaluation     Self-care/ Home management     Manual therapy 20 AAROM to hips in supine with hamstring stretch, adductor stretch.  STM/MFR to lumbosacral area in left side lying.   Neuromuscular Re-education 8 Worked on activation of TrA and adding in contraction of pelvic floor muscles in supine, transfers, sitting, standing and walking; worked on good posture and standing tall.(standing one minute x2 and sitting one minute x 1.)   Therapeutic Activity     Therapeutic Exercises 0    Current exercises from previous PT:  Standing: hip abduction, hip extension, marching, forward leg lifts, mini squats, heel/toe raises, knee flexion, stand tall  Balance: SLS    Seated knee extension, march, abduction, ball squeeze, ankle pumps, butt squeezes, sit to stand.    Supine: ankle pumps, quad sets, glut sets, heels slides, hip abduction.    UE exercises: Shopulder flexion, extension, biceps, triceps, wrist flexion, extension, horizontal adduction, shoulder abducton, wrist  pronation/supination, punches.    Neck: shoulder rolls, retraction, elevation      Exercise #1: Supine bridges  Comment #1: HEP  Exercise #2: TrA activation in supine, sitting and standing  Comment #2: HEP  Exercise #3: Sitting posture x 1 minute  Exercise #4: Standing posture x 1 minute  x10  See flowsheet for date performed.   Gait training     Modality__________________                Total 28    Blank areas are intentional and mean the treatment did not include these items.     Saskia Brantley PT, CLT  3/27/2019

## 2021-06-27 NOTE — PROGRESS NOTES
Progress Notes by Saskia Koehler PT at 4/24/2019  2:30 PM     Author: Saskia Koehler PT Service: -- Author Type: Physical Therapist    Filed: 4/24/2019  4:42 PM Encounter Date: 4/24/2019 Status: Signed    : Saskia Koehler PT (Physical Therapist)       [facility] Daily Progress     Patient Name: Ellis Em  Date: 4/24/2019  Visit #: 7/12  PTA visit #:  0  Referral Diagnosis:  Acute midline low back pain with left-sided sciatica  M54.42 (ICD-10-CM) - Acute midline low back pain with left-sided sciatica   M99.83 (ICD-10-CM) - Lumbar foraminal stenosis   Z98.890 (ICD-10-CM) - History of lumbar surgery   M53.3 (ICD-10-CM) - Sacroiliac joint pain       Referring provider: Lavern Ch DO    Precautions / Restrictions : stenosis, depression,osteoporosis, neuropathy, s/p multiple surgeries,      Initial assessment: This 87 year old patient presents with low back pain for 10+ years with an exacerbation on 12/2018 after doing some lifting.  She has pain with movement and decreased ROM in the lumbar spine.  She has generalized weakness and is at risk for falls. She was sore after minimal manual therapy and exercise today. Symptoms may be consistent with strain/sprain in the lumbar area. Progress will be slow but pain can improve with gentle strengthening and mobility.  Impairments in  pain, posture, ROM, joint mobility, strength, gait/locomotion and balance  Prognosis to achieve goals is  good   Pt. is appropriate for skilled PT intervention as outlined in the Plan of Care (POC).    Visit Diagnosis:     ICD-10-CM    1. Acute bilateral low back pain without sciatica M54.5    2. Decreased ROM of lumbar spine M53.86    3. Generalized muscle weakness M62.81             Assessment:   MILA has improved 10%. Pain is down a little today. Pt is to focus on returning to her exercises as best she can.  Exercises were reviewed.  Having the flu set her back. Pt's ROM and strength are better  but her endurance is low since the flu. Pt can correct sitting and standing posture without cues.   She was encouraged to continue to increase the exercises this week and continue walking if it is good weather.   HEP/POC compliance is  good .  Patient demonstrates understanding/independence with home program.  Patient is appropriate to continue with skilled physical therapy intervention, as indicated by initial plan of care.    Goal Status:  Pt. will demonstrate/verbalize independence in self-management of condition in : 12 weeks;Progressing toward  Pt. will be independent with home exercise program in : 12 weeks;Met  Pt. will report decreased intensity, frequency of : Pain;in 12 weeks;Met  Pt. will be able to walk : 20 minutes;with less difficulty;with less pain;for household mobility;for community mobility;in 12 weeks;Progressing toward  Patient will stand : other time;with less pain;with less difficultty;for home chores;in 12 weeks;Progressing toward  Other Time: 15 minutes        Plan for next visits:   Work on posture in standing and walking  MT as needed  Retest strength               Subjective:   Still very fatigued. Back has been sore over the weekend and feeling weak today.  Does the exercises on and off.  Pt can stand about 15 minutes.     Pain :2-3/10         Objective:     Patient Outcome Measures :  2/18/2019  Modified Oswestry Low Back Pain Disablity Questionnaire  in %: 54     Scores range from 0-100%, where a score of 0% represents minimal pain and maximal function. The minimal clinically important difference is a score reduction of 12%.    Patient Outcome Measures :  3/27/2019  Modified Oswestry Low Back Pain Disablity Questionnaire  in %: 50      Patient Outcome Measures :  4/24/2019  Modified Oswestry Low Back Pain Disablity Questionnaire  in %: 44        Involved side: Bilateral and More on left  Posture Observation:  Forward head, rounded shoulder girdles, right hip higher, right shoulder  lower.  Lumbopelvic complex: Pelvic alignment: right iliac crest higher.     Lumbar ROM:     Date: 2/18/2019 3/27/2019      *Indicate scale  MAL= Maximally limited  MOL=Moderately limited  MIL= Minimally limited  - = no pain  += minimal pain  ++= moderate pain  +++= maximal pain AROM AROM AROM   Lumbar Flexion WNL- WNL-      Lumbar Extension MAL- MAL+        Right Left Right Left Right Left   Lumbar Sidebending MOL++ MOL+ MOL-  MOL-        Lumbar Rotation MOL++ MOL+ MOL-  MOL-           Lower Extremity Strength:      Date:         LE strength/5 Right Left Right Left Right Left   Hip Flexion (L1-3) 4 4           Hip Extension (L5-S1) 4 4           Hip Abduction (L4-5) 5 5           Hip Adduction (L2-3) 5 5           Hip External Rotation 4 4           Hip Internal Rotation 4 4           Knee Extension (L3-4) 4- 5           Knee Flexion 5 5           Ankle Dorsiflexion (L4-5) 4- 4-           Great Toe Extension (L5)               Ankle Plantar flexion (S1) 4 4           Abdominals                   Treatment Today      TREATMENT MINUTES COMMENTS   Evaluation     Self-care/ Home management     Manual therapy 12 AAROM to hips in supine with hamstring stretch, adductor stretch.      Neuromuscular Re-education 0 Worked on activation of TrA and adding in contraction of pelvic floor muscles in supine, transfers, sitting, standing and walking; worked on good posture and standing tall.(standing one minute x2 and sitting one minute x 1.)   Therapeutic Activity     Therapeutic Exercises 12    Worked on posture  and reviewed HEP.      Current exercises from previous PT:  Standing: hip abduction, hip extension, marching, forward leg lifts, mini squats, heel/toe raises, knee flexion, stand tall  Balance: SLS    Seated knee extension, march, abduction, ball squeeze, ankle pumps, butt squeezes, sit to stand.    Supine: ankle pumps, quad sets, glut sets, heels slides, hip abduction.    UE exercises: Shoulder flexion, extension,  biceps, triceps, wrist flexion, extension, horizontal adduction, shoulder abducton, wrist pronation/supination, punches.    Neck: shoulder rolls, retraction, elevation      Exercise #1: Supine bridges x10  Comment #1: HEP  Exercise #2: TrA activation in supine x10  Comment #2: HEP  Exercise #3: Sitting posture x 1 minute  Exercise #4: Standing posture x 1 minute  Exercise #5: Horizontal Abduction with yellow band  HEP  Exercise #6: Shoulder abduction with yellow band  HEP  x10  See flowsheet for date performed.   Gait training     Modality__________________                Total 24    Blank areas are intentional and mean the treatment did not include these items.     Saskia Brantley PT, CLT  4/24/2019

## 2021-06-29 NOTE — PROGRESS NOTES
Progress Notes by Whitney Velasquez DO at 10/2/2020 11:20 AM     Author: Whitney Velasquez DO Service: -- Author Type: Physician    Filed: 10/5/2020  5:58 AM Encounter Date: 10/2/2020 Status: Signed    : Whitney Velasquez DO (Physician)         ASSESSMENT and PLAN:  1. Closed head injury, initial encounter  -Presented for closed head injury 2 weeks out.  Unlikely to have a traumatic head bleed and I offered imaging at this point to see if there is any cause of her unstable balance but unlikely that anything surgical would be done at this point.  She has not declined.  I believe she likely does have a concussion and we discussed referral to her concussion clinic for further evaluation and management.  - Ambulatory referral to Concussion Clinic    2. Fall, initial encounter  In regards to her fall x-rays were obtained today to rule out fracture given her osteoporosis as a source of her unstable balance.  These were negative.  - XR Pelvis W 2 Vw Hips Bilateral; Future  - XR Tibia and Fibula Left; Future  - XR Tibia and Fibula Right; Future  - Ambulatory referral to Concussion Clinic    3. Unstable balance  As mentioned x-rays were unremarkable.  Only thing pertinent on examination was acute anemia and iron deficiency.  Uncertain if this is all since her fall but I have advised that she does discontinue the CBD, cut back on the gabapentin to taking no more than as needed and not necessarily scheduled 3 times a day.  Again we addressed the risks of taking baclofen and gabapentin although she is adamant that it has never caused any issues for her in the past.  Her unstable balance may also be due to acute anxiety in relation to the fall and uncertainty of what to do if it happens again.  We could consider physical therapy for strengthening.  Her anemia could be making her foggy headed and making her feel decrease in strength so I have advised starting ferrous sulfate 325 mg twice daily.  She has an upcoming  appointment with me in December and we can recheck labs at that time.  - Comprehensive Metabolic Panel  - HM2(CBC w/o Differential)  - XR Pelvis W 2 Vw Hips Bilateral; Future  - XR Tibia and Fibula Left; Future  - XR Tibia and Fibula Right; Future  - Ambulatory referral to Concussion Clinic    4. Concussion without loss of consciousness, initial encounter  -As noted above.  Patient was given option to follow-up with the concussion clinic.  Also discussed decreasing stimulation that could be causing mental fog and headaches.  - CK Total  - Ambulatory referral to Concussion Clinic    5. Need for vaccination    - Influenza,Quad,High Dose,PF 65 YR+    6. Vitamin deficiency   -Checking to make sure vitamin levels are not significantly abnormal given the excessive supplements she is taking  - Vitamin B12  - Vitamin B6 (Pyridoxal 5-Phosphate    7. Hypothyroidism, unspecified type   -Stable continue current dosing of levothyroxine  - Thyroid Cascade    8. Age-related osteoporosis without current pathological fracture     - Vitamin D, Total (25-Hydroxy)    9. Pain in joint, ankle and foot, unspecified laterality  -Acute fracture ruled out.  - XR Tibia and Fibula Left; Future  - XR Tibia and Fibula Right; Future    10. Urinary frequency   -Patient will bring back urinalysis if she continues to have symptoms to rule out that as a secondary cause as well.  - Urinalysis-UC if Indicated; Future    11. Anemia, unspecified type   -As mentioned start ferrous sulfate 325 mg twice daily.  [employer] message sent to patient  - Iron and Transferrin Iron Binding Capacity             Patient Instructions   Referral placed for concussion clinic   -check at home what you have for gabapaentin strength and if you have the 100mg or the 300mg and let me know, I would advise going down if you are on 300mg to 200mg 3x per day and I can give more instruction.      Patient Education     Concussion    A concussion can be caused by a direct blow to the  "head, neck, face, or somewhere else on the body with the force being transmitted to the head. This may cause you to lose consciousness - be \"knocked out\" - but not always. Depending on the severity of the blow, it will take from a few hours up to a few days to get better. Sometimes symptoms may last a few months or longer. This is called post-concussion syndrome.  At first, you may have a headache, nausea, vomiting, or dizziness. You may also have problems concentrating or remembering things. This is normal.  Symptoms should get better as the hours and days go by. Symptoms that get worse could be a sign of a more serious injury. This might be a bruise or bleeding in the brain. Thats why its important to watch for the warning signs listed below.  Home care  If your injury is mild and there are no serious signs or symptoms, your healthcare provider may recommend that you be monitored at home. If there is evidence that the injury is more serious, you will be monitored in the hospital. Follow these tips to help care for yourself at home:    After a concussion, your healthcare provider may recommend that a family member or friend monitor you for 12 to 24 hours. They may be told to wake you every few hours during sleep to check for the signs below.    If your face or scalp swells, apply an ice pack for 20 minutes every 1 to 2 hours. Do this until the swelling starts to go down. You can make an ice pack by putting ice cubes in a plastic bag and wrapping the bag in a towel.    You may use acetaminophen to control pain, unless another pain medicine was prescribed. Do not use aspirin or ibuprofen after a head injury. If you have chronic liver or kidney disease, talk with your doctor before using these medicines. Also talk with your doctor if you ever had a stomach ulcer or gastrointestinal bleeding.    For the next 24 hours:  ? Dont drink alcohol or take sedatives or medicines that make you sleepy.  ? Dont drive or operate " machinery.  ? Avoid doing anything strenuous. Dont lift or strain.    Dont return to sports or any activity that could cause you to hit your head until all symptoms are gone and you have been cleared by your doctor. A second head injury before fully recovering from the first one can lead to serious brain injury.    Avoid doing activities that require a lot of concentration or a lot of attention. This will allow your brain to rest and heal quicker.  Follow-up care  Follow up with your doctor in 1 week, or as directed.  Note: A radiologist will review any X-rays or CT scans that were taken. You will be told of any new findings that may affect your care.  When to seek medical advice  Call your healthcare provider right away if any of these occur:    Repeated vomiting    Headache or dizziness that is severe or gets worse    Loss of consciousness    Unusual drowsiness, or unable to wake up as usual    Weakness or decreased ability to walk or move any limb    Confusion, agitation, or change in behavior or speech, or memory loss    Blurred vision    Convulsion (seizure)    Swelling on the scalp or face that gets worse    Changes in pupil size (the black part of the eye)    Redness, warmth, or pus from the swollen area    Fluid draining from or bleeding from the nose or ears     Date Last Reviewed: 8/14/2015 2000-2017 [employer]. [address] [place] [address], [place] [address]. All rights reserved. This information is not intended as a substitute for professional medical care. Always follow your healthcare professional's instructions.               Orders Placed This Encounter   Procedures   ? XR Pelvis W 2 Vw Hips Bilateral     Standing Status:   Future     Number of Occurrences:   1     Standing Expiration Date:   10/2/2021     Order Specific Question:   Reason for Exam (Describe Symptoms):     Answer:   recent fall with unstable balance. osteoporosis.  eval for fx     Order Specific Question:   Can the  procedure be changed per Radiologist protocol?     Answer:   Yes   ? XR Tibia and Fibula Left     Standing Status:   Future     Number of Occurrences:   1     Standing Expiration Date:   10/3/2021     Scheduling Instructions:      TIB/FIB     Order Specific Question:   Reason for Exam (Describe Symptoms):     Answer:   recent fall with unstable balance. osteoporosis.  eval for fx. ankle pain     Order Specific Question:   Can the procedure be changed per Radiologist protocol?     Answer:   Yes   ? XR Tibia and Fibula Right     Standing Status:   Future     Number of Occurrences:   1     Standing Expiration Date:   10/3/2021     Scheduling Instructions:      TIB/FIB     Order Specific Question:   Reason for Exam (Describe Symptoms):     Answer:   recent fall with unstable balance. osteoporosis.  eval for fx. ankle pain     Order Specific Question:   Can the procedure be changed per Radiologist protocol?     Answer:   Yes   ? Influenza,Quad,High Dose,PF 65 YR+   ? Vitamin D, Total (25-Hydroxy)   ? Vitamin B12   ? Vitamin B6 (Pyridoxal 5-Phosphate   ? Comprehensive Metabolic Panel   ? HM2(CBC w/o Differential)   ? Thyroid Cascade   ? CK Total   ? Urinalysis-UC if Indicated     Standing Status:   Future     Standing Expiration Date:   10/2/2021   ? Iron and Transferrin Iron Binding Capacity   ? Ambulatory referral to Concussion Clinic     Referral Priority:   Routine     Referral Type:   Consultation     Referral Reason:   Evaluation and Treatment     Requested Specialty:   Neurology     Number of Visits Requested:   1     Medications Discontinued During This Encounter   Medication Reason   ? gabapentin (NEURONTIN) 300 MG capsule        No follow-ups on file.    DATA REVIEWED:  Additional History from Old Records Summarized (2):    Decision to Obtain Records (1):    Radiology Tests Summarized or Ordered (1): X-ray imaging ordered  Labs Reviewed or Ordered (1): -Labs ordered and reviewed  Medicine Test Summarized or  Ordered (1):    Independent Review of EKG, X-RAY, or RAPID STREP (2 each):  Personally evaluated x-rays no acute fracture noted    The visit lasted a total of 40 minutes face to face with the patient. Over 50% of the time was spent counseling and educating the patient     CHIEF COMPLAINT:  Chief Complaint   Patient presents with   ? Fall     She fell about 2 weeks ago.  Says she hit her head, but there was no lena. Feels she does not think as clear or is as sharp as usual.  Her right hip was quite sore for at least two days.  She was stuck on the floor.  She called her daughter to come and get her.  Bones hurt, but no bruising.  Now since the fall her balance has been off.  Feels like she is going to fall and has to hold onto something.      ? Shoulder Pain     When she uses the arm to much the bicep bunches up.    ? Medication Management     Check on refill requests.        HISTORY OF PRESENT ILLNESS:  Ellis Em is a 89 y.o. female presents today with her daughter Rosalva for evaluation of ongoing unsteady balance since a fall that occurred 2 weeks ago.  She sent me a [employer] message thinking she needs evaluation.  She has history of severe osteoporosis and cervical compression fractures  She has been off forteo for 1 year  .  Incident occurred 2 weeks tomorrow tripped on chair and fell over night stand.  She had the right side of her had near the [place] area per her report but there has not been any bruising or bleeding or laceration.  Couldn't get off the floor. Daughter rosalva came to lift her. On the floor for an hour. Ended up on bottom.   Pain better but not 100%- soreness lower leg on right and some shoulder pain on right. Bicep bubbles on right when use.  That has been going on since I saw her last time where we decided she may have possible tendinopathy or tendon rupture.  Afraid to fall and feels not stable. Has to touch things to walk.  Has a walker.  Happened at 5am and had been to the bathroom.  She  continues to live alone.  Headaches all the time since the fall - over right temple. isnt severe. Not accustomed to headaches. Hit on night stand on corner. Didn't think hit hard. No bleeding. No syncope or loss of starts. Headache comes and goes. Takes tylenol every 6 hours prior even prior to this.. Helps relieve it.   Occurred Saturday 19th.  Blood pressure is quite elevated today.  Typically blood pressure ranges  130s-150s. Once 169/83. No nausea with these headaches. Vomited dinner a couple nights ago. Not typical for her.   Diarrhea off and on chronic. Focus not as good. No energy. Feels tired and wants to go lay down. Daughter notes hesitancy and less sharp when talking .   Was doing Hemp oil cbd drops -2 droppers under tongue daily hemp 66mg total since after the fall because leg hurting so much. 15mg CBD guero she has been using for years for chronic pain on leg. . Stopped since Monday and had not improved with unclear head.  Also has been using CV complete.   She has issues with chronic constipation but this regimen has been working well for her -prune juice 1am. Bed 11 then wakes at 1- to keep regular BM 6am.   B12- 500mcg, B6 25mg, D3 500IU -just started. 3 weeks   Was having bicep issue for awhile consistently because of repetitive laundry. Staggering.    Rosalva there twice a week and rowena or her every weekend.   Still taking gabapentin 3 x per day 300mg at a time  Takes for leg spasms taking 100 mg 3 times daily..         REVIEW OF SYSTEMS:    Comprehensive review of systems is negative except as noted in the HPI.     PFSH:  Tobacco use and medications reviewed below.     TOBACCO USE:  Social History     Tobacco Use   Smoking Status Never Smoker   Smokeless Tobacco Never Used       VITALS:  Vitals:    10/02/20 1152   BP: (!) 172/141   Pulse: 69   Resp: 22   Temp: 97.3  F (36.3  C)   TempSrc: Oral   SpO2: 100%   Weight: 143 lb 12.8 oz (65.2 kg)     Wt Readings from Last 3 Encounters:   10/02/20 143 lb  12.8 oz (65.2 kg)   03/10/20 148 lb 8 oz (67.4 kg)   02/04/20 147 lb 8 oz (66.9 kg)       PHYSICAL EXAM:  Constitutional:  Reveals a 89 y.o. female in NAD.  She appears weak and frail.  Vitals:  Per nursing notes.  Neck:  Supple, thyroid not palpable.  Cardiac:  Regular rate and rhythm without murmurs, rubs, or gallops. Carotids without bruits. Legs without edema. Palpation of the radial pulse regular.  Lungs: Clear.  Respiratory effort normal.  Skin:   Without rash, bruise, or palpable lesions.  Rheumatologic: Normal joints and nails of the hands.  Neurologic:  Cranial nerves II-XII intact.     Psychiatric:  Mood appropriate, memory intact.   Musculoskeletal.  Patient has some pain to palpation of the distal fibula bilateral.  Full range of motion of the hips and pelvis  Neurological exam she is alert and oriented x3.  Cranial nerves tested 2 through 12 and are intact.  Pupils symmetric bilateral.  She does have somewhat constricted pupils.  Extraocular motion intact no nystagmus.  Negative for pronator drift.  I watched patient ambulate with her walker which was slow but no ataxia.      MEDICATIONS:  Current Outpatient Medications   Medication Sig Dispense Refill   ? alpha lipoic acid 200 mg cap Take 1 capsule by mouth 2 (two) times a day.     ? buPROPion (WELLBUTRIN SR) 150 MG 12 hr tablet TAKE 1 TABLET BY MOUTH EVERY DAY 90 tablet 2   ? cranberry fruit extract (CRANBERRY ORAL) Take by mouth.     ? diclofenac sodium (VOLTAREN) 1 % Gel APPLY 2 GRAMS OF GEL TO UPPER RIGHT SHOULDER FOUR TIMES DAILY AS NEEDED FOR PAIN 100 g 0   ? gabapentin (NEURONTIN) 100 MG capsule TAKE 1 TO 3 CAPSULES BY MOUTH THREE TIMES DAILY AS NEEDED 270 capsule 5   ? HEMP OIL OR EXTRACT OR OTHER CBD CANNABINOID, NOT MEDICAL CANNABIS, Uses a cream for her leg pain and drops by mouth also     ? inositol/Mv Comb 1/bertha/betaine (CARDIOVASCULAR SUPPORT ORAL) Take by mouth.     ? losartan (COZAAR) 50 MG tablet Take 1 tablet (50 mg total) by mouth 2  (two) times a day. 180 tablet 3   ? melatonin 10 mg Tab Take 10 mg by mouth bedtime.     ? omega-3 fatty acids 500 mg cap Take by mouth.     ? omeprazole (PRILOSEC) 20 MG capsule Take 1 capsule (20 mg total) by mouth daily before breakfast. 90 capsule 4   ? ondansetron (ZOFRAN-ODT) 4 MG disintegrating tablet DISSOLVE 1 TABLET(4 MG) ON THE TONGUE EVERY 8 HOURS AS NEEDED FOR NAUSEA 30 tablet 0   ? STRONTIUM CHLORIDE HEXAHYDRATE (STRONTIUM CHL HEXAHYD, BULK,) 100 % Kailyn Use As Directed.     ? VIT C/E/ZN/COPPR/LUTEIN/ZEAXAN (PRESERVISION AREDS 2 ORAL) Take 1 tablet by mouth 2 (two) times a day.      ? baclofen (LIORESAL) 10 MG tablet Take 1 tablet (10 mg total) by mouth 3 (three) times a day. 270 tablet 1   ? ferrous sulfate 325 (65 FE) MG tablet Take 1 tablet (325 mg total) by mouth 2 (two) times a day. 60 tablet 1   ? FLUoxetine (PROZAC) 10 MG capsule TAKE 3 CAPSULES BY MOUTH DAILY 270 capsule 3   ? levothyroxine (SYNTHROID, LEVOTHROID) 75 MCG tablet TAKE 1 TABLET BY MOUTH DAILY 90 tablet 3     No current facility-administered medications for this visit.

## 2021-07-03 NOTE — ADDENDUM NOTE
Addendum Note by Juana Granados LPN at 10/26/2020 10:58 AM     Author: Juana Granados LPN Service: -- Author Type: Licensed Nurse    Filed: 10/26/2020 10:58 AM Encounter Date: 10/23/2020 Status: Signed    : Juana Granados LPN (Licensed Nurse)    Addended by: JUANA GRANADOS on: 10/26/2020 10:58 AM        Modules accepted: Orders

## 2021-07-03 NOTE — ADDENDUM NOTE
Addendum Note by Kwame Flores DO at 10/26/2020 11:07 AM     Author: Kwame Flores DO Service: -- Author Type: Physician    Filed: 10/26/2020 11:07 AM Encounter Date: 10/23/2020 Status: Signed    : Kwame Flores DO (Physician)    Addended by: KWAME FLORES on: 10/26/2020 11:07 AM        Modules accepted: Orders

## 2021-07-03 NOTE — ADDENDUM NOTE
Addendum Note by Padmini Daley NP at 1/30/2018  1:00 PM     Author: Padmini Daley NP Service: -- Author Type: Nurse Practitioner    Filed: 2/6/2019  2:48 PM Encounter Date: 1/30/2018 Status: Signed    : Padmini Daley NP (Nurse Practitioner)    Addended by: PADMINI DALEY on: 2/6/2019 02:48 PM        Modules accepted: Orders

## 2021-07-03 NOTE — ADDENDUM NOTE
Addendum Note by Kwame Flores DO at 12/1/2020 11:40 AM     Author: Kwame Flores DO Service: -- Author Type: Physician    Filed: 12/3/2020  9:49 AM Encounter Date: 12/1/2020 Status: Signed    : Kwame Flores DO (Physician)    Addended by: KWAME FLORES on: 12/3/2020 09:49 AM        Modules accepted: Orders

## 2021-07-03 NOTE — ADDENDUM NOTE
Addendum Note by Kwame Flores DO at 10/27/2018  8:33 AM     Author: Kwame Flores DO Service: -- Author Type: Physician    Filed: 10/27/2018  8:33 AM Encounter Date: 10/23/2018 Status: Signed    : Kwame Flores DO (Physician)    Addended by: KWAME FLORES on: 10/27/2018 08:33 AM        Modules accepted: Orders

## 2021-07-04 NOTE — ADDENDUM NOTE
Addendum Note by Kwame Flores DO at 5/28/2021  8:00 AM     Author: Kwame Flores DO Service: -- Author Type: Physician    Filed: 5/28/2021 10:56 PM Encounter Date: 5/28/2021 Status: Signed    : Kwame Flores DO (Physician)    Addended by: KWAME FLORES on: 5/28/2021 10:56 PM        Modules accepted: Orders

## 2021-07-08 NOTE — TELEPHONE ENCOUNTER
Telephone Encounter by Kelsey Kauffman at 7/8/2021  3:04 PM     Author: Kelsey Kauffman Service: -- Author Type: --    Filed: 7/8/2021  3:06 PM Encounter Date: 6/19/2021 Status: Signed    : Kelsey Kauffman       I tried calling his office but I was on hold for about 12 min and had to hang up to  another call. I faxed the letter and referral to Dr. Fam's office with a note on the fax cover sheet to give/set on Dr. Fam's desk for review. I'll follow up with his office tmrw in the morning when I come in.

## 2021-07-08 NOTE — ADDENDUM NOTE
Addendum Note by Kwame Flores DO at 7/8/2021 11:59 AM     Author: Kwame Flores DO Service: -- Author Type: Physician    Filed: 7/8/2021 11:59 AM Encounter Date: 6/19/2021 Status: Signed    : Kwame Flores DO (Physician)    Addended by: KWAME FLORES on: 7/8/2021 11:59 AM        Modules accepted: Orders

## 2021-07-08 NOTE — TELEPHONE ENCOUNTER
Telephone Encounter by Whitney Velasquez DO at 7/8/2021 11:56 AM     Author: Whitney Velasquez DO Service: -- Author Type: Physician    Filed: 7/8/2021 11:59 AM Encounter Date: 6/19/2021 Status: Signed    : Whitney Velasquez DO (Physician)       I just wrote a letter to Dr. Fam in regards to an appointment this patient has on 7/13/2021.  The reason for the appointment also needs to be changed so I put in referral into the system for cognitive concerns.  Can you please fax the referral and   Letter and call  and make sure that you talk to his CMA  so it is placed on his desk so that it is received and not sent to medical records?

## 2021-07-13 ENCOUNTER — TELEPHONE (OUTPATIENT)
Dept: NEUROLOGY | Facility: CLINIC | Age: 86
End: 2021-07-13

## 2021-07-13 ENCOUNTER — OFFICE VISIT (OUTPATIENT)
Dept: NEUROLOGY | Facility: CLINIC | Age: 86
End: 2021-07-13
Payer: MEDICARE

## 2021-07-13 VITALS
BODY MASS INDEX: 23.66 KG/M2 | HEIGHT: 65 IN | WEIGHT: 142 LBS | DIASTOLIC BLOOD PRESSURE: 56 MMHG | SYSTOLIC BLOOD PRESSURE: 102 MMHG

## 2021-07-13 DIAGNOSIS — G60.8 POLYNEUROPATHY, PERIPHERAL SENSORIMOTOR AXONAL: ICD-10-CM

## 2021-07-13 DIAGNOSIS — R41.9 NEUROCOGNITIVE DISORDER: Primary | ICD-10-CM

## 2021-07-13 PROBLEM — R53.83 FATIGUE: Status: RESOLVED | Noted: 2021-05-25 | Resolved: 2021-07-13

## 2021-07-13 PROBLEM — L60.9 NAIL ABNORMALITY: Status: RESOLVED | Noted: 2021-02-16 | Resolved: 2021-07-13

## 2021-07-13 PROBLEM — M62.81 MUSCLE WEAKNESS (GENERALIZED): Status: RESOLVED | Noted: 2021-05-25 | Resolved: 2021-07-13

## 2021-07-13 PROBLEM — Z86.018 HISTORY OF ADRENAL ADENOMA: Status: RESOLVED | Noted: 2021-05-25 | Resolved: 2021-07-13

## 2021-07-13 PROCEDURE — 99205 OFFICE O/P NEW HI 60 MIN: CPT | Performed by: PSYCHIATRY & NEUROLOGY

## 2021-07-13 RX ORDER — DONEPEZIL HYDROCHLORIDE 5 MG/1
5 TABLET, FILM COATED ORAL AT BEDTIME
Qty: 30 TABLET | Refills: 1 | Status: SHIPPED | OUTPATIENT
Start: 2021-07-13 | End: 2021-09-02

## 2021-07-13 ASSESSMENT — MONTREAL COGNITIVE ASSESSMENT (MOCA)
12. MEMORY INDEX SCORE: 2
WHAT LEVEL OF EDUCATION WAS ATTAINED: 0
6. READ LIST OF DIGITS [FORWARD/BACKWARD]: 2
VISUOSPATIAL/EXECUTIVE SUBSCORE: 5
10. [FLUENCY] NAME WORDS STARTING WITH DESIGNATED LETTER: 0
4. NAME EACH OF THE THREE ANIMALS SHOWN: 3
11. FOR EACH PAIR OF WORDS, WHAT CATEGORY DO THEY BELONG TO (OUT OF 2): 2
7. [VIGILENCE] TAP WHEN HEARING DESIGNATED LETTER: 0
8. SERIAL SUBTRACTION OF 7S: 0
WHAT IS THE TOTAL SCORE (OUT OF 30): 20
13. ORIENTATION SUBSCORE: 4
9. REPEAT EACH SENTENCE: 2

## 2021-07-13 ASSESSMENT — MIFFLIN-ST. JEOR: SCORE: 1064.99

## 2021-07-13 NOTE — LETTER
2021         RE: Ellis Em  [address]  [facility] [address]        Dear Colleague,    Thank you for referring your patient, Ellis Em, to the [facility]. Please see a copy of my visit note below.    NEUROLOGY CONSULTATION NOTE       [facility]  [address] [place] [address]  Tel: [phone]  Fax: [phone]  [email or web address]     Ellis Em,  1931, MRN [id number]  PCP: Whitney Velasquez  Date: 2021     ASSESSMENT & PLAN     Diagnosis code  1. Neurocognitive disorder  2. Sensorimotor axonal polyneuropathy     Neurocognitive disorder  89-year-old female with history of HTN, HLD, hypothyroidism, osteoporosis, cervical stenosis status post C1-C2 laminoplasty who was referred for evaluation of sudden decline in her cognition.  MRI of the brain shows small vessel ischemic disease and she scored 20/30 on Errol cognitive assessment today.  I suspect patient has vascular dementia made worse with medication (baclofen, Wellbutrin).  She is scheduled for neuropsychological testing but I have recommended starting her on Aricept 5 mg daily pending work-up.  Additionally, I am ordering folate, homocystine, methylmalonic acid level, RPR, TSH and vitamin B12.  I will see her back in follow-up in 2 months when I plan on increasing the dose of Aricept.  I have also told her to minimize the use of baclofen that could be playing a role in her symptomatology.    Right lumbar radiculopathy  There has been a sudden decline in her ability to ambulate and has increased weakness on the right leg.  Recent work-up at [facility] included EMG of the lower extremity that suggest mixed sensorimotor polyneuropathy.  MRI of the lumbar spine shows multilevel radiculopathy mostly at the right L3 and L4.  This nerve root impingement likely is the etiology of her right leg weakness and if therapy alone is not helpful we  can consider epidural injection at right L4-L5 and right L3-L4 although my preference would be to maximize physical therapy before attempting epidural in this elderly lady.  Follow-up will be in 2 months.    Thank you again for this referral, please feel free to contact me if you have any questions.    Yoni Fam MD  [facility]  (Formerly, [facility], ..)     REASON FOR CONSULTATION Memory Loss and Extremity Weakness        HISTORY OF PRESENT ILLNESS     We have been requested by Dr. Velasquez to evaluate Ellis Em who is a 89 year old  female for cognitive decline and gait difficulty    Patient is 89-year-old female with history of hypertension, hyperlipidemia, hypothyroidism, osteoporosis cervical stenosis status post C1-C2 laminoplasty who was referred for evaluation of cognitive decline along with gait difficulty.  According to daughter patient had a fall in May 2021 hitting her head.  She also complained of right leg weakness and had CT of the head and cervical spine that did not show any fracture or high-grade stenosis.  Subsequently MRI was done that showed mild to moderate small vessel ischemic changes.  No high-grade stenosis noted in bilateral carotids.  Daughter reports there was sudden decline in her cognition after that fall and she was complaining of fatigue.  She was found to have UTI and treated with antibiotic and according to daughter her mental status improved.  However afterwards she started having increased memory difficulty that tends to be worse when she is fatigued.  She was seen at [facility] and had EMG of the lower extremity that suggested length dependent large fiber symmetric sensorimotor polyneuropathy.  She also had MRI of the lumbar spine that showed severe right L4 and bilateral L3 radiculopathy.  Due to her frequent falls she was told not to get up and is mostly wheelchair-bound.  She denies any bowel or bladder  incontinence.  There is no history of any visual or auditory hallucinations.  She lives alone but is able to do her activities of daily living and her daughter visits her on a daily basis.  She has not done anything that puts her or other people at risk.     PROBLEM LIST   Patient Active Problem List   Diagnosis Code     Hypothyroidism E03.9     Major depressive disorder, recurrent episode, in full remission (H) F33.42     Idiopathic osteoporosis M81.8     Adrenal mass, left (H) E27.8     Lumbar stenosis with neurogenic claudication M48.062     Essential hypertension I10     Generalized osteoarthritis of multiple sites M15.9     Polyneuropathy, peripheral sensorimotor axonal G60.8     Allergic rhinitis J30.9     Tinnitus H93.19     Macular degeneration mild  H35.30     Breast implant rupture bilateral  T85.43XA     ACP (advance care planning) Z71.89     Actinic keratosis L57.0     Cyst of ovary N83.209     Hearing loss H91.90     Hyperlipidemia E78.5     Malignant neoplasm of skin of parts of face C44.309     Neurogenic bladder N31.9     Antalgic gait R26.89     Cervical spondylosis with myelopathy M47.12         PAST MEDICAL & SURGICAL HISTORY     Past Medical History:   Patient  has a past medical history of Bowel obstruction (H) (1974), C1 cervical fracture (H) (10/12/2015), Cancer (H) (breast, 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, salivary gland), Cancer of skin, Carpal tunnel syndrome, Cervical spondylosis with myelopathy (4/25/2013), Chronic pain, Closed fracture of cervical vertebra (H) (10/21/2015), Closed fracture of lumbar vertebra (H) (1/21/2015), Cyst of spinal meninges, Dermatitis, Disorders of meninges, not elsewhere classified, Gastric ulcer (1983), Hip fracture (H) (2007), History of adrenal adenoma (5/25/2021), History of appendectomy, History of fractured pelvis (1985), Hyponatremia, Hypothyroidism, Leg pain, bilateral, Major depression, Miscarriage (1962), Neck fracture (H) (09/10/2015), Nephrolithiasis, Osteoarthritis,  Osteoporosis, Panic attack, Peripheral neuropathy, Reactive confusion, Renal insufficiency, Rib fracture (2008), Shingles, Status post lumbar laminectomy (11/7/2014), UTI (urinary tract infection), and Varicella (7/14/2004).    Surgical History:  She  has a past surgical history that includes Pr Arthroplasty Tibial Plateau (Left); Salivary gland surgery (12/05/1984); Cataract Extraction, Bilateral; Cholecystectomy (06/30/1983); other surgical history; Total Hip Arthroplasty (Bilateral); Ovarian Cyst Removal (1943); Abdomen surgery (06/30/1983); joint replacement; fracture surgery (1985); Cervical Laminoplasty (Bilateral, 5/2/2013); Mastectomy (Bilateral); Lumbar Laminectomy (N/A, 11/6/2014); QUIGLEY W/O FACETEC FORAMOT/DSKC 1/2 VRT SEG, LUMBAR; appendectomy (1943); tubal ligation (1962); Revise reconstructed breast (Bilateral, 11/11/1984); Endometrial Biopsy (09/09/1986); Dilation and curettage (09/19/1986); Combined Mammaplasty Revision W/ Abdominoplasty (07/1998); Root Canal (09/14/1998); Skin Cancer Excision (08/15/2003); Total Knee Arthroplasty (10/20/2005); and Tumor Excision (05/01/2013).     SOCIAL HISTORY     Reviewed, and she  reports that she has never smoked. She has never used smokeless tobacco. She reports that she does not drink alcohol and does not use drugs.     FAMILY HISTORY     Reviewed, and family history includes Alcoholism in her brother; Cancer in her mother; Dementia in her brother; Diabetes in her brother; Kidney Disease in her father.     ALLERGIES     Allergies   Allergen Reactions     Iodinated Contrast Media [Diagnostic X-Ray Materials] Hives     Adhesive [Mecrylate] Unknown     Adhesive tape     Amlodipine Unknown     drowsiness     Asparaginase Unknown     Atorvastatin Unknown     PN: LW Reaction: arthalgia     Ciprofloxacin Unknown     Codeine Unknown     Latex Unknown     PN: Converted from LW Latex Sensitivity Flag     Lisinopril Cough     Pravastatin Unknown     Propoxyphene  N-Acetaminophen [Propoxyphene N-Apap] Unknown     PN: LW Reaction: hallucinations     Sulfa (Sulfonamide Antibiotics) [Sulfa Drugs] Unknown     Albuterol Unknown     Other reaction(s): Urinary Retention         REVIEW OF SYSTEMS     A 12 point review of system was performed and was negative except as outlined in the history of present illness.     HOME MEDICATIONS     Current Outpatient Rx   Medication Sig Dispense Refill     alpha lipoic acid 200 mg cap [ALPHA LIPOIC ACID 200 MG CAP] Take 1 capsule by mouth 2 (two) times a day.       baclofen (LIORESAL) 10 MG tablet [BACLOFEN (LIORESAL) 10 MG TABLET] TAKE 1 TABLET(10 MG) BY MOUTH THREE TIMES DAILY 270 tablet 1     buPROPion (WELLBUTRIN SR) 150 MG 12 hr tablet [BUPROPION (WELLBUTRIN SR) 150 MG 12 HR TABLET] TAKE 1 TABLET BY MOUTH EVERY DAY 90 tablet 2     cranberry fruit extract (CRANBERRY ORAL) [CRANBERRY FRUIT EXTRACT (CRANBERRY ORAL)] Take by mouth.       diclofenac sodium (VOLTAREN) 1 % Gel [DICLOFENAC SODIUM (VOLTAREN) 1 % GEL] APPLY 2 GRAMS OF GEL TO UPPER RIGHT SHOULDER FOUR TIMES DAILY AS NEEDED FOR PAIN 100 g 0     donepezil (ARICEPT) 5 MG tablet Take 1 tablet (5 mg) by mouth At Bedtime 30 tablet 1     ferrous sulfate 325 (65 FE) MG tablet [FERROUS SULFATE 325 (65 FE) MG TABLET] Take 1 tablet (325 mg total) by mouth daily with breakfast. 90 tablet 2     FLUoxetine (PROZAC) 10 MG capsule [FLUOXETINE (PROZAC) 10 MG CAPSULE] TAKE 3 CAPSULES BY MOUTH DAILY 270 capsule 3     gabapentin (NEURONTIN) 100 MG capsule [GABAPENTIN (NEURONTIN) 100 MG CAPSULE] 1 capsule in the morning, 2 capsules in the afternoon and 1 capsule in the evening. 270 capsule 5     levothyroxine (SYNTHROID, LEVOTHROID) 75 MCG tablet [LEVOTHYROXINE (SYNTHROID, LEVOTHROID) 75 MCG TABLET] TAKE 1 TABLET BY MOUTH DAILY 90 tablet 3     losartan (COZAAR) 50 MG tablet [LOSARTAN (COZAAR) 50 MG TABLET] TAKE 1 TABLET BY MOUTH TWICE DAILY 180 tablet 3     melatonin 10 mg Tab [MELATONIN 10 MG TAB] Take 10  "mg by mouth bedtime.       omega-3 fatty acids 500 mg cap [OMEGA-3 FATTY ACIDS 500 MG CAP] Take by mouth.       omeprazole (PRILOSEC) 20 MG capsule [OMEPRAZOLE (PRILOSEC) 20 MG CAPSULE] TAKE 1 CAPSULE(20 MG) BY MOUTH DAILY BEFORE BREAKFAST 90 capsule 4     ondansetron (ZOFRAN-ODT) 4 MG disintegrating tablet [ONDANSETRON (ZOFRAN-ODT) 4 MG DISINTEGRATING TABLET] DISSOLVE 1 TABLET(4 MG) ON THE TONGUE EVERY 8 HOURS AS NEEDED FOR NAUSEA 30 tablet 1     predniSONE (DELTASONE) 1 MG tablet [PREDNISONE (DELTASONE) 1 MG TABLET] Take 2 mg by mouth daily. 60 tablet 0     predniSONE (DELTASONE) 5 MG tablet [PREDNISONE (DELTASONE) 5 MG TABLET] Take 5 mg by mouth daily before breakfast. 30 tablet 0     STRONTIUM CHLORIDE HEXAHYDRATE (STRONTIUM CHL HEXAHYD, BULK,) 100 % Kailyn [STRONTIUM CHLORIDE HEXAHYDRATE (STRONTIUM CHL HEXAHYD, BULK,) 100 % KAILYN] Use As Directed.       VIT C/E/ZN/COPPR/LUTEIN/ZEAXAN (PRESERVISION AREDS 2 ORAL) [VIT C/E/ZN/COPPR/LUTEIN/ZEAXAN (PRESERVISION AREDS 2 ORAL)] Take 1 tablet by mouth 2 (two) times a day.            PHYSICAL EXAM     Vital signs  /56 (BP Location: Left arm, Patient Position: Sitting)   Ht 1.651 m (5' 5\")   Wt 64.4 kg (142 lb)   BMI 23.63 kg/m      Weight:   142 lbs 0 oz  Hermitage Cognitive Assessment:    Hermitage Cognitive Assessment (MOCA)  Visuospatial/Executive : 5  Naming: 3  Attention - Digits: 2  Attention - Letters: 0  Attention - Subtraction: 0  Language - Repeat: 2  Language - Fluency : 0  Abstraction: 2  Delayed Recall: 2  Orientation: 4  Education: 0  MOCA Score: 20  Administered by: : Joanie Lopez CMA     Errol Cognitive Assessment Score:  MOCA Score: 20/30.    Patient is alert and oriented x4 in no acute distress. Vital signs were reviewed and are documented in electronic medical record. Neck was supple, no carotid bruits, thyromegaly, JVD, or lymphadenopathy was noted.   NEUROLOGY EXAM:    Patient s speech was normal with no aphasia or dysarthria. Mentation, " and affect were also normal.      Cranial nerves II -XII were intact except she is hard of hearing    Patient has decreased mass with normal tone strength in upper extremity 5/5 in the right lower extremity 4+/5 in the left lower extremity 5 -/5    Sensation was creased to light touch pinprick below mid thighs    Reflexes were 1+ symmetrical in upper extremity absent in the lower extremity    minimal dysmetria noted on finger-nose testing    Gait testing was deferred as patient is wheelchair-bound     DIAGNOSTIC STUDIES     PERTINENT RADIOLOGY  Following imaging studies were reviewed:     CT HEAD & CERVICAL SPINE 5/20/2021  HEAD CT:  1.  No hemorrhage, mass, or mass effect.  2.  Ectasia of right supraclinoid ICA.  3.  Moderate presumed chronic small vessel ischemia.  4.  Moderate atrophy.     CERVICAL SPINE CT:  1.  No definite fracture.  2.  New moderate left paracentral disc protrusion at C3-C4.  3.  Underlying degenerative and postoperative changes, as described.    MRI, MRA BRAIN 5/29/2021  HEAD MRI:   1.  No acute infarct, acute intracranial hemorrhage, or intracranial mass.  2.  Mild to moderate burden presumed sequela of chronic small vessel ischemic disease involving the cerebral white matter and aníbal.  3.  Complete opacification of the left mastoid air cells and middle ear cavity.     HEAD MRA:   1.  No aneurysm, high flow AVM or significant stenosis identified.     NECK MRA:  1.  No significant stenosis of the bilateral internal carotid arteries based on NASCET criteria.    MRI LUMBAR SPINE 5/29/2021  1.  Moderate to advanced multilevel lumbar spondylitic changes with evidence of prior laminectomies at L1-L2 through L5-S1. There is no significant spinal canal stenosis in the lumbar region.  2.  At L5-S1, there is moderate right and severe left neural foraminal stenosis.  3.  At L4-L5, there is severe right neural foraminal stenosis with deformity of the exiting right L4 nerve root.  4.  At L3-L4, there is  a moderate circumferential disc bulge with severe bilateral neural foraminal stenosis and right lateral recess narrowing.  5.  At L1-L2, there is severe bilateral neural foraminal stenosis.  6.  Moderate rightward convex curvature of the lumbar spine with the apex at L2.    MRI CERVICAL SPINE 10/16/2020  1.  Postsurgical changes C1 and C2 laminoplasty with hardware in the left and associated susceptibility. This is better appreciated on prior cervical spine CT 05/04/2016.  2.  Interbody fusion at C4/C5 with associated kyphosis, unchanged.  3.  Small broad bar disc osteophyte complexes contributing to mild spinal canal narrowing at C3/C4-C6/C7. No significant spinal canal narrowing at any other level within the cervical spine.  4.  Multilevel uncovertebral joint disease and facet arthropathy with mild multilevel neural foraminal narrowing at C3/C4-C5/C6 and T1/T2.    EMG 7/1/2021  This is an abnormal study.  1.  Reduced and absent sensory amplitudes in both lower extremities symmetrically.  These finding may represent either a length dependent large fiber relatively symmetric sensorimotor axonal polyneuropathy or alternatively can be seen as an age-related change for a person in this age group  2.  Superimposed chronic L4-S1 polyradiculopathy is suspected with ongoing denervation noted in extensor hallucis longus muscle which could be secondary to radiculopathy versus active polyneuropathy.  Correlation with lumbar spine imaging is recommended     PERTINENT LABS  Following labs were reviewed:  Ambulatory - [facility] on 06/03/2021   Component Date Value     Color Urine 06/03/2021 Yellow      Appearance Urine 06/03/2021 Clear      Glucose Urine 06/03/2021 Negative      Protein Albumin Urine 06/03/2021 Negative      Bilirubin Urine 06/03/2021 Negative      Urobilinogen Urine 06/03/2021 0.2 E.U./dL      pH Urine 06/03/2021 7.0      Blood Urine 06/03/2021 Negative      Ketones Urine 06/03/2021 Negative      Nitrite Urine  06/03/2021 Negative      Leukocyte Esterase Urine 06/03/2021 Negative      Specific Gravity Urine 06/03/2021 1.015    Infusion - [facility] on 05/28/2021   Component Date Value     CK 05/28/2021 101      Aldosterone 05/28/2021 5.4      Cortisol, Pre-dose 05/28/2021 7.3      Cortisol 30 min Post-dose 05/28/2021 22.8      Cortisol 60 min Post-dose 05/28/2021 26.5      Time of Cosyntropin Inje* 05/28/2021 0000      Sodium 05/28/2021 137      Potassium 05/28/2021 3.8      Chloride 05/28/2021 102      Carbon Dioxide (CO2) 05/28/2021 25      Anion Gap 05/28/2021 10      Glucose 05/28/2021 91      Calcium 05/28/2021 8.9      Urea Nitrogen 05/28/2021 22      Creatinine 05/28/2021 0.75      GFR Estimate If Black 05/28/2021 >60      GFR Estimate 05/28/2021 >60    Ambulatory - [facility] on 05/19/2021   Component Date Value     Color Urine 05/19/2021 Yellow      Appearance Urine 05/19/2021 Slightly Cloudy*     Glucose Urine 05/19/2021 Negative      Protein Albumin Urine 05/19/2021 Negative      Bilirubin Urine 05/19/2021 Negative      Urobilinogen Urine 05/19/2021 0.2 E.U./dL      pH Urine 05/19/2021 7.0      Blood Urine 05/19/2021 Negative      Ketones Urine 05/19/2021 Negative      Nitrite Urine 05/19/2021 Positive*     Leukocyte Esterase Urine 05/19/2021 Negative      Specific Gravity Urine 05/19/2021 1.015      RBC Urine 05/19/2021 0-2      WBC Urine 05/19/2021 0-5      Bacteria Urine 05/19/2021 Many*     Squamous Epithelials Uri* 05/19/2021 0-5      Transitional Epithelials* 05/19/2021 0-5*     Culture 05/19/2021 ESCHERICHIA COLI*   Office Visit - [facility] on 05/17/2021   Component Date Value     TSH 05/17/2021 1.45      Sodium 05/17/2021 137      Potassium 05/17/2021 3.9      Chloride 05/17/2021 101      Carbon Dioxide (CO2) 05/17/2021 24      Anion Gap 05/17/2021 12      Glucose 05/17/2021 99      Urea Nitrogen 05/17/2021 20      Creatinine 05/17/2021 0.66      GFR Estimate If Black 05/17/2021 >60      GFR Estimate  05/17/2021 >60      Bilirubin Total 05/17/2021 0.4      Calcium 05/17/2021 8.8      Protein Total 05/17/2021 6.3      Albumin 05/17/2021 3.6      Alkaline Phosphatase 05/17/2021 83      AST 05/17/2021 25      ALT 05/17/2021 35      Iron 05/17/2021 87      Transferrin 05/17/2021 291      Transferrin Saturation, * 05/17/2021 24      Transferrin IBC, Calcula* 05/17/2021 363      Ferritin 05/17/2021 28      Erythrocyte Sedimentatio* 05/17/2021 7      CRP 05/17/2021 <0.1      Vitamin B1 Whole Blood L* 05/17/2021 105      WBC 05/17/2021 5.8      RBC Count 05/17/2021 3.71*     Hemoglobin 05/17/2021 12.0      Hematocrit 05/17/2021 37.8      MCV 05/17/2021 102*     MCH 05/17/2021 32.3      MCHC 05/17/2021 31.7*     RDW 05/17/2021 12.8      Platelet Count 05/17/2021 259      Mean Platelet Volume 05/17/2021 9.3      % Neutrophils 05/17/2021 62      % Lymphocytes 05/17/2021 25      % Monocytes 05/17/2021 10      % Eosinophils 05/17/2021 2      % Basophils 05/17/2021 1      % Immature Granulocytes 05/17/2021 1*     Absolute Neutrophils 05/17/2021 3.6      Absolute Lymphocytes 05/17/2021 1.5      Absolute Monocytes 05/17/2021 0.6      Eosinophils Absolute 05/17/2021 0.1      Absolute Basophils 05/17/2021 0.0      Absolute Immature Granul* 05/17/2021 0.0      Vitamin B12 05/17/2021 1,062*     Folic Acid 05/17/2021 16.8      BNP 05/17/2021 169*        Total time spent for face to face visit, reviewing labs/imaging studies, counseling and coordination of care was: 1 Hour spent on the date of the encounter doing chart review, review of outside records, review of test results, interpretation of tests, patient visit and documentation       This note was dictated using voice recognition software.  Any grammatical or context distortions are unintentional and inherent to the software.    Orders Placed This Encounter   Procedures     Folate     Homocysteine     Methylmalonic Acid     RPR with Rflx to Titer and Confirm ([facility])     TSH  reflex to T4 ([facility])     Vitamin B12      New Prescriptions    DONEPEZIL (ARICEPT) 5 MG TABLET    Take 1 tablet (5 mg) by mouth At Bedtime      Modified Medications    No medications on file              Again, thank you for allowing me to participate in the care of your patient.        Sincerely,        Yoni Fam MD

## 2021-07-13 NOTE — TELEPHONE ENCOUNTER
Pt's daughter called re Aricept Rx. It was not sent in today. Please send to [facility] in [facility].

## 2021-07-13 NOTE — PROGRESS NOTES
NEUROLOGY CONSULTATION NOTE       [facility] NEUROLOGY [employer]  [address] [place] [address]  Tel: [phone]  Fax: [phone]  [email or web address]     Ellis Em,  1931, MRN [id number]  PCP: Whitney Velasquez  Date: 2021     ASSESSMENT & PLAN     Diagnosis code  1. Neurocognitive disorder  2. Sensorimotor axonal polyneuropathy     Neurocognitive disorder  89-year-old female with history of HTN, HLD, hypothyroidism, osteoporosis, cervical stenosis status post C1-C2 laminoplasty who was referred for evaluation of sudden decline in her cognition.  MRI of the brain shows small vessel ischemic disease and she scored 20/30 on Errol cognitive assessment today.  I suspect patient has vascular dementia made worse with medication (baclofen, Wellbutrin).  She is scheduled for neuropsychological testing but I have recommended starting her on Aricept 5 mg daily pending work-up.  Additionally, I am ordering folate, homocystine, methylmalonic acid level, RPR, TSH and vitamin B12.  I will see her back in follow-up in 2 months when I plan on increasing the dose of Aricept.  I have also told her to minimize the use of baclofen that could be playing a role in her symptomatology.    Right lumbar radiculopathy  There has been a sudden decline in her ability to ambulate and has increased weakness on the right leg.  Recent work-[facility] at [facility] included EMG of the lower extremity that suggest mixed sensorimotor polyneuropathy.  MRI of the lumbar spine shows multilevel radiculopathy mostly at the right L3 and L4.  This nerve root impingement likely is the etiology of her right leg weakness and if therapy alone is not helpful we can consider epidural injection at right L4-L5 and right L3-L4 although my preference would be to maximize physical therapy before attempting epidural in this elderly lady.  Follow-up will be in 2 months.    Thank you again for this referral, please feel free  to contact me if you have any questions.    Yoni Fam MD  [facility]  (Formerly, [facility]     REASON FOR CONSULTATION Memory Loss and Extremity Weakness        HISTORY OF PRESENT ILLNESS     We have been requested by Dr. Velasquez to evaluate Ellis Em who is a 89 year old  female for cognitive decline and gait difficulty    Patient is 89-year-old female with history of hypertension, hyperlipidemia, hypothyroidism, osteoporosis cervical stenosis status post C1-C2 laminoplasty who was referred for evaluation of cognitive decline along with gait difficulty.  According to daughter patient had a fall in May 2021 hitting her head.  She also complained of right leg weakness and had CT of the head and cervical spine that did not show any fracture or high-grade stenosis.  Subsequently MRI was done that showed mild to moderate small vessel ischemic changes.  No high-grade stenosis noted in bilateral carotids.  Daughter reports there was sudden decline in her cognition after that fall and she was complaining of fatigue.  She was found to have UTI and treated with antibiotic and according to daughter her mental status improved.  However afterwards she started having increased memory difficulty that tends to be worse when she is fatigued.  She was seen at [facility] and had EMG of the lower extremity that suggested length dependent large fiber symmetric sensorimotor polyneuropathy.  She also had MRI of the lumbar spine that showed severe right L4 and bilateral L3 radiculopathy.  Due to her frequent falls she was told not to get up and is mostly wheelchair-bound.  She denies any bowel or bladder incontinence.  There is no history of any visual or auditory hallucinations.  She lives alone but is able to do her activities of daily living and her daughter visits her on a daily basis.  She has not done anything that puts her or other people at risk.     PROBLEM LIST    Patient Active Problem List   Diagnosis Code     Hypothyroidism E03.9     Major depressive disorder, recurrent episode, in full remission (H) F33.42     Idiopathic osteoporosis M81.8     Adrenal mass, left (H) E27.8     Lumbar stenosis with neurogenic claudication M48.062     Essential hypertension I10     Generalized osteoarthritis of multiple sites M15.9     Polyneuropathy, peripheral sensorimotor axonal G60.8     Allergic rhinitis J30.9     Tinnitus H93.19     Macular degeneration mild  H35.30     Breast implant rupture bilateral  T85.43XA     ACP (advance care planning) Z71.89     Actinic keratosis L57.0     Cyst of ovary N83.209     Hearing loss H91.90     Hyperlipidemia E78.5     Malignant neoplasm of skin of parts of face C44.309     Neurogenic bladder N31.9     Antalgic gait R26.89     Cervical spondylosis with myelopathy M47.12         PAST MEDICAL & SURGICAL HISTORY     Past Medical History:   Patient  has a past medical history of Bowel obstruction (H) (1974), C1 cervical fracture (H) (10/12/2015), Cancer (H) (breast, 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, salivary gland), Cancer of skin, Carpal tunnel syndrome, Cervical spondylosis with myelopathy (4/25/2013), Chronic pain, Closed fracture of cervical vertebra (H) (10/21/2015), Closed fracture of lumbar vertebra (H) (1/21/2015), Cyst of spinal meninges, Dermatitis, Disorders of meninges, not elsewhere classified, Gastric ulcer (1983), Hip fracture (H) (2007), History of adrenal adenoma (5/25/2021), History of appendectomy, History of fractured pelvis (1985), Hyponatremia, Hypothyroidism, Leg pain, bilateral, Major depression, Miscarriage (1962), Neck fracture (H) (09/10/2015), Nephrolithiasis, Osteoarthritis, Osteoporosis, Panic attack, Peripheral neuropathy, Reactive confusion, Renal insufficiency, Rib fracture (2008), Shingles, Status post lumbar laminectomy (11/7/2014), UTI (urinary tract infection), and Varicella (7/14/2004).    Surgical History:  She  has a past surgical  history that includes Pr Arthroplasty Tibial Plateau (Left); Salivary gland surgery (12/05/1984); Cataract Extraction, Bilateral; Cholecystectomy (06/30/1983); other surgical history; Total Hip Arthroplasty (Bilateral); Ovarian Cyst Removal (1943); Abdomen surgery (06/30/1983); joint replacement; fracture surgery (1985); Cervical Laminoplasty (Bilateral, 5/2/2013); Mastectomy (Bilateral); Lumbar Laminectomy (N/A, 11/6/2014); QUIGLEY W/O FACETEC FORAMOT/DSKC 1/2 VRT SEG, LUMBAR; appendectomy (1943); tubal ligation (1962); Revise reconstructed breast (Bilateral, 11/11/1984); Endometrial Biopsy (09/09/1986); Dilation and curettage (09/19/1986); Combined Mammaplasty Revision W/ Abdominoplasty (07/1998); Root Canal (09/14/1998); Skin Cancer Excision (08/15/2003); Total Knee Arthroplasty (10/20/2005); and Tumor Excision (05/01/2013).     SOCIAL HISTORY     Reviewed, and she  reports that she has never smoked. She has never used smokeless tobacco. She reports that she does not drink alcohol and does not use drugs.     FAMILY HISTORY     Reviewed, and family history includes Alcoholism in her brother; Cancer in her mother; Dementia in her brother; Diabetes in her brother; Kidney Disease in her father.     ALLERGIES     Allergies   Allergen Reactions     Iodinated Contrast Media [Diagnostic X-Ray Materials] Hives     Adhesive [Mecrylate] Unknown     Adhesive tape     Amlodipine Unknown     drowsiness     Asparaginase Unknown     Atorvastatin Unknown     PN: LW Reaction: arthalgia     Ciprofloxacin Unknown     Codeine Unknown     Latex Unknown     PN: Converted from LW Latex Sensitivity Flag     Lisinopril Cough     Pravastatin Unknown     Propoxyphene N-Acetaminophen [Propoxyphene N-Apap] Unknown     PN: LW Reaction: hallucinations     Sulfa (Sulfonamide Antibiotics) [Sulfa Drugs] Unknown     Albuterol Unknown     Other reaction(s): Urinary Retention         REVIEW OF SYSTEMS     A 12 point review of system was performed and was  negative except as outlined in the history of present illness.     HOME MEDICATIONS     Current Outpatient Rx   Medication Sig Dispense Refill     alpha lipoic acid 200 mg cap [ALPHA LIPOIC ACID 200 MG CAP] Take 1 capsule by mouth 2 (two) times a day.       baclofen (LIORESAL) 10 MG tablet [BACLOFEN (LIORESAL) 10 MG TABLET] TAKE 1 TABLET(10 MG) BY MOUTH THREE TIMES DAILY 270 tablet 1     buPROPion (WELLBUTRIN SR) 150 MG 12 hr tablet [BUPROPION (WELLBUTRIN SR) 150 MG 12 HR TABLET] TAKE 1 TABLET BY MOUTH EVERY DAY 90 tablet 2     cranberry fruit extract (CRANBERRY ORAL) [CRANBERRY FRUIT EXTRACT (CRANBERRY ORAL)] Take by mouth.       diclofenac sodium (VOLTAREN) 1 % Gel [DICLOFENAC SODIUM (VOLTAREN) 1 % GEL] APPLY 2 GRAMS OF GEL TO UPPER RIGHT SHOULDER FOUR TIMES DAILY AS NEEDED FOR PAIN 100 g 0     donepezil (ARICEPT) 5 MG tablet Take 1 tablet (5 mg) by mouth At Bedtime 30 tablet 1     ferrous sulfate 325 (65 FE) MG tablet [FERROUS SULFATE 325 (65 FE) MG TABLET] Take 1 tablet (325 mg total) by mouth daily with breakfast. 90 tablet 2     FLUoxetine (PROZAC) 10 MG capsule [FLUOXETINE (PROZAC) 10 MG CAPSULE] TAKE 3 CAPSULES BY MOUTH DAILY 270 capsule 3     gabapentin (NEURONTIN) 100 MG capsule [GABAPENTIN (NEURONTIN) 100 MG CAPSULE] 1 capsule in the morning, 2 capsules in the afternoon and 1 capsule in the evening. 270 capsule 5     levothyroxine (SYNTHROID, LEVOTHROID) 75 MCG tablet [LEVOTHYROXINE (SYNTHROID, LEVOTHROID) 75 MCG TABLET] TAKE 1 TABLET BY MOUTH DAILY 90 tablet 3     losartan (COZAAR) 50 MG tablet [LOSARTAN (COZAAR) 50 MG TABLET] TAKE 1 TABLET BY MOUTH TWICE DAILY 180 tablet 3     melatonin 10 mg Tab [MELATONIN 10 MG TAB] Take 10 mg by mouth bedtime.       omega-3 fatty acids 500 mg cap [OMEGA-3 FATTY ACIDS 500 MG CAP] Take by mouth.       omeprazole (PRILOSEC) 20 MG capsule [OMEPRAZOLE (PRILOSEC) 20 MG CAPSULE] TAKE 1 CAPSULE(20 MG) BY MOUTH DAILY BEFORE BREAKFAST 90 capsule 4     ondansetron (ZOFRAN-ODT)  "4 MG disintegrating tablet [ONDANSETRON (ZOFRAN-ODT) 4 MG DISINTEGRATING TABLET] DISSOLVE 1 TABLET(4 MG) ON THE TONGUE EVERY 8 HOURS AS NEEDED FOR NAUSEA 30 tablet 1     predniSONE (DELTASONE) 1 MG tablet [PREDNISONE (DELTASONE) 1 MG TABLET] Take 2 mg by mouth daily. 60 tablet 0     predniSONE (DELTASONE) 5 MG tablet [PREDNISONE (DELTASONE) 5 MG TABLET] Take 5 mg by mouth daily before breakfast. 30 tablet 0     STRONTIUM CHLORIDE HEXAHYDRATE (STRONTIUM CHL HEXAHYD, BULK,) 100 % Kailyn [STRONTIUM CHLORIDE HEXAHYDRATE (STRONTIUM CHL HEXAHYD, BULK,) 100 % KAILYN] Use As Directed.       VIT C/E/ZN/COPPR/LUTEIN/ZEAXAN (PRESERVISION AREDS 2 ORAL) [VIT C/E/ZN/COPPR/LUTEIN/ZEAXAN (PRESERVISION AREDS 2 ORAL)] Take 1 tablet by mouth 2 (two) times a day.            PHYSICAL EXAM     Vital signs  /56 (BP Location: Left arm, Patient Position: Sitting)   Ht 1.651 m (5' 5\")   Wt 64.4 kg (142 lb)   BMI 23.63 kg/m      Weight:   142 lbs 0 oz  [place]ner Cognitive Assessment:    Errol Cognitive Assessment (MOCA)  Visuospatial/Executive : 5  Naming: 3  Attention - Digits: 2  Attention - Letters: 0  Attention - Subtraction: 0  Language - Repeat: 2  Language - Fluency : 0  Abstraction: 2  Delayed Recall: 2  Orientation: 4  Education: 0  MOCA Score: 20  Administered by: : Joanie Lopez CMA     Errol Cognitive Assessment Score:  MOCA Score: 20/30.    Patient is alert and oriented x4 in no acute distress. Vital signs were reviewed and are documented in electronic medical record. Neck was supple, no carotid bruits, thyromegaly, JVD, or lymphadenopathy was noted.   NEUROLOGY EXAM:    Patient s speech was normal with no aphasia or dysarthria. Mentation, and affect were also normal.      Cranial nerves II -XII were intact except she is hard of hearing    Patient has decreased mass with normal tone strength in upper extremity 5/5 in the right lower extremity 4+/5 in the left lower extremity 5 -/5    Sensation was creased to light " touch pinprick below mid thighs    Reflexes were 1+ symmetrical in upper extremity absent in the lower extremity    minimal dysmetria noted on finger-nose testing    Gait testing was deferred as patient is wheelchair-bound     DIAGNOSTIC STUDIES     PERTINENT RADIOLOGY  Following imaging studies were reviewed:     CT HEAD & CERVICAL SPINE 5/20/2021  HEAD CT:  1.  No hemorrhage, mass, or mass effect.  2.  Ectasia of right supraclinoid ICA.  3.  Moderate presumed chronic small vessel ischemia.  4.  Moderate atrophy.     CERVICAL SPINE CT:  1.  No definite fracture.  2.  New moderate left paracentral disc protrusion at C3-C4.  3.  Underlying degenerative and postoperative changes, as described.    MRI, MRA BRAIN 5/29/2021  HEAD MRI:   1.  No acute infarct, acute intracranial hemorrhage, or intracranial mass.  2.  Mild to moderate burden presumed sequela of chronic small vessel ischemic disease involving the cerebral white matter and aníbal.  3.  Complete opacification of the left mastoid air cells and middle ear cavity.     HEAD MRA:   1.  No aneurysm, high flow AVM or significant stenosis identified.     NECK MRA:  1.  No significant stenosis of the bilateral internal carotid arteries based on NASCET criteria.    MRI LUMBAR SPINE 5/29/2021  1.  Moderate to advanced multilevel lumbar spondylitic changes with evidence of prior laminectomies at L1-L2 through L5-S1. There is no significant spinal canal stenosis in the lumbar region.  2.  At L5-S1, there is moderate right and severe left neural foraminal stenosis.  3.  At L4-L5, there is severe right neural foraminal stenosis with deformity of the exiting right L4 nerve root.  4.  At L3-L4, there is a moderate circumferential disc bulge with severe bilateral neural foraminal stenosis and right lateral recess narrowing.  5.  At L1-L2, there is severe bilateral neural foraminal stenosis.  6.  Moderate rightward convex curvature of the lumbar spine with the apex at L2.    MRI  CERVICAL SPINE 10/16/2020  1.  Postsurgical changes C1 and C2 laminoplasty with hardware in the left and associated susceptibility. This is better appreciated on prior cervical spine CT 05/04/2016.  2.  Interbody fusion at C4/C5 with associated kyphosis, unchanged.  3.  Small broad bar disc osteophyte complexes contributing to mild spinal canal narrowing at C3/C4-C6/C7. No significant spinal canal narrowing at any other level within the cervical spine.  4.  Multilevel uncovertebral joint disease and facet arthropathy with mild multilevel neural foraminal narrowing at C3/C4-C5/C6 and T1/T2.    EMG 7/1/2021  This is an abnormal study.  1.  Reduced and absent sensory amplitudes in both lower extremities symmetrically.  These finding may represent either a length dependent large fiber relatively symmetric sensorimotor axonal polyneuropathy or alternatively can be seen as an age-related change for a person in this age group  2.  Superimposed chronic L4-S1 polyradiculopathy is suspected with ongoing denervation noted in extensor hallucis longus muscle which could be secondary to radiculopathy versus active polyneuropathy.  Correlation with lumbar spine imaging is recommended     PERTINENT LABS  Following labs were reviewed:  Ambulatory - [facility] on 06/03/2021   Component Date Value     Color Urine 06/03/2021 Yellow      Appearance Urine 06/03/2021 Clear      Glucose Urine 06/03/2021 Negative      Protein Albumin Urine 06/03/2021 Negative      Bilirubin Urine 06/03/2021 Negative      Urobilinogen Urine 06/03/2021 0.2 E.U./dL      pH Urine 06/03/2021 7.0      Blood Urine 06/03/2021 Negative      Ketones Urine 06/03/2021 Negative      Nitrite Urine 06/03/2021 Negative      Leukocyte Esterase Urine 06/03/2021 Negative      Specific Gravity Urine 06/03/2021 1.015    Infusion - [facility] on 05/28/2021   Component Date Value     CK 05/28/2021 101      Aldosterone 05/28/2021 5.4      Cortisol, Pre-dose 05/28/2021 7.3       Cortisol 30 min Post-dose 05/28/2021 22.8      Cortisol 60 min Post-dose 05/28/2021 26.5      Time of Cosyntropin Inje* 05/28/2021 0000      Sodium 05/28/2021 137      Potassium 05/28/2021 3.8      Chloride 05/28/2021 102      Carbon Dioxide (CO2) 05/28/2021 25      Anion Gap 05/28/2021 10      Glucose 05/28/2021 91      Calcium 05/28/2021 8.9      Urea Nitrogen 05/28/2021 22      Creatinine 05/28/2021 0.75      GFR Estimate If Black 05/28/2021 >60      GFR Estimate 05/28/2021 >60    Ambulatory - [facility] on 05/19/2021   Component Date Value     Color Urine 05/19/2021 Yellow      Appearance Urine 05/19/2021 Slightly Cloudy*     Glucose Urine 05/19/2021 Negative      Protein Albumin Urine 05/19/2021 Negative      Bilirubin Urine 05/19/2021 Negative      Urobilinogen Urine 05/19/2021 0.2 E.U./dL      pH Urine 05/19/2021 7.0      Blood Urine 05/19/2021 Negative      Ketones Urine 05/19/2021 Negative      Nitrite Urine 05/19/2021 Positive*     Leukocyte Esterase Urine 05/19/2021 Negative      Specific Gravity Urine 05/19/2021 1.015      RBC Urine 05/19/2021 0-2      WBC Urine 05/19/2021 0-5      Bacteria Urine 05/19/2021 Many*     Squamous Epithelials Uri* 05/19/2021 0-5      Transitional Epithelials* 05/19/2021 0-5*     Culture 05/19/2021 ESCHERICHIA COLI*   Office Visit - [facility] on 05/17/2021   Component Date Value     TSH 05/17/2021 1.45      Sodium 05/17/2021 137      Potassium 05/17/2021 3.9      Chloride 05/17/2021 101      Carbon Dioxide (CO2) 05/17/2021 24      Anion Gap 05/17/2021 12      Glucose 05/17/2021 99      Urea Nitrogen 05/17/2021 20      Creatinine 05/17/2021 0.66      GFR Estimate If Black 05/17/2021 >60      GFR Estimate 05/17/2021 >60      Bilirubin Total 05/17/2021 0.4      Calcium 05/17/2021 8.8      Protein Total 05/17/2021 6.3      Albumin 05/17/2021 3.6      Alkaline Phosphatase 05/17/2021 83      AST 05/17/2021 25      ALT 05/17/2021 35      Iron 05/17/2021 87      Transferrin  05/17/2021 291      Transferrin Saturation, * 05/17/2021 24      Transferrin IBC, Calcula* 05/17/2021 363      Ferritin 05/17/2021 28      Erythrocyte Sedimentatio* 05/17/2021 7      CRP 05/17/2021 <0.1      Vitamin B1 Whole Blood L* 05/17/2021 105      WBC 05/17/2021 5.8      RBC Count 05/17/2021 3.71*     Hemoglobin 05/17/2021 12.0      Hematocrit 05/17/2021 37.8      MCV 05/17/2021 102*     MCH 05/17/2021 32.3      MCHC 05/17/2021 31.7*     RDW 05/17/2021 12.8      Platelet Count 05/17/2021 259      Mean Platelet Volume 05/17/2021 9.3      % Neutrophils 05/17/2021 62      % Lymphocytes 05/17/2021 25      % Monocytes 05/17/2021 10      % Eosinophils 05/17/2021 2      % Basophils 05/17/2021 1      % Immature Granulocytes 05/17/2021 1*     Absolute Neutrophils 05/17/2021 3.6      Absolute Lymphocytes 05/17/2021 1.5      Absolute Monocytes 05/17/2021 0.6      Eosinophils Absolute 05/17/2021 0.1      Absolute Basophils 05/17/2021 0.0      Absolute Immature Granul* 05/17/2021 0.0      Vitamin B12 05/17/2021 1,062*     Folic Acid 05/17/2021 16.8      BNP 05/17/2021 169*        Total time spent for face to face visit, reviewing labs/imaging studies, counseling and coordination of care was: 1 Hour spent on the date of the encounter doing chart review, review of outside records, review of test results, interpretation of tests, patient visit and documentation       This note was dictated using voice recognition software.  Any grammatical or context distortions are unintentional and inherent to the software.    Orders Placed This Encounter   Procedures     Folate     Homocysteine     Methylmalonic Acid     RPR with Rflx to Titer and Confirm ([facility])     TSH reflex to T4 ([facility])     Vitamin B12      New Prescriptions    DONEPEZIL (ARICEPT) 5 MG TABLET    Take 1 tablet (5 mg) by mouth At Bedtime      Modified Medications    No medications on file

## 2021-07-13 NOTE — NURSING NOTE
ABHIJEET COGNITIVE ASSESSMENT (MOCA)  Version 7.1 Original Version  VISUOSPATIAL/EXECUTIVE               COPY CUBE      [ 1   ]                                [ 1   ] DRAW CLOCK (Ten past eleven)  (3 points)    [  1  ]                    [ 1   ]               [1    ]       Contour            Numbers     Hands POINTS                 5  / 5   NAMING    [1   ]                                                                        [  1  ]                                             [  1  ]  Liderrell Starks                                Camel                  3   / 3   MEMORY Read list of words, subject must repeat them. Do 2 trials, even if 1st trial is successful. Do a recall after 5 minutes  FACE VELVET Hindu CARRIE RED No Points    1st x x        2nd x x x x x    ATTENTION Read list of digits (1 digit/sec) Subject has to repeat in the forward order       [  1  ]   2  1  8  5  4                                [ 1   ] 7 4 2                       2   /2   Read list of letters. The subject must tap with his hand at each letter A. No points if > 2 errors.  [   0 ] F B A C M N A A J K L B A F A K D E A A A J A M O F A A B            0  /1   Serial 7 subtraction starting at 100          0[    ] 93         [    ] 86          [    ] 79          [    ] 72         [    ] 65   4 or 5 correct subtractions: 3 points,  2 or 3 correct: 2 points,  1correct: 1 point,   0 correct: 0 points          0  /3   LANGUAGE Repeat: I only know that Ryan is the one to help today. [  1   ]                                      The cat always hid under the couch when dogs were in the room. [ 1  ]           2    /2   Fluency: Name maximum number of words in one minute that begin with the letter F                                                                                                                    [ 0   ] __5_ (N > 11 words)              0 /1   ABSTRACTION Similarity  between e.g. banana-orange=fruit                                                                   [  1  ] train-bicycle                      [  1  ] watch-ruler           2   /2   DELAYED  RECALL Has to recall words  WITH NO CUE FACE  [    ] VELVET  [  1  ] Orthodoxy  [1    ]  CARRIE  [    ] RED  [    ] Points for UNCUED recall only          2  /5           OPTIONAL Category cue    1 1      Multiple choice cue          ORIENTATION  [ 0   ] Date     [   1 ] Month       [  1  ] Year      [  1  ] Day      [ 0   ] Place        [  1  ] City        4  /6   TOTAL  Normal > 26/30 Add 1 point if < 12 years education     20   /30

## 2021-07-14 ENCOUNTER — LAB (OUTPATIENT)
Dept: LAB | Facility: HOSPITAL | Age: 86
End: 2021-07-14
Payer: MEDICARE

## 2021-07-14 DIAGNOSIS — R41.89 COGNITIVE CHANGES: ICD-10-CM

## 2021-07-14 DIAGNOSIS — E03.9 HYPOTHYROIDISM, UNSPECIFIED TYPE: ICD-10-CM

## 2021-07-14 DIAGNOSIS — R41.9 NEUROCOGNITIVE DISORDER: ICD-10-CM

## 2021-07-14 DIAGNOSIS — R40.0 UNCONTROLLED DAYTIME SOMNOLENCE: Primary | ICD-10-CM

## 2021-07-14 DIAGNOSIS — M62.81 GENERALIZED MUSCLE WEAKNESS: ICD-10-CM

## 2021-07-14 LAB
FOLATE SERPL-MCNC: 16.2 NG/ML
TSH SERPL DL<=0.005 MIU/L-ACNC: 1.72 UIU/ML (ref 0.3–5)
VIT B12 SERPL-MCNC: 1335 PG/ML (ref 213–816)

## 2021-07-14 PROCEDURE — 84443 ASSAY THYROID STIM HORMONE: CPT

## 2021-07-14 PROCEDURE — 83921 ORGANIC ACID SINGLE QUANT: CPT

## 2021-07-14 PROCEDURE — 86780 TREPONEMA PALLIDUM: CPT | Mod: GZ

## 2021-07-14 PROCEDURE — 36415 COLL VENOUS BLD VENIPUNCTURE: CPT

## 2021-07-14 PROCEDURE — 82607 VITAMIN B-12: CPT

## 2021-07-14 PROCEDURE — 82746 ASSAY OF FOLIC ACID SERUM: CPT

## 2021-07-14 PROCEDURE — 83090 ASSAY OF HOMOCYSTEINE: CPT

## 2021-07-15 ENCOUNTER — TELEPHONE (OUTPATIENT)
Dept: FAMILY MEDICINE | Facility: CLINIC | Age: 86
End: 2021-07-15

## 2021-07-15 LAB — T PALLIDUM AB SER QL: NEGATIVE

## 2021-07-15 NOTE — TELEPHONE ENCOUNTER
Nae from [facility] is calling to get verbal order for continued occupational therapy.  Starting next week, twice a week for 2 more weeks.  Please call verbal to [phone].  Line is confidential, ok to leave detailed message.

## 2021-07-19 ENCOUNTER — TRANSFERRED RECORDS (OUTPATIENT)
Dept: HEALTH INFORMATION MANAGEMENT | Facility: CLINIC | Age: 86
End: 2021-07-19

## 2021-07-20 DIAGNOSIS — F33.42 MAJOR DEPRESSIVE DISORDER, RECURRENT EPISODE, IN FULL REMISSION (H): ICD-10-CM

## 2021-07-20 NOTE — TELEPHONE ENCOUNTER
Pending Prescriptions:                       Disp   Refills    buPROPion (WELLBUTRIN SR) 150 MG 12 hr ta*90 tab*2            Sig: [BUPROPION (WELLBUTRIN SR) 150 MG 12 HR TABLET]           TAKE 1 TABLET BY MOUTH EVERY DAY

## 2021-07-22 LAB — METHYLMALONATE SERPL-SCNC: 0.2 UMOL/L (ref 0–0.4)

## 2021-07-22 NOTE — LETTER
Letter by Whitney Velasquez DO at      Author: Whitney Velasquez DO Service: -- Author Type: --    Filed:  Encounter Date: 6/19/2021 Status: (Other)         Yoni Fam MD  [address] [place] [address]                                  July 8, 2021    Patient: Ellis Em   MR Number: [id number]   YOB: 1931         Dear Dr. Sarwat MD:    Thank you for seeing my patient  Ellis Em on July 13,2021 for evaluation of acute cognitive decline and unsteady gait.  Given the extensive history I have had with this patient and the concerns of the family they have asked that I personally contact you, and give you context into her baseline functioning and events leading up to her decline.     I had previously seen her in February 2021.  Although she is 89 years old she is very sharp and has always had very good memory and recall.  What she has struggled most with is chronic polyarthralgia and low back pain but never memory.     She received vaccinations for COVID-19 at the end of February after I had seen her and early March and approximately 2 to 4 weeks later is when she went to [facility] for joint injections into her shoulder and fingers.  She received 4  all at the same time.    Her two daughter Rosalva and Taisha who are very active in her care noted that it was then that they noticed acute decline in her mental functioning- was falling asleep all the time during the day, seemed to have memory concerns with repeating herself or confusion, and had acute gait instability.   Prior to that time she was in independent living in a senior facility and ambulating with a walker. She is now in a wheelchair and cannot even coordinate her walking.     I had seen her on 5/17/2021 for concerns of daytime somnolence and weakness and did a full lab work-up on her that was unrevealing.  Several days later she was brought into the emergency room on 5/20/2021 for unsteady gait leading to fall and  confusion.    Her work-up has continued including cosyntropin stimulation testing that was unremarkable in the event that potentially her for injections of steroids led to acute adrenal insufficiency. We even trialed a course of prednisone to see if that would help and are in the process of weaning.  She has had normal MRI work-up of the brain and fairly unrevealing MRI of her lumbar spine that did not elicit anything other than chronic findings that we were already aware of. You should be able to see this in [facility] care everywhere.    She was referred to neurology and since she could not get an appointment with you until July she was  able to secure an appointment at [facility] where she was seen on 6/9/2021.  Her family was not happy with this appointment and felt dismissed and that their concerns were not addressed. There was no addressing of the cognitive concerns and somnolence and only her gait.  They are afraid that she is just being looked at as a 89-year-old woman who has normal decline for her age when in fact all of this was rather sudden and they of concerned that there is something that we are missing that could potentially be reversible. We have ordered neurocognitive testing but they are booked out until December.  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 We will try to send a copy of their reports.       if you have questions, please do not hesitate to call me. I look forward to following Ellis along with you.    Sincerely,        Whitney Velasquez, DO  Cell [phone]

## 2021-07-25 RX ORDER — BUPROPION HYDROCHLORIDE 150 MG/1
TABLET, EXTENDED RELEASE ORAL
Qty: 90 TABLET | Refills: 2 | Status: SHIPPED | OUTPATIENT
Start: 2021-07-25 | End: 2022-04-20

## 2021-07-25 NOTE — TELEPHONE ENCOUNTER
"Routing refill request to provider for review/approval because:  Labs not current:  PHQ-9  Last OV > 6 months    Last Written Prescription Date:  3/27/21  Last Fill Quantity: 90,  # refills: 2   Last office visit provider:  5/25/21     Requested Prescriptions   Pending Prescriptions Disp Refills     buPROPion (WELLBUTRIN SR) 150 MG 12 hr tablet 90 tablet 2     Sig: [BUPROPION (WELLBUTRIN SR) 150 MG 12 HR TABLET] TAKE 1 TABLET BY MOUTH EVERY DAY       SSRIs Protocol Failed - 7/20/2021  2:19 PM        Failed - PHQ-9 score less than 5 in past 6 months     Please review last PHQ-9 score.           Failed - Recent (6 mo) or future (30 days) visit within the authorizing provider's specialty     Patient had office visit in the last 6 months or has a visit in the next 30 days with authorizing provider or within the authorizing provider's specialty.  See \"Patient Info\" tab in inbasket, or \"Choose Columns\" in Meds & Orders section of the refill encounter.            Passed - Medication is Bupropion     If the medication is Bupropion (Wellbutrin), and the patient is taking for smoking cessation; OK to refill.          Passed - Medication is active on med list        Passed - Patient is age 18 or older        Passed - No active pregnancy on record        Passed - No positive pregnancy test in last 12 months             roly moscoso RN 07/25/21 8:49 AM  "

## 2021-08-01 ENCOUNTER — MYC MEDICAL ADVICE (OUTPATIENT)
Dept: FAMILY MEDICINE | Facility: CLINIC | Age: 86
End: 2021-08-01

## 2021-08-02 ENCOUNTER — TELEPHONE (OUTPATIENT)
Dept: ENDOCRINOLOGY | Facility: CLINIC | Age: 86
End: 2021-08-02

## 2021-08-02 NOTE — TELEPHONE ENCOUNTER
I called the pt's daughter and informed that it appears the pt had her repeat DXA too soon. She stated that DEMAR Paulson thought she should have one, as part of the study she was in for foreto (?). I informed I will discuss with DEMAR Paulson and contact her back. The DXA order does state change in therapy, however the notes do not correlate with that being the case. I will have DEMAR Paulson review and advise.

## 2021-08-02 NOTE — TELEPHONE ENCOUNTER
Rosalva-daughter called. Patient is having difficulty with getting her insurance to pay for her last DXA scan.   Please call Rosalva to further discuss on how to get the insurance company to pay for the DXA scan.    Rosalva @ [phone]

## 2021-08-02 NOTE — TELEPHONE ENCOUNTER
"I am unsure why she scheduled the Dexa Scan for now - my note said, \"see her back in another year with a Dexa Scan\".  She was supposed to get the Dexa Scan next year, not this year.   "

## 2021-08-07 ENCOUNTER — NURSE TRIAGE (OUTPATIENT)
Dept: NURSING | Facility: CLINIC | Age: 86
End: 2021-08-07

## 2021-08-07 DIAGNOSIS — N39.0 URINARY TRACT INFECTION WITHOUT HEMATURIA, SITE UNSPECIFIED: Primary | ICD-10-CM

## 2021-08-07 RX ORDER — NITROFURANTOIN 25; 75 MG/1; MG/1
100 CAPSULE ORAL 2 TIMES DAILY
Qty: 10 CAPSULE | Refills: 0 | Status: SHIPPED | OUTPATIENT
Start: 2021-08-07 | End: 2021-09-02

## 2021-08-07 NOTE — TELEPHONE ENCOUNTER
Pt's daughter Rosalva is calling. Consent on file.    Script for a UTI was sent in. She is on the last dose of Cephalexin today, but she is still having symptoms.  Urinary urgency. Some confusion.  She gets confused when she has a UTI and she is having some confusion now.  She is requesting another prescription for a different antibiotic.  Has had 3 antibiotics in the last 3 months.  No urine was done with this last one.   Last urine was 06/03/2021 and was negative.  Last course of antibiotics before this did help with her cognition. She did get better.  She is still having urinary urgency and frequency. Some confusion today as well.  Denies fever, dysuria, new back or flank pain.  I advised her that I can page the on call physician and then call her back.    Allergy: Cipro, Sulfa.    7:14pm-Call to Lavern Sorto, who is on call for Dr Velasquez.  Ok to send in another round of different antibiotics. If not any better by Monday, she should be seen in clinic and urine be checked.   Ok for Macrobid 100 mg twice a day for 5 days.  [facility].     7:17pm-Call back to Rosalva, her daughter. I advised her that we will send in the RX for Macrobid. May start that tomorrow AM when able to . If not getting any better in the next 48 hours, or if worsens, then call back for appointment or speak to one of the nurses. She verbalized understanding.    Reason for Disposition    [1] Taking antibiotic > 72 hours (3 days) for UTI AND [2] painful urination or frequency not improved    Additional Information    Negative: Shock suspected (e.g., cold/pale/clammy skin, too weak to stand, low BP, rapid pulse)    Negative: Sounds like a life-threatening emergency to the triager    Negative: Urinary tract infection suspected, but not taking antibiotics    Negative: [1] Unable to urinate (or only a few drops) > 4 hours AND     [2] bladder feels very full (e.g., palpable bladder or strong urge to urinate)    Negative:  Passing pure blood or large blood clots (i.e., size > a dime)  (Exceptions: flecks, small strands, or pinkish-red color)    Negative: Fever > 103 F (39.4 C)    Negative: Patient sounds very sick or weak to the triager    Negative: [1] SEVERE pain (e.g., excruciating) AND [2] no improvement 2 hours after pain medications    Negative: [1] Fever > 100.0 F (37.8 C) AND [2] new onset since starting antibiotics    Negative: [1] Side (flank) or lower back pain AND [2] new onset since starting antibiotics    Negative: [1] Taking antibiotic > 24 hours for UTI AND [2] flank or lower back pain worsening    Negative: [1] Vomiting 2 or more times AND [2] interferes with taking oral antibiotic    Negative: [1] Taking antibiotic > 24 hours for UTI (urinary tract or bladder infection) AND [2] fever persists    Protocols used: URINARY TRACT INFECTION ON ANTIBIOTIC FOLLOW-UP CALL - WALKER-FELIPE-EDUIN Rayo RN  [facility] Nurse Advisor  8/7/2021 at 7:05 PM

## 2021-08-10 ENCOUNTER — TELEPHONE (OUTPATIENT)
Dept: FAMILY MEDICINE | Facility: CLINIC | Age: 86
End: 2021-08-10

## 2021-08-10 NOTE — TELEPHONE ENCOUNTER
Reason for Call:  Home Health Care    Linsey with [facility] called regarding (reason for call): orders    Orders are needed for this patient. PT, Skilled nursing    PT: order for reassessment       Skilled Nursinx/week for 3 weeks, 1 visit every other week for 6 weeks,  2 PRN visits for pain mgmt & symptom mgmt        Phone Number Homecare Nurse can be reached at: [phone]    Can we leave a detailed message on this number? YES    Phone number patient can be reached at: Other phone number:  na*    Best Time: na    Call taken on 8/10/2021 at 11:19 AM by Marianne Estrella

## 2021-08-12 ENCOUNTER — TELEPHONE (OUTPATIENT)
Dept: NEUROLOGY | Facility: CLINIC | Age: 86
End: 2021-08-12

## 2021-08-12 NOTE — TELEPHONE ENCOUNTER
Pt's daughter called to report two episodes since starting Aricept, one month ago. Pt has gotten up in the middle of the night to get dressed to go out. She is in her wheelchair. It was as if pt dreamt  that she needed to get ready to go out. Please call to discuss if any changes need to be made. Pt in still on 5mg Aricept. And due to refill. [phone]

## 2021-08-12 NOTE — TELEPHONE ENCOUNTER
Informed daughter, Rosalva, and she verbalized understanding  Joanie Lopez CMA on 8/12/2021 at 2:49 PM

## 2021-08-14 ENCOUNTER — LAB (OUTPATIENT)
Dept: LAB | Facility: HOSPITAL | Age: 86
End: 2021-08-14
Payer: MEDICARE

## 2021-08-14 DIAGNOSIS — R30.0 DYSURIA: ICD-10-CM

## 2021-08-14 LAB
ALBUMIN UR-MCNC: NEGATIVE MG/DL
APPEARANCE UR: CLEAR
BILIRUB UR QL STRIP: NEGATIVE
COLOR UR AUTO: COLORLESS
GLUCOSE UR STRIP-MCNC: NEGATIVE MG/DL
HGB UR QL STRIP: NEGATIVE
KETONES UR STRIP-MCNC: NEGATIVE MG/DL
LEUKOCYTE ESTERASE UR QL STRIP: NEGATIVE
NITRATE UR QL: NEGATIVE
PH UR STRIP: 6 [PH] (ref 5–7)
SP GR UR STRIP: 1.02 (ref 1–1.03)
UROBILINOGEN UR STRIP-MCNC: <2 MG/DL

## 2021-08-14 PROCEDURE — 81003 URINALYSIS AUTO W/O SCOPE: CPT

## 2021-08-16 DIAGNOSIS — Z53.9 DIAGNOSIS NOT YET DEFINED: Primary | ICD-10-CM

## 2021-08-16 PROCEDURE — G0179 MD RECERTIFICATION HHA PT: HCPCS | Performed by: FAMILY MEDICINE

## 2021-08-17 ENCOUNTER — TELEPHONE (OUTPATIENT)
Dept: FAMILY MEDICINE | Facility: CLINIC | Age: 86
End: 2021-08-17

## 2021-08-17 NOTE — TELEPHONE ENCOUNTER
Reason for Call:  Home Health Care    Caitie with [facility] called regarding (reason for call): verbal orders    Orders are needed for this patient. PT    PT: 1x/week for 1 week, 2x/week for 6 weeks to continue to work on strength, balance & mobility      Pt Provider: reporting pt fell on 8/16/21 in evening, no injuries     Phone Number Homecare Nurse can be reached at: [phone]    Can we leave a detailed message on this number? YES    Phone number patient can be reached at: Other phone number:  na*    Best Time: na    Call taken on 8/17/2021 at 12:10 PM by Marianne Estrella

## 2021-08-24 ENCOUNTER — MYC MEDICAL ADVICE (OUTPATIENT)
Dept: FAMILY MEDICINE | Facility: CLINIC | Age: 86
End: 2021-08-24

## 2021-08-26 NOTE — TELEPHONE ENCOUNTER
See Alyssa's message. Please keep an eye on my bucket for this form, and if not there, please call for it.

## 2021-09-02 ENCOUNTER — OFFICE VISIT (OUTPATIENT)
Dept: NEUROLOGY | Facility: CLINIC | Age: 86
End: 2021-09-02
Payer: MEDICARE

## 2021-09-02 VITALS
SYSTOLIC BLOOD PRESSURE: 140 MMHG | DIASTOLIC BLOOD PRESSURE: 71 MMHG | HEIGHT: 65 IN | HEART RATE: 75 BPM | WEIGHT: 140 LBS | BODY MASS INDEX: 23.32 KG/M2

## 2021-09-02 DIAGNOSIS — R41.9 NEUROCOGNITIVE DISORDER: ICD-10-CM

## 2021-09-02 DIAGNOSIS — M48.062 LUMBAR STENOSIS WITH NEUROGENIC CLAUDICATION: Primary | Chronic | ICD-10-CM

## 2021-09-02 PROCEDURE — 99214 OFFICE O/P EST MOD 30 MIN: CPT | Performed by: PSYCHIATRY & NEUROLOGY

## 2021-09-02 RX ORDER — MEMANTINE HYDROCHLORIDE 5 MG/1
TABLET ORAL
Qty: 70 TABLET | Refills: 0 | Status: SHIPPED | OUTPATIENT
Start: 2021-09-02 | End: 2021-09-24

## 2021-09-02 ASSESSMENT — MIFFLIN-ST. JEOR: SCORE: 1055.92

## 2021-09-02 NOTE — PROGRESS NOTES
NEUROLOGY FOLLOW UP VISIT  NOTE       [facility] NEUROLOGY [place]  [address] [place] [address]  Tel: [phone]  Fax: [phone]  [email or web address]     Ellis Em,  1931, MRN [id number]  PCP: Whitney Velasquez  Date: 2021      ASSESSMENT & PLAN     Visit Diagnosis  1. Lumbar stenosis with neurogenic claudication  2. Neurocognitive Disorder     Neurocognitive disorder  Pleasant 89-year-old female with history of hypertension, hyperlipidemia, osteoporosis, cervical stenosis status post C1-C2 laminoplasty who was evaluated for sudden decline in her cognition.  MRI of the brain showed small vessel ischemic disease.  Lab work for common causes of cognitive decline was normal.  She scored 20/30 on [place] cognitive assessment and I suspected she had vascular dementia made worse with medication (Wellbutrin, baclofen).  She was not able to tolerate Aricept as it caused vivid dreams, diarrhea and I have switched her to Namenda 5 mg daily gradually increasing it to 10 mg twice daily.  I also encouraged her to minimize the use of baclofen that could be causing increased confusion.  I have asked daughter to update us in 1 month and if she is able to tolerate Namenda I will increase the dose to 10 mg twice daily.  Regular follow-up will be in 3 months.    Right lumbar radiculopathy  She also developed sudden decline in her ability to walk with increased weakness in the leg.  MRI shows multilevel radiculopathy most prominent at right L3 and L4.  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  She is going through physical therapy but still has significant weakness.  Due to her age she is reluctant to go through epidural injections.  Follow-up will be in 3 months.    Thank you again for this referral, please feel free to contact me if you have any questions.    Yoni Fam MD  [facility]  (Formerly, [facility]  HESHAM Lopes)     HISTORY OF PRESENT ILLNESS     Patient is 89-year-old female with history of HTN, HLD, hypothyroidism, osteoporosis, cervical stenosis s/p C1-C2 laminoplasty who was initially evaluated on 7/3/2021 with complaints of cognitive decline along with gait difficulty.  Work-up in the past included MRI of the brain that showed small vessel ischemic disease and she scored 20/30 on [place] cognitive assessment.  I suspected her cognitive issues were multifactorial due to small vessel ischemic disease noted on the MRI scan and medication (baclofen, Wellbutrin) and was told to minimize the use of baclofen.  She had lab work for common causes of cognitive decline that included normal folic acid, methylmalonic acid level, vitamin B12, TSH, RPR.  She was started on Aricept and neuropsychological testing was pending.  However is that diagnosis was rather clear family decided not to do the neuropsychological testing.  She is having difficulty tolerating Aricept.  Initially she was having vivid dreams and Aricept was switched to in the morning.  She also had some diarrhea that has persisted and that has resulted in affecting her sleep cycle and she is wondering if she can try some other medication    Patient also reported sudden decline in her ability to walk with increased weakness in the right leg.  She was seen at [facility] and had EMG of the lower extremity that suggested length dependent large fiber symmetric sensorimotor polyneuropathy.  MRI lumbar spine showed severe right L4 and bilateral L3 radiculopathy.  Due to her increased risk of fall she was told not to get up and mostly is wheelchair-bound.  There is no history of any bowel or bladder incontinence.  I did inform patient that her peripheral neuropathy and specifically right L3 and bilateral L4 nerve root impingement likely is the cause for her right leg weakness and had recommended physical therapy.  I was reluctant to use epidural in a  89-year-old.  Since her last visit she reports some improvement in her ability to walk and she is getting some therapy but does report that she has fallen down few times     PROBLEM LIST   Patient Active Problem List   Diagnosis Code     Hypothyroidism E03.9     Major depressive disorder, recurrent episode, in full remission (H) F33.42     Idiopathic osteoporosis M81.8     Adrenal mass, left (H) E27.8     Lumbar stenosis with neurogenic claudication M48.062     Essential hypertension I10     Generalized osteoarthritis of multiple sites M15.9     Polyneuropathy, peripheral sensorimotor axonal G60.8     Allergic rhinitis J30.9     Tinnitus H93.19     Macular degeneration mild  H35.30     Breast implant rupture bilateral  T85.43XA     ACP (advance care planning) Z71.89     Actinic keratosis L57.0     Cyst of ovary N83.209     Hearing loss H91.90     Hyperlipidemia E78.5     Malignant neoplasm of skin of parts of face C44.309     Neurogenic bladder N31.9     Antalgic gait R26.89     Cervical spondylosis with myelopathy M47.12         PAST MEDICAL & SURGICAL HISTORY     Past Medical History:   Patient  has a past medical history of Bowel obstruction (H) (1974), C1 cervical fracture (H) (10/12/2015), Cancer (H) (breast, 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, salivary gland), Cancer of skin, Carpal tunnel syndrome, Cervical spondylosis with myelopathy (4/25/2013), Chronic pain, Closed fracture of cervical vertebra (H) (10/21/2015), Closed fracture of lumbar vertebra (H) (1/21/2015), Cyst of spinal meninges, Dermatitis, Disorders of meninges, not elsewhere classified, Gastric ulcer (1983), Hip fracture (H) (2007), History of adrenal adenoma (5/25/2021), History of appendectomy, History of fractured pelvis (1985), Hyponatremia, Hypothyroidism, Leg pain, bilateral, Major depression, Miscarriage (1962), Neck fracture (H) (09/10/2015), Nephrolithiasis, Osteoarthritis, Osteoporosis, Panic attack, Peripheral neuropathy, Reactive confusion, Renal  insufficiency, Rib fracture (2008), Shingles, Status post lumbar laminectomy (11/7/2014), UTI (urinary tract infection), and Varicella (7/14/2004).    Surgical History:  She  has a past surgical history that includes Pr Arthroplasty Tibial Plateau (Left); Salivary gland surgery (12/05/1984); Cataract Extraction, Bilateral; Cholecystectomy (06/30/1983); other surgical history; Total Hip Arthroplasty (Bilateral); Ovarian Cyst Removal (1943); Abdomen surgery (06/30/1983); joint replacement; fracture surgery (1985); Cervical Laminoplasty (Bilateral, 5/2/2013); Mastectomy (Bilateral); Lumbar Laminectomy (N/A, 11/6/2014); QUIGLEY W/O FACETEC FORAMOT/DSKC 1/2 VRT SEG, LUMBAR; appendectomy (1943); tubal ligation (1962); Revise reconstructed breast (Bilateral, 11/11/1984); Endometrial Biopsy (09/09/1986); Dilation and curettage (09/19/1986); Combined Mammaplasty Revision W/ Abdominoplasty (07/1998); Root Canal (09/14/1998); Skin Cancer Excision (08/15/2003); Total Knee Arthroplasty (10/20/2005); and Tumor Excision (05/01/2013).     SOCIAL HISTORY     Reviewed, and she  reports that she has never smoked. She has never used smokeless tobacco. She reports that she does not drink alcohol and does not use drugs.     FAMILY HISTORY     Reviewed, and family history includes Alcoholism in her brother; Cancer in her mother; Dementia in her brother; Diabetes in her brother; Kidney Disease in her father.     ALLERGIES     Allergies   Allergen Reactions     Iodinated Contrast Media [Diagnostic X-Ray Materials] Hives     Adhesive [Mecrylate] Unknown     Adhesive tape     Amlodipine Unknown     drowsiness     Asparaginase Unknown     Atorvastatin Unknown     PN: LW Reaction: arthalgia     Ciprofloxacin Unknown     Codeine Unknown     Latex Unknown     PN: Converted from LW Latex Sensitivity Flag     Lisinopril Cough     Pravastatin Unknown     Propoxyphene N-Acetaminophen [Propoxyphene N-Apap] Unknown     PN: LW Reaction: hallucinations      Sulfa (Sulfonamide Antibiotics) [Sulfa Drugs] Unknown     Albuterol Unknown     Other reaction(s): Urinary Retention         REVIEW OF SYSTEMS     A 12 point review of system was performed and was negative except as outlined in the history of present illness.     HOME MEDICATIONS     Current Outpatient Rx   Medication Sig Dispense Refill     alpha lipoic acid 200 mg cap [ALPHA LIPOIC ACID 200 MG CAP] Take 1 capsule by mouth 2 (two) times a day.       baclofen (LIORESAL) 10 MG tablet [BACLOFEN (LIORESAL) 10 MG TABLET] TAKE 1 TABLET(10 MG) BY MOUTH THREE TIMES DAILY 270 tablet 1     buPROPion (WELLBUTRIN SR) 150 MG 12 hr tablet [BUPROPION (WELLBUTRIN SR) 150 MG 12 HR TABLET] TAKE 1 TABLET BY MOUTH EVERY DAY 90 tablet 2     cranberry fruit extract (CRANBERRY ORAL) [CRANBERRY FRUIT EXTRACT (CRANBERRY ORAL)] Take by mouth.       ferrous sulfate 325 (65 FE) MG tablet [FERROUS SULFATE 325 (65 FE) MG TABLET] Take 1 tablet (325 mg total) by mouth daily with breakfast. 90 tablet 2     FLUoxetine (PROZAC) 10 MG capsule [FLUOXETINE (PROZAC) 10 MG CAPSULE] TAKE 3 CAPSULES BY MOUTH DAILY 270 capsule 3     gabapentin (NEURONTIN) 100 MG capsule [GABAPENTIN (NEURONTIN) 100 MG CAPSULE] 1 capsule in the morning, 2 capsules in the afternoon and 1 capsule in the evening. 270 capsule 5     levothyroxine (SYNTHROID, LEVOTHROID) 75 MCG tablet [LEVOTHYROXINE (SYNTHROID, LEVOTHROID) 75 MCG TABLET] TAKE 1 TABLET BY MOUTH DAILY 90 tablet 3     losartan (COZAAR) 50 MG tablet [LOSARTAN (COZAAR) 50 MG TABLET] TAKE 1 TABLET BY MOUTH TWICE DAILY 180 tablet 3     melatonin 10 mg Tab [MELATONIN 10 MG TAB] Take 10 mg by mouth bedtime.       memantine (NAMENDA) 5 MG tablet 1 PO every day x 7 days, then 1 PO BID x 7 days, then 2 PO Q am & 1 PO at bedtime x 7 days, then 2 PO BID thereafter 70 tablet 0     omega-3 fatty acids 500 mg cap [OMEGA-3 FATTY ACIDS 500 MG CAP] Take by mouth.       VIT C/E/ZN/COPPR/LUTEIN/ZEAXAN (PRESERVISION AREDS 2 ORAL) [VIT  "C/E/ZN/COPPR/LUTEIN/ZEAXAN (PRESERVISION AREDS 2 ORAL)] Take 1 tablet by mouth 2 (two) times a day.        diclofenac sodium (VOLTAREN) 1 % Gel [DICLOFENAC SODIUM (VOLTAREN) 1 % GEL] APPLY 2 GRAMS OF GEL TO UPPER RIGHT SHOULDER FOUR TIMES DAILY AS NEEDED FOR PAIN 100 g 0     STRONTIUM CHLORIDE HEXAHYDRATE (STRONTIUM CHL HEXAHYD, BULK,) 100 % Kailyn [STRONTIUM CHLORIDE HEXAHYDRATE (STRONTIUM CHL HEXAHYD, BULK,) 100 % KAILYN] Use As Directed.           PHYSICAL EXAM     Vital signs  BP (!) 140/71 (BP Location: Left arm, Patient Position: Sitting)   Pulse 75   Ht 1.651 m (5' 5\")   Wt 63.5 kg (140 lb)   BMI 23.30 kg/m      Weight:   140 lbs 0 oz    Patient is alert and oriented x4 in no acute distress. Vital signs were reviewed and are documented in electronic medical record. Neck was supple, no carotid bruits, thyromegaly, JVD, or lymphadenopathy was noted.   NEUROLOGY EXAM:    Patient s speech was normal with no aphasia or dysarthria. Mentation, and affect were also normal.      Cranial nerves II -XII were intact except she is hard of hearing    Patient has decreased mass with normal tone strength in upper extremity 5/5 in the right lower extremity 4+/5 in the left lower extremity 5 -/5    Sensation was creased to light touch pinprick below mid thighs    Reflexes were 1+ symmetrical in upper extremity absent in the lower extremity    minimal dysmetria noted on finger-nose testing    Gait testing was deferred as patient is wheelchair-bound     DIAGNOSTIC STUDIES     PERTINENT RADIOLOGY  Following imaging studies were reviewed:     CT HEAD & CERVICAL SPINE 5/20/2021  HEAD CT:  1.  No hemorrhage, mass, or mass effect.  2.  Ectasia of right supraclinoid ICA.  3.  Moderate presumed chronic small vessel ischemia.  4.  Moderate atrophy.     CERVICAL SPINE CT:  1.  No definite fracture.  2.  New moderate left paracentral disc protrusion at C3-C4.  3.  Underlying degenerative and postoperative changes, as described.     MRI, " MRA BRAIN 5/29/2021  HEAD MRI:   1.  No acute infarct, acute intracranial hemorrhage, or intracranial mass.  2.  Mild to moderate burden presumed sequela of chronic small vessel ischemic disease involving the cerebral white matter and aníbal.  3.  Complete opacification of the left mastoid air cells and middle ear cavity.     HEAD MRA:   1.  No aneurysm, high flow AVM or significant stenosis identified.     NECK MRA:  1.  No significant stenosis of the bilateral internal carotid arteries based on NASCET criteria.     MRI LUMBAR SPINE 5/29/2021  1.  Moderate to advanced multilevel lumbar spondylitic changes with evidence of prior laminectomies at L1-L2 through L5-S1. There is no significant spinal canal stenosis in the lumbar region.  2.  At L5-S1, there is moderate right and severe left neural foraminal stenosis.  3.  At L4-L5, there is severe right neural foraminal stenosis with deformity of the exiting right L4 nerve root.  4.  At L3-L4, there is a moderate circumferential disc bulge with severe bilateral neural foraminal stenosis and right lateral recess narrowing.  5.  At L1-L2, there is severe bilateral neural foraminal stenosis.  6.  Moderate rightward convex curvature of the lumbar spine with the apex at L2.     MRI CERVICAL SPINE 10/16/2020  1.  Postsurgical changes C1 and C2 laminoplasty with hardware in the left and associated susceptibility. This is better appreciated on prior cervical spine CT 05/04/2016.  2.  Interbody fusion at C4/C5 with associated kyphosis, unchanged.  3.  Small broad bar disc osteophyte complexes contributing to mild spinal canal narrowing at C3/C4-C6/C7. No significant spinal canal narrowing at any other level within the cervical spine.  4.  Multilevel uncovertebral joint disease and facet arthropathy with mild multilevel neural foraminal narrowing at C3/C4-C5/C6 and T1/T2.     EMG 7/1/2021  This is an abnormal study.  1.  Reduced and absent sensory amplitudes in both lower  extremities symmetrically.  These finding may represent either a length dependent large fiber relatively symmetric sensorimotor axonal polyneuropathy or alternatively can be seen as an age-related change for a person in this age group  2.  Superimposed chronic L4-S1 polyradiculopathy is suspected with ongoing denervation noted in extensor hallucis longus muscle which could be secondary to radiculopathy versus active polyneuropathy.  Correlation with lumbar spine imaging is recommended     PERTINENT LABS  Following labs were reviewed:  Lab on 08/14/2021   Component Date Value     Color Urine 08/14/2021 Colorless      Appearance Urine 08/14/2021 Clear      Glucose Urine 08/14/2021 Negative      Bilirubin Urine 08/14/2021 Negative      Ketones Urine 08/14/2021 Negative      Specific Gravity Urine 08/14/2021 1.017      Blood Urine 08/14/2021 Negative      pH Urine 08/14/2021 6.0      Protein Albumin Urine 08/14/2021 Negative      Urobilinogen Urine 08/14/2021 <2.0      Nitrite Urine 08/14/2021 Negative      Leukocyte Esterase Urine 08/14/2021 Negative    Lab on 07/14/2021   Component Date Value     Folic Acid 07/14/2021 16.2      Methylmalonic Acid 07/14/2021 0.20      Vitamin B12 07/14/2021 1,335*     TSH 07/14/2021 1.72      Treponema Antibody Total 07/14/2021 Negative    Transferred Records on 06/09/2021   Component Date Value     TSH (External) 06/09/2021 1.16    Ambulatory - [facility] on 06/03/2021   Component Date Value     Color Urine 06/03/2021 Yellow      Appearance Urine 06/03/2021 Clear      Glucose Urine 06/03/2021 Negative      Protein Albumin Urine 06/03/2021 Negative      Bilirubin Urine 06/03/2021 Negative      Urobilinogen Urine 06/03/2021 0.2 E.U./dL      pH Urine 06/03/2021 7.0      Blood Urine 06/03/2021 Negative      Ketones Urine 06/03/2021 Negative      Nitrite Urine 06/03/2021 Negative      Leukocyte Esterase Urine 06/03/2021 Negative      Specific Gravity Urine 06/03/2021 1.015          Total  time spent for face to face visit, reviewing labs/imaging studies, counseling and coordination of care was: 30 Minutes spent on the date of the encounter doing chart review, review of outside records, review of test results, interpretation of tests, patient visit, documentation and discussion with family       This note was dictated using voice recognition software.  Any grammatical or context distortions are unintentional and inherent to the software.    No orders of the defined types were placed in this encounter.     New Prescriptions    MEMANTINE (NAMENDA) 5 MG TABLET    1 PO every day x 7 days, then 1 PO BID x 7 days, then 2 PO Q am & 1 PO at bedtime x 7 days, then 2 PO BID thereafter     Modified Medications    No medications on file

## 2021-09-02 NOTE — LETTER
2021         RE: Ellis Em  [address]  [facility] [address]        Dear Colleague,    Thank you for referring your patient, Ellis Em, to the [facility]. Please see a copy of my visit note below.    NEUROLOGY FOLLOW UP VISIT  NOTE       [facility]  [address] [place] [address]  Tel: [phone]  Fax: [phone]  [email or web address]     Ellis Em,  1931, MRN [id number]  PCP: Whitney Velasquez  Date: 2021      ASSESSMENT & PLAN     Visit Diagnosis  1. Lumbar stenosis with neurogenic claudication  2. Neurocognitive Disorder     Neurocognitive disorder  Pleasant 89-year-old female with history of hypertension, hyperlipidemia, osteoporosis, cervical stenosis status post C1-C2 laminoplasty who was evaluated for sudden decline in her cognition.  MRI of the brain showed small vessel ischemic disease.  Lab work for common causes of cognitive decline was normal.  She scored 20/30 on [place] cognitive assessment and I suspected she had vascular dementia made worse with medication (Wellbutrin, baclofen).  She was not able to tolerate Aricept as it caused vivid dreams, diarrhea and I have switched her to Namenda 5 mg daily gradually increasing it to 10 mg twice daily.  I also encouraged her to minimize the use of baclofen that could be causing increased confusion.  I have asked daughter to update us in 1 month and if she is able to tolerate Namenda I will increase the dose to 10 mg twice daily.  Regular follow-up will be in 3 months.    Right lumbar radiculopathy  She also developed sudden decline in her ability to walk with increased weakness in the leg.  MRI shows multilevel radiculopathy most prominent at right L3 and L4.  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  She is going through physical therapy but still has significant weakness.  Due to her age she is reluctant to go  through epidural injections.  Follow-up will be in 3 months.    Thank you again for this referral, please feel free to contact me if you have any questions.    Yoni Fam MD  [facility]  (Formerly, [facility]     HISTORY OF PRESENT ILLNESS     Patient is 89-year-old female with history of HTN, HLD, hypothyroidism, osteoporosis, cervical stenosis s/p C1-C2 laminoplasty who was initially evaluated on 7/3/2021 with complaints of cognitive decline along with gait difficulty.  Work-up in the past included MRI of the brain that showed small vessel ischemic disease and she scored 20/30 on [place] cognitive assessment.  I suspected her cognitive issues were multifactorial due to small vessel ischemic disease noted on the MRI scan and medication (baclofen, Wellbutrin) and was told to minimize the use of baclofen.  She had lab work for common causes of cognitive decline that included normal folic acid, methylmalonic acid level, vitamin B12, TSH, RPR.  She was started on Aricept and neuropsychological testing was pending.  However is that diagnosis was rather clear family decided not to do the neuropsychological testing.  She is having difficulty tolerating Aricept.  Initially she was having vivid dreams and Aricept was switched to in the morning.  She also had some diarrhea that has persisted and that has resulted in affecting her sleep cycle and she is wondering if she can try some other medication    Patient also reported sudden decline in her ability to walk with increased weakness in the right leg.  She was seen at [facility] and had EMG of the lower extremity that suggested length dependent large fiber symmetric sensorimotor polyneuropathy.  MRI lumbar spine showed severe right L4 and bilateral L3 radiculopathy.  Due to her increased risk of fall she was told not to get up and mostly is wheelchair-bound.  There is no history of any bowel or bladder  incontinence.  I did inform patient that her peripheral neuropathy and specifically right L3 and bilateral L4 nerve root impingement likely is the cause for her right leg weakness and had recommended physical therapy.  I was reluctant to use epidural in a 89-year-old.  Since her last visit she reports some improvement in her ability to walk and she is getting some therapy but does report that she has fallen down few times     PROBLEM LIST   Patient Active Problem List   Diagnosis Code     Hypothyroidism E03.9     Major depressive disorder, recurrent episode, in full remission (H) F33.42     Idiopathic osteoporosis M81.8     Adrenal mass, left (H) E27.8     Lumbar stenosis with neurogenic claudication M48.062     Essential hypertension I10     Generalized osteoarthritis of multiple sites M15.9     Polyneuropathy, peripheral sensorimotor axonal G60.8     Allergic rhinitis J30.9     Tinnitus H93.19     Macular degeneration mild  H35.30     Breast implant rupture bilateral  T85.43XA     ACP (advance care planning) Z71.89     Actinic keratosis L57.0     Cyst of ovary N83.209     Hearing loss H91.90     Hyperlipidemia E78.5     Malignant neoplasm of skin of parts of face C44.309     Neurogenic bladder N31.9     Antalgic gait R26.89     Cervical spondylosis with myelopathy M47.12         PAST MEDICAL & SURGICAL HISTORY     Past Medical History:   Patient  has a past medical history of Bowel obstruction (H) (1974), C1 cervical fracture (H) (10/12/2015), Cancer (H) (breast, 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, salivary gland), Cancer of skin, Carpal tunnel syndrome, Cervical spondylosis with myelopathy (4/25/2013), Chronic pain, Closed fracture of cervical vertebra (H) (10/21/2015), Closed fracture of lumbar vertebra (H) (1/21/2015), Cyst of spinal meninges, Dermatitis, Disorders of meninges, not elsewhere classified, Gastric ulcer (1983), Hip fracture (H) (2007), History of adrenal adenoma (5/25/2021), History of appendectomy, History of  fractured pelvis (1985), Hyponatremia, Hypothyroidism, Leg pain, bilateral, Major depression, Miscarriage (1962), Neck fracture (H) (09/10/2015), Nephrolithiasis, Osteoarthritis, Osteoporosis, Panic attack, Peripheral neuropathy, Reactive confusion, Renal insufficiency, Rib fracture (2008), Shingles, Status post lumbar laminectomy (11/7/2014), UTI (urinary tract infection), and Varicella (7/14/2004).    Surgical History:  She  has a past surgical history that includes Pr Arthroplasty Tibial Plateau (Left); Salivary gland surgery (12/05/1984); Cataract Extraction, Bilateral; Cholecystectomy (06/30/1983); other surgical history; Total Hip Arthroplasty (Bilateral); Ovarian Cyst Removal (1943); Abdomen surgery (06/30/1983); joint replacement; fracture surgery (1985); Cervical Laminoplasty (Bilateral, 5/2/2013); Mastectomy (Bilateral); Lumbar Laminectomy (N/A, 11/6/2014); QUIGLEY W/O FACETEC FORAMOT/DSKC 1/2 VRT SEG, LUMBAR; appendectomy (1943); tubal ligation (1962); Revise reconstructed breast (Bilateral, 11/11/1984); Endometrial Biopsy (09/09/1986); Dilation and curettage (09/19/1986); Combined Mammaplasty Revision W/ Abdominoplasty (07/1998); Root Canal (09/14/1998); Skin Cancer Excision (08/15/2003); Total Knee Arthroplasty (10/20/2005); and Tumor Excision (05/01/2013).     SOCIAL HISTORY     Reviewed, and she  reports that she has never smoked. She has never used smokeless tobacco. She reports that she does not drink alcohol and does not use drugs.     FAMILY HISTORY     Reviewed, and family history includes Alcoholism in her brother; Cancer in her mother; Dementia in her brother; Diabetes in her brother; Kidney Disease in her father.     ALLERGIES     Allergies   Allergen Reactions     Iodinated Contrast Media [Diagnostic X-Ray Materials] Hives     Adhesive [Mecrylate] Unknown     Adhesive tape     Amlodipine Unknown     drowsiness     Asparaginase Unknown     Atorvastatin Unknown     PN: LW Reaction: arthalgia      Ciprofloxacin Unknown     Codeine Unknown     Latex Unknown     PN: Converted from LW Latex Sensitivity Flag     Lisinopril Cough     Pravastatin Unknown     Propoxyphene N-Acetaminophen [Propoxyphene N-Apap] Unknown     PN: LW Reaction: hallucinations     Sulfa (Sulfonamide Antibiotics) [Sulfa Drugs] Unknown     Albuterol Unknown     Other reaction(s): Urinary Retention         REVIEW OF SYSTEMS     A 12 point review of system was performed and was negative except as outlined in the history of present illness.     HOME MEDICATIONS     Current Outpatient Rx   Medication Sig Dispense Refill     alpha lipoic acid 200 mg cap [ALPHA LIPOIC ACID 200 MG CAP] Take 1 capsule by mouth 2 (two) times a day.       baclofen (LIORESAL) 10 MG tablet [BACLOFEN (LIORESAL) 10 MG TABLET] TAKE 1 TABLET(10 MG) BY MOUTH THREE TIMES DAILY 270 tablet 1     buPROPion (WELLBUTRIN SR) 150 MG 12 hr tablet [BUPROPION (WELLBUTRIN SR) 150 MG 12 HR TABLET] TAKE 1 TABLET BY MOUTH EVERY DAY 90 tablet 2     cranberry fruit extract (CRANBERRY ORAL) [CRANBERRY FRUIT EXTRACT (CRANBERRY ORAL)] Take by mouth.       ferrous sulfate 325 (65 FE) MG tablet [FERROUS SULFATE 325 (65 FE) MG TABLET] Take 1 tablet (325 mg total) by mouth daily with breakfast. 90 tablet 2     FLUoxetine (PROZAC) 10 MG capsule [FLUOXETINE (PROZAC) 10 MG CAPSULE] TAKE 3 CAPSULES BY MOUTH DAILY 270 capsule 3     gabapentin (NEURONTIN) 100 MG capsule [GABAPENTIN (NEURONTIN) 100 MG CAPSULE] 1 capsule in the morning, 2 capsules in the afternoon and 1 capsule in the evening. 270 capsule 5     levothyroxine (SYNTHROID, LEVOTHROID) 75 MCG tablet [LEVOTHYROXINE (SYNTHROID, LEVOTHROID) 75 MCG TABLET] TAKE 1 TABLET BY MOUTH DAILY 90 tablet 3     losartan (COZAAR) 50 MG tablet [LOSARTAN (COZAAR) 50 MG TABLET] TAKE 1 TABLET BY MOUTH TWICE DAILY 180 tablet 3     melatonin 10 mg Tab [MELATONIN 10 MG TAB] Take 10 mg by mouth bedtime.       memantine (NAMENDA) 5 MG tablet 1 PO every day x 7 days,  "then 1 PO BID x 7 days, then 2 PO Q am & 1 PO at bedtime x 7 days, then 2 PO BID thereafter 70 tablet 0     omega-3 fatty acids 500 mg cap [OMEGA-3 FATTY ACIDS 500 MG CAP] Take by mouth.       VIT C/E/ZN/COPPR/LUTEIN/ZEAXAN (PRESERVISION AREDS 2 ORAL) [VIT C/E/ZN/COPPR/LUTEIN/ZEAXAN (PRESERVISION AREDS 2 ORAL)] Take 1 tablet by mouth 2 (two) times a day.        diclofenac sodium (VOLTAREN) 1 % Gel [DICLOFENAC SODIUM (VOLTAREN) 1 % GEL] APPLY 2 GRAMS OF GEL TO UPPER RIGHT SHOULDER FOUR TIMES DAILY AS NEEDED FOR PAIN 100 g 0     STRONTIUM CHLORIDE HEXAHYDRATE (STRONTIUM CHL HEXAHYD, BULK,) 100 % Kailyn [STRONTIUM CHLORIDE HEXAHYDRATE (STRONTIUM CHL HEXAHYD, BULK,) 100 % KAILYN] Use As Directed.           PHYSICAL EXAM     Vital signs  BP (!) 140/71 (BP Location: Left arm, Patient Position: Sitting)   Pulse 75   Ht 1.651 m (5' 5\")   Wt 63.5 kg (140 lb)   BMI 23.30 kg/m      Weight:   140 lbs 0 oz    Patient is alert and oriented x4 in no acute distress. Vital signs were reviewed and are documented in electronic medical record. Neck was supple, no carotid bruits, thyromegaly, JVD, or lymphadenopathy was noted.   NEUROLOGY EXAM:    Patient s speech was normal with no aphasia or dysarthria. Mentation, and affect were also normal.      Cranial nerves II -XII were intact except she is hard of hearing    Patient has decreased mass with normal tone strength in upper extremity 5/5 in the right lower extremity 4+/5 in the left lower extremity 5 -/5    Sensation was creased to light touch pinprick below mid thighs    Reflexes were 1+ symmetrical in upper extremity absent in the lower extremity    minimal dysmetria noted on finger-nose testing    Gait testing was deferred as patient is wheelchair-bound     DIAGNOSTIC STUDIES     PERTINENT RADIOLOGY  Following imaging studies were reviewed:     CT HEAD & CERVICAL SPINE 5/20/2021  HEAD CT:  1.  No hemorrhage, mass, or mass effect.  2.  Ectasia of right supraclinoid ICA.  3. "  Moderate presumed chronic small vessel ischemia.  4.  Moderate atrophy.     CERVICAL SPINE CT:  1.  No definite fracture.  2.  New moderate left paracentral disc protrusion at C3-C4.  3.  Underlying degenerative and postoperative changes, as described.     MRI, MRA BRAIN 5/29/2021  HEAD MRI:   1.  No acute infarct, acute intracranial hemorrhage, or intracranial mass.  2.  Mild to moderate burden presumed sequela of chronic small vessel ischemic disease involving the cerebral white matter and aníbal.  3.  Complete opacification of the left mastoid air cells and middle ear cavity.     HEAD MRA:   1.  No aneurysm, high flow AVM or significant stenosis identified.     NECK MRA:  1.  No significant stenosis of the bilateral internal carotid arteries based on NASCET criteria.     MRI LUMBAR SPINE 5/29/2021  1.  Moderate to advanced multilevel lumbar spondylitic changes with evidence of prior laminectomies at L1-L2 through L5-S1. There is no significant spinal canal stenosis in the lumbar region.  2.  At L5-S1, there is moderate right and severe left neural foraminal stenosis.  3.  At L4-L5, there is severe right neural foraminal stenosis with deformity of the exiting right L4 nerve root.  4.  At L3-L4, there is a moderate circumferential disc bulge with severe bilateral neural foraminal stenosis and right lateral recess narrowing.  5.  At L1-L2, there is severe bilateral neural foraminal stenosis.  6.  Moderate rightward convex curvature of the lumbar spine with the apex at L2.     MRI CERVICAL SPINE 10/16/2020  1.  Postsurgical changes C1 and C2 laminoplasty with hardware in the left and associated susceptibility. This is better appreciated on prior cervical spine CT 05/04/2016.  2.  Interbody fusion at C4/C5 with associated kyphosis, unchanged.  3.  Small broad bar disc osteophyte complexes contributing to mild spinal canal narrowing at C3/C4-C6/C7. No significant spinal canal narrowing at any other level within the  cervical spine.  4.  Multilevel uncovertebral joint disease and facet arthropathy with mild multilevel neural foraminal narrowing at C3/C4-C5/C6 and T1/T2.     EMG 7/1/2021  This is an abnormal study.  1.  Reduced and absent sensory amplitudes in both lower extremities symmetrically.  These finding may represent either a length dependent large fiber relatively symmetric sensorimotor axonal polyneuropathy or alternatively can be seen as an age-related change for a person in this age group  2.  Superimposed chronic L4-S1 polyradiculopathy is suspected with ongoing denervation noted in extensor hallucis longus muscle which could be secondary to radiculopathy versus active polyneuropathy.  Correlation with lumbar spine imaging is recommended     PERTINENT LABS  Following labs were reviewed:  Lab on 08/14/2021   Component Date Value     Color Urine 08/14/2021 Colorless      Appearance Urine 08/14/2021 Clear      Glucose Urine 08/14/2021 Negative      Bilirubin Urine 08/14/2021 Negative      Ketones Urine 08/14/2021 Negative      Specific Gravity Urine 08/14/2021 1.017      Blood Urine 08/14/2021 Negative      pH Urine 08/14/2021 6.0      Protein Albumin Urine 08/14/2021 Negative      Urobilinogen Urine 08/14/2021 <2.0      Nitrite Urine 08/14/2021 Negative      Leukocyte Esterase Urine 08/14/2021 Negative    Lab on 07/14/2021   Component Date Value     Folic Acid 07/14/2021 16.2      Methylmalonic Acid 07/14/2021 0.20      Vitamin B12 07/14/2021 1,335*     TSH 07/14/2021 1.72      Treponema Antibody Total 07/14/2021 Negative    Transferred Records on 06/09/2021   Component Date Value     TSH (External) 06/09/2021 1.16    Ambulatory - [facility] on 06/03/2021   Component Date Value     Color Urine 06/03/2021 Yellow      Appearance Urine 06/03/2021 Clear      Glucose Urine 06/03/2021 Negative      Protein Albumin Urine 06/03/2021 Negative      Bilirubin Urine 06/03/2021 Negative      Urobilinogen Urine 06/03/2021 0.2  E.U./dL      pH Urine 06/03/2021 7.0      Blood Urine 06/03/2021 Negative      Ketones Urine 06/03/2021 Negative      Nitrite Urine 06/03/2021 Negative      Leukocyte Esterase Urine 06/03/2021 Negative      Specific Gravity Urine 06/03/2021 1.015          Total time spent for face to face visit, reviewing labs/imaging studies, counseling and coordination of care was: 30 Minutes spent on the date of the encounter doing chart review, review of outside records, review of test results, interpretation of tests, patient visit, documentation and discussion with family       This note was dictated using voice recognition software.  Any grammatical or context distortions are unintentional and inherent to the software.    No orders of the defined types were placed in this encounter.     New Prescriptions    MEMANTINE (NAMENDA) 5 MG TABLET    1 PO every day x 7 days, then 1 PO BID x 7 days, then 2 PO Q am & 1 PO at bedtime x 7 days, then 2 PO BID thereafter     Modified Medications    No medications on file                     Again, thank you for allowing me to participate in the care of your patient.        Sincerely,        Yoni Fam MD

## 2021-09-07 DIAGNOSIS — F33.42 MAJOR DEPRESSIVE DISORDER, RECURRENT EPISODE, IN FULL REMISSION (H): ICD-10-CM

## 2021-09-08 RX ORDER — FLUOXETINE 10 MG/1
CAPSULE ORAL
Qty: 270 CAPSULE | Refills: 0 | Status: SHIPPED | OUTPATIENT
Start: 2021-09-08 | End: 2021-11-24

## 2021-09-08 NOTE — TELEPHONE ENCOUNTER
"Routing refill request to provider for review/approval because:  PHQ 9 score    Last Written Prescription Date:  10/2/20  Last Fill Quantity: 270,  # refills: 3   Last office visit provider:  5/25/21     Requested Prescriptions   Pending Prescriptions Disp Refills     FLUoxetine (PROZAC) 10 MG capsule 270 capsule 3     Sig: [FLUOXETINE (PROZAC) 10 MG CAPSULE] TAKE 3 CAPSULES BY MOUTH DAILY       SSRIs Protocol Failed - 9/7/2021  1:52 PM        Failed - PHQ-9 score less than 5 in past 6 months     Please review last PHQ-9 score.           Failed - Recent (6 mo) or future (30 days) visit within the authorizing provider's specialty     Patient had office visit in the last 6 months or has a visit in the next 30 days with authorizing provider or within the authorizing provider's specialty.  See \"Patient Info\" tab in inbasket, or \"Choose Columns\" in Meds & Orders section of the refill encounter.            Passed - Medication is active on med list        Passed - Patient is age 18 or older        Passed - No active pregnancy on record        Passed - No positive pregnancy test in last 12 months             Bassem Johnson RN 09/08/21 8:40 AM  "

## 2021-09-14 ENCOUNTER — MYC MEDICAL ADVICE (OUTPATIENT)
Dept: FAMILY MEDICINE | Facility: CLINIC | Age: 86
End: 2021-09-14

## 2021-09-14 ENCOUNTER — TELEPHONE (OUTPATIENT)
Dept: FAMILY MEDICINE | Facility: CLINIC | Age: 86
End: 2021-09-14

## 2021-09-14 NOTE — TELEPHONE ENCOUNTER
COVERING MD      Daughter - Rosalva fitzpatrick.     Ellis lives independently.  Has been experiencing an increase with her urination for a week now. Especially at night.  She is not experiencing any other symptoms - just the frequency.  In the past (Aug), Dr. Velasquez has allowed her to do a lab only - UA.      Rosalva comes to the clinic to get the supplies to collect and is knowledgeable about proper collection and handling.     She wanted me to ask if this can be ordered without doing an OV/Evisit.  I told her I'd send a message back to ask.

## 2021-09-15 ENCOUNTER — TELEPHONE (OUTPATIENT)
Dept: FAMILY MEDICINE | Facility: CLINIC | Age: 86
End: 2021-09-15

## 2021-09-15 NOTE — TELEPHONE ENCOUNTER
Reason for Call:  Verbal orders    Orders are needed for this patient.     PT: KYLER    OT: KYLER    Skilled Nursing: discharge skilled nursing and continue with PT    Pt Provider: Eva    Name of Homecare Nurse: Linsey    Phone Number Homecare Nurse can be reached at: [phone]    Can we leave a detailed message on this number? Yes

## 2021-09-15 NOTE — TELEPHONE ENCOUNTER
Looking for verbal OK  Rula calling from [facility] care- wanting orders for social work assesment to help with long term care planning

## 2021-09-15 NOTE — TELEPHONE ENCOUNTER
At a minimum we need an E-visit and I can order this for a lab only.  I am sorry I cannot do this without documentation.

## 2021-09-16 ENCOUNTER — OFFICE VISIT (OUTPATIENT)
Dept: FAMILY MEDICINE | Facility: CLINIC | Age: 86
End: 2021-09-16
Payer: MEDICARE

## 2021-09-16 VITALS
WEIGHT: 140.5 LBS | HEART RATE: 73 BPM | SYSTOLIC BLOOD PRESSURE: 154 MMHG | OXYGEN SATURATION: 98 % | DIASTOLIC BLOOD PRESSURE: 70 MMHG | HEIGHT: 65 IN | BODY MASS INDEX: 23.41 KG/M2

## 2021-09-16 DIAGNOSIS — M62.838 MUSCLE SPASM: ICD-10-CM

## 2021-09-16 DIAGNOSIS — M47.12 CERVICAL SPONDYLOSIS WITH MYELOPATHY: ICD-10-CM

## 2021-09-16 DIAGNOSIS — R26.89 INABILITY TO BEAR WEIGHT: ICD-10-CM

## 2021-09-16 DIAGNOSIS — R27.9 DISCOORDINATION: ICD-10-CM

## 2021-09-16 DIAGNOSIS — G60.8 POLYNEUROPATHY, PERIPHERAL SENSORIMOTOR AXONAL: ICD-10-CM

## 2021-09-16 DIAGNOSIS — R26.89 ANTALGIC GAIT: ICD-10-CM

## 2021-09-16 DIAGNOSIS — N39.41 URGE INCONTINENCE OF URINE: ICD-10-CM

## 2021-09-16 DIAGNOSIS — M62.81 MUSCLE WEAKNESS (GENERALIZED): Primary | ICD-10-CM

## 2021-09-16 DIAGNOSIS — R35.0 URINE FREQUENCY: ICD-10-CM

## 2021-09-16 DIAGNOSIS — R41.89 ACUTE COGNITIVE DECLINE: ICD-10-CM

## 2021-09-16 LAB
ALBUMIN UR-MCNC: NEGATIVE MG/DL
APPEARANCE UR: CLEAR
BILIRUB UR QL STRIP: NEGATIVE
COLOR UR AUTO: YELLOW
GLUCOSE UR STRIP-MCNC: NEGATIVE MG/DL
HGB UR QL STRIP: NEGATIVE
KETONES UR STRIP-MCNC: NEGATIVE MG/DL
LEUKOCYTE ESTERASE UR QL STRIP: NEGATIVE
NITRATE UR QL: POSITIVE
PH UR STRIP: 5.5 [PH] (ref 5–8)
RBC #/AREA URNS AUTO: NORMAL /HPF
SP GR UR STRIP: 1.01 (ref 1–1.03)
UROBILINOGEN UR STRIP-ACNC: 0.2 E.U./DL
WBC #/AREA URNS AUTO: NORMAL /HPF

## 2021-09-16 PROCEDURE — 81001 URINALYSIS AUTO W/SCOPE: CPT | Performed by: FAMILY MEDICINE

## 2021-09-16 PROCEDURE — 87186 SC STD MICRODIL/AGAR DIL: CPT | Performed by: FAMILY MEDICINE

## 2021-09-16 PROCEDURE — 99214 OFFICE O/P EST MOD 30 MIN: CPT | Performed by: FAMILY MEDICINE

## 2021-09-16 PROCEDURE — 87086 URINE CULTURE/COLONY COUNT: CPT | Performed by: FAMILY MEDICINE

## 2021-09-16 ASSESSMENT — MIFFLIN-ST. JEOR: SCORE: 1058.18

## 2021-09-16 ASSESSMENT — PATIENT HEALTH QUESTIONNAIRE - PHQ9: SUM OF ALL RESPONSES TO PHQ QUESTIONS 1-9: 3

## 2021-09-16 NOTE — PROGRESS NOTES
Assessment & Plan     Muscle weakness (generalized)  Discoordination  Antalgic gait  Inability to bear weight  Muscle spasm  Sudden decline. Cannot walk. Cannot stand up due to severe weakness. Physical therapy has not helped improve her symptoms. Qualified for lift chair, order written and given to Taisha. Continue with PT.   - Lift Chair W Seat Lift Mechanism Order for DME - ONLY FOR DME    Polyneuropathy, peripheral sensorimotor axonal  Cervical spondylosis with myelopathy  Wheelchair bound, decreased muscle mass and strength in the lower extremity right worse than left.  Loss of ability to perform activities of daily living including transferring, toileting, dressing, bathing and she's at a very increased risk for falling. Her inabiliy to peform these ADLs has been noted for at least 4 months. Recommended patient to have home care, PCA, in home PT.     Acute cognitive decline  Needs in home care. 2/2 vascular dementia. [place] cognitive assessment is only 20/30 which is abnormal. Needs to be supervised for medication administration.   Seeing neurology. On namenda.     Urge incontinence of urine  Urine frequency  Frequent urination at night.   UA/UC shows E. Coli. Sensitivities pending. Script for antibiotics written.   - UA macro with reflex to Microscopic and Culture - Clinc Collect  - Urine Microscopic Exam  - Urine Culture  - nitroFURantoin macrocrystal-monohydrate (MACROBID) 100 MG capsule  Dispense: 14 capsule; Refill: 0      Return in about 3 months (around 12/16/2021) for Follow up, in person, with PCP.    Deysi Angulo MD  [facility]    Ashleigh Corral is a 89 year old who presents for the following health issues  accompanied by her daughter Taisha:    HPI   Chief Complaint   Patient presents with     Patient Request for Note/Letter     Needs a note to continue long term care. Dating back 3 months.     Referral     Lift chair     UTI     Patient is new to me.  She has  "been seeing a partner of mine, Dr. Velasquez.  She is here today with her daughter Taisha. Her other daughter who is very involved in her care is Rosalva. Taisha and Jez would like a letter written to her long term insurance plan stating of her significant needs.  She is unable to do much of any of activities of daily living.  She needs help with transferring.  She is unable to lift her legs, right worse than left.  Has been to neurology for further evaluation.  Patient has toya recently diagnosed with neurocognitive disorder and specifically vascular dementia.  Has been not been able to tolerate Aricept as it has caused vivid dreams and diarrhea and so was recently switched to Namenda.  Has developed sudden decline in her ability to walk with increased weakness in her legs, right worse than left.  Neurology has evaluated this as well and was diagnosed with multilevel radiculopathy with EMG showing length dependent mixed sensorimotor polyneuropathy.  Has gone through physical therapy but still has significant weakness.  Has become wheelchair dependent.  Needs help with transferring.  Rosalva and Taisha have to take turn taking care of patient.  Also today concerned about potential UTI. Has been waking up multiple times at night going to use the bathroom.     Review of Systems   Constitutional, HEENT, cardiovascular, pulmonary, gi and gu systems are negative, except as otherwise noted.      Objective    BP (!) 154/70   Pulse 73   Ht 1.651 m (5' 5\")   Wt 63.7 kg (140 lb 8 oz)   LMP  (LMP Unknown)   SpO2 98%   BMI 23.38 kg/m    Body mass index is 23.38 kg/m .  Physical Exam   GENERAL: healthy, alert and no distress  NECK: no adenopathy, no asymmetry, masses, or scars and thyroid normal to palpation  RESP: lungs clear to auscultation - no rales, rhonchi or wheezes  CV: regular rate and rhythm, normal S1 S2, no S3 or S4, no murmur, click or rub, no peripheral edema and peripheral pulses strong  ABDOMEN: soft, nontender, no " hepatosplenomegaly, no masses and bowel sounds normal  MS: wheelchair bound, significantly decreased muscle strength in the lower extremity with right lower extremity 1/5 and left lower extremity 3/5.   NEURO: Speech is normal no aphasia or dysarthria. She is hard of hearing.  Cranial nerves II to XII intact.  PSYCH: mentation appears normal, affect normal/bright

## 2021-09-17 RX ORDER — NITROFURANTOIN 25; 75 MG/1; MG/1
100 CAPSULE ORAL 2 TIMES DAILY
Qty: 14 CAPSULE | Refills: 0 | Status: SHIPPED | OUTPATIENT
Start: 2021-09-17 | End: 2021-09-24

## 2021-09-18 LAB — BACTERIA UR CULT: ABNORMAL

## 2021-09-21 ENCOUNTER — MEDICAL CORRESPONDENCE (OUTPATIENT)
Dept: HEALTH INFORMATION MANAGEMENT | Facility: CLINIC | Age: 86
End: 2021-09-21

## 2021-09-22 ENCOUNTER — MYC MEDICAL ADVICE (OUTPATIENT)
Dept: FAMILY MEDICINE | Facility: CLINIC | Age: 86
End: 2021-09-22

## 2021-09-22 DIAGNOSIS — N39.0 URINARY TRACT INFECTION WITHOUT HEMATURIA, SITE UNSPECIFIED: Primary | ICD-10-CM

## 2021-09-23 RX ORDER — NITROFURANTOIN 25; 75 MG/1; MG/1
100 CAPSULE ORAL 2 TIMES DAILY
Qty: 14 CAPSULE | Refills: 0 | Status: SHIPPED | OUTPATIENT
Start: 2021-09-23 | End: 2021-09-30

## 2021-09-24 DIAGNOSIS — R41.9 NEUROCOGNITIVE DISORDER: ICD-10-CM

## 2021-09-24 RX ORDER — MEMANTINE HYDROCHLORIDE 10 MG/1
10 TABLET ORAL 2 TIMES DAILY
Qty: 180 TABLET | Refills: 0 | Status: SHIPPED | OUTPATIENT
Start: 2021-09-24 | End: 2021-11-24

## 2021-09-24 NOTE — TELEPHONE ENCOUNTER
Refill request for Namenda  Medication T'd for review and signature  Joanie Lopez CMA on 9/24/2021 at 8:40 AM

## 2021-09-27 ENCOUNTER — MEDICAL CORRESPONDENCE (OUTPATIENT)
Dept: HEALTH INFORMATION MANAGEMENT | Facility: CLINIC | Age: 86
End: 2021-09-27

## 2021-09-30 DIAGNOSIS — E03.9 HYPOTHYROIDISM: ICD-10-CM

## 2021-09-30 DIAGNOSIS — D50.0 IRON DEFICIENCY ANEMIA DUE TO CHRONIC BLOOD LOSS: ICD-10-CM

## 2021-10-01 RX ORDER — FERROUS SULFATE 325(65) MG
TABLET ORAL
Qty: 90 TABLET | Refills: 1 | Status: SHIPPED | OUTPATIENT
Start: 2021-10-01 | End: 2022-04-03

## 2021-10-01 RX ORDER — LEVOTHYROXINE SODIUM 75 UG/1
TABLET ORAL
Qty: 90 TABLET | Refills: 1 | Status: SHIPPED | OUTPATIENT
Start: 2021-10-01 | End: 2022-04-03

## 2021-10-01 NOTE — TELEPHONE ENCOUNTER
"Last Written Prescription:      Last office visit provider:  5/17/21     Requested Prescriptions   Pending Prescriptions Disp Refills     levothyroxine (SYNTHROID/LEVOTHROID) 75 MCG tablet [Pharmacy Med Name: LEVOTHYROXINE 75 MCG TABLET] 90 tablet 1     Sig: TAKE 1 TABLET BY MOUTH DAILY       Thyroid Protocol Passed - 9/30/2021 12:29 AM        Passed - Patient is 12 years or older        Passed - Recent (12 mo) or future (30 days) visit within the authorizing provider's specialty     Patient has had an office visit with the authorizing provider or a provider within the authorizing providers department within the previous 12 mos or has a future within next 30 days. See \"Patient Info\" tab in inbasket, or \"Choose Columns\" in Meds & Orders section of the refill encounter.              Passed - Medication is active on med list        Passed - Normal TSH on file in past 12 months     Recent Labs   Lab Test 07/14/21  0939   TSH 1.72              Passed - No active pregnancy on record     If patient is pregnant or has had a positive pregnancy test, please check TSH.          Passed - No positive pregnancy test in past 12 months     If patient is pregnant or has had a positive pregnancy test, please check TSH.             ferrous sulfate (FEROSUL) 325 (65 Fe) MG tablet [Pharmacy Med Name: FERROUS SULFATE 325 MG TABLET] 90 tablet 2     Sig: TAKE 1 TABLET BY MOUTH EVERY DAY WITH BREAKFAST       Iron Supplements Passed - 9/30/2021 12:29 AM        Passed - Patient is 12 years of age or older        Passed - Recent (12 mo) or future (30 days) visit within the authorizing provider's specialty     Patient has had an office visit with the authorizing provider or a provider within the authorizing providers department within the previous 12 mos or has a future within next 30 days. See \"Patient Info\" tab in inbasket, or \"Choose Columns\" in Meds & Orders section of the refill encounter.              Passed - Hgb OR Hct on record within " the past 12 mos.     Patient need only have had a HGB or HCT on file in the past 12 mos. That result does not need to be normal.    Recent Labs   Lab Test 05/20/21  0525 05/17/21  1709 10/02/20  1304   HGB 12.3 12.0 9.1*     Recent Labs   Lab Test 05/20/21  0525 05/17/21  1709 10/02/20  1304   HCT 38.9 37.8 28.5*       Please verify a HGB or HCT has been checked SINCE THE LAST DOSE CHANGE.            Passed - Medication is active on med list             Dinora Younger RN 10/01/21 8:37 AM

## 2021-10-03 ENCOUNTER — HEALTH MAINTENANCE LETTER (OUTPATIENT)
Age: 86
End: 2021-10-03

## 2021-10-04 ENCOUNTER — E-VISIT (OUTPATIENT)
Dept: FAMILY MEDICINE | Facility: CLINIC | Age: 86
End: 2021-10-04
Payer: MEDICARE

## 2021-10-04 DIAGNOSIS — N39.0 ACUTE UTI (URINARY TRACT INFECTION): ICD-10-CM

## 2021-10-04 DIAGNOSIS — N39.0 RECURRENT UTI: Primary | ICD-10-CM

## 2021-10-04 PROCEDURE — 99421 OL DIG E/M SVC 5-10 MIN: CPT | Performed by: FAMILY MEDICINE

## 2021-10-05 ENCOUNTER — TELEPHONE (OUTPATIENT)
Dept: FAMILY MEDICINE | Facility: CLINIC | Age: 86
End: 2021-10-05

## 2021-10-05 ENCOUNTER — LAB (OUTPATIENT)
Dept: LAB | Facility: CLINIC | Age: 86
End: 2021-10-05
Payer: MEDICARE

## 2021-10-05 DIAGNOSIS — N39.0 RECURRENT UTI: ICD-10-CM

## 2021-10-05 DIAGNOSIS — N39.0 RECURRENT UTI: Primary | ICD-10-CM

## 2021-10-05 LAB
ALBUMIN UR-MCNC: NEGATIVE MG/DL
APPEARANCE UR: ABNORMAL
BACTERIA #/AREA URNS HPF: ABNORMAL /HPF
BILIRUB UR QL STRIP: NEGATIVE
COLOR UR AUTO: YELLOW
GLUCOSE UR STRIP-MCNC: NEGATIVE MG/DL
HGB UR QL STRIP: NEGATIVE
HYALINE CASTS #/AREA URNS LPF: ABNORMAL /LPF
KETONES UR STRIP-MCNC: NEGATIVE MG/DL
LEUKOCYTE ESTERASE UR QL STRIP: NEGATIVE
NITRATE UR QL: POSITIVE
PH UR STRIP: 6 [PH] (ref 5–8)
RBC #/AREA URNS AUTO: ABNORMAL /HPF
SP GR UR STRIP: 1.01 (ref 1–1.03)
SQUAMOUS #/AREA URNS AUTO: ABNORMAL /LPF
UROBILINOGEN UR STRIP-ACNC: 0.2 E.U./DL
WBC #/AREA URNS AUTO: ABNORMAL /HPF

## 2021-10-05 PROCEDURE — 81001 URINALYSIS AUTO W/SCOPE: CPT

## 2021-10-05 PROCEDURE — 87086 URINE CULTURE/COLONY COUNT: CPT

## 2021-10-05 PROCEDURE — 87186 SC STD MICRODIL/AGAR DIL: CPT

## 2021-10-05 RX ORDER — CEFDINIR 300 MG/1
300 CAPSULE ORAL 2 TIMES DAILY
Qty: 28 CAPSULE | Refills: 0 | Status: SHIPPED | OUTPATIENT
Start: 2021-10-05 | End: 2021-10-19

## 2021-10-05 NOTE — TELEPHONE ENCOUNTER
Incoming call from [employer] needing a current med list to be faxed to them - they are trying to set pt up with homecare services. Pleas fax current med list to [phone], Attn: [facility] Rees.

## 2021-10-05 NOTE — TELEPHONE ENCOUNTER
Left message to call back.  What is the name of the company we are faxing to? Is it for home care?

## 2021-10-08 LAB — BACTERIA UR CULT: ABNORMAL

## 2021-10-12 NOTE — TELEPHONE ENCOUNTER
Incoming call from [employer] returning Anabela and Lorin's call. Per [facility], if we can fax the current med list to her. She will coordinate with [facility].

## 2021-10-20 DIAGNOSIS — I10 ESSENTIAL HYPERTENSION: ICD-10-CM

## 2021-10-21 RX ORDER — LOSARTAN POTASSIUM 50 MG/1
TABLET ORAL
Qty: 180 TABLET | Refills: 1 | Status: SHIPPED | OUTPATIENT
Start: 2021-10-21 | End: 2022-04-25

## 2021-10-28 ENCOUNTER — TELEPHONE (OUTPATIENT)
Dept: FAMILY MEDICINE | Facility: CLINIC | Age: 86
End: 2021-10-28

## 2021-10-28 DIAGNOSIS — N39.0 RECURRENT UTI: Primary | ICD-10-CM

## 2021-10-28 RX ORDER — CEPHALEXIN 500 MG/1
500 CAPSULE ORAL 2 TIMES DAILY
Qty: 14 CAPSULE | Refills: 0 | Status: SHIPPED | OUTPATIENT
Start: 2021-10-28 | End: 2021-11-04

## 2021-10-28 NOTE — TELEPHONE ENCOUNTER
Cefdinir and cephalexin are in the same class of medication.  I reviewed her recent urine culture results and these antibiotics should appropriately treat her infection.    Virginia Wetzel, DO

## 2021-10-28 NOTE — TELEPHONE ENCOUNTER
Reason for Call:  Other prescription    Detailed comments: pt daughter calling as pt was treated for UTI with antibiotics per Dr Henson, completed antibiotics a few days ago & now symptoms are back.  Requesting additional antibiotics until pt can get in to see [facility].    As of today no appt with Urology, so gave daughter info to schedule.     Please call daughter, Taisha, back with update     [facility] [id number] [place] [address]    Phone Number Patient can be reached at: Other phone number:  [phone]*    Best Time: na    Can we leave a detailed message on this number? YES    Call taken on 10/28/2021 at 9:37 AM by Marianne Estrella

## 2021-10-28 NOTE — TELEPHONE ENCOUNTER
I am sorry I am not covering Dr. Velasquez today I will send this to her basket for the covering provider.  Thank you.

## 2021-10-28 NOTE — TELEPHONE ENCOUNTER
Daughter states she was on this keflex before and did not seem to help her much.  She was on Cefdinir most recently and that seemed to help the most.      They are wondering if it can be changed?    Also asking if the duration could be extended or maybe a maintenance dose until the appt with urology on 11-23-21?

## 2021-10-28 NOTE — TELEPHONE ENCOUNTER
Antibiotic sent to her pharmacy.  If symptoms persist or worsen she needs to physically be seen for follow-up.    Vriginia Wetzel, DO

## 2021-10-29 NOTE — TELEPHONE ENCOUNTER
Occasionally we will put people on daily UTI prophylaxis. But since I have not met the patient and am unfamiliar with her history, I would recommend discussing this during a visit since prolonged antibiotics carry risks.    Virginia Wetzel, DO

## 2021-10-29 NOTE — TELEPHONE ENCOUNTER
Relayed message below from Dr. Wetzel to patient's daughter Taisha. Taisha voiced understanding. She will call back to schedule an appointment if patient isn't better after completing cephalexin course.

## 2021-11-11 ENCOUNTER — IMMUNIZATION (OUTPATIENT)
Dept: NURSING | Facility: CLINIC | Age: 86
End: 2021-11-11
Payer: MEDICARE

## 2021-11-11 PROCEDURE — 0004A PR COVID VAC PFIZER DIL RECON 30 MCG/0.3 ML IM: CPT

## 2021-11-11 PROCEDURE — 91300 PR COVID VAC PFIZER DIL RECON 30 MCG/0.3 ML IM: CPT

## 2021-11-23 ENCOUNTER — TRANSFERRED RECORDS (OUTPATIENT)
Dept: HEALTH INFORMATION MANAGEMENT | Facility: CLINIC | Age: 86
End: 2021-11-23
Payer: MEDICAID

## 2021-11-23 DIAGNOSIS — R41.9 NEUROCOGNITIVE DISORDER: ICD-10-CM

## 2021-11-24 RX ORDER — MEMANTINE HYDROCHLORIDE 10 MG/1
TABLET ORAL
Qty: 180 TABLET | Refills: 0 | Status: SHIPPED | OUTPATIENT
Start: 2021-11-24 | End: 2022-01-28

## 2021-11-24 NOTE — TELEPHONE ENCOUNTER
Refill request for memantine.  Pt has upcoming appt on 12/7/21 with .  Medication T'd for review and signature    Beatrice Abdalla LPN on 11/24/2021 at 2:11 PM

## 2021-12-20 ENCOUNTER — TELEPHONE (OUTPATIENT)
Dept: FAMILY MEDICINE | Facility: CLINIC | Age: 86
End: 2021-12-20
Payer: MEDICAID

## 2021-12-20 DIAGNOSIS — R53.83 FATIGUE, UNSPECIFIED TYPE: Primary | ICD-10-CM

## 2021-12-20 NOTE — TELEPHONE ENCOUNTER
Patient was given vaginal estrogen cream by urology and patients daughter is wondering if that would affect her thyroid. Patient is very tired and weak.    Should patient come in and have labs done?  Patient is having an U/S on 12/23 at [facility] and daughter is wondering if Dr Velasquez could just put in lab orders so she can have them done at the same time, at [facility] lab.  Please call patients daughter  Ok to leave a detailed message

## 2021-12-23 ENCOUNTER — LAB (OUTPATIENT)
Dept: LAB | Facility: HOSPITAL | Age: 86
End: 2021-12-23
Payer: MEDICARE

## 2021-12-23 DIAGNOSIS — R53.83 FATIGUE, UNSPECIFIED TYPE: ICD-10-CM

## 2021-12-23 LAB
ALBUMIN SERPL-MCNC: 3.3 G/DL (ref 3.5–5)
ALP SERPL-CCNC: 67 U/L (ref 45–120)
ALT SERPL W P-5'-P-CCNC: 21 U/L (ref 0–45)
ANION GAP SERPL CALCULATED.3IONS-SCNC: 10 MMOL/L (ref 5–18)
AST SERPL W P-5'-P-CCNC: 17 U/L (ref 0–40)
BILIRUB SERPL-MCNC: 0.3 MG/DL (ref 0–1)
BUN SERPL-MCNC: 22 MG/DL (ref 8–28)
CALCIUM SERPL-MCNC: 8.9 MG/DL (ref 8.5–10.5)
CHLORIDE BLD-SCNC: 104 MMOL/L (ref 98–107)
CO2 SERPL-SCNC: 24 MMOL/L (ref 22–31)
CREAT SERPL-MCNC: 0.76 MG/DL (ref 0.6–1.1)
ERYTHROCYTE [DISTWIDTH] IN BLOOD BY AUTOMATED COUNT: 13.2 % (ref 10–15)
GFR SERPL CREATININE-BSD FRML MDRD: 74 ML/MIN/1.73M2
GLUCOSE BLD-MCNC: 103 MG/DL (ref 70–125)
HCT VFR BLD AUTO: 38.5 % (ref 35–47)
HGB BLD-MCNC: 11.9 G/DL (ref 11.7–15.7)
MCH RBC QN AUTO: 32.1 PG (ref 26.5–33)
MCHC RBC AUTO-ENTMCNC: 30.9 G/DL (ref 31.5–36.5)
MCV RBC AUTO: 104 FL (ref 78–100)
PLATELET # BLD AUTO: 232 10E3/UL (ref 150–450)
POTASSIUM BLD-SCNC: 4.4 MMOL/L (ref 3.5–5)
PROT SERPL-MCNC: 6.1 G/DL (ref 6–8)
RBC # BLD AUTO: 3.71 10E6/UL (ref 3.8–5.2)
SODIUM SERPL-SCNC: 138 MMOL/L (ref 136–145)
TSH SERPL DL<=0.005 MIU/L-ACNC: 0.76 UIU/ML (ref 0.3–5)
WBC # BLD AUTO: 8.1 10E3/UL (ref 4–11)

## 2021-12-23 PROCEDURE — 36415 COLL VENOUS BLD VENIPUNCTURE: CPT

## 2021-12-23 PROCEDURE — 85027 COMPLETE CBC AUTOMATED: CPT

## 2021-12-23 PROCEDURE — 84443 ASSAY THYROID STIM HORMONE: CPT

## 2021-12-23 PROCEDURE — 80053 COMPREHEN METABOLIC PANEL: CPT

## 2021-12-28 ENCOUNTER — MYC MEDICAL ADVICE (OUTPATIENT)
Dept: FAMILY MEDICINE | Facility: CLINIC | Age: 86
End: 2021-12-28
Payer: MEDICAID

## 2021-12-30 ENCOUNTER — HOSPITAL ENCOUNTER (OUTPATIENT)
Dept: ULTRASOUND IMAGING | Facility: HOSPITAL | Age: 86
End: 2021-12-30
Attending: PHYSICIAN ASSISTANT
Payer: MEDICARE

## 2021-12-30 DIAGNOSIS — R39.14 FEELING OF INCOMPLETE BLADDER EMPTYING: ICD-10-CM

## 2021-12-30 PROCEDURE — 76770 US EXAM ABDO BACK WALL COMP: CPT

## 2022-01-03 ENCOUNTER — MYC MEDICAL ADVICE (OUTPATIENT)
Dept: NEUROLOGY | Facility: CLINIC | Age: 87
End: 2022-01-03
Payer: MEDICAID

## 2022-01-03 NOTE — TELEPHONE ENCOUNTER
[facility]    Phone Message    May a detailed message be left on voicemail: yes     Reason for Call: Other: Pt's daughter called with concerns about Pt's right leg and hoping to get Pt in for appt.      Writer sees Dr. Fam's note, but does not know when exactly to schedule. Please call Rosalva back to advise when Pt can be seen for appt. She does advise to please leave detailed VM with time of appt if she cannot answer.    Action Taken: Message routed to:  Other: [facility]    Travel Screening: Not Applicable

## 2022-01-07 ENCOUNTER — TELEPHONE (OUTPATIENT)
Dept: FAMILY MEDICINE | Facility: CLINIC | Age: 87
End: 2022-01-07
Payer: MEDICAID

## 2022-01-07 DIAGNOSIS — R05.9 COUGH: Primary | ICD-10-CM

## 2022-01-07 NOTE — TELEPHONE ENCOUNTER
Please tell Rosalva I am terribly sorry. If she is at 92% -maybe should be seen before Monday but no availability unfortunately on a Friday afternoon (walk in clinic is our only option or urgency room).

## 2022-01-07 NOTE — TELEPHONE ENCOUNTER
Patients daughter calling back.  Read doctors note and she answered the questions.  Patient has been taking Mucinex and Dayquil for her cough.  Her O2 when taken at 1pm today was 92%. Her temp was 97.  Her cough is productive with yellow phlegm.    Patients daughter would like to get her in to see anyone available.  As soon as possible  Please call Rosalva  [phone]

## 2022-01-07 NOTE — TELEPHONE ENCOUNTER
Please call patient's daughter and apologize to her that upon arrival today I walked right into seeing patients and did not see the phone message until now.  I am reassured by her oxygen running 97%.  Please triage if she is having any other symptoms with her cough:  Any fever, nausea, nasal congestion, drainage, new headache or new body aches?  If she taking any medication for her cough now?    We have seen some false-negative tests with the Covid at home testing if testing too early in the disease.  If she would like me to put in an order to test for Covid and influenza I could do so.    If she is not having increased work of breathing or fever we do not necessarily need to see her today unless her symptoms are worsening.  I unfortunately did not have any openings today or Monday but could see a partner or walk in clinic.     Is her cough wet or dry and is a constant?  Do they feel they need something for the cough?

## 2022-01-07 NOTE — TELEPHONE ENCOUNTER
Pt's daughter Rosalva called re pt's appt for today with Dr. Fam. Pt woke with cold symptoms.2 rapid COVID tests were negitive. PCP has been called to address COVID tests and pt is waiting to hear form them. Rosalva will call back to reschedule if PCP is not able to also help with leg issues.

## 2022-01-07 NOTE — TELEPHONE ENCOUNTER
Reason for call:  Patient reporting a symptom    Symptom or request: cough, cold symptoms    Duration (how long have symptoms been present): 2 days     Have you been treated for this before? No    Additional comments: took 2 rapid at home covid test- both negative  O2 is running around 97%    Daughter looking for advice on what to do & if pt should be seen. Please advise     Phone Number patient can be reached at:  Other phone number:  [phone]*    Best Time:  na    Can we leave a detailed message on this number:  YES    Call taken on 1/7/2022 at 8:54 AM by Marianne Estrella   Alert and oriented to person, place, time/situation. normal mood and affect. no apparent risk to self or others.

## 2022-01-08 ENCOUNTER — TRANSFERRED RECORDS (OUTPATIENT)
Dept: HEALTH INFORMATION MANAGEMENT | Facility: CLINIC | Age: 87
End: 2022-01-08

## 2022-01-08 ENCOUNTER — OFFICE VISIT (OUTPATIENT)
Dept: FAMILY MEDICINE | Facility: CLINIC | Age: 87
End: 2022-01-08
Payer: MEDICARE

## 2022-01-08 VITALS
OXYGEN SATURATION: 96 % | TEMPERATURE: 98 F | SYSTOLIC BLOOD PRESSURE: 147 MMHG | HEART RATE: 66 BPM | DIASTOLIC BLOOD PRESSURE: 82 MMHG

## 2022-01-08 DIAGNOSIS — M25.551 HIP PAIN, RIGHT: ICD-10-CM

## 2022-01-08 DIAGNOSIS — J20.9 ACUTE BRONCHITIS, UNSPECIFIED ORGANISM: Primary | ICD-10-CM

## 2022-01-08 PROCEDURE — U0003 INFECTIOUS AGENT DETECTION BY NUCLEIC ACID (DNA OR RNA); SEVERE ACUTE RESPIRATORY SYNDROME CORONAVIRUS 2 (SARS-COV-2) (CORONAVIRUS DISEASE [COVID-19]), AMPLIFIED PROBE TECHNIQUE, MAKING USE OF HIGH THROUGHPUT TECHNOLOGIES AS DESCRIBED BY CMS-2020-01-R: HCPCS | Performed by: FAMILY MEDICINE

## 2022-01-08 PROCEDURE — U0005 INFEC AGEN DETEC AMPLI PROBE: HCPCS | Performed by: FAMILY MEDICINE

## 2022-01-08 PROCEDURE — 99214 OFFICE O/P EST MOD 30 MIN: CPT | Performed by: FAMILY MEDICINE

## 2022-01-08 RX ORDER — DOXYCYCLINE HYCLATE 100 MG
100 TABLET ORAL 2 TIMES DAILY
Qty: 20 TABLET | Refills: 0 | Status: SHIPPED | OUTPATIENT
Start: 2022-01-08 | End: 2022-01-18

## 2022-01-08 NOTE — PROGRESS NOTES
Assessment & Plan     Hip pain, right    - XR Pelvis and Hip Right 1 View; Future  - XR Pelvis and Hip Right 1 View  - XR Femur Right 2 Views; Future  - XR Femur Right 2 Views    Patient and daughter reassured with normal RIGHT hip/pelvis and femur films today-- there is no fracture causing increased RLE pain,  Recommend Follow-up with NEURO as planned for further recommendations.    Acute bronchitis, unspecified organism    - XR Chest 2 Views; Future  - Symptomatic; Unknown COVID-19 Virus (Coronavirus) by PCR Nose  - XR Chest 2 Views  - doxycycline hyclate (VIBRA-TABS) 100 MG tablet; Take 1 tablet (100 mg) by mouth 2 times daily for 10 days    Reassured of negative xray.  Will treat with doxycycline.  Close Follow-up if any new or worsening sx prn.    Elva Yang MD  [facility]    Ashleigh Corral is a 89 year old who presents for the following health issues     HPI     Presents with daughter for Follow-up cough.  Has been undergoing workup with NEURO/Fam for RLE pain and weakness since April.  Lives independently and had been ambulating with walker up until April when had sudden increased pain and inability to lift up RLE.  Now is in wheelchair.  Thought to be a pinched peroneal nerve per daughter.  Patient states pain in RLE has been worse recently.  Denies any new injury or trauma.  Is independent with own assists from wheelchair to toilet and back.    Patient has also had worsening cough.  No fever.  Cough has become more congested yesterday.  Patient is vaccinated and boosted for COVID.      Review of Systems   Constitutional, HEENT, cardiovascular, pulmonary, GI, , musculoskeletal, neuro, skin, endocrine and psych systems are negative, except as otherwise noted.      Objective    BP (!) 147/82 (BP Location: Right arm, Patient Position: Sitting, Cuff Size: Adult Regular)   Pulse 66   Temp 98  F (36.7  C) (Oral)   LMP  (LMP Unknown)   SpO2 96%   There is no height or  weight on file to calculate BMI.  Physical Exam   GENERAL: healthy, alert and no distress  EYES: Eyes grossly normal to inspection, PERRL and conjunctivae and sclerae normal  NECK: no adenopathy, no asymmetry, masses, or scars and thyroid normal to palpation  RESP: lungs clear to auscultation - no rales, rhonchi or wheezes, harsh congested cough  CV: regular rate and rhythm, normal S1 S2, no S3 or S4, no murmur, click or rub, no peripheral edema and peripheral pulses strong  ABDOMEN: soft, nontender, no hepatosplenomegaly, no masses and bowel sounds normal  MS: unable to raise RLE on command while sitting in WC- can lift RLE with arms and bend knee without pain  SKIN: no suspicious lesions or rashes  PSYCH: mentation appears normal, affect normal/bright    Xray - Reviewed and interpreted by me.  Negative CXR/pelivs and RIGHT hip and femur films.

## 2022-01-10 LAB — SARS-COV-2 RNA RESP QL NAA+PROBE: NEGATIVE

## 2022-01-12 RX ORDER — ALBUTEROL SULFATE 90 UG/1
1-2 AEROSOL, METERED RESPIRATORY (INHALATION) EVERY 6 HOURS PRN
Qty: 18 G | Refills: 0 | Status: SHIPPED | OUTPATIENT
Start: 2022-01-12 | End: 2022-02-01

## 2022-01-12 RX ORDER — INHALER, ASSIST DEVICES
SPACER (EA) MISCELLANEOUS
Qty: 1 EACH | Refills: 0 | Status: SHIPPED | OUTPATIENT
Start: 2022-01-12 | End: 2022-07-22

## 2022-01-12 NOTE — TELEPHONE ENCOUNTER
If she is quite lethargic and getting worse, the ER would be the best place to expedite labs and imaging and IV fluids if needed. Let her know I responded but am out of clinic the next couple days

## 2022-01-12 NOTE — TELEPHONE ENCOUNTER
Patients daughter calling back.  After being seen in [facility], patient seems to be getting worse.  Symptoms are Chest heaviness, lethargic. Patients daughter would like her seen today.  If not by Dr Velasquez by one of the others working.    I did tell patients daughter that if anything changed before a call back to go to [facility] again.  Please call Rosalva

## 2022-01-12 NOTE — TELEPHONE ENCOUNTER
Spoke to Rosalva and relayed MD message. She does not want to take her to ER and wait. I told her the urgency room could be another option. She said her symptoms aren't getting worse but just not getting any better, other than she did mention pt said her chest was heavy. I told her she should really be evaluated again in person if not getting better. Rosalva reluctant to take her back in anywhere. Wants to know if we can just change antibiotic.

## 2022-01-12 NOTE — TELEPHONE ENCOUNTER
Spoke to mayra and relayed MD message. She would like to try the albuterol inhaler. She is going over to see pt now and said she will update us if anything is different. I did schedule her with Dr Angulo on Friday to follow up

## 2022-01-12 NOTE — TELEPHONE ENCOUNTER
Call and speak to normal on Monday morning on January 10, 2022.  She indicated she had just gotten to her mother's house to check on her.  Seem like she got a little worse before better.  Was having a little bit of a better day on Sunday.  They did take the advice and going to the walk-in clinic and had a full work-up done including labs, x-ray and COVID testing which was still pending.  She was started on antibiotics.  Daughter is monitoring oxygen saturation and has not gone below 90%.  I did bring up questions about the knee and how that has been investigated since they went out on leave.  They had been seeing neurology Dr. Fam whose notes I have reviewed.  She has been going for further diagnostic work-up but there have been times where she is having flareups of the leg where she can hardly move or walk and that was a recent concern.  She did get some strength back in the fall when she had a PCA coming to work with her regularly.  Recently her PCA had some exposure where she could not come out to the home.  They were trying to schedule a follow-up visit with Dr. Fam but then it had to be postponed due to Mrs. Sahni possible COVID-19.  They have looked into long-term options of assisted living but they are not quite there yet as her cares are becoming greater.  We discussed medication changes or suggestions that have been made.  They will let me know if they have further questions or concerns and normal we will let her mother know that I called to check on her.  Time spent on the phone at least 12 to 15 minutes

## 2022-01-12 NOTE — TELEPHONE ENCOUNTER
She didn't have a pneumonia on xray. I dont know that changing the antibiotic is going to resolve or help her symptoms as we do not know what we are treating. This could just be viral. It can take the cough 1-2 weeks to resolve.   Important she is hydrating and important that she is maintaining her O2 sats above 90%, if this is declining, then needs to be seen immediately.   Maybe you can put her in with someone who has a 40 min same day slot tomorrow.   If the cough is the primary concern, we could try an albuterol inhaler with chamber ? Or get them a nebulizer (would have to come in to )

## 2022-01-14 ENCOUNTER — OFFICE VISIT (OUTPATIENT)
Dept: FAMILY MEDICINE | Facility: CLINIC | Age: 87
End: 2022-01-14
Payer: MEDICARE

## 2022-01-14 ENCOUNTER — MEDICAL CORRESPONDENCE (OUTPATIENT)
Dept: HEALTH INFORMATION MANAGEMENT | Facility: CLINIC | Age: 87
End: 2022-01-14

## 2022-01-14 VITALS
BODY MASS INDEX: 23.7 KG/M2 | SYSTOLIC BLOOD PRESSURE: 155 MMHG | WEIGHT: 142.4 LBS | DIASTOLIC BLOOD PRESSURE: 80 MMHG | TEMPERATURE: 97.7 F | HEART RATE: 67 BPM | RESPIRATION RATE: 16 BRPM | OXYGEN SATURATION: 97 %

## 2022-01-14 DIAGNOSIS — F33.42 MAJOR DEPRESSIVE DISORDER, RECURRENT EPISODE, IN FULL REMISSION (H): ICD-10-CM

## 2022-01-14 DIAGNOSIS — R05.9 COUGH: Primary | ICD-10-CM

## 2022-01-14 DIAGNOSIS — E27.8 ADRENAL MASS, LEFT (H): ICD-10-CM

## 2022-01-14 DIAGNOSIS — R53.83 OTHER FATIGUE: ICD-10-CM

## 2022-01-14 LAB
ALBUMIN SERPL-MCNC: 3.2 G/DL (ref 3.5–5)
ALP SERPL-CCNC: 103 U/L (ref 45–120)
ALT SERPL W P-5'-P-CCNC: 34 U/L (ref 0–45)
ANION GAP SERPL CALCULATED.3IONS-SCNC: 11 MMOL/L (ref 5–18)
AST SERPL W P-5'-P-CCNC: 26 U/L (ref 0–40)
BASOPHILS # BLD AUTO: 0 10E3/UL (ref 0–0.2)
BASOPHILS NFR BLD AUTO: 1 %
BILIRUB SERPL-MCNC: 0.4 MG/DL (ref 0–1)
BUN SERPL-MCNC: 14 MG/DL (ref 8–28)
C PNEUM DNA SPEC QL NAA+PROBE: NOT DETECTED
CALCIUM SERPL-MCNC: 9.1 MG/DL (ref 8.5–10.5)
CHLORIDE BLD-SCNC: 103 MMOL/L (ref 98–107)
CO2 SERPL-SCNC: 25 MMOL/L (ref 22–31)
CREAT SERPL-MCNC: 0.73 MG/DL (ref 0.6–1.1)
EOSINOPHIL # BLD AUTO: 0.3 10E3/UL (ref 0–0.7)
EOSINOPHIL NFR BLD AUTO: 4 %
ERYTHROCYTE [DISTWIDTH] IN BLOOD BY AUTOMATED COUNT: 13.2 % (ref 10–15)
FLUAV AG SPEC QL IA: NEGATIVE
FLUAV H1 2009 PAND RNA SPEC QL NAA+PROBE: NOT DETECTED
FLUAV H1 RNA SPEC QL NAA+PROBE: NOT DETECTED
FLUAV H3 RNA SPEC QL NAA+PROBE: NOT DETECTED
FLUAV RNA SPEC QL NAA+PROBE: NOT DETECTED
FLUBV AG SPEC QL IA: NEGATIVE
FLUBV RNA SPEC QL NAA+PROBE: NOT DETECTED
GFR SERPL CREATININE-BSD FRML MDRD: 78 ML/MIN/1.73M2
GLUCOSE BLD-MCNC: 88 MG/DL (ref 70–125)
HADV DNA SPEC QL NAA+PROBE: NOT DETECTED
HCOV PNL SPEC NAA+PROBE: NOT DETECTED
HCT VFR BLD AUTO: 40.6 % (ref 35–47)
HGB BLD-MCNC: 12.3 G/DL (ref 11.7–15.7)
HMPV RNA SPEC QL NAA+PROBE: NOT DETECTED
HPIV1 RNA SPEC QL NAA+PROBE: NOT DETECTED
HPIV2 RNA SPEC QL NAA+PROBE: NOT DETECTED
HPIV3 RNA SPEC QL NAA+PROBE: NOT DETECTED
HPIV4 RNA SPEC QL NAA+PROBE: NOT DETECTED
IMM GRANULOCYTES # BLD: 0.1 10E3/UL
IMM GRANULOCYTES NFR BLD: 1 %
LYMPHOCYTES # BLD AUTO: 1.6 10E3/UL (ref 0.8–5.3)
LYMPHOCYTES NFR BLD AUTO: 22 %
M PNEUMO DNA SPEC QL NAA+PROBE: NOT DETECTED
MCH RBC QN AUTO: 31.2 PG (ref 26.5–33)
MCHC RBC AUTO-ENTMCNC: 30.3 G/DL (ref 31.5–36.5)
MCV RBC AUTO: 103 FL (ref 78–100)
MONOCYTES # BLD AUTO: 0.6 10E3/UL (ref 0–1.3)
MONOCYTES NFR BLD AUTO: 9 %
NEUTROPHILS # BLD AUTO: 4.6 10E3/UL (ref 1.6–8.3)
NEUTROPHILS NFR BLD AUTO: 63 %
PLATELET # BLD AUTO: 295 10E3/UL (ref 150–450)
POTASSIUM BLD-SCNC: 4.4 MMOL/L (ref 3.5–5)
PROT SERPL-MCNC: 6 G/DL (ref 6–8)
RBC # BLD AUTO: 3.94 10E6/UL (ref 3.8–5.2)
RSV RNA SPEC QL NAA+PROBE: NOT DETECTED
RSV RNA SPEC QL NAA+PROBE: NOT DETECTED
RV+EV RNA SPEC QL NAA+PROBE: DETECTED
SODIUM SERPL-SCNC: 139 MMOL/L (ref 136–145)
WBC # BLD AUTO: 7.3 10E3/UL (ref 4–11)

## 2022-01-14 PROCEDURE — 36415 COLL VENOUS BLD VENIPUNCTURE: CPT | Performed by: FAMILY MEDICINE

## 2022-01-14 PROCEDURE — 87633 RESP VIRUS 12-25 TARGETS: CPT | Performed by: FAMILY MEDICINE

## 2022-01-14 PROCEDURE — 87486 CHLMYD PNEUM DNA AMP PROBE: CPT | Performed by: FAMILY MEDICINE

## 2022-01-14 PROCEDURE — 87804 INFLUENZA ASSAY W/OPTIC: CPT | Mod: 59 | Performed by: FAMILY MEDICINE

## 2022-01-14 PROCEDURE — 80053 COMPREHEN METABOLIC PANEL: CPT | Performed by: FAMILY MEDICINE

## 2022-01-14 PROCEDURE — 85025 COMPLETE CBC W/AUTO DIFF WBC: CPT | Performed by: FAMILY MEDICINE

## 2022-01-14 PROCEDURE — 99214 OFFICE O/P EST MOD 30 MIN: CPT | Performed by: FAMILY MEDICINE

## 2022-01-14 PROCEDURE — 87581 M.PNEUMON DNA AMP PROBE: CPT | Performed by: FAMILY MEDICINE

## 2022-01-14 RX ORDER — BENZONATATE 100 MG/1
100 CAPSULE ORAL 3 TIMES DAILY PRN
Qty: 30 CAPSULE | Refills: 1 | Status: SHIPPED | OUTPATIENT
Start: 2022-01-14 | End: 2022-07-22

## 2022-01-14 NOTE — PROGRESS NOTES
Assessment & Plan     Cough  Ongoing for close to 2 weeks now.  COVID-negative on 1/8/2022.  Is on doxycycline treatment.  Cough is not improving.  Chest x-ray done on 1/8/2022 was normal.  We will get lab work today. If Neutrophils are significantly elevated, will obtain CT chest for further evaluation.  We will do RSV and influenza testing today as well.  Continue with doxycycline for now.  We will give her Tessalon Perles for treatment of cough.  - Influenza A & B Antigen - Clinic Collect  - RSV rapid antigen  - CBC with platelets and differential  - Comprehensive metabolic panel (BMP + Alb, Alk Phos, ALT, AST, Total. Bili, TP)  - benzonatate (TESSALON) 100 MG capsule  Dispense: 30 capsule; Refill: 1    Other fatigue  Most likely due to this cough.  Lab check today for further evaluation however.  - CBC with platelets and differential  - Comprehensive metabolic panel (BMP + Alb, Alk Phos, ALT, AST, Total. Bili, TP)    Major depressive disorder, recurrent episode, in full remission (H)  Noted. Stable.    Adrenal mass, left (H)  Noted.incidental finding. PCP following    Return in about 3 months (around 4/14/2022) for Follow up, in person, with PCP.    Deysi Angulo MD  [facility]    Ashleigh Corral is a 89 year old who presents for the following health issues  accompanied by her daughter Rosalva    HPI   Chief Complaint   Patient presents with     RECHECK     follow up bronchitis, would like RSV and flu testing    Seen on 1/8/2022 for cough and right-sided hip pain at walk-in clinic: X-ray of pelvis and right hip and right femur are normal without any fracture or dislocation. She was also diagnosed with acute bronchitis and chest x-ray was negative and was treated with doxycycline 1 tablet 2 times daily for 10 days. She is here for follow-up.  She follows with neurology for neurocognitive disorder; vascular dementia  2 daughters Rosalva and Taisha are taking care of  patients.  Her PCP gave her inhaler a couple days ago to help with cough but hasn't made a difference  Coughing up phlegm - yellow.   Denies sore throat, ear pain, sinus pain.   COVID was positive on 1/8/2022.    Review of Systems   Constitutional, HEENT, cardiovascular, pulmonary, gi and gu systems are negative, except as otherwise noted.      Objective    BP (!) 155/80   Pulse 67   Temp 97.7  F (36.5  C) (Oral)   Resp 16   Wt 64.6 kg (142 lb 6.4 oz)   LMP  (LMP Unknown)   SpO2 97%   BMI 23.70 kg/m    Body mass index is 23.7 kg/m .  Physical Exam   GENERAL: healthy, alert and no distress  NECK: no adenopathy, no asymmetry, masses, or scars and thyroid normal to palpation  HEENT: normal sinus, TMs, posterior oropharynx  RESP: lungs clear to auscultation - no rales, rhonchi or wheezes  CV: regular rate and rhythm, normal S1 S2, no S3 or S4, no murmur, click or rub, no peripheral edema and peripheral pulses strong  ABDOMEN: soft, nontender, no hepatosplenomegaly, no masses and bowel sounds normal  MS: no gross musculoskeletal defects noted, no edema  NEURO: sleepy

## 2022-01-17 NOTE — TELEPHONE ENCOUNTER
Normal called back and declined earlier appt on 1/20 as patient has a cold right now apparently. They will keep current appt on 1/28.Thank you.

## 2022-01-18 VITALS
RESPIRATION RATE: 18 BRPM | SYSTOLIC BLOOD PRESSURE: 140 MMHG | HEIGHT: 64 IN | HEART RATE: 72 BPM | DIASTOLIC BLOOD PRESSURE: 64 MMHG | BODY MASS INDEX: 24.41 KG/M2 | WEIGHT: 143 LBS

## 2022-01-18 VITALS
RESPIRATION RATE: 16 BRPM | OXYGEN SATURATION: 97 % | DIASTOLIC BLOOD PRESSURE: 86 MMHG | WEIGHT: 138.8 LBS | HEART RATE: 68 BPM | SYSTOLIC BLOOD PRESSURE: 168 MMHG | TEMPERATURE: 96.8 F | BODY MASS INDEX: 23.7 KG/M2 | HEIGHT: 64 IN

## 2022-01-18 VITALS
TEMPERATURE: 98.7 F | OXYGEN SATURATION: 97 % | WEIGHT: 147.2 LBS | BODY MASS INDEX: 25.27 KG/M2 | SYSTOLIC BLOOD PRESSURE: 130 MMHG | RESPIRATION RATE: 16 BRPM | HEART RATE: 73 BPM | DIASTOLIC BLOOD PRESSURE: 71 MMHG

## 2022-01-18 VITALS
SYSTOLIC BLOOD PRESSURE: 108 MMHG | HEART RATE: 81 BPM | RESPIRATION RATE: 20 BRPM | WEIGHT: 147.2 LBS | BODY MASS INDEX: 25.27 KG/M2 | DIASTOLIC BLOOD PRESSURE: 55 MMHG | OXYGEN SATURATION: 96 %

## 2022-01-18 VITALS
WEIGHT: 146.2 LBS | SYSTOLIC BLOOD PRESSURE: 135 MMHG | HEART RATE: 67 BPM | TEMPERATURE: 98 F | RESPIRATION RATE: 12 BRPM | BODY MASS INDEX: 25.1 KG/M2 | DIASTOLIC BLOOD PRESSURE: 86 MMHG | OXYGEN SATURATION: 99 %

## 2022-01-18 VITALS
OXYGEN SATURATION: 98 % | RESPIRATION RATE: 16 BRPM | HEART RATE: 66 BPM | SYSTOLIC BLOOD PRESSURE: 175 MMHG | DIASTOLIC BLOOD PRESSURE: 76 MMHG

## 2022-01-18 VITALS
HEART RATE: 69 BPM | TEMPERATURE: 97.3 F | RESPIRATION RATE: 22 BRPM | DIASTOLIC BLOOD PRESSURE: 141 MMHG | BODY MASS INDEX: 24.68 KG/M2 | WEIGHT: 143.8 LBS | SYSTOLIC BLOOD PRESSURE: 172 MMHG | OXYGEN SATURATION: 100 %

## 2022-01-18 VITALS
SYSTOLIC BLOOD PRESSURE: 128 MMHG | HEART RATE: 76 BPM | BODY MASS INDEX: 25.49 KG/M2 | RESPIRATION RATE: 16 BRPM | WEIGHT: 148.5 LBS | DIASTOLIC BLOOD PRESSURE: 52 MMHG

## 2022-01-18 VITALS
WEIGHT: 147.2 LBS | BODY MASS INDEX: 25.27 KG/M2 | OXYGEN SATURATION: 96 % | TEMPERATURE: 98.3 F | HEART RATE: 70 BPM | RESPIRATION RATE: 14 BRPM | DIASTOLIC BLOOD PRESSURE: 66 MMHG | SYSTOLIC BLOOD PRESSURE: 161 MMHG

## 2022-01-18 VITALS
HEIGHT: 64 IN | HEART RATE: 74 BPM | WEIGHT: 147.5 LBS | BODY MASS INDEX: 25.18 KG/M2 | DIASTOLIC BLOOD PRESSURE: 88 MMHG | SYSTOLIC BLOOD PRESSURE: 154 MMHG

## 2022-01-18 ASSESSMENT — PATIENT HEALTH QUESTIONNAIRE - PHQ9
SUM OF ALL RESPONSES TO PHQ QUESTIONS 1-9: 5

## 2022-01-19 ASSESSMENT — ANXIETY QUESTIONNAIRES: GAD7 TOTAL SCORE: 3

## 2022-01-23 ENCOUNTER — HEALTH MAINTENANCE LETTER (OUTPATIENT)
Age: 87
End: 2022-01-23

## 2022-01-28 ENCOUNTER — OFFICE VISIT (OUTPATIENT)
Dept: NEUROLOGY | Facility: CLINIC | Age: 87
End: 2022-01-28
Payer: MEDICARE

## 2022-01-28 VITALS
HEIGHT: 65 IN | HEART RATE: 67 BPM | BODY MASS INDEX: 23.66 KG/M2 | WEIGHT: 142 LBS | DIASTOLIC BLOOD PRESSURE: 83 MMHG | SYSTOLIC BLOOD PRESSURE: 171 MMHG

## 2022-01-28 DIAGNOSIS — G60.8 POLYNEUROPATHY, PERIPHERAL SENSORIMOTOR AXONAL: ICD-10-CM

## 2022-01-28 DIAGNOSIS — M48.062 LUMBAR STENOSIS WITH NEUROGENIC CLAUDICATION: ICD-10-CM

## 2022-01-28 DIAGNOSIS — R41.9 NEUROCOGNITIVE DISORDER: Primary | ICD-10-CM

## 2022-01-28 PROBLEM — N39.0 RECURRENT URINARY TRACT INFECTION: Status: ACTIVE | Noted: 2021-10-05

## 2022-01-28 PROCEDURE — 99214 OFFICE O/P EST MOD 30 MIN: CPT | Performed by: PSYCHIATRY & NEUROLOGY

## 2022-01-28 RX ORDER — MEMANTINE HYDROCHLORIDE 10 MG/1
TABLET ORAL
Qty: 180 TABLET | Refills: 3 | Status: SHIPPED | OUTPATIENT
Start: 2022-01-28 | End: 2022-07-22

## 2022-01-28 ASSESSMENT — MIFFLIN-ST. JEOR: SCORE: 1064.99

## 2022-01-28 NOTE — NURSING NOTE
Chief Complaint   Patient presents with     Neurocognitive disorder     3 month follow up- Tolerating Namenda 10mg BID      Right lumbar radiculopathy     Joanie Lopez CMA on 1/28/2022 at 11:30 AM

## 2022-01-28 NOTE — PROGRESS NOTES
NEUROLOGY FOLLOW UP VISIT  NOTE       [facility] NEUROLOGY [place]  [address] [place] [address]  Tel: [phone]  Fax: [phone]  [email or web address]     Ellis Em,  1931, MRN [id number]  PCP: Whitney Velasquez  Date: 2022      ASSESSMENT & PLAN     Visit Diagnosis  Neurocognitive disorder  Lumbar stenosis with neurogenic claudication  Polyneuropathy, peripheral sensorimotor axonal     Neurocognitive disorder  89-year-old female with history of HTN, HLD, osteoporosis, cervical stenosis, s/p C1-C2 laminoplasty was evaluated for sudden decline in her cognition. MRI of the brain showed small vessel ischemic disease.  Lab work for common causes of cognitive decline was normal.  She scored 20/30 on Errol cognitive assessment and I suspected she had vascular dementia made worse with medication (Wellbutrin, baclofen).  She was not able to tolerate Aricept as it caused vivid dreams, diarrhea and was switched to Namenda that she tolerated better.  She is currently taking 10 mg twice daily.  She had a hepatic profile checked recently that was normal.  Regular follow-up will be in 6 months.    Lumbar radiculopathy   Patient also developed sudden decline in her ability to walk with increased weakness in the right leg.  MRI scan shows multilevel radiculopathy most pronounced at the right L3 and L4.  EMG however showed length dependent mixed sensorimotor polyneuropathy.  She is doing therapy and not interested in epidural injection due to her age.  Follow-up will be in 6 months    Thank you again for this referral, please feel free to contact me if you have any questions.    Yoni Fam MD  [facility]  (Former[facility], [facility]     HISTORY OF PRESENT ILLNESS     Patient is a 89-year-old female with history of HTN, HLD, hypothyroidism, osteoporosis, cervical stenosis, s/p C1-C2 laminoplasty who was last seen on  9/2/2021 for sudden decline in her cognition.  She scored 20/30 on Errol cognitive assessment.  MRI brain showed small vessel ischemic disease and lab work for common causes of cognitive decline was normal.  She was unable to tolerate Aricept and was switched to Namenda and dose was gradually increased.  I had told her to minimize the use of baclofen as that could increase her confusion.  She was able to increase the dose of Namenda to 10 mg twice daily.  She recently had hepatic profile checked that was normal    She also developed sudden decline in her ability to walk with increased weakness in the leg.  MRI lumbar spine showed multilevel radiculopathy most prominent at right L3-L4.  EMG showed a length dependent mixed sensorimotor polyneuropathy.  She was sent for physical therapy and was reluctant to go through epidural injections.  Due to her frequent falls she is mostly wheelchair-bound.  She denies any bowel or bladder incontinence.  She started sleeping sitting up and has noticed significant improvement in her back pain although she still has weakness in her right leg     PROBLEM LIST   Patient Active Problem List   Diagnosis Code     Hypothyroidism E03.9     Major depressive disorder, recurrent episode, in full remission (H) F33.42     Idiopathic osteoporosis M81.8     Adrenal mass, left (H) E27.8     Lumbar stenosis with neurogenic claudication M48.062     Essential hypertension I10     Generalized osteoarthritis of multiple sites M15.9     Polyneuropathy, peripheral sensorimotor axonal G60.8     Allergic rhinitis J30.9     Tinnitus H93.19     Macular degeneration mild  H35.30     Breast implant rupture bilateral  T85.43XA     ACP (advance care planning) Z71.89     Actinic keratosis L57.0     Cyst of ovary N83.209     Hearing loss H91.90     Hyperlipidemia E78.5     Malignant neoplasm of skin of parts of face C44.309     Neurogenic bladder N31.9     Antalgic gait R26.89     Cervical spondylosis with  myelopathy M47.12     Recurrent urinary tract infection N39.0         PAST MEDICAL & SURGICAL HISTORY     Past Medical History:   Patient  has a past medical history of Bowel obstruction (H) (1974), C1 cervical fracture (H) (10/12/2015), Cancer (H) (breast, 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, salivary gland), Cancer of skin, Carpal tunnel syndrome, Cervical spondylosis with myelopathy (4/25/2013), Chronic pain, Closed fracture of cervical vertebra (H) (10/21/2015), Closed fracture of lumbar vertebra (H) (1/21/2015), Cyst of spinal meninges, Dermatitis, Disorders of meninges, not elsewhere classified, Gastric ulcer (1983), Hip fracture (H) (2007), History of adrenal adenoma (5/25/2021), History of appendectomy, History of fractured pelvis (1985), Hyponatremia, Hypothyroidism, Leg pain, bilateral, Major depression, Miscarriage (1962), Neck fracture (H) (09/10/2015), Nephrolithiasis, Osteoarthritis, Osteoporosis, Panic attack, Peripheral neuropathy, Reactive confusion, Renal insufficiency, Rib fracture (2008), Shingles, Status post lumbar laminectomy (11/7/2014), UTI (urinary tract infection), and Varicella (7/14/2004).    Surgical History:  She  has a past surgical history that includes Pr Arthroplasty Tibial Plateau (Left); Salivary gland surgery (12/05/1984); Cataract Extraction, Bilateral; Cholecystectomy (06/30/1983); other surgical history; Total Hip Arthroplasty (Bilateral); Ovarian Cyst Removal (1943); Abdomen surgery (06/30/1983); joint replacement; fracture surgery (1985); Cervical Laminoplasty (Bilateral, 5/2/2013); Mastectomy (Bilateral); Lumbar Laminectomy (N/A, 11/6/2014); QUIGLEY W/O FACETEC FORAMOT/[facility]KC 1/2 VRT SEG, LUMBAR; appendectomy (1943); tubal ligation (1962); Revise reconstructed breast (Bilateral, 11/11/1984); Endometrial Biopsy (09/09/1986); Dilation and curettage (09/19/1986); Combined Mammaplasty Revision W/ Abdominoplasty (07/1998); Root Canal (09/14/1998); Skin Cancer Excision (08/15/2003); Total Knee Arthroplasty  (10/20/2005); and Tumor Excision (05/01/2013).     SOCIAL HISTORY     Reviewed, and she  reports that she has never smoked. She has never used smokeless tobacco. She reports that she does not drink alcohol and does not use drugs.     FAMILY HISTORY     Reviewed, and family history includes Alcoholism in her brother; Cancer in her mother; Dementia in her brother; Diabetes in her brother; Kidney Disease in her father.     ALLERGIES     Allergies   Allergen Reactions     Iodinated Contrast Media [Diagnostic X-Ray Materials] Hives     Adhesive [Mecrylate] Unknown     Adhesive tape     Amlodipine Unknown     drowsiness     Asparaginase Unknown     Atorvastatin Unknown     PN: LW Reaction: arthalgia     Ciprofloxacin Unknown     Codeine Unknown     Latex Unknown     PN: Converted from LW Latex Sensitivity Flag     Lisinopril Cough     Pravastatin Unknown     Propoxyphene N-Acetaminophen [Propoxyphene N-Apap] Unknown     PN: LW Reaction: hallucinations     Sulfa (Sulfonamide Antibiotics) [Sulfa Drugs] Unknown     Albuterol Unknown     Other reaction(s): Urinary Retention         REVIEW OF SYSTEMS     A 12 point review of system was performed and was negative except as outlined in the history of present illness.     HOME MEDICATIONS     Current Outpatient Rx   Medication Sig Dispense Refill     albuterol (PROAIR HFA/PROVENTIL HFA/VENTOLIN HFA) 108 (90 Base) MCG/ACT inhaler Inhale 1-2 puffs into the lungs every 6 hours as needed for shortness of breath / dyspnea or wheezing (or cough) 18 g 0     alpha lipoic acid 200 mg cap [ALPHA LIPOIC ACID 200 MG CAP] Take 1 capsule by mouth 2 (two) times a day.       baclofen (LIORESAL) 10 MG tablet [BACLOFEN (LIORESAL) 10 MG TABLET] TAKE 1 TABLET(10 MG) BY MOUTH THREE TIMES DAILY (Patient taking differently: 2 times daily ) 270 tablet 1     benzonatate (TESSALON) 100 MG capsule Take 1 capsule (100 mg) by mouth 3 times daily as needed for cough 30 capsule 1     buPROPion (WELLBUTRIN SR)  "150 MG 12 hr tablet [BUPROPION (WELLBUTRIN SR) 150 MG 12 HR TABLET] TAKE 1 TABLET BY MOUTH EVERY DAY 90 tablet 2     cranberry fruit extract (CRANBERRY ORAL) [CRANBERRY FRUIT EXTRACT (CRANBERRY ORAL)] Take by mouth.       diclofenac sodium (VOLTAREN) 1 % Gel [DICLOFENAC SODIUM (VOLTAREN) 1 % GEL] APPLY 2 GRAMS OF GEL TO UPPER RIGHT SHOULDER FOUR TIMES DAILY AS NEEDED FOR PAIN 100 g 0     ferrous sulfate (FEROSUL) 325 (65 Fe) MG tablet TAKE 1 TABLET BY MOUTH EVERY DAY WITH BREAKFAST 90 tablet 1     FLUoxetine (PROZAC) 10 MG capsule TAKE 3 CAPSULES BY MOUTH ONCE DAILY 270 capsule 2     gabapentin (NEURONTIN) 100 MG capsule [GABAPENTIN (NEURONTIN) 100 MG CAPSULE] 1 capsule in the morning, 2 capsules in the afternoon and 1 capsule in the evening. 270 capsule 5     levothyroxine (SYNTHROID/LEVOTHROID) 75 MCG tablet TAKE 1 TABLET BY MOUTH DAILY 90 tablet 1     losartan (COZAAR) 50 MG tablet [LOSARTAN (COZAAR) 50 MG TABLET] TAKE 1 TABLET BY MOUTH TWICE DAILY 180 tablet 1     melatonin 10 mg Tab [MELATONIN 10 MG TAB] Take 10 mg by mouth bedtime.       memantine (NAMENDA) 10 MG tablet TAKE 1 TABLET BY MOUTH TWICE A  tablet 3     omega-3 fatty acids 500 mg cap [OMEGA-3 FATTY ACIDS 500 MG CAP] Take by mouth.       spacer (OPTICHAMBER RAJWINDER) holding chamber Use with albuterol inhaler as directed 1 each 0     STRONTIUM CHLORIDE HEXAHYDRATE (STRONTIUM CHL HEXAHYD, BULK,) 100 % Kailyn [STRONTIUM CHLORIDE HEXAHYDRATE (STRONTIUM CHL HEXAHYD, BULK,) 100 % KAILYN] Use As Directed.       VIT C/E/ZN/COPPR/LUTEIN/ZEAXAN (PRESERVISION AREDS 2 ORAL) [VIT C/E/ZN/COPPR/LUTEIN/ZEAXAN (PRESERVISION AREDS 2 ORAL)] Take 1 tablet by mouth 2 (two) times a day.            PHYSICAL EXAM     Vital signs  BP (!) 171/83 (BP Location: Left arm, Patient Position: Sitting)   Pulse 67   Ht 1.651 m (5' 5\")   Wt 64.4 kg (142 lb)   LMP  (LMP Unknown)   BMI 23.63 kg/m      Weight:   142 lbs 0 oz    Patient is alert and oriented x4 in no acute " distress. Vital signs were reviewed and are documented in electronic medical record. Neck was supple, no carotid bruits, thyromegaly, JVD, or lymphadenopathy was noted.   NEUROLOGY EXAM:    Patient s speech was normal with no aphasia or dysarthria. Mentation, and affect were also normal.      Cranial nerves II -XII were intact except she is hard of hearing    Patient has decreased mass with normal tone strength in upper extremity 5/5 in the right lower extremity 4+/5 in the left lower extremity 5 -/5    Sensation was creased to light touch pinprick below mid thighs    Reflexes were 1+ symmetrical in upper extremity absent in the lower extremity    minimal dysmetria noted on finger-nose testing    Gait testing was deferred as patient is wheelchair-bound     PERTINENT DIAGNOSTIC STUDIES     Following studies were reviewed:     CT HEAD & CERVICAL SPINE 5/20/2021  HEAD CT:  1.  No hemorrhage, mass, or mass effect.  2.  Ectasia of right supraclinoid ICA.  3.  Moderate presumed chronic small vessel ischemia.  4.  Moderate atrophy.     CERVICAL SPINE CT:  1.  No definite fracture.  2.  New moderate left paracentral disc protrusion at C3-C4.  3.  Underlying degenerative and postoperative changes, as described.     MRI, MRA BRAIN 5/29/2021  HEAD MRI:   1.  No acute infarct, acute intracranial hemorrhage, or intracranial mass.  2.  Mild to moderate burden presumed sequela of chronic small vessel ischemic disease involving the cerebral white matter and aníbal.  3.  Complete opacification of the left mastoid air cells and middle ear cavity.     HEAD MRA:   1.  No aneurysm, high flow AVM or significant stenosis identified.     NECK MRA:  1.  No significant stenosis of the bilateral internal carotid arteries based on NASCET criteria.     MRI LUMBAR SPINE 5/29/2021  1.  Moderate to advanced multilevel lumbar spondylitic changes with evidence of prior laminectomies at L1-L2 through L5-S1. There is no significant spinal canal stenosis  in the lumbar region.  2.  At L5-S1, there is moderate right and severe left neural foraminal stenosis.  3.  At L4-L5, there is severe right neural foraminal stenosis with deformity of the exiting right L4 nerve root.  4.  At L3-L4, there is a moderate circumferential disc bulge with severe bilateral neural foraminal stenosis and right lateral recess narrowing.  5.  At L1-L2, there is severe bilateral neural foraminal stenosis.  6.  Moderate rightward convex curvature of the lumbar spine with the apex at L2.     MRI CERVICAL SPINE 10/16/2020  1.  Postsurgical changes C1 and C2 laminoplasty with hardware in the left and associated susceptibility. This is better appreciated on prior cervical spine CT 05/04/2016.  2.  Interbody fusion at C4/C5 with associated kyphosis, unchanged.  3.  Small broad bar disc osteophyte complexes contributing to mild spinal canal narrowing at C3/C4-C6/C7. No significant spinal canal narrowing at any other level within the cervical spine.  4.  Multilevel uncovertebral joint disease and facet arthropathy with mild multilevel neural foraminal narrowing at C3/C4-C5/C6 and T1/T2.     EMG 7/1/2021  This is an abnormal study.  1.  Reduced and absent sensory amplitudes in both lower extremities symmetrically.  These finding may represent either a length dependent large fiber relatively symmetric sensorimotor axonal polyneuropathy or alternatively can be seen as an age-related change for a person in this age group  2.  Superimposed chronic L4-S1 polyradiculopathy is suspected with ongoing denervation noted in extensor hallucis longus muscle which could be secondary to radiculopathy versus active polyneuropathy.  Correlation with lumbar spine imaging is recommended     PERTINENT LABS  Following labs were reviewed:  Office Visit on 01/14/2022   Component Date Value     Sodium 01/14/2022 139      Potassium 01/14/2022 4.4      Chloride 01/14/2022 103      Carbon Dioxide (CO2) 01/14/2022 25      Anion  Gap 01/14/2022 11      Urea Nitrogen 01/14/2022 14      Creatinine 01/14/2022 0.73      Calcium 01/14/2022 9.1      Glucose 01/14/2022 88      Alkaline Phosphatase 01/14/2022 103      AST 01/14/2022 26      ALT 01/14/2022 34      Protein Total 01/14/2022 6.0      Albumin 01/14/2022 3.2*     Bilirubin Total 01/14/2022 0.4      GFR Estimate 01/14/2022 78      WBC Count 01/14/2022 7.3      RBC Count 01/14/2022 3.94      Hemoglobin 01/14/2022 12.3      Hematocrit 01/14/2022 40.6      MCV 01/14/2022 103*     MCH 01/14/2022 31.2      MCHC 01/14/2022 30.3*     RDW 01/14/2022 13.2      Platelet Count 01/14/2022 295      % Neutrophils 01/14/2022 63      % Lymphocytes 01/14/2022 22      % Monocytes 01/14/2022 9      % Eosinophils 01/14/2022 4      % Basophils 01/14/2022 1      % Immature Granulocytes 01/14/2022 1      Absolute Neutrophils 01/14/2022 4.6      Absolute Lymphocytes 01/14/2022 1.6      Absolute Monocytes 01/14/2022 0.6      Absolute Eosinophils 01/14/2022 0.3      Absolute Basophils 01/14/2022 0.0      Absolute Immature Granul* 01/14/2022 0.1      Adenovirus 01/14/2022 Not Detected      Coronavirus 01/14/2022 Not Detected      Human Metapneumovirus 01/14/2022 Not Detected      Human Rhin/Enterovirus 01/14/2022 Detected*     Influenza A 01/14/2022 Not Detected      Influenza A, H1 01/14/2022 Not Detected      Influenza A 2009 H1N1 01/14/2022 Not Detected      Influenza A, H3 01/14/2022 Not Detected      Influenza B 01/14/2022 Not Detected      Parainfluenza Virus 1 01/14/2022 Not Detected      Parainfluenza Virus 2 01/14/2022 Not Detected      Parainfluenza Virus 3 01/14/2022 Not Detected      Parainfluenza Virus 4 01/14/2022 Not Detected      Respiratory Syncytial Vi* 01/14/2022 Not Detected      Respiratory Syncytial Vi* 01/14/2022 Not Detected      Chlamydia Pneumoniae 01/14/2022 Not Detected      Mycoplasma Pneumoniae 01/14/2022 Not Detected      Influenza A antigen 01/14/2022 Negative      Influenza B antigen  01/14/2022 Negative    Office Visit on 01/08/2022   Component Date Value     SARS CoV2 PCR 01/08/2022 Negative    Lab on 12/23/2021   Component Date Value     TSH 12/23/2021 0.76      Sodium 12/23/2021 138      Potassium 12/23/2021 4.4      Chloride 12/23/2021 104      Carbon Dioxide (CO2) 12/23/2021 24      Anion Gap 12/23/2021 10      Urea Nitrogen 12/23/2021 22      Creatinine 12/23/2021 0.76      Calcium 12/23/2021 8.9      Glucose 12/23/2021 103      Alkaline Phosphatase 12/23/2021 67      AST 12/23/2021 17      ALT 12/23/2021 21      Protein Total 12/23/2021 6.1      Albumin 12/23/2021 3.3*     Bilirubin Total 12/23/2021 0.3      GFR Estimate 12/23/2021 74      WBC Count 12/23/2021 8.1      RBC Count 12/23/2021 3.71*     Hemoglobin 12/23/2021 11.9      Hematocrit 12/23/2021 38.5      MCV 12/23/2021 104*     MCH 12/23/2021 32.1      MCHC 12/23/2021 30.9*     RDW 12/23/2021 13.2      Platelet Count 12/23/2021 232          Total time spent for face to face visit, reviewing labs/imaging studies, counseling and coordination of care was: 30 Minutes spent on the date of the encounter doing chart review, review of outside records, review of test results, interpretation of tests, patient visit, documentation and discussion with family     This note was dictated using voice recognition software.  Any grammatical or context distortions are unintentional and inherent to the software.    No orders of the defined types were placed in this encounter.     New Prescriptions    No medications on file     Modified Medications    Modified Medication Previous Medication    MEMANTINE (NAMENDA) 10 MG TABLET memantine (NAMENDA) 10 MG tablet       TAKE 1 TABLET BY MOUTH TWICE A DAY    TAKE 1 TABLET BY MOUTH TWICE A DAY

## 2022-01-28 NOTE — LETTER
2022         RE: Ellis Em  [address]  [facility] [address]        Dear Colleague,    Thank you for referring your patient, Ellis Em, to the [facility] [place]. Please see a copy of my visit note below.    NEUROLOGY FOLLOW UP VISIT  NOTE       [facility] [place]  [address] [place] [address]  Tel: [phone]  Fax: [phone]  [email or web address]     Ellis Em,  1931, MRN [id number]  PCP: Whitney Velasquez  Date: 2022      ASSESSMENT & PLAN     Visit Diagnosis  1. Neurocognitive disorder  2. Lumbar stenosis with neurogenic claudication  3. Polyneuropathy, peripheral sensorimotor axonal     Neurocognitive disorder  89-year-old female with history of HTN, HLD, osteoporosis, cervical stenosis, s/p C1-C2 laminoplasty was evaluated for sudden decline in her cognition. MRI of the brain showed small vessel ischemic disease.  Lab work for common causes of cognitive decline was normal.  She scored 20/30 on Errol cognitive assessment and I suspected she had vascular dementia made worse with medication (Wellbutrin, baclofen).  She was not able to tolerate Aricept as it caused vivid dreams, diarrhea and was switched to Namenda that she tolerated better.  She is currently taking 10 mg twice daily.  She had a hepatic profile checked recently that was normal.  Regular follow-up will be in 6 months.    Lumbar radiculopathy   Patient also developed sudden decline in her ability to walk with increased weakness in the right leg.  MRI scan shows multilevel radiculopathy most pronounced at the right L3 and L4.  EMG however showed length dependent mixed sensorimotor polyneuropathy.  She is doing therapy and not interested in epidural injection due to her age.  Follow-up will be in 6 months    Thank you again for this referral, please feel free to contact me if you have any questions.    Yoni Fam MD  [facility]  NEUROLOGY, [facility]  (Formerly, [facility]     HISTORY OF PRESENT ILLNESS     Patient is a 89-year-old female with history of HTN, HLD, hypothyroidism, osteoporosis, cervical stenosis, s/p C1-C2 laminoplasty who was last seen on 9/2/2021 for sudden decline in her cognition.  She scored 20/30 on Errol cognitive assessment.  MRI brain showed small vessel ischemic disease and lab work for common causes of cognitive decline was normal.  She was unable to tolerate Aricept and was switched to Namenda and dose was gradually increased.  I had told her to minimize the use of baclofen as that could increase her confusion.  She was able to increase the dose of Namenda to 10 mg twice daily.  She recently had hepatic profile checked that was normal    She also developed sudden decline in her ability to walk with increased weakness in the leg.  MRI lumbar spine showed multilevel radiculopathy most prominent at right L3-L4.  EMG showed a length dependent mixed sensorimotor polyneuropathy.  She was sent for physical therapy and was reluctant to go through epidural injections.  Due to her frequent falls she is mostly wheelchair-bound.  She denies any bowel or bladder incontinence.  She started sleeping sitting up and has noticed significant improvement in her back pain although she still has weakness in her right leg     PROBLEM LIST   Patient Active Problem List   Diagnosis Code     Hypothyroidism E03.9     Major depressive disorder, recurrent episode, in full remission (H) F33.42     Idiopathic osteoporosis M81.8     Adrenal mass, left (H) E27.8     Lumbar stenosis with neurogenic claudication M48.062     Essential hypertension I10     Generalized osteoarthritis of multiple sites M15.9     Polyneuropathy, peripheral sensorimotor axonal G60.8     Allergic rhinitis J30.9     Tinnitus H93.19     Macular degeneration mild  H35.30     Breast implant rupture bilateral  T85.43XA     ACP (advance care  planning) Z71.89     Actinic keratosis L57.0     Cyst of ovary N83.209     Hearing loss H91.90     Hyperlipidemia E78.5     Malignant neoplasm of skin of parts of face C44.309     Neurogenic bladder N31.9     Antalgic gait R26.89     Cervical spondylosis with myelopathy M47.12     Recurrent urinary tract infection N39.0         PAST MEDICAL & SURGICAL HISTORY     Past Medical History:   Patient  has a past medical history of Bowel obstruction (H) (1974), C1 cervical fracture (H) (10/12/2015), Cancer (H) (breast, 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, salivary gland), Cancer of skin, Carpal tunnel syndrome, Cervical spondylosis with myelopathy (4/25/2013), Chronic pain, Closed fracture of cervical vertebra (H) (10/21/2015), Closed fracture of lumbar vertebra (H) (1/21/2015), Cyst of spinal meninges, Dermatitis, Disorders of meninges, not elsewhere classified, Gastric ulcer (1983), Hip fracture (H) (2007), History of adrenal adenoma (5/25/2021), History of appendectomy, History of fractured pelvis (1985), Hyponatremia, Hypothyroidism, Leg pain, bilateral, Major depression, Miscarriage (1962), Neck fracture (H) (09/10/2015), Nephrolithiasis, Osteoarthritis, Osteoporosis, Panic attack, Peripheral neuropathy, Reactive confusion, Renal insufficiency, Rib fracture (2008), Shingles, Status post lumbar laminectomy (11/7/2014), UTI (urinary tract infection), and Varicella (7/14/2004).    Surgical History:  She  has a past surgical history that includes Pr Arthroplasty Tibial Plateau (Left); Salivary gland surgery (12/05/1984); Cataract Extraction, Bilateral; Cholecystectomy (06/30/1983); other surgical history; Total Hip Arthroplasty (Bilateral); Ovarian Cyst Removal (1943); Abdomen surgery (06/30/1983); joint replacement; fracture surgery (1985); Cervical Laminoplasty (Bilateral, 5/2/2013); Mastectomy (Bilateral); Lumbar Laminectomy (N/A, 11/6/2014); QUIGLEY W/O FACETEC FORAMOT/DSKC 1/2 VRT SEG, LUMBAR; appendectomy (1943); tubal ligation (1962);  Revise reconstructed breast (Bilateral, 11/11/1984); Endometrial Biopsy (09/09/1986); Dilation and curettage (09/19/1986); Combined Mammaplasty Revision W/ Abdominoplasty (07/1998); Root Canal (09/14/1998); Skin Cancer Excision (08/15/2003); Total Knee Arthroplasty (10/20/2005); and Tumor Excision (05/01/2013).     SOCIAL HISTORY     Reviewed, and she  reports that she has never smoked. She has never used smokeless tobacco. She reports that she does not drink alcohol and does not use drugs.     FAMILY HISTORY     Reviewed, and family history includes Alcoholism in her brother; Cancer in her mother; Dementia in her brother; Diabetes in her brother; Kidney Disease in her father.     ALLERGIES     Allergies   Allergen Reactions     Iodinated Contrast Media [Diagnostic X-Ray Materials] Hives     Adhesive [Mecrylate] Unknown     Adhesive tape     Amlodipine Unknown     drowsiness     Asparaginase Unknown     Atorvastatin Unknown     PN: LW Reaction: arthalgia     Ciprofloxacin Unknown     Codeine Unknown     Latex Unknown     PN: Converted from LW Latex Sensitivity Flag     Lisinopril Cough     Pravastatin Unknown     Propoxyphene N-Acetaminophen [Propoxyphene N-Apap] Unknown     PN: LW Reaction: hallucinations     Sulfa (Sulfonamide Antibiotics) [Sulfa Drugs] Unknown     Albuterol Unknown     Other reaction(s): Urinary Retention         REVIEW OF SYSTEMS     A 12 point review of system was performed and was negative except as outlined in the history of present illness.     HOME MEDICATIONS     Current Outpatient Rx   Medication Sig Dispense Refill     albuterol (PROAIR HFA/PROVENTIL HFA/VENTOLIN HFA) 108 (90 Base) MCG/ACT inhaler Inhale 1-2 puffs into the lungs every 6 hours as needed for shortness of breath / dyspnea or wheezing (or cough) 18 g 0     alpha lipoic acid 200 mg cap [ALPHA LIPOIC ACID 200 MG CAP] Take 1 capsule by mouth 2 (two) times a day.       baclofen (LIORESAL) 10 MG tablet [BACLOFEN (LIORESAL) 10 MG  TABLET] TAKE 1 TABLET(10 MG) BY MOUTH THREE TIMES DAILY (Patient taking differently: 2 times daily ) 270 tablet 1     benzonatate (TESSALON) 100 MG capsule Take 1 capsule (100 mg) by mouth 3 times daily as needed for cough 30 capsule 1     buPROPion (WELLBUTRIN SR) 150 MG 12 hr tablet [BUPROPION (WELLBUTRIN SR) 150 MG 12 HR TABLET] TAKE 1 TABLET BY MOUTH EVERY DAY 90 tablet 2     cranberry fruit extract (CRANBERRY ORAL) [CRANBERRY FRUIT EXTRACT (CRANBERRY ORAL)] Take by mouth.       diclofenac sodium (VOLTAREN) 1 % Gel [DICLOFENAC SODIUM (VOLTAREN) 1 % GEL] APPLY 2 GRAMS OF GEL TO UPPER RIGHT SHOULDER FOUR TIMES DAILY AS NEEDED FOR PAIN 100 g 0     ferrous sulfate (FEROSUL) 325 (65 Fe) MG tablet TAKE 1 TABLET BY MOUTH EVERY DAY WITH BREAKFAST 90 tablet 1     FLUoxetine (PROZAC) 10 MG capsule TAKE 3 CAPSULES BY MOUTH ONCE DAILY 270 capsule 2     gabapentin (NEURONTIN) 100 MG capsule [GABAPENTIN (NEURONTIN) 100 MG CAPSULE] 1 capsule in the morning, 2 capsules in the afternoon and 1 capsule in the evening. 270 capsule 5     levothyroxine (SYNTHROID/LEVOTHROID) 75 MCG tablet TAKE 1 TABLET BY MOUTH DAILY 90 tablet 1     losartan (COZAAR) 50 MG tablet [LOSARTAN (COZAAR) 50 MG TABLET] TAKE 1 TABLET BY MOUTH TWICE DAILY 180 tablet 1     melatonin 10 mg Tab [MELATONIN 10 MG TAB] Take 10 mg by mouth bedtime.       memantine (NAMENDA) 10 MG tablet TAKE 1 TABLET BY MOUTH TWICE A  tablet 3     omega-3 fatty acids 500 mg cap [OMEGA-3 FATTY ACIDS 500 MG CAP] Take by mouth.       spacer (OPTICHAMBER RAJWINDER) holding chamber Use with albuterol inhaler as directed 1 each 0     STRONTIUM CHLORIDE HEXAHYDRATE (STRONTIUM CHL HEXAHYD, BULK,) 100 % Kailyn [STRONTIUM CHLORIDE HEXAHYDRATE (STRONTIUM CHL HEXAHYD, BULK,) 100 % KAILYN] Use As Directed.       VIT C/E/ZN/COPPR/LUTEIN/ZEAXAN (PRESERVISION AREDS 2 ORAL) [VIT C/E/ZN/COPPR/LUTEIN/ZEAXAN (PRESERVISION AREDS 2 ORAL)] Take 1 tablet by mouth 2 (two) times a day.            PHYSICAL  "EXAM     Vital signs  BP (!) 171/83 (BP Location: Left arm, Patient Position: Sitting)   Pulse 67   Ht 1.651 m (5' 5\")   Wt 64.4 kg (142 lb)   LMP  (LMP Unknown)   BMI 23.63 kg/m      Weight:   142 lbs 0 oz    Patient is alert and oriented x4 in no acute distress. Vital signs were reviewed and are documented in electronic medical record. Neck was supple, no carotid bruits, thyromegaly, JVD, or lymphadenopathy was noted.   NEUROLOGY EXAM:    Patient s speech was normal with no aphasia or dysarthria. Mentation, and affect were also normal.      Cranial nerves II -XII were intact except she is hard of hearing    Patient has decreased mass with normal tone strength in upper extremity 5/5 in the right lower extremity 4+/5 in the left lower extremity 5 -/5    Sensation was creased to light touch pinprick below mid thighs    Reflexes were 1+ symmetrical in upper extremity absent in the lower extremity    minimal dysmetria noted on finger-nose testing    Gait testing was deferred as patient is wheelchair-bound     PERTINENT DIAGNOSTIC STUDIES     Following studies were reviewed:     CT HEAD & CERVICAL SPINE 5/20/2021  HEAD CT:  1.  No hemorrhage, mass, or mass effect.  2.  Ectasia of right supraclinoid ICA.  3.  Moderate presumed chronic small vessel ischemia.  4.  Moderate atrophy.     CERVICAL SPINE CT:  1.  No definite fracture.  2.  New moderate left paracentral disc protrusion at C3-C4.  3.  Underlying degenerative and postoperative changes, as described.     MRI, MRA BRAIN 5/29/2021  HEAD MRI:   1.  No acute infarct, acute intracranial hemorrhage, or intracranial mass.  2.  Mild to moderate burden presumed sequela of chronic small vessel ischemic disease involving the cerebral white matter and aníbal.  3.  Complete opacification of the left mastoid air cells and middle ear cavity.     HEAD MRA:   1.  No aneurysm, high flow AVM or significant stenosis identified.     NECK MRA:  1.  No significant stenosis of the " bilateral internal carotid arteries based on NASCET criteria.     MRI LUMBAR SPINE 5/29/2021  1.  Moderate to advanced multilevel lumbar spondylitic changes with evidence of prior laminectomies at L1-L2 through L5-S1. There is no significant spinal canal stenosis in the lumbar region.  2.  At L5-S1, there is moderate right and severe left neural foraminal stenosis.  3.  At L4-L5, there is severe right neural foraminal stenosis with deformity of the exiting right L4 nerve root.  4.  At L3-L4, there is a moderate circumferential disc bulge with severe bilateral neural foraminal stenosis and right lateral recess narrowing.  5.  At L1-L2, there is severe bilateral neural foraminal stenosis.  6.  Moderate rightward convex curvature of the lumbar spine with the apex at L2.     MRI CERVICAL SPINE 10/16/2020  1.  Postsurgical changes C1 and C2 laminoplasty with hardware in the left and associated susceptibility. This is better appreciated on prior cervical spine CT 05/04/2016.  2.  Interbody fusion at C4/C5 with associated kyphosis, unchanged.  3.  Small broad bar disc osteophyte complexes contributing to mild spinal canal narrowing at C3/C4-C6/C7. No significant spinal canal narrowing at any other level within the cervical spine.  4.  Multilevel uncovertebral joint disease and facet arthropathy with mild multilevel neural foraminal narrowing at C3/C4-C5/C6 and T1/T2.     EMG 7/1/2021  This is an abnormal study.  1.  Reduced and absent sensory amplitudes in both lower extremities symmetrically.  These finding may represent either a length dependent large fiber relatively symmetric sensorimotor axonal polyneuropathy or alternatively can be seen as an age-related change for a person in this age group  2.  Superimposed chronic L4-S1 polyradiculopathy is suspected with ongoing denervation noted in extensor hallucis longus muscle which could be secondary to radiculopathy versus active polyneuropathy.  Correlation with lumbar  spine imaging is recommended     PERTINENT LABS  Following labs were reviewed:  Office Visit on 01/14/2022   Component Date Value     Sodium 01/14/2022 139      Potassium 01/14/2022 4.4      Chloride 01/14/2022 103      Carbon Dioxide (CO2) 01/14/2022 25      Anion Gap 01/14/2022 11      Urea Nitrogen 01/14/2022 14      Creatinine 01/14/2022 0.73      Calcium 01/14/2022 9.1      Glucose 01/14/2022 88      Alkaline Phosphatase 01/14/2022 103      AST 01/14/2022 26      ALT 01/14/2022 34      Protein Total 01/14/2022 6.0      Albumin 01/14/2022 3.2*     Bilirubin Total 01/14/2022 0.4      GFR Estimate 01/14/2022 78      WBC Count 01/14/2022 7.3      RBC Count 01/14/2022 3.94      Hemoglobin 01/14/2022 12.3      Hematocrit 01/14/2022 40.6      MCV 01/14/2022 103*     MCH 01/14/2022 31.2      MCHC 01/14/2022 30.3*     RDW 01/14/2022 13.2      Platelet Count 01/14/2022 295      % Neutrophils 01/14/2022 63      % Lymphocytes 01/14/2022 22      % Monocytes 01/14/2022 9      % Eosinophils 01/14/2022 4      % Basophils 01/14/2022 1      % Immature Granulocytes 01/14/2022 1      Absolute Neutrophils 01/14/2022 4.6      Absolute Lymphocytes 01/14/2022 1.6      Absolute Monocytes 01/14/2022 0.6      Absolute Eosinophils 01/14/2022 0.3      Absolute Basophils 01/14/2022 0.0      Absolute Immature Granul* 01/14/2022 0.1      Adenovirus 01/14/2022 Not Detected      Coronavirus 01/14/2022 Not Detected      Human Metapneumovirus 01/14/2022 Not Detected      Human Rhin/Enterovirus 01/14/2022 Detected*     Influenza A 01/14/2022 Not Detected      Influenza A, H1 01/14/2022 Not Detected      Influenza A 2009 H1N1 01/14/2022 Not Detected      Influenza A, H3 01/14/2022 Not Detected      Influenza B 01/14/2022 Not Detected      Parainfluenza Virus 1 01/14/2022 Not Detected      Parainfluenza Virus 2 01/14/2022 Not Detected      Parainfluenza Virus 3 01/14/2022 Not Detected      Parainfluenza Virus 4 01/14/2022 Not Detected       Respiratory Syncytial Vi* 01/14/2022 Not Detected      Respiratory Syncytial Vi* 01/14/2022 Not Detected      Chlamydia Pneumoniae 01/14/2022 Not Detected      Mycoplasma Pneumoniae 01/14/2022 Not Detected      Influenza A antigen 01/14/2022 Negative      Influenza B antigen 01/14/2022 Negative    Office Visit on 01/08/2022   Component Date Value     SARS CoV2 PCR 01/08/2022 Negative    Lab on 12/23/2021   Component Date Value     TSH 12/23/2021 0.76      Sodium 12/23/2021 138      Potassium 12/23/2021 4.4      Chloride 12/23/2021 104      Carbon Dioxide (CO2) 12/23/2021 24      Anion Gap 12/23/2021 10      Urea Nitrogen 12/23/2021 22      Creatinine 12/23/2021 0.76      Calcium 12/23/2021 8.9      Glucose 12/23/2021 103      Alkaline Phosphatase 12/23/2021 67      AST 12/23/2021 17      ALT 12/23/2021 21      Protein Total 12/23/2021 6.1      Albumin 12/23/2021 3.3*     Bilirubin Total 12/23/2021 0.3      GFR Estimate 12/23/2021 74      WBC Count 12/23/2021 8.1      RBC Count 12/23/2021 3.71*     Hemoglobin 12/23/2021 11.9      Hematocrit 12/23/2021 38.5      MCV 12/23/2021 104*     MCH 12/23/2021 32.1      MCHC 12/23/2021 30.9*     RDW 12/23/2021 13.2      Platelet Count 12/23/2021 232          Total time spent for face to face visit, reviewing labs/imaging studies, counseling and coordination of care was: 30 Minutes spent on the date of the encounter doing chart review, review of outside records, review of test results, interpretation of tests, patient visit, documentation and discussion with family       This note was dictated using voice recognition software.  Any grammatical or context distortions are unintentional and inherent to the software.    No orders of the defined types were placed in this encounter.     New Prescriptions    No medications on file     Modified Medications    Modified Medication Previous Medication    MEMANTINE (NAMENDA) 10 MG TABLET memantine (NAMENDA) 10 MG tablet       TAKE 1 TABLET  BY MOUTH TWICE A DAY    TAKE 1 TABLET BY MOUTH TWICE A DAY                     Again, thank you for allowing me to participate in the care of your patient.        Sincerely,        Yoni Fam MD

## 2022-01-28 NOTE — PATIENT INSTRUCTIONS
Patient Education   Coping with Nerve Damage (Peripheral Neuropathy)  What causes nerve damage?  Some illnesses, certain medicines, injury and very poor diet may cause problems with some nerves in your body (called peripheral neuropathy).  You may feel tingling, itching, burning, weakness or numbness in your fingers, hands, toes or feet. You may have less feeling: You may be unable to feel hot, cold or pain.  While this may improve over time, there could be long-term problems.  How is it treated?  If your chemotherapy drugs are the cause of the nerve damage, your care team may stop them or change to safer drugs.  There are several medicines that are helpful in treating nerve damage.  You may also ask your care team about alternate treatments, such as:    Acupuncture    Massage    Physical therapy    A referral to [facility].  What else can I do to protect myself?    Protect your feet, especially if they are numb or have less feeling than usual. Always wear well-fitted shoes with rubber soles, even around the house.    Move carefully, especially on the stairs. Use the handrails.    Apply nonskid surfaces on floors, tubs and showers so you do not slip and fall..    Remove items, such as rugs, that might make you trip.    Keep rooms well lighted.    Protect your hands and fingers if they are numb or have less feeling. Be careful when grasping hot or sharp objects.    Lower the temperature on your hot water heater as needed.    Wear padded gloves when you reach into the oven or  hot pots and pans. Be extra careful when cooking or ironing.    Inspect skin for cuts, scrapes and burns daily    Wear heavy work gloves when you dig in the garden or work around thorny plants.    Keep walking or doing other mild exercise.    Share your feelings and worries about nerve damage with your family, friends, ronnell leader and care team. A support group may also help--be sure to ask your care team  for information.  When should I call my care team?  Call your care team if:    The symptoms (tingling, burning, weakness, numbness) get worse in your fingers, hands, toes or feet.    You have trouble using buttons or zippers on your clothes.    You have trouble walking.    You hurt yourself and the area becomes red, painful or swollen.  Comments:  __________________________________________  __________________________________________  __________________________________________  __________________________________________  __________________________________________  __________________________________________  For informational purposes only. Not to replace the advice of your health care provider. Copyright   2006 [employer]. All rights reserved. Clinically reviewed by [facility]. [employer] 314930 - REV 04/19

## 2022-01-30 DIAGNOSIS — R05.9 COUGH: ICD-10-CM

## 2022-02-01 RX ORDER — ALBUTEROL SULFATE 90 UG/1
AEROSOL, METERED RESPIRATORY (INHALATION)
Qty: 18 G | Refills: 5 | Status: SHIPPED | OUTPATIENT
Start: 2022-02-01 | End: 2022-07-22

## 2022-02-01 NOTE — TELEPHONE ENCOUNTER
"Last Written Prescription Date:  1/12/22  Last Fill Quantity: 18 g,  # refills: 0   Last office visit provider:  1/14/22     Requested Prescriptions   Pending Prescriptions Disp Refills     albuterol (PROAIR HFA/PROVENTIL HFA/VENTOLIN HFA) 108 (90 Base) MCG/ACT inhaler [Pharmacy Med Name: ALBUTEROL HFA (VENTOLIN) INH] 18 g      Sig: INHALE 1 TO 2 PUFFS INTO THE LUNGS EVERY 6 HOURS AS NEEDE FOR SHORTNESS OF BREATH, WHEEZING OR COUGH       Asthma Maintenance Inhalers - Anticholinergics Passed - 1/30/2022  7:46 AM        Passed - Patient is age 12 years or older        Passed - Recent (12 mo) or future (30 days) visit within the authorizing provider's specialty     Patient has had an office visit with the authorizing provider or a provider within the authorizing providers department within the previous 12 mos or has a future within next 30 days. See \"Patient Info\" tab in inbasket, or \"Choose Columns\" in Meds & Orders section of the refill encounter.              Passed - Medication is active on med list       Short-Acting Beta Agonist Inhalers Protocol  Passed - 1/30/2022  7:46 AM        Passed - Patient is age 12 or older        Passed - Recent (12 mo) or future (30 days) visit within the authorizing provider's specialty     Patient has had an office visit with the authorizing provider or a provider within the authorizing providers department within the previous 12 mos or has a future within next 30 days. See \"Patient Info\" tab in inbasket, or \"Choose Columns\" in Meds & Orders section of the refill encounter.              Passed - Medication is active on med list             Bassem Johnson RN 02/01/22 8:09 AM  "

## 2022-02-25 ENCOUNTER — OFFICE VISIT (OUTPATIENT)
Dept: FAMILY MEDICINE | Facility: CLINIC | Age: 87
End: 2022-02-25
Payer: MEDICARE

## 2022-02-25 VITALS
BODY MASS INDEX: 23.96 KG/M2 | HEART RATE: 69 BPM | DIASTOLIC BLOOD PRESSURE: 89 MMHG | RESPIRATION RATE: 18 BRPM | SYSTOLIC BLOOD PRESSURE: 187 MMHG | WEIGHT: 144 LBS

## 2022-02-25 DIAGNOSIS — Z74.09 MOBILITY IMPAIRED: ICD-10-CM

## 2022-02-25 DIAGNOSIS — M21.371 FOOT DROP, RIGHT: ICD-10-CM

## 2022-02-25 DIAGNOSIS — M72.2 PLANTAR FASCIITIS: ICD-10-CM

## 2022-02-25 DIAGNOSIS — R41.89 COGNITIVE CHANGES: ICD-10-CM

## 2022-02-25 DIAGNOSIS — M25.9: ICD-10-CM

## 2022-02-25 DIAGNOSIS — F33.42 MAJOR DEPRESSIVE DISORDER, RECURRENT EPISODE, IN FULL REMISSION (H): ICD-10-CM

## 2022-02-25 DIAGNOSIS — R49.0 HOARSE VOICE QUALITY: Primary | ICD-10-CM

## 2022-02-25 DIAGNOSIS — E27.8 ADRENAL MASS, LEFT (H): ICD-10-CM

## 2022-02-25 DIAGNOSIS — E04.1 RIGHT THYROID NODULE: ICD-10-CM

## 2022-02-25 DIAGNOSIS — R06.02 SHORTNESS OF BREATH: ICD-10-CM

## 2022-02-25 DIAGNOSIS — R53.83 FATIGUE, UNSPECIFIED TYPE: ICD-10-CM

## 2022-02-25 DIAGNOSIS — I10 ESSENTIAL HYPERTENSION: ICD-10-CM

## 2022-02-25 PROCEDURE — G0008 ADMIN INFLUENZA VIRUS VAC: HCPCS | Performed by: FAMILY MEDICINE

## 2022-02-25 PROCEDURE — 99215 OFFICE O/P EST HI 40 MIN: CPT | Mod: 25 | Performed by: FAMILY MEDICINE

## 2022-02-25 PROCEDURE — 90662 IIV NO PRSV INCREASED AG IM: CPT | Performed by: FAMILY MEDICINE

## 2022-02-25 NOTE — PROGRESS NOTES
ASSESSMENT and PLAN:     Assessment & Plan   Problem List Items Addressed This Visit     Major depressive disorder, recurrent episode, in full remission (H)     Has been stable on fluoxetine 30 mg once daily and Wellbutrin although would consider scaling back on one or both of these medications which could be inducing some fatigue as can the gabapentin that she is taking.         Adrenal mass, left (H)     Could contribute to elevated bp but we have been monitoring and there had been no changes or concerns regarding this. Could consider imaging if bp uncontrolled.          Essential hypertension     On losartan 50 mg twice daily maximum therapy. Would consider adding back in hydrochlorothiazide which she has been on in the past. We'll have them check some home blood pressure readings. Assume the blood pressure is elevated today due to pain         Fatigue, unspecified type     Multifactorial         Relevant Orders    Echocardiogram Complete    Mobility impaired     Prescription placed for motorized wheelchair which would benefit patient to be able to get around. Cannot utilize a regular wheelchair given her shoulder issues. Has profound weakness and also problems with her hands and dexterity         Relevant Orders    Electric Wheelchair Order for DME - ONLY FOR DME    Foot drop, right     Continue to utilize support device and splint for her right foot but this could be inducing some plantar fasciitis. They're going to try an insert and also to remove for about an hour a day         Relevant Orders    Electric Wheelchair Order for DME - ONLY FOR DME    Cognitive changes     Neurology started patient on Namenda twice daily and seems to be doing better with that. Continue to monitor         Plantar fasciitis     Okay to use NSAIDs as needed given normal kidney function but otherwise we talked about insert into her orthotic device which could be contributing to some of her pain         Right thyroid nodule     TSH is  normal. Continue levothyroxine 75 mcg daily         Relevant Orders    US Thyroid    Hoarse voice quality - Primary     There could be a lesion on her vocal cord. We'll start however with thyroid ultrasound given palpable nodule. Could have her see ENT if no improvement or findings. Denies any reflux         Relevant Orders    US Thyroid    Shortness of breath      Family is concerned of potential heart failure with the weakness and change in voice quality. Echocardiogram has been ordered. Also discussed can pretreat with albuterol to see if this is helpful         Relevant Orders    Echocardiogram Complete      Other Visit Diagnoses     Joint problem, shoulder        Relevant Orders    Electric Wheelchair Order for DME - ONLY FOR DME           Ordering of each unique test  Prescription drug management  55 minutes spent on the date of the encounter doing chart review, history and exam, documentation and further activities per the note        See Patient Instructions  Patient Instructions   Consider putting an orthotic into R foot.     Podiatry Dr. Jason jefferson. [address], [place] [address]     [phone]    Echo ordered       Ultrasound of thyroid [phone]     Schedule trigger finger injection with [facility].       No follow-ups on file.    Whitney Velasquez, [facility]    There are no Patient Instructions on file for this visit.    Orders Placed This Encounter   Procedures     INFLUENZA, QUAD, HD, PF, 65+ (FLUZONE HD)     There are no discontinued medications.    No follow-ups on file.    DATA REVIEWED:  I have reviewed most recent office notes in the chart/care everywhere as well as most recent labs and imaging that is available.      55 minutes spent on the date of the encounter doing chart review, history and exam, documentation and further activities per the note    CHIEF COMPLAINT:  Patient presents with:  Ingrown Toenail: had nails done  by podiatry and doesnt like how they were done. Has had pain in her feet       HISTORY OF PRESENT ILLNESS:  Ellis Em is a 89 year old female presents with daughter Rosalva for foot pain and what her daughter misunderstood to be pain along nails after trimming with podiatrist.     Was super sick for awhile with lung issues.   Sleeping upright has helped her leg and give strength back.  Working with PCA moving once to twice daily.   Walking well with the walker and a brace (helped stabilize knee from buckling) even though was prescribed for foot drop.  Was not previously able to ambulate. Mostly using wheelchair to get around,. Shoulder is bothersome so cannot do without assistance.   Not checking blood pressure at home any more.   Looking at [facility] assisted [facility].   Trigger finger R hand     On namenda 10mg now twice daily (aricept didn't tolerate). Daughter thinks made a difference.     Has been  Having pain in bottom of foot. On R . Been wearing orthotic device.  Painful in moring and when bottom of feet touched.   Left leg shorter.     Estrogen cream made a huge difference with urinary frequency and tissue.       REVIEW OF SYSTEMS:    Comprehensive review of systems is negative except as noted in the HPI.     PFSH:  Tobacco use and medications reviewed below.     TOBACCO USE:  History   Smoking Status     Never Smoker   Smokeless Tobacco     Never Used       VITALS:  Vitals:    02/25/22 1448   BP: (!) 187/89   Pulse: 69   Resp: 18   Weight: 65.3 kg (144 lb)     Wt Readings from Last 3 Encounters:   02/25/22 65.3 kg (144 lb)   01/28/22 64.4 kg (142 lb)   01/14/22 64.6 kg (142 lb 6.4 oz)       PHYSICAL EXAM:  Constitutional:  Reveals a 89 year old female in NAD.  Vitals:  Per nursing notes.  Soft spoken   Neck:  Supple, thyroid not palpable.  Cardiac:  Regular rate and rhythm without murmurs, rubs, or gallops. Carotids without bruits. Legs without edema. Palpation of the radial pulse regular.  Lungs:  Clear.  Respiratory effort normal.  Skin:   Without rash, bruise, or palpable lesions.  Rheumatologic: Normal joints and nails of the hands.  Neurologic:  Cranial nerves II-XII intact.     Psychiatric:  Mood appropriate, memory intact.   Musc- inability to lift R ankle.   Pain along plantar aspect of foot.       MEDICATIONS:  Current Outpatient Medications   Medication Sig Dispense Refill     albuterol (PROAIR HFA/PROVENTIL HFA/VENTOLIN HFA) 108 (90 Base) MCG/ACT inhaler INHALE 1 TO 2 PUFFS INTO THE LUNGS EVERY 6 HOURS AS NEEDE FOR SHORTNESS OF BREATH, WHEEZING OR COUGH 18 g 5     alpha lipoic acid 200 mg cap [ALPHA LIPOIC ACID 200 MG CAP] Take 1 capsule by mouth 2 (two) times a day.       baclofen (LIORESAL) 10 MG tablet [BACLOFEN (LIORESAL) 10 MG TABLET] TAKE 1 TABLET(10 MG) BY MOUTH THREE TIMES DAILY (Patient taking differently: 2 times daily ) 270 tablet 1     benzonatate (TESSALON) 100 MG capsule Take 1 capsule (100 mg) by mouth 3 times daily as needed for cough 30 capsule 1     buPROPion (WELLBUTRIN SR) 150 MG 12 hr tablet [BUPROPION (WELLBUTRIN SR) 150 MG 12 HR TABLET] TAKE 1 TABLET BY MOUTH EVERY DAY 90 tablet 2     cranberry fruit extract (CRANBERRY ORAL) [CRANBERRY FRUIT EXTRACT (CRANBERRY ORAL)] Take by mouth.       diclofenac sodium (VOLTAREN) 1 % Gel [DICLOFENAC SODIUM (VOLTAREN) 1 % GEL] APPLY 2 GRAMS OF GEL TO UPPER RIGHT SHOULDER FOUR TIMES DAILY AS NEEDED FOR PAIN 100 g 0     ferrous sulfate (FEROSUL) 325 (65 Fe) MG tablet TAKE 1 TABLET BY MOUTH EVERY DAY WITH BREAKFAST 90 tablet 1     FLUoxetine (PROZAC) 10 MG capsule TAKE 3 CAPSULES BY MOUTH ONCE DAILY 270 capsule 2     gabapentin (NEURONTIN) 100 MG capsule [GABAPENTIN (NEURONTIN) 100 MG CAPSULE] 1 capsule in the morning, 2 capsules in the afternoon and 1 capsule in the evening. 270 capsule 5     levothyroxine (SYNTHROID/LEVOTHROID) 75 MCG tablet TAKE 1 TABLET BY MOUTH DAILY 90 tablet 1     losartan (COZAAR) 50 MG tablet [LOSARTAN (COZAAR) 50 MG  TABLET] TAKE 1 TABLET BY MOUTH TWICE DAILY 180 tablet 1     melatonin 10 mg Tab [MELATONIN 10 MG TAB] Take 10 mg by mouth bedtime.       memantine (NAMENDA) 10 MG tablet TAKE 1 TABLET BY MOUTH TWICE A  tablet 3     omega-3 fatty acids 500 mg cap [OMEGA-3 FATTY ACIDS 500 MG CAP] Take by mouth.       spacer (OPTICHAMBER RAJWINDER) holding chamber Use with albuterol inhaler as directed 1 each 0     STRONTIUM CHLORIDE HEXAHYDRATE (STRONTIUM CHL HEXAHYD, BULK,) 100 % Kailyn [STRONTIUM CHLORIDE HEXAHYDRATE (STRONTIUM CHL HEXAHYD, BULK,) 100 % KAILYN] Use As Directed.       VIT C/E/ZN/COPPR/LUTEIN/ZEAXAN (PRESERVISION AREDS 2 ORAL) [VIT C/E/ZN/COPPR/LUTEIN/ZEAXAN (PRESERVISION AREDS 2 ORAL)] Take 1 tablet by mouth 2 (two) times a day.

## 2022-02-25 NOTE — PATIENT INSTRUCTIONS
Consider putting an orthotic into R foot.     Podiatry Dr. Jason jefferson. [address] [facility] [address], [place], [place] [address] [phone]    Echo ordered       Ultrasound of thyroid [phone]     Schedule trigger finger injection with [facility].

## 2022-03-04 ENCOUNTER — TRANSFERRED RECORDS (OUTPATIENT)
Dept: HEALTH INFORMATION MANAGEMENT | Facility: CLINIC | Age: 87
End: 2022-03-04
Payer: MEDICAID

## 2022-03-07 ENCOUNTER — TRANSFERRED RECORDS (OUTPATIENT)
Dept: HEALTH INFORMATION MANAGEMENT | Facility: CLINIC | Age: 87
End: 2022-03-07
Payer: MEDICAID

## 2022-03-07 PROBLEM — R49.0 HOARSE VOICE QUALITY: Status: ACTIVE | Noted: 2022-03-07

## 2022-03-07 PROBLEM — E04.1 RIGHT THYROID NODULE: Status: ACTIVE | Noted: 2022-03-07

## 2022-03-07 PROBLEM — Z74.09 MOBILITY IMPAIRED: Status: ACTIVE | Noted: 2022-03-07

## 2022-03-07 PROBLEM — R06.02 SHORTNESS OF BREATH: Status: ACTIVE | Noted: 2022-03-07

## 2022-03-07 PROBLEM — M72.2 PLANTAR FASCIITIS: Status: ACTIVE | Noted: 2022-03-07

## 2022-03-07 PROBLEM — R53.83 FATIGUE, UNSPECIFIED TYPE: Status: ACTIVE | Noted: 2021-05-25

## 2022-03-07 PROBLEM — R41.89 COGNITIVE CHANGES: Status: ACTIVE | Noted: 2022-03-07

## 2022-03-07 PROBLEM — M21.371 FOOT DROP, RIGHT: Status: ACTIVE | Noted: 2022-03-07

## 2022-03-07 NOTE — ASSESSMENT & PLAN NOTE
Prescription placed for motorized wheelchair which would benefit patient to be able to get around. Cannot utilize a regular wheelchair given her shoulder issues. Has profound weakness and also problems with her hands and dexterity   (3) no apparent problem

## 2022-03-07 NOTE — ASSESSMENT & PLAN NOTE
Family is concerned of potential heart failure with the weakness and change in voice quality. Echocardiogram has been ordered. Also discussed can pretreat with albuterol to see if this is helpful

## 2022-03-07 NOTE — ASSESSMENT & PLAN NOTE
>>ASSESSMENT AND PLAN FOR COGNITIVE CHANGES WRITTEN ON 3/7/2022  6:35 AM BY KWAME FLORES MD    Neurology started patient on Namenda twice daily and seems to be doing better with that. Continue to monitor

## 2022-03-07 NOTE — ASSESSMENT & PLAN NOTE
Continue to utilize support device and splint for her right foot but this could be inducing some plantar fasciitis. They're going to try an insert and also to remove for about an hour a day

## 2022-03-07 NOTE — ASSESSMENT & PLAN NOTE
On losartan 50 mg twice daily maximum therapy. Would consider adding back in hydrochlorothiazide which she has been on in the past. We'll have them check some home blood pressure readings. Assume the blood pressure is elevated today due to pain

## 2022-03-07 NOTE — ASSESSMENT & PLAN NOTE
Okay to use NSAIDs as needed given normal kidney function but otherwise we talked about insert into her orthotic device which could be contributing to some of her pain

## 2022-03-07 NOTE — ASSESSMENT & PLAN NOTE
Could contribute to elevated bp but we have been monitoring and there had been no changes or concerns regarding this. Could consider imaging if bp uncontrolled.

## 2022-03-07 NOTE — ASSESSMENT & PLAN NOTE
There could be a lesion on her vocal cord. We'll start however with thyroid ultrasound given palpable nodule. Could have her see ENT if no improvement or findings. Denies any reflux

## 2022-03-07 NOTE — ASSESSMENT & PLAN NOTE
Neurology started patient on Namenda twice daily and seems to be doing better with that. Continue to monitor

## 2022-03-25 ENCOUNTER — HOSPITAL ENCOUNTER (OUTPATIENT)
Dept: CARDIOLOGY | Facility: HOSPITAL | Age: 87
Discharge: HOME | End: 2022-03-25
Attending: FAMILY MEDICINE | Admitting: FAMILY MEDICINE
Payer: MEDICARE

## 2022-03-25 DIAGNOSIS — R06.02 SHORTNESS OF BREATH: ICD-10-CM

## 2022-03-25 DIAGNOSIS — R53.83 FATIGUE, UNSPECIFIED TYPE: ICD-10-CM

## 2022-03-25 PROCEDURE — 93306 TTE W/DOPPLER COMPLETE: CPT | Mod: 26 | Performed by: INTERNAL MEDICINE

## 2022-03-25 PROCEDURE — 93306 TTE W/DOPPLER COMPLETE: CPT

## 2022-04-01 ENCOUNTER — HOSPITAL ENCOUNTER (OUTPATIENT)
Dept: ULTRASOUND IMAGING | Facility: HOSPITAL | Age: 87
Discharge: HOME | End: 2022-04-01
Attending: FAMILY MEDICINE | Admitting: FAMILY MEDICINE
Payer: MEDICARE

## 2022-04-01 DIAGNOSIS — E03.9 HYPOTHYROIDISM: ICD-10-CM

## 2022-04-01 DIAGNOSIS — R49.0 HOARSE VOICE QUALITY: ICD-10-CM

## 2022-04-01 DIAGNOSIS — D50.0 IRON DEFICIENCY ANEMIA DUE TO CHRONIC BLOOD LOSS: ICD-10-CM

## 2022-04-01 DIAGNOSIS — E04.1 RIGHT THYROID NODULE: ICD-10-CM

## 2022-04-01 PROCEDURE — 76536 US EXAM OF HEAD AND NECK: CPT

## 2022-04-03 RX ORDER — FERROUS SULFATE 325(65) MG
TABLET ORAL
Qty: 90 TABLET | Refills: 3 | Status: SHIPPED | OUTPATIENT
Start: 2022-04-03 | End: 2023-01-13

## 2022-04-03 RX ORDER — LEVOTHYROXINE SODIUM 75 UG/1
TABLET ORAL
Qty: 90 TABLET | Refills: 3 | Status: SHIPPED | OUTPATIENT
Start: 2022-04-03 | End: 2022-10-06

## 2022-04-03 NOTE — TELEPHONE ENCOUNTER
"Last Written Prescription Date:  Both on 10/1/2021  Last Fill Quantity: 90,  # refills: 1   Last office visit provider: 2/25/2022 with PCP Dr SORAYA Velasquez     Requested Prescriptions   Pending Prescriptions Disp Refills     ferrous sulfate (FEROSUL) 325 (65 Fe) MG tablet [Pharmacy Med Name: FERROUS SULFATE 325 MG TABLET] 90 tablet 1     Sig: TAKE 1 TABLET BY MOUTH EVERY DAY WITH BREAKFAST       Iron Supplements Passed - 4/1/2022  1:33 AM        Passed - Patient is 12 years of age or older        Passed - Recent (12 mo) or future (30 days) visit within the authorizing provider's specialty     Patient has had an office visit with the authorizing provider or a provider within the authorizing providers department within the previous 12 mos or has a future within next 30 days. See \"Patient Info\" tab in inbasket, or \"Choose Columns\" in Meds & Orders section of the refill encounter.              Passed - Hgb OR Hct on record within the past 12 mos.     Patient need only have had a HGB or HCT on file in the past 12 mos. That result does not need to be normal.    Recent Labs   Lab Test 01/14/22  1011 12/23/21  1304 05/20/21  0525   HGB 12.3 11.9 12.3     Recent Labs   Lab Test 01/14/22  1011 12/23/21  1304 05/20/21  0525   HCT 40.6 38.5 38.9       Please verify a HGB or HCT has been checked SINCE THE LAST DOSE CHANGE.            Passed - Medication is active on med list           levothyroxine (SYNTHROID/LEVOTHROID) 75 MCG tablet [Pharmacy Med Name: LEVOTHYROXINE 75 MCG TABLET] 90 tablet 1     Sig: TAKE 1 TABLET BY MOUTH EVERY DAY       Thyroid Protocol Passed - 4/1/2022  1:33 AM        Passed - Patient is 12 years or older        Passed - Recent (12 mo) or future (30 days) visit within the authorizing provider's specialty     Patient has had an office visit with the authorizing provider or a provider within the authorizing providers department within the previous 12 mos or has a future within next 30 days. See \"Patient Info\" " "tab in inbasket, or \"Choose Columns\" in Meds & Orders section of the refill encounter.              Passed - Medication is active on med list        Passed - Normal TSH on file in past 12 months     Recent Labs   Lab Test 12/23/21  1304   TSH 0.76              Passed - No active pregnancy on record     If patient is pregnant or has had a positive pregnancy test, please check TSH.          Passed - No positive pregnancy test in past 12 months     If patient is pregnant or has had a positive pregnancy test, please check TSH.               Yadira Kwok RN 04/03/22 4:03 PM  "

## 2022-04-06 ENCOUNTER — MYC MEDICAL ADVICE (OUTPATIENT)
Dept: FAMILY MEDICINE | Facility: CLINIC | Age: 87
End: 2022-04-06
Payer: MEDICAID

## 2022-04-06 DIAGNOSIS — N39.0 RECURRENT UTI: Primary | ICD-10-CM

## 2022-04-07 NOTE — CONFIDENTIAL NOTE
Can you call daughter Rosalva to help her find a time to collect urine supplies for her mother (lab appt). She will collect then at home and bring back to drop off . Sending to [employer] and scheduling

## 2022-04-18 DIAGNOSIS — F33.42 MAJOR DEPRESSIVE DISORDER, RECURRENT EPISODE, IN FULL REMISSION (H): ICD-10-CM

## 2022-04-20 ENCOUNTER — IMMUNIZATION (OUTPATIENT)
Dept: NURSING | Facility: CLINIC | Age: 87
End: 2022-04-20
Payer: MEDICARE

## 2022-04-20 PROCEDURE — 0054A COVID-19,PF,PFIZER (12+ YRS): CPT

## 2022-04-20 PROCEDURE — 91305 COVID-19,PF,PFIZER (12+ YRS): CPT

## 2022-04-20 RX ORDER — BUPROPION HYDROCHLORIDE 150 MG/1
TABLET, EXTENDED RELEASE ORAL
Qty: 90 TABLET | Refills: 2 | Status: SHIPPED | OUTPATIENT
Start: 2022-04-20 | End: 2022-09-15

## 2022-04-20 NOTE — TELEPHONE ENCOUNTER
"Routing refill request to provider for review/approval because:  Labs not current:  PHQ-9    Last Written Prescription Date:  7/25/21  Last Fill Quantity: 90,  # refills: 2   Last office visit provider:  2/25/22     Requested Prescriptions   Pending Prescriptions Disp Refills     buPROPion (WELLBUTRIN SR) 150 MG 12 hr tablet [Pharmacy Med Name: BUPROPION HCL  MG TABLET] 90 tablet 2     Sig: TAKE 1 TABLET BY MOUTH EVERY DAY       SSRIs Protocol Failed - 4/18/2022 10:06 AM        Failed - PHQ-9 score less than 5 in past 6 months     Please review last PHQ-9 score.           Passed - Medication is Bupropion     If the medication is Bupropion (Wellbutrin), and the patient is taking for smoking cessation; OK to refill.          Passed - Medication is active on med list        Passed - Patient is age 18 or older        Passed - No active pregnancy on record        Passed - No positive pregnancy test in last 12 months        Passed - Recent (6 mo) or future (30 days) visit within the authorizing provider's specialty     Patient had office visit in the last 6 months or has a visit in the next 30 days with authorizing provider or within the authorizing provider's specialty.  See \"Patient Info\" tab in inbasket, or \"Choose Columns\" in Meds & Orders section of the refill encounter.                 Lavern Kendrick RN 04/20/22 9:19 AM  "
No

## 2022-04-21 DIAGNOSIS — I10 ESSENTIAL HYPERTENSION: ICD-10-CM

## 2022-04-24 NOTE — TELEPHONE ENCOUNTER
"Routing refill request to provider for review/approval because:  BP not in range.    Last Written Prescription Date:  10/21/21  Last Fill Quantity: 180,  # refills: 1   Last office visit provider:  2/25/22     Requested Prescriptions   Pending Prescriptions Disp Refills     losartan (COZAAR) 50 MG tablet [Pharmacy Med Name: LOSARTAN POTASSIUM 50 MG TAB] 180 tablet 1     Sig: TAKE 1 TABLET BY MOUTH TWICE A DAY       Angiotensin-II Receptors Failed - 4/21/2022 12:31 AM        Failed - Last blood pressure under 140/90 in past 12 months     BP Readings from Last 3 Encounters:   03/11/22 (!) 147/77   02/25/22 (!) 187/89   01/28/22 (!) 171/83                 Passed - Recent (12 mo) or future (30 days) visit within the authorizing provider's specialty     Patient has had an office visit with the authorizing provider or a provider within the authorizing providers department within the previous 12 mos or has a future within next 30 days. See \"Patient Info\" tab in inbasket, or \"Choose Columns\" in Meds & Orders section of the refill encounter.              Passed - Medication is active on med list        Passed - Patient is age 18 or older        Passed - No active pregnancy on record        Passed - Normal serum creatinine on file in past 12 months     Recent Labs   Lab Test 01/14/22  1011   CR 0.73       Ok to refill medication if creatinine is low          Passed - Normal serum potassium on file in past 12 months     Recent Labs   Lab Test 01/14/22  1011   POTASSIUM 4.4                    Passed - No positive pregnancy test in past 12 months             Bassem Johnson RN 04/24/22 10:02 AM  "

## 2022-04-25 RX ORDER — LOSARTAN POTASSIUM 50 MG/1
TABLET ORAL
Qty: 180 TABLET | Refills: 1 | Status: SHIPPED | OUTPATIENT
Start: 2022-04-25 | End: 2022-09-22

## 2022-05-05 ENCOUNTER — TRANSFERRED RECORDS (OUTPATIENT)
Dept: HEALTH INFORMATION MANAGEMENT | Facility: CLINIC | Age: 87
End: 2022-05-05
Payer: MEDICAID

## 2022-05-06 ENCOUNTER — MYC MEDICAL ADVICE (OUTPATIENT)
Dept: FAMILY MEDICINE | Facility: CLINIC | Age: 87
End: 2022-05-06
Payer: MEDICARE

## 2022-05-06 DIAGNOSIS — N39.0 RECURRENT UTI: ICD-10-CM

## 2022-05-10 LAB
ALBUMIN UR-MCNC: NEGATIVE MG/DL
APPEARANCE UR: ABNORMAL
BACTERIA #/AREA URNS HPF: ABNORMAL /HPF
BILIRUB UR QL STRIP: NEGATIVE
COLOR UR AUTO: YELLOW
GLUCOSE UR STRIP-MCNC: NEGATIVE MG/DL
HGB UR QL STRIP: NEGATIVE
KETONES UR STRIP-MCNC: NEGATIVE MG/DL
LEUKOCYTE ESTERASE UR QL STRIP: ABNORMAL
NITRATE UR QL: POSITIVE
PH UR STRIP: 5.5 [PH] (ref 5–8)
RBC #/AREA URNS AUTO: ABNORMAL /HPF
SP GR UR STRIP: 1.01 (ref 1–1.03)
SQUAMOUS #/AREA URNS AUTO: ABNORMAL /LPF
UROBILINOGEN UR STRIP-ACNC: 0.2 E.U./DL
WBC #/AREA URNS AUTO: ABNORMAL /HPF

## 2022-05-10 PROCEDURE — 81001 URINALYSIS AUTO W/SCOPE: CPT | Performed by: FAMILY MEDICINE

## 2022-05-10 PROCEDURE — 87086 URINE CULTURE/COLONY COUNT: CPT | Performed by: FAMILY MEDICINE

## 2022-05-12 LAB — BACTERIA UR CULT: NORMAL

## 2022-05-24 DIAGNOSIS — M62.838 MUSCLE SPASM: ICD-10-CM

## 2022-05-24 RX ORDER — BACLOFEN 10 MG/1
TABLET ORAL
Qty: 270 TABLET | Refills: 1 | Status: SHIPPED | OUTPATIENT
Start: 2022-05-24 | End: 2022-10-07

## 2022-05-24 NOTE — TELEPHONE ENCOUNTER
Routing refill request to provider for review/approval because:  Drug not on the G refill protocol     Last Written Prescription Date:  3/27/21  Last Fill Quantity: 270,  # refills: 1   Last office visit provider:  2/25/22     Requested Prescriptions   Pending Prescriptions Disp Refills     baclofen (LIORESAL) 10 MG tablet [Pharmacy Med Name: BACLOFEN 10 MG TABLET] 270 tablet 1     Sig: TAKE 1 TABLET BY MOUTH THREE TIMES DAILY       There is no refill protocol information for this order          Shirley Martino RN 05/24/22 6:25 PM

## 2022-07-05 ENCOUNTER — MYC MEDICAL ADVICE (OUTPATIENT)
Dept: FAMILY MEDICINE | Facility: CLINIC | Age: 87
End: 2022-07-05
Payer: MEDICARE

## 2022-07-05 DIAGNOSIS — N39.0 RECURRENT UTI: ICD-10-CM

## 2022-07-07 ENCOUNTER — TELEPHONE (OUTPATIENT)
Dept: FAMILY MEDICINE | Facility: CLINIC | Age: 87
End: 2022-07-07

## 2022-07-07 LAB
ALBUMIN UR-MCNC: NEGATIVE MG/DL
APPEARANCE UR: ABNORMAL
BACTERIA #/AREA URNS HPF: ABNORMAL /HPF
BILIRUB UR QL STRIP: NEGATIVE
COLOR UR AUTO: YELLOW
GLUCOSE UR STRIP-MCNC: NEGATIVE MG/DL
HGB UR QL STRIP: NEGATIVE
HYALINE CASTS #/AREA URNS LPF: ABNORMAL /LPF
KETONES UR STRIP-MCNC: NEGATIVE MG/DL
LEUKOCYTE ESTERASE UR QL STRIP: ABNORMAL
NITRATE UR QL: POSITIVE
PH UR STRIP: 6 [PH] (ref 5–8)
RBC #/AREA URNS AUTO: ABNORMAL /HPF
SP GR UR STRIP: 1.02 (ref 1–1.03)
SQUAMOUS #/AREA URNS AUTO: ABNORMAL /LPF
UROBILINOGEN UR STRIP-ACNC: 0.2 E.U./DL
WBC #/AREA URNS AUTO: ABNORMAL /HPF

## 2022-07-07 PROCEDURE — 87086 URINE CULTURE/COLONY COUNT: CPT | Performed by: FAMILY MEDICINE

## 2022-07-07 PROCEDURE — 87186 SC STD MICRODIL/AGAR DIL: CPT | Performed by: FAMILY MEDICINE

## 2022-07-07 PROCEDURE — 81001 URINALYSIS AUTO W/SCOPE: CPT | Performed by: FAMILY MEDICINE

## 2022-07-07 PROCEDURE — 87088 URINE BACTERIA CULTURE: CPT | Performed by: FAMILY MEDICINE

## 2022-07-07 NOTE — TELEPHONE ENCOUNTER
Patient has no burning, no lower back pain, no fever.    Patient is only experiencing urgency and feeling like she always has to go to the bathroom.

## 2022-07-07 NOTE — RESULT ENCOUNTER NOTE
Update: Patient has no burning, no lower back pain, no fever. Patient is only experiencing urgency and feeling like she always has to go to the bathroom.     We will await culture results prior to prescribing antibiotics.     Suzette Madrid, DO

## 2022-07-07 NOTE — TELEPHONE ENCOUNTER
Thanks for the update. I recommend we wait for the urine culture then before prescribing antibiotics.   Suzette Madrid, DO

## 2022-07-07 NOTE — TELEPHONE ENCOUNTER
----- Message from Suzette Madrid DO sent at 7/7/2022 12:56 PM CDT -----  I am covering Dr. Velasquez today who is out of the clinic.  This urine returns concerning for infection.  This was completed with a standing order and not through a visit, so I do not have any context.  It also looks like this patient has an extensive list of allergies.    Please contact patient for update on current symptoms to help guide medical decision making. Route message back to me.    Suzette Madrid DO

## 2022-07-09 LAB — BACTERIA UR CULT: ABNORMAL

## 2022-07-11 ENCOUNTER — TELEPHONE (OUTPATIENT)
Dept: FAMILY MEDICINE | Facility: CLINIC | Age: 87
End: 2022-07-11

## 2022-07-11 DIAGNOSIS — G60.8 POLYNEUROPATHY, PERIPHERAL SENSORIMOTOR AXONAL: Primary | ICD-10-CM

## 2022-07-11 NOTE — TELEPHONE ENCOUNTER
Reason for Call: Request for an order or referral:    Order or referral being requested: Orders for PT and OT, Home safety eval for move in to Assisted living    Date needed: as soon as possible    Has the patient been seen by the PCP for this problem? YES    Additional comments: [facility] said there is a med list sent over by assisted living, written in to have PT and OT but its not signed. Please fax signed orders to [phone]. Goes directly to [facility]    Phone number Patient can be reached at:  Other phone number:  [phone]    Best Time:  ASAP    Can we leave a detailed message on this number?  YES    Call taken on 7/11/2022 at 12:36 PM by Julia Lopez

## 2022-07-11 NOTE — TELEPHONE ENCOUNTER
Left message for [facility] relaying verbal orders       Looks like they are also needing orders for PT and OT faxed. I pended the orders

## 2022-07-12 ENCOUNTER — TELEPHONE (OUTPATIENT)
Dept: FAMILY MEDICINE | Facility: CLINIC | Age: 87
End: 2022-07-12

## 2022-07-12 NOTE — TELEPHONE ENCOUNTER
Called Venita with [facility] per Dr. Velasquez ok to give verbal to delay start of care til 7/15.     Marquez Carr, RN, BSN  [facility]

## 2022-07-12 NOTE — TELEPHONE ENCOUNTER
Reason for call:  Home Healthcare Reason for Call:  Home Health Care    [facility]ita [facility] called regarding (reason for call): Delay start of care    Orders are needed for this patient. Delay start of care    PT: Delay start of care til 07/15    OT: Delay start of care til 07/15        Phone Number Homecare Nurse can be reached at: [phone]    Can we leave a detailed message on this number? YES    Phone number patient can be reached at: Home number on file [phone] (home)    Best Time: Anytime    Call taken on 7/12/2022 at 11:41 AM by Caitie Shi

## 2022-07-14 ENCOUNTER — TELEPHONE (OUTPATIENT)
Dept: FAMILY MEDICINE | Facility: CLINIC | Age: 87
End: 2022-07-14

## 2022-07-14 NOTE — TELEPHONE ENCOUNTER
Called Isaias with [facility] to give verbal okay to delay patient's PT until 7/25 per Dr. Velasquez.    Marquez Carr, RN, BSN  [facility]

## 2022-07-14 NOTE — TELEPHONE ENCOUNTER
Reason for call:  Home Healthcare Reason for Call:  Home Health Care     Homecare called regarding (reason for call): Verbal orders    Orders are needed for this patient. PT    PT: Delay start of care til 07/25/22 because patient is moving into a new apartment per family request      Phone Number Homecare Nurse can be reached at: [phone]    Can we leave a detailed message on this number? YES    Phone number patient can be reached at: Home number on file [phone] (home)    Best Time: Anytime    Call taken on 7/14/2022 at 1:20 PM by Caitie Shi

## 2022-07-22 ENCOUNTER — OFFICE VISIT (OUTPATIENT)
Dept: NEUROLOGY | Facility: CLINIC | Age: 87
End: 2022-07-22
Payer: MEDICARE

## 2022-07-22 ENCOUNTER — LAB (OUTPATIENT)
Dept: LAB | Facility: HOSPITAL | Age: 87
End: 2022-07-22
Payer: MEDICARE

## 2022-07-22 VITALS
DIASTOLIC BLOOD PRESSURE: 78 MMHG | WEIGHT: 144 LBS | HEIGHT: 65 IN | SYSTOLIC BLOOD PRESSURE: 170 MMHG | HEART RATE: 63 BPM | BODY MASS INDEX: 23.99 KG/M2

## 2022-07-22 DIAGNOSIS — G60.8 POLYNEUROPATHY, PERIPHERAL SENSORIMOTOR AXONAL: ICD-10-CM

## 2022-07-22 DIAGNOSIS — R41.9 NEUROCOGNITIVE DISORDER: ICD-10-CM

## 2022-07-22 DIAGNOSIS — R41.9 NEUROCOGNITIVE DISORDER: Primary | ICD-10-CM

## 2022-07-22 LAB
ALBUMIN SERPL-MCNC: 3.4 G/DL (ref 3.5–5)
ALP SERPL-CCNC: 80 U/L (ref 45–120)
ALT SERPL W P-5'-P-CCNC: 48 U/L (ref 0–45)
AST SERPL W P-5'-P-CCNC: 29 U/L (ref 0–40)
BILIRUB DIRECT SERPL-MCNC: 0.1 MG/DL
BILIRUB SERPL-MCNC: 0.4 MG/DL (ref 0–1)
PROT SERPL-MCNC: 6.5 G/DL (ref 6–8)

## 2022-07-22 PROCEDURE — 36415 COLL VENOUS BLD VENIPUNCTURE: CPT

## 2022-07-22 PROCEDURE — 82040 ASSAY OF SERUM ALBUMIN: CPT

## 2022-07-22 PROCEDURE — 99214 OFFICE O/P EST MOD 30 MIN: CPT | Performed by: PSYCHIATRY & NEUROLOGY

## 2022-07-22 RX ORDER — MEMANTINE HYDROCHLORIDE 10 MG/1
TABLET ORAL
Qty: 180 TABLET | Refills: 3 | Status: SHIPPED | OUTPATIENT
Start: 2022-07-22 | End: 2022-10-06

## 2022-07-22 RX ORDER — ESTRADIOL 0.1 MG/G
1 CREAM VAGINAL
COMMUNITY
Start: 2022-02-19 | End: 2022-09-09

## 2022-07-22 ASSESSMENT — MONTREAL COGNITIVE ASSESSMENT (MOCA)
4. NAME EACH OF THE THREE ANIMALS SHOWN: 3
9. REPEAT EACH SENTENCE: 2
13. ORIENTATION SUBSCORE: 6
6. READ LIST OF DIGITS [FORWARD/BACKWARD]: 2
12. MEMORY INDEX SCORE: 5
WHAT IS THE TOTAL SCORE (OUT OF 30): 29
8. SERIAL SUBTRACTION OF 7S: 3
VISUOSPATIAL/EXECUTIVE SUBSCORE: 5
WHAT LEVEL OF EDUCATION WAS ATTAINED: 0
10. [FLUENCY] NAME WORDS STARTING WITH DESIGNATED LETTER: 0
7. [VIGILENCE] TAP WHEN HEARING DESIGNATED LETTER: 1
11. FOR EACH PAIR OF WORDS, WHAT CATEGORY DO THEY BELONG TO (OUT OF 2): 2

## 2022-07-22 NOTE — LETTER
2022         RE: Ellis Em  [address] [place] [address]   [phone]  [facility] [id number]        Dear Colleague,    Thank you for referring your patient, Ellis Em, to the [facility] [place]. Please see a copy of my visit note below.    NEUROLOGY FOLLOW UP VISIT  NOTE       [facility] [place]  [address] [place] [address]  Tel: [phone]  Fax: [phone]  [email or web address]     Ellis Em,  1931, MRN [id number]  PCP: Whitney Velasquez  Date: 2022      ASSESSMENT & PLAN     Visit Diagnosis  1. Neurocognitive disorder  2. Polyneuropathy, peripheral sensorimotor axonal     Neurocognitive disorder  89-year-old female with history of HTN, HLD, osteoporosis, cervical stenosis s/p C1-C2 laminoplasty who is been followed in our clinic for peripheral neuropathy and neurocognitive decline.  She had MRI of the brain that showed small vessel ischemic disease.  Initially she scored 20/30 on MoCA but today her score is 29/30.  I had suspected she has vascular dementia but's sudden improvement in her MoCA score suggest her dementia might be playing a role.  She was initially started on Aricept but could not tolerate and was switched to Namenda.  I have refilled her prescription, I am checking hepatic profile.  Regular follow-up will be in 1 year    Sensorimotor polyneuropathy  Patient developed sudden decline in her ability to walk with increased weakness in the right leg.  MRI lumbar spine showed multilevel radiculopathy most pronounced at the right side L3-L4.  EMG suggested length dependent mixed sensorimotor polyneuropathy.  Due to her age she was not interested in epidural injection or any surgical intervention.  I have recommended continuing with physical therapy.  Regular follow-up will be in 1 year.    Thank you again for this referral, please feel free to contact me if you have any questions.    Yoni Fam MD  [facility]  [place] [facility]  (Formerly, [facility]     HISTORY OF PRESENT ILLNESS     Patient is a 89-year-old female with history of HTN, HLD, osteoporosis, cervical stenosis s/p C1-C2 laminoplasty who was last seen on 1/28/2022 for sudden decline in her cognition.  She had MRI of the brain that showed small vessel ischemic disease.  Lab work for common causes of cognitive decline were normal.  She scored 20/30 on MoCA and I suspect that she has vascular dementia.  She was not able to tolerate Aricept and was switched to Namenda.  She also reported decline in her ability to walk and had MRI of the lumbar spine that showed multilevel radiculopathy most pronounced at L3-L4.  EMG however showed length dependent mixed sensorimotor polyneuropathy.  She went through some physical therapy and was not interested in any epidural injection.  She denies any bowel or bladder incontinence.  She has moved into an assisted living facility and enjoys being there.  She had a Winkler done today and surprisingly her MoCA score has improved to 29/30.     PROBLEM LIST   Patient Active Problem List   Diagnosis Code     Hypothyroidism E03.9     Major depressive disorder, recurrent episode, in full remission (H) F33.42     Idiopathic osteoporosis M81.8     Adrenal mass, left (H) E27.8     Lumbar stenosis with neurogenic claudication M48.062     Essential hypertension I10     Generalized osteoarthritis of multiple sites M15.9     Polyneuropathy, peripheral sensorimotor axonal G60.8     Allergic rhinitis J30.9     Tinnitus H93.19     Macular degeneration mild  H35.30     Breast implant rupture bilateral  T85.43XA     ACP (advance care planning) Z71.89     Actinic keratosis L57.0     Cyst of ovary N83.209     Hearing loss H91.90     Hyperlipidemia E78.5     Malignant neoplasm of skin of parts of face C44.309     Neurogenic bladder N31.9     Antalgic gait R26.89     Fatigue, unspecified type R53.83     Cervical  spondylosis with myelopathy M47.12     Recurrent urinary tract infection N39.0     Mobility impaired Z74.09     Foot drop, right M21.371     Cognitive changes R41.89     Plantar fasciitis M72.2     Right thyroid nodule E04.1     Hoarse voice quality R49.0     Shortness of breath  R06.02         PAST MEDICAL & SURGICAL HISTORY     Past Medical History:   Patient  has a past medical history of Bowel obstruction (H) (1974), C1 cervical fracture (H) (10/12/2015), Cancer (H) (breast, 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, salivary gland), Cancer of skin, Carpal tunnel syndrome, Cervical spondylosis with myelopathy (4/25/2013), Chronic pain, Closed fracture of cervical vertebra (H) (10/21/2015), Closed fracture of lumbar vertebra (H) (1/21/2015), Cyst of spinal meninges, Dermatitis, Disorders of meninges, not elsewhere classified, Gastric ulcer (1983), Hip fracture (H) (2007), History of adrenal adenoma (5/25/2021), History of appendectomy, History of fractured pelvis (1985), Hyponatremia, Hypothyroidism, Leg pain, bilateral, Major depression, Miscarriage (1962), Neck fracture (H) (09/10/2015), Nephrolithiasis, Osteoarthritis, Osteoporosis, Panic attack, Peripheral neuropathy, Reactive confusion, Renal insufficiency, Rib fracture (2008), Shingles, Status post lumbar laminectomy (11/7/2014), UTI (urinary tract infection), and Varicella (7/14/2004).    Surgical History:  She  has a past surgical history that includes Pr Arthroplasty Tibial Plateau (Left); Salivary gland surgery (12/05/1984); Cataract Extraction, Bilateral; Cholecystectomy (06/30/1983); other surgical history; Total Hip Arthroplasty (Bilateral); Ovarian Cyst Removal (1943); Abdomen surgery (06/30/1983); joint replacement; fracture surgery (1985); Cervical Laminoplasty (Bilateral, 5/2/2013); Mastectomy (Bilateral); Lumbar Laminectomy (N/A, 11/6/2014); QUIGLEY W/O FACETEC FORAMOT/[facility] 1/2 VRT SEG, LUMBAR; appendectomy (1943); tubal ligation (1962); Revise reconstructed breast (Bilateral,  11/11/1984); Endometrial Biopsy (09/09/1986); Dilation and curettage (09/19/1986); Combined Mammaplasty Revision W/ Abdominoplasty (07/1998); Root Canal (09/14/1998); Skin Cancer Excision (08/15/2003); Total Knee Arthroplasty (10/20/2005); and Tumor Excision (05/01/2013).     SOCIAL HISTORY     Reviewed, and she  reports that she has never smoked. She has never used smokeless tobacco. She reports that she does not drink alcohol and does not use drugs.     FAMILY HISTORY     Reviewed, and family history includes Alcoholism in her brother; Cancer in her mother; Dementia in her brother; Diabetes in her brother; Kidney Disease in her father.     ALLERGIES     Allergies   Allergen Reactions     Iodinated Contrast Media [Diagnostic X-Ray Materials] Hives     Adhesive [Mecrylate] Unknown     Adhesive tape     Amlodipine Unknown     drowsiness     Asparaginase Unknown     Atorvastatin Unknown     PN: LW Reaction: arthalgia     Ciprofloxacin Unknown     Codeine Unknown     Latex Unknown     PN: Converted from LW Latex Sensitivity Flag     Lisinopril Cough     Pravastatin Unknown     Propoxyphene N-Acetaminophen [Propoxyphene N-Apap] Unknown     PN: LW Reaction: hallucinations     Sulfa (Sulfonamide Antibiotics) [Sulfa Drugs] Unknown     Albuterol Unknown     Other reaction(s): Urinary Retention         REVIEW OF SYSTEMS     A 12 point review of system was performed and was negative except as outlined in the history of present illness.     HOME MEDICATIONS     Current Outpatient Rx   Medication Sig Dispense Refill     alpha lipoic acid 200 mg cap [ALPHA LIPOIC ACID 200 MG CAP] Take 1 capsule by mouth 2 (two) times a day.       baclofen (LIORESAL) 10 MG tablet TAKE 1 TABLET BY MOUTH THREE TIMES DAILY 270 tablet 1     buPROPion (WELLBUTRIN SR) 150 MG 12 hr tablet TAKE 1 TABLET BY MOUTH EVERY DAY 90 tablet 2     cranberry fruit extract (CRANBERRY ORAL) [CRANBERRY FRUIT EXTRACT (CRANBERRY ORAL)] Take by mouth.       estradiol  "(ESTRACE) 0.1 MG/GM vaginal cream        ferrous sulfate (FEROSUL) 325 (65 Fe) MG tablet TAKE 1 TABLET BY MOUTH EVERY DAY WITH BREAKFAST 90 tablet 3     FLUoxetine (PROZAC) 10 MG capsule TAKE 3 CAPSULES BY MOUTH ONCE DAILY 270 capsule 2     gabapentin (NEURONTIN) 100 MG capsule TAKE 1 CAPSULE IN THE MORNING, 2 CAPSULES IN THE AFTERNOON AND 1 CAPSULE IN THE EVENING. 270 capsule 1     levothyroxine (SYNTHROID/LEVOTHROID) 75 MCG tablet TAKE 1 TABLET BY MOUTH EVERY DAY 90 tablet 3     losartan (COZAAR) 50 MG tablet TAKE 1 TABLET BY MOUTH TWICE A  tablet 1     melatonin 10 mg Tab [MELATONIN 10 MG TAB] Take 10 mg by mouth bedtime.       memantine (NAMENDA) 10 MG tablet TAKE 1 TABLET BY MOUTH TWICE A  tablet 3     omega-3 fatty acids 500 mg cap [OMEGA-3 FATTY ACIDS 500 MG CAP] Take by mouth.       VIT C/E/ZN/COPPR/LUTEIN/ZEAXAN (PRESERVISION AREDS 2 ORAL) [VIT C/E/ZN/COPPR/LUTEIN/ZEAXAN (PRESERVISION AREDS 2 ORAL)] Take 1 tablet by mouth 2 (two) times a day.        STRONTIUM CHLORIDE HEXAHYDRATE (STRONTIUM CHL HEXAHYD, BULK,) 100 % Kailyn [STRONTIUM CHLORIDE HEXAHYDRATE (STRONTIUM CHL HEXAHYD, BULK,) 100 % KAILYN] Use As Directed.           PHYSICAL EXAM     Vital signs  BP (!) 170/78 (BP Location: Left arm, Patient Position: Sitting)   Pulse 63   Ht 1.651 m (5' 5\")   Wt 65.3 kg (144 lb)   LMP  (LMP Unknown)   BMI 23.96 kg/m      Weight:   144 lbs 0 oz  [place]iban Cognitive Assessment:    Errol Cognitive Assessment (MOCA)  Visuospatial/Executive : 5  Naming: 3  Attention - Digits: 2  Attention - Letters: 1  Attention - Subtraction: 3  Language - Repeat: 2  Language - Fluency : 0  Abstraction: 2  Delayed Recall: 5  Orientation: 6  Education: 0  MOCA Score: 29  Administered by: : Joanie Lopez CMA     Errol Cognitive Assessment Score:  MOCA Score: 29/30.    Patient is alert and oriented x4 in no acute distress. Vital signs were reviewed and are documented in electronic medical record. Neck was supple, " no carotid bruits, thyromegaly, JVD, or lymphadenopathy was noted.   NEUROLOGY EXAM:    Patient s speech was normal with no aphasia or dysarthria. Mentation, and affect were also normal.      Cranial nerves II -XII were intact except she is hard of hearing    Patient has decreased mass with normal tone strength in upper extremity 5/5 in the right lower extremity 4+/5 in the left lower extremity 5 -/5    Sensation was decreased to light touch pinprick below mid thighs    Reflexes were 1+ symmetrical in upper extremity absent in the lower extremity    minimal dysmetria noted on finger-nose testing    Gait testing was deferred as patient is wheelchair-bound     PERTINENT DIAGNOSTIC STUDIES     Following studies were reviewed:     CT HEAD & CERVICAL SPINE 5/20/2021  HEAD CT:  1.  No hemorrhage, mass, or mass effect.  2.  Ectasia of right supraclinoid ICA.  3.  Moderate presumed chronic small vessel ischemia.  4.  Moderate atrophy.     CERVICAL SPINE CT:  1.  No definite fracture.  2.  New moderate left paracentral disc protrusion at C3-C4.  3.  Underlying degenerative and postoperative changes, as described.     MRI, MRA BRAIN 5/29/2021  HEAD MRI:   1.  No acute infarct, acute intracranial hemorrhage, or intracranial mass.  2.  Mild to moderate burden presumed sequela of chronic small vessel ischemic disease involving the cerebral white matter and aníbal.  3.  Complete opacification of the left mastoid air cells and middle ear cavity.     HEAD MRA:   1.  No aneurysm, high flow AVM or significant stenosis identified.     NECK MRA:  1.  No significant stenosis of the bilateral internal carotid arteries based on NASCET criteria.     MRI LUMBAR SPINE 5/29/2021  1.  Moderate to advanced multilevel lumbar spondylitic changes with evidence of prior laminectomies at L1-L2 through L5-S1. There is no significant spinal canal stenosis in the lumbar region.  2.  At L5-S1, there is moderate right and severe left neural foraminal  stenosis.  3.  At L4-L5, there is severe right neural foraminal stenosis with deformity of the exiting right L4 nerve root.  4.  At L3-L4, there is a moderate circumferential disc bulge with severe bilateral neural foraminal stenosis and right lateral recess narrowing.  5.  At L1-L2, there is severe bilateral neural foraminal stenosis.  6.  Moderate rightward convex curvature of the lumbar spine with the apex at L2.     MRI CERVICAL SPINE 10/16/2020  1.  Postsurgical changes C1 and C2 laminoplasty with hardware in the left and associated susceptibility. This is better appreciated on prior cervical spine CT 05/04/2016.  2.  Interbody fusion at C4/C5 with associated kyphosis, unchanged.  3.  Small broad bar disc osteophyte complexes contributing to mild spinal canal narrowing at C3/C4-C6/C7. No significant spinal canal narrowing at any other level within the cervical spine.  4.  Multilevel uncovertebral joint disease and facet arthropathy with mild multilevel neural foraminal narrowing at C3/C4-C5/C6 and T1/T2.     EMG 7/1/2021  This is an abnormal study.  1.  Reduced and absent sensory amplitudes in both lower extremities symmetrically.  These finding may represent either a length dependent large fiber relatively symmetric sensorimotor axonal polyneuropathy or alternatively can be seen as an age-related change for a person in this age group  2.  Superimposed chronic L4-S1 polyradiculopathy is suspected with ongoing denervation noted in extensor hallucis longus muscle which could be secondary to radiculopathy versus active polyneuropathy.  Correlation with lumbar spine imaging is recommended     PERTINENT LABS  Following labs were reviewed:  [facility] Medical Advice on 07/05/2022   Component Date Value     Color Urine 07/07/2022 Yellow      Appearance Urine 07/07/2022 Slightly Cloudy (A)     Glucose Urine 07/07/2022 Negative      Bilirubin Urine 07/07/2022 Negative      Ketones Urine 07/07/2022 Negative      Specific  Gravity Urine 07/07/2022 1.020      Blood Urine 07/07/2022 Negative      pH Urine 07/07/2022 6.0      Protein Albumin Urine 07/07/2022 Negative      Urobilinogen Urine 07/07/2022 0.2      Nitrite Urine 07/07/2022 Positive (A)     Leukocyte Esterase Urine 07/07/2022 Trace (A)     Bacteria Urine 07/07/2022 Many (A)     RBC Urine 07/07/2022 0-2      WBC Urine 07/07/2022 0-5      Squamous Epithelials Uri* 07/07/2022 Few (A)     Hyaline Casts Urine 07/07/2022 0-2 (A)     Culture 07/07/2022 >100,000 CFU/mL Klebsiella aerogenes (A)   [facility] Medical Advice on 05/06/2022   Component Date Value     Color Urine 05/10/2022 Yellow      Appearance Urine 05/10/2022 Slightly Cloudy (A)     Glucose Urine 05/10/2022 Negative      Bilirubin Urine 05/10/2022 Negative      Ketones Urine 05/10/2022 Negative      Specific Gravity Urine 05/10/2022 1.015      Blood Urine 05/10/2022 Negative      pH Urine 05/10/2022 5.5      Protein Albumin Urine 05/10/2022 Negative      Urobilinogen Urine 05/10/2022 0.2      Nitrite Urine 05/10/2022 Positive (A)     Leukocyte Esterase Urine 05/10/2022 Trace (A)     Bacteria Urine 05/10/2022 Moderate (A)     RBC Urine 05/10/2022 0-2      WBC Urine 05/10/2022 0-5      Squamous Epithelials Uri* 05/10/2022 Few (A)     Culture 05/10/2022 >100,000 CFU/mL Mixture of urogenital enoch          Total time spent for face to face visit, reviewing labs/imaging studies, counseling and coordination of care was: 30 Minutes spent on the date of the encounter doing chart review, review of outside records, review of test results, interpretation of tests, patient visit, documentation and discussion with family       This note was dictated using voice recognition software.  Any grammatical or context distortions are unintentional and inherent to the software.    Orders Placed This Encounter   Procedures     Hepatic function panel      New Prescriptions    No medications on file     Modified Medications    Modified Medication  Previous Medication    MEMANTINE (NAMENDA) 10 MG TABLET memantine (NAMENDA) 10 MG tablet       TAKE 1 TABLET BY MOUTH TWICE A DAY    TAKE 1 TABLET BY MOUTH TWICE A DAY                     Again, thank you for allowing me to participate in the care of your patient.        Sincerely,        Yoni Fam MD

## 2022-07-22 NOTE — NURSING NOTE
Chief Complaint   Patient presents with     Neurocognitive disorder     Annual follow up- MOCA 29/30     Joanie Lopez CMA on 7/22/2022 at 10:58 AM

## 2022-07-22 NOTE — NURSING NOTE
ABHIJEET COGNITIVE ASSESSMENT (MOCA)  Version 7.1 Original Version  VISUOSPATIAL/EXECUTIVE               COPY CUBE      [  1  ]                                [  1  ] DRAW CLOCK (Ten past eleven)  (3 points)    [  1  ]                    [  1  ]               [ 1   ]       Contour            Numbers     Hands POINTS               5    / 5   NAMING    [ 1  ]                                                                        [ 1   ]                                             [ 1   ]  Lion                                                                      Irinas                                Camel                     / 3   MEMORY Read list of words, subject must repeat them. Do 2 trials, even if 1st trial is successful. Do a recall after 5 minutes  FACE VELVET Rastafarian CARRIE RED No Points    1st x x x x x     2nd x x x x x    ATTENTION Read list of digits (1 digit/sec) Subject has to repeat in the forward order       [ 1   ]   2  1  8  5  4                                [ 1   ] 7 4 2                      2    /2   Read list of letters. The subject must tap with his hand at each letter A. No points if > 2 errors.  [ 1   ] F B A C M N A A J K L B A F A K D E A A A J A M O F A A B          1    /1   Serial 7 subtraction starting at 100          [  1  ] 93         [ 1   ] 86          [1    ] 79          [ 1   ] 72         [  1  ] 65   4 or 5 correct subtractions: 3 points,  2 or 3 correct: 2 points,  1correct: 1 point,   0 correct: 0 points       3     /3   LANGUAGE Repeat: I only know that Ryan is the one to help today. [ 1    ]                                      The cat always hid under the couch when dogs were in the room. [1   ]              2 /2   Fluency: Name maximum number of words in one minute that begin with the letter F                                                                                                                    [ 0   ] _9__ (N > 11 words)          0     /1   ABSTRACTION  Similarity between e.g. banana-orange=fruit                                                                   [1    ] train-bicycle                      [ 1   ] watch-ruler      2        /2   DELAYED  RECALL Has to recall words  WITH NO CUE FACE  [  1  ] VELVET  [  1  ] Yazidism  [ 1   ]  CARRIE  [   1 ] RED  [  1  ] Points for UNCUED recall only         5   /5           OPTIONAL Category cue           Multiple choice cue          ORIENTATION  [  1  ] Date     [ 1   ] Month       [ 1   ] Year      [  1  ] Day      [ 1   ] Place        [  1  ] City      6 /6   TOTAL  Normal > 26/30 Add 1 point if < 12 years education      29  /30

## 2022-07-22 NOTE — PROGRESS NOTES
NEUROLOGY FOLLOW UP VISIT  NOTE       [facility] [place]  [address] [place] [address]  Tel: [phone]  Fax: [phone]  [email or web address]     Ellis Em,  1931, MRN [id number]  PCP: Whitney Velasquez  Date: 2022      ASSESSMENT & PLAN     Visit Diagnosis  Neurocognitive disorder  Polyneuropathy, peripheral sensorimotor axonal     Neurocognitive disorder  89-year-old female with history of HTN, HLD, osteoporosis, cervical stenosis s/p C1-C2 laminoplasty who is been followed in our clinic for peripheral neuropathy and neurocognitive decline.  She had MRI of the brain that showed small vessel ischemic disease.  Initially she scored 20/30 on MoCA but today her score is 29/30.  I had suspected she has vascular dementia but's sudden improvement in her MoCA score suggest her dementia might be playing a role.  She was initially started on Aricept but could not tolerate and was switched to Namenda.  I have refilled her prescription, I am checking hepatic profile.  Regular follow-up will be in 1 year    Sensorimotor polyneuropathy  Patient developed sudden decline in her ability to walk with increased weakness in the right leg.  MRI lumbar spine showed multilevel radiculopathy most pronounced at the right side L3-L4.  EMG suggested length dependent mixed sensorimotor polyneuropathy.  Due to her age she was not interested in epidural injection or any surgical intervention.  I have recommended continuing with physical therapy.  Regular follow-up will be in 1 year.    Thank you again for this referral, please feel free to contact me if you have any questions.    Yoni Fam MD  [facility]  (Formerly, [facility]     HISTORY OF PRESENT ILLNESS     Patient is a 89-year-old female with history of HTN, HLD, osteoporosis, cervical stenosis s/p C1-C2 laminoplasty who was last seen on 2022 for sudden decline  in her cognition.  She had MRI of the brain that showed small vessel ischemic disease.  Lab work for common causes of cognitive decline were normal.  She scored 20/30 on MoCA and I suspect that she has vascular dementia.  She was not able to tolerate Aricept and was switched to Namenda.  She also reported decline in her ability to walk and had MRI of the lumbar spine that showed multilevel radiculopathy most pronounced at L3-L4.  EMG however showed length dependent mixed sensorimotor polyneuropathy.  She went through some physical therapy and was not interested in any epidural injection.  She denies any bowel or bladder incontinence.  She has moved into an assisted living facility and enjoys being there.  She had a Cambria done today and surprisingly her MoCA score has improved to 29/30.     PROBLEM LIST   Patient Active Problem List   Diagnosis Code     Hypothyroidism E03.9     Major depressive disorder, recurrent episode, in full remission (H) F33.42     Idiopathic osteoporosis M81.8     Adrenal mass, left (H) E27.8     Lumbar stenosis with neurogenic claudication M48.062     Essential hypertension I10     Generalized osteoarthritis of multiple sites M15.9     Polyneuropathy, peripheral sensorimotor axonal G60.8     Allergic rhinitis J30.9     Tinnitus H93.19     Macular degeneration mild  H35.30     Breast implant rupture bilateral  T85.43XA     ACP (advance care planning) Z71.89     Actinic keratosis L57.0     Cyst of ovary N83.209     Hearing loss H91.90     Hyperlipidemia E78.5     Malignant neoplasm of skin of parts of face C44.309     Neurogenic bladder N31.9     Antalgic gait R26.89     Fatigue, unspecified type R53.83     Cervical spondylosis with myelopathy M47.12     Recurrent urinary tract infection N39.0     Mobility impaired Z74.09     Foot drop, right M21.371     Cognitive changes R41.89     Plantar fasciitis M72.2     Right thyroid nodule E04.1     Hoarse voice quality R49.0     Shortness of breath   R06.02         PAST MEDICAL & SURGICAL HISTORY     Past Medical History:   Patient  has a past medical history of Bowel obstruction (H) (1974), C1 cervical fracture (H) (10/12/2015), Cancer (H) (breast, 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, salivary gland), Cancer of skin, Carpal tunnel syndrome, Cervical spondylosis with myelopathy (4/25/2013), Chronic pain, Closed fracture of cervical vertebra (H) (10/21/2015), Closed fracture of lumbar vertebra (H) (1/21/2015), Cyst of spinal meninges, Dermatitis, Disorders of meninges, not elsewhere classified, Gastric ulcer (1983), Hip fracture (H) (2007), History of adrenal adenoma (5/25/2021), History of appendectomy, History of fractured pelvis (1985), Hyponatremia, Hypothyroidism, Leg pain, bilateral, Major depression, Miscarriage (1962), Neck fracture (H) (09/10/2015), Nephrolithiasis, Osteoarthritis, Osteoporosis, Panic attack, Peripheral neuropathy, Reactive confusion, Renal insufficiency, Rib fracture (2008), Shingles, Status post lumbar laminectomy (11/7/2014), UTI (urinary tract infection), and Varicella (7/14/2004).    Surgical History:  She  has a past surgical history that includes Pr Arthroplasty Tibial Plateau (Left); Salivary gland surgery (12/05/1984); Cataract Extraction, Bilateral; Cholecystectomy (06/30/1983); other surgical history; Total Hip Arthroplasty (Bilateral); Ovarian Cyst Removal (1943); Abdomen surgery (06/30/1983); joint replacement; fracture surgery (1985); Cervical Laminoplasty (Bilateral, 5/2/2013); Mastectomy (Bilateral); Lumbar Laminectomy (N/A, 11/6/2014); QUIGLEY W/O FACETEC FORAMOT/DSKC 1/2 VRT SEG, LUMBAR; appendectomy (1943); tubal ligation (1962); Revise reconstructed breast (Bilateral, 11/11/1984); Endometrial Biopsy (09/09/1986); Dilation and curettage (09/19/1986); Combined Mammaplasty Revision W/ Abdominoplasty (07/1998); Root Canal (09/14/1998); Skin Cancer Excision (08/15/2003); Total Knee Arthroplasty (10/20/2005); and Tumor Excision (05/01/2013).      SOCIAL HISTORY     Reviewed, and she  reports that she has never smoked. She has never used smokeless tobacco. She reports that she does not drink alcohol and does not use drugs.     FAMILY HISTORY     Reviewed, and family history includes Alcoholism in her brother; Cancer in her mother; Dementia in her brother; Diabetes in her brother; Kidney Disease in her father.     ALLERGIES     Allergies   Allergen Reactions     Iodinated Contrast Media [Diagnostic X-Ray Materials] Hives     Adhesive [Mecrylate] Unknown     Adhesive tape     Amlodipine Unknown     drowsiness     Asparaginase Unknown     Atorvastatin Unknown     PN: LW Reaction: arthalgia     Ciprofloxacin Unknown     Codeine Unknown     Latex Unknown     PN: Converted from LW Latex Sensitivity Flag     Lisinopril Cough     Pravastatin Unknown     Propoxyphene N-Acetaminophen [Propoxyphene N-Apap] Unknown     PN: LW Reaction: hallucinations     Sulfa (Sulfonamide Antibiotics) [Sulfa Drugs] Unknown     Albuterol Unknown     Other reaction(s): Urinary Retention         REVIEW OF SYSTEMS     A 12 point review of system was performed and was negative except as outlined in the history of present illness.     HOME MEDICATIONS     Current Outpatient Rx   Medication Sig Dispense Refill     alpha lipoic acid 200 mg cap [ALPHA LIPOIC ACID 200 MG CAP] Take 1 capsule by mouth 2 (two) times a day.       baclofen (LIORESAL) 10 MG tablet TAKE 1 TABLET BY MOUTH THREE TIMES DAILY 270 tablet 1     buPROPion (WELLBUTRIN SR) 150 MG 12 hr tablet TAKE 1 TABLET BY MOUTH EVERY DAY 90 tablet 2     cranberry fruit extract (CRANBERRY ORAL) [CRANBERRY FRUIT EXTRACT (CRANBERRY ORAL)] Take by mouth.       estradiol (ESTRACE) 0.1 MG/GM vaginal cream        ferrous sulfate (FEROSUL) 325 (65 Fe) MG tablet TAKE 1 TABLET BY MOUTH EVERY DAY WITH BREAKFAST 90 tablet 3     FLUoxetine (PROZAC) 10 MG capsule TAKE 3 CAPSULES BY MOUTH ONCE DAILY 270 capsule 2     gabapentin (NEURONTIN) 100 MG  "capsule TAKE 1 CAPSULE IN THE MORNING, 2 CAPSULES IN THE AFTERNOON AND 1 CAPSULE IN THE EVENING. 270 capsule 1     levothyroxine (SYNTHROID/LEVOTHROID) 75 MCG tablet TAKE 1 TABLET BY MOUTH EVERY DAY 90 tablet 3     losartan (COZAAR) 50 MG tablet TAKE 1 TABLET BY MOUTH TWICE A  tablet 1     melatonin 10 mg Tab [MELATONIN 10 MG TAB] Take 10 mg by mouth bedtime.       memantine (NAMENDA) 10 MG tablet TAKE 1 TABLET BY MOUTH TWICE A  tablet 3     omega-3 fatty acids 500 mg cap [OMEGA-3 FATTY ACIDS 500 MG CAP] Take by mouth.       VIT C/E/ZN/COPPR/LUTEIN/ZEAXAN (PRESERVISION AREDS 2 ORAL) [VIT C/E/ZN/COPPR/LUTEIN/ZEAXAN (PRESERVISION AREDS 2 ORAL)] Take 1 tablet by mouth 2 (two) times a day.        STRONTIUM CHLORIDE HEXAHYDRATE (STRONTIUM CHL HEXAHYD, BULK,) 100 % Kailyn [STRONTIUM CHLORIDE HEXAHYDRATE (STRONTIUM CHL HEXAHYD, BULK,) 100 % KAILYN] Use As Directed.           PHYSICAL EXAM     Vital signs  BP (!) 170/78 (BP Location: Left arm, Patient Position: Sitting)   Pulse 63   Ht 1.651 m (5' 5\")   Wt 65.3 kg (144 lb)   LMP  (LMP Unknown)   BMI 23.96 kg/m      Weight:   144 lbs 0 oz  Errol Cognitive Assessment:    [place] Cognitive Assessment (MOCA)  Visuospatial/Executive : 5  Naming: 3  Attention - Digits: 2  Attention - Letters: 1  Attention - Subtraction: 3  Language - Repeat: 2  Language - Fluency : 0  Abstraction: 2  Delayed Recall: 5  Orientation: 6  Education: 0  MOCA Score: 29  Administered by: : Joanie Lopez CMA     [place] Cognitive Assessment Score:  MOCA Score: 29/30.    Patient is alert and oriented x4 in no acute distress. Vital signs were reviewed and are documented in electronic medical record. Neck was supple, no carotid bruits, thyromegaly, JVD, or lymphadenopathy was noted.   NEUROLOGY EXAM:    Patient s speech was normal with no aphasia or dysarthria. Mentation, and affect were also normal.      Cranial nerves II -XII were intact except she is hard of hearing    Patient has " decreased mass with normal tone strength in upper extremity 5/5 in the right lower extremity 4+/5 in the left lower extremity 5 -/5    Sensation was decreased to light touch pinprick below mid thighs    Reflexes were 1+ symmetrical in upper extremity absent in the lower extremity    minimal dysmetria noted on finger-nose testing    Gait testing was deferred as patient is wheelchair-bound     PERTINENT DIAGNOSTIC STUDIES     Following studies were reviewed:     CT HEAD & CERVICAL SPINE 5/20/2021  HEAD CT:  1.  No hemorrhage, mass, or mass effect.  2.  Ectasia of right supraclinoid ICA.  3.  Moderate presumed chronic small vessel ischemia.  4.  Moderate atrophy.     CERVICAL SPINE CT:  1.  No definite fracture.  2.  New moderate left paracentral disc protrusion at C3-C4.  3.  Underlying degenerative and postoperative changes, as described.     MRI, MRA BRAIN 5/29/2021  HEAD MRI:   1.  No acute infarct, acute intracranial hemorrhage, or intracranial mass.  2.  Mild to moderate burden presumed sequela of chronic small vessel ischemic disease involving the cerebral white matter and aníbal.  3.  Complete opacification of the left mastoid air cells and middle ear cavity.     HEAD MRA:   1.  No aneurysm, high flow AVM or significant stenosis identified.     NECK MRA:  1.  No significant stenosis of the bilateral internal carotid arteries based on NASCET criteria.     MRI LUMBAR SPINE 5/29/2021  1.  Moderate to advanced multilevel lumbar spondylitic changes with evidence of prior laminectomies at L1-L2 through L5-S1. There is no significant spinal canal stenosis in the lumbar region.  2.  At L5-S1, there is moderate right and severe left neural foraminal stenosis.  3.  At L4-L5, there is severe right neural foraminal stenosis with deformity of the exiting right L4 nerve root.  4.  At L3-L4, there is a moderate circumferential disc bulge with severe bilateral neural foraminal stenosis and right lateral recess narrowing.  5.  At  L1-L2, there is severe bilateral neural foraminal stenosis.  6.  Moderate rightward convex curvature of the lumbar spine with the apex at L2.     MRI CERVICAL SPINE 10/16/2020  1.  Postsurgical changes C1 and C2 laminoplasty with hardware in the left and associated susceptibility. This is better appreciated on prior cervical spine CT 05/04/2016.  2.  Interbody fusion at C4/C5 with associated kyphosis, unchanged.  3.  Small broad bar disc osteophyte complexes contributing to mild spinal canal narrowing at C3/C4-C6/C7. No significant spinal canal narrowing at any other level within the cervical spine.  4.  Multilevel uncovertebral joint disease and facet arthropathy with mild multilevel neural foraminal narrowing at C3/C4-C5/C6 and T1/T2.     EMG 7/1/2021  This is an abnormal study.  1.  Reduced and absent sensory amplitudes in both lower extremities symmetrically.  These finding may represent either a length dependent large fiber relatively symmetric sensorimotor axonal polyneuropathy or alternatively can be seen as an age-related change for a person in this age group  2.  Superimposed chronic L4-S1 polyradiculopathy is suspected with ongoing denervation noted in extensor hallucis longus muscle which could be secondary to radiculopathy versus active polyneuropathy.  Correlation with lumbar spine imaging is recommended     PERTINENT LABS  Following labs were reviewed:  My[facility] Medical Advice on 07/05/2022   Component Date Value     Color Urine 07/07/2022 Yellow      Appearance Urine 07/07/2022 Slightly Cloudy (A)     Glucose Urine 07/07/2022 Negative      Bilirubin Urine 07/07/2022 Negative      Ketones Urine 07/07/2022 Negative      Specific Gravity Urine 07/07/2022 1.020      Blood Urine 07/07/2022 Negative      pH Urine 07/07/2022 6.0      Protein Albumin Urine 07/07/2022 Negative      Urobilinogen Urine 07/07/2022 0.2      Nitrite Urine 07/07/2022 Positive (A)     Leukocyte Esterase Urine 07/07/2022 Trace (A)      Bacteria Urine 07/07/2022 Many (A)     RBC Urine 07/07/2022 0-2      WBC Urine 07/07/2022 0-5      Squamous Epithelials Uri* 07/07/2022 Few (A)     Hyaline Casts Urine 07/07/2022 0-2 (A)     Culture 07/07/2022 >100,000 CFU/mL Klebsiella aerogenes (A)   My[facility] Medical Advice on 05/06/2022   Component Date Value     Color Urine 05/10/2022 Yellow      Appearance Urine 05/10/2022 Slightly Cloudy (A)     Glucose Urine 05/10/2022 Negative      Bilirubin Urine 05/10/2022 Negative      Ketones Urine 05/10/2022 Negative      Specific Gravity Urine 05/10/2022 1.015      Blood Urine 05/10/2022 Negative      pH Urine 05/10/2022 5.5      Protein Albumin Urine 05/10/2022 Negative      Urobilinogen Urine 05/10/2022 0.2      Nitrite Urine 05/10/2022 Positive (A)     Leukocyte Esterase Urine 05/10/2022 Trace (A)     Bacteria Urine 05/10/2022 Moderate (A)     RBC Urine 05/10/2022 0-2      WBC Urine 05/10/2022 0-5      Squamous Epithelials Uri* 05/10/2022 Few (A)     Culture 05/10/2022 >100,000 CFU/mL Mixture of urogenital enoch          Total time spent for face to face visit, reviewing labs/imaging studies, counseling and coordination of care was: 30 Minutes spent on the date of the encounter doing chart review, review of outside records, review of test results, interpretation of tests, patient visit, documentation and discussion with family     This note was dictated using voice recognition software.  Any grammatical or context distortions are unintentional and inherent to the software.    Orders Placed This Encounter   Procedures     Hepatic function panel      New Prescriptions    No medications on file     Modified Medications    Modified Medication Previous Medication    MEMANTINE (NAMENDA) 10 MG TABLET memantine (NAMENDA) 10 MG tablet       TAKE 1 TABLET BY MOUTH TWICE A DAY    TAKE 1 TABLET BY MOUTH TWICE A DAY

## 2022-07-25 ENCOUNTER — TELEPHONE (OUTPATIENT)
Dept: FAMILY MEDICINE | Facility: CLINIC | Age: 87
End: 2022-07-25

## 2022-07-25 NOTE — TELEPHONE ENCOUNTER
Caller requesting verbal order to delay the PT start date to 8/4.  Patient and daughter request to allow patient to get settled into new facility  Please call  [facility]  [phone]  Ok to leave a message

## 2022-08-04 ENCOUNTER — TELEPHONE (OUTPATIENT)
Dept: FAMILY MEDICINE | Facility: CLINIC | Age: 87
End: 2022-08-04

## 2022-08-04 ENCOUNTER — MEDICAL CORRESPONDENCE (OUTPATIENT)
Dept: HEALTH INFORMATION MANAGEMENT | Facility: CLINIC | Age: 87
End: 2022-08-04

## 2022-08-04 NOTE — TELEPHONE ENCOUNTER
Reason for Call: Request for verbal orders    Order or referral being requested: 10 additional PT sessions for walking training and therapeutic exercise, and OT evaluation for assistive device training.    Date needed: as soon as possible    Has the patient been seen by the PCP for this problem? Not Applicable    Additional comments: Incoming call from Isaias from [facility] requesting verbal orders    Phone number Patient can be reached at:  [phone]     Best Time:  Anytime    Can we leave a detailed message on this number?  YES    Call taken on 8/4/2022 at 4:42 PM by Juana Us

## 2022-08-06 DIAGNOSIS — F33.42 MAJOR DEPRESSIVE DISORDER, RECURRENT EPISODE, IN FULL REMISSION (H): ICD-10-CM

## 2022-08-07 RX ORDER — FLUOXETINE 10 MG/1
CAPSULE ORAL
Qty: 270 CAPSULE | Refills: 2 | Status: SHIPPED | OUTPATIENT
Start: 2022-08-07 | End: 2022-10-06

## 2022-08-07 NOTE — TELEPHONE ENCOUNTER
"Routing refill request to provider for review/approval because:  phq 9    Last Written Prescription Date:  11/24/21  Last Fill Quantity: 270,  # refills: 2   Last office visit provider:  2/25/22     Requested Prescriptions   Pending Prescriptions Disp Refills     FLUoxetine (PROZAC) 10 MG capsule [Pharmacy Med Name: FLUOXETINE HCL 10 MG CAPSULE] 270 capsule 2     Sig: TAKE 3 CAPSULES BY MOUTH EVERY DAY       SSRIs Protocol Failed - 8/6/2022  9:06 AM        Failed - PHQ-9 score less than 5 in past 6 months     Please review last PHQ-9 score.           Passed - Medication is active on med list        Passed - Patient is age 18 or older        Passed - No active pregnancy on record        Passed - No positive pregnancy test in last 12 months        Passed - Recent (6 mo) or future (30 days) visit within the authorizing provider's specialty     Patient had office visit in the last 6 months or has a visit in the next 30 days with authorizing provider or within the authorizing provider's specialty.  See \"Patient Info\" tab in inbasket, or \"Choose Columns\" in Meds & Orders section of the refill encounter.                 Shirley Martino RN 08/06/22 10:10 PM  "

## 2022-08-11 ENCOUNTER — TELEPHONE (OUTPATIENT)
Dept: FAMILY MEDICINE | Facility: CLINIC | Age: 87
End: 2022-08-11

## 2022-08-11 NOTE — TELEPHONE ENCOUNTER
Reason for Call:  Other order    Detailed comments: Lolis OT from [facility] called today and would like verbal OT orders from Eva Koehler at [facility].  General OT for 1 x a week for 6 weeks.    Please contact Lolis back.  Thank you.    Phone Number Patient can be reached at: Other phone number:  [phone] Lolis OT*    Best Time: any    Can we leave a detailed message on this number? YES    Call taken on 8/11/2022 at 10:08 AM by Princess Hurley

## 2022-08-12 ENCOUNTER — VIRTUAL VISIT (OUTPATIENT)
Dept: FAMILY MEDICINE | Facility: CLINIC | Age: 87
End: 2022-08-12
Payer: MEDICARE

## 2022-08-12 ENCOUNTER — MEDICAL CORRESPONDENCE (OUTPATIENT)
Dept: HEALTH INFORMATION MANAGEMENT | Facility: CLINIC | Age: 87
End: 2022-08-12

## 2022-08-12 VITALS — DIASTOLIC BLOOD PRESSURE: 78 MMHG | SYSTOLIC BLOOD PRESSURE: 135 MMHG

## 2022-08-12 DIAGNOSIS — I10 ESSENTIAL HYPERTENSION: ICD-10-CM

## 2022-08-12 DIAGNOSIS — E03.9 HYPOTHYROIDISM, UNSPECIFIED TYPE: ICD-10-CM

## 2022-08-12 DIAGNOSIS — F33.42 MAJOR DEPRESSIVE DISORDER, RECURRENT EPISODE, IN FULL REMISSION (H): ICD-10-CM

## 2022-08-12 DIAGNOSIS — G60.8 POLYNEUROPATHY, PERIPHERAL SENSORIMOTOR AXONAL: Primary | ICD-10-CM

## 2022-08-12 DIAGNOSIS — D50.0 IRON DEFICIENCY ANEMIA DUE TO CHRONIC BLOOD LOSS: ICD-10-CM

## 2022-08-12 DIAGNOSIS — N39.0 RECURRENT UTI: ICD-10-CM

## 2022-08-12 PROCEDURE — 99214 OFFICE O/P EST MOD 30 MIN: CPT | Mod: 95 | Performed by: FAMILY MEDICINE

## 2022-08-12 ASSESSMENT — ANXIETY QUESTIONNAIRES
4. TROUBLE RELAXING: NOT AT ALL
7. FEELING AFRAID AS IF SOMETHING AWFUL MIGHT HAPPEN: NOT AT ALL
GAD7 TOTAL SCORE: 0
1. FEELING NERVOUS, ANXIOUS, OR ON EDGE: NOT AT ALL
GAD7 TOTAL SCORE: 0
5. BEING SO RESTLESS THAT IT IS HARD TO SIT STILL: NOT AT ALL
2. NOT BEING ABLE TO STOP OR CONTROL WORRYING: NOT AT ALL
6. BECOMING EASILY ANNOYED OR IRRITABLE: NOT AT ALL
3. WORRYING TOO MUCH ABOUT DIFFERENT THINGS: NOT AT ALL
GAD7 TOTAL SCORE: 0
7. FEELING AFRAID AS IF SOMETHING AWFUL MIGHT HAPPEN: NOT AT ALL

## 2022-08-12 ASSESSMENT — PATIENT HEALTH QUESTIONNAIRE - PHQ9
10. IF YOU CHECKED OFF ANY PROBLEMS, HOW DIFFICULT HAVE THESE PROBLEMS MADE IT FOR YOU TO DO YOUR WORK, TAKE CARE OF THINGS AT HOME, OR GET ALONG WITH OTHER PEOPLE: SOMEWHAT DIFFICULT
SUM OF ALL RESPONSES TO PHQ QUESTIONS 1-9: 2
SUM OF ALL RESPONSES TO PHQ QUESTIONS 1-9: 2

## 2022-08-12 NOTE — PROGRESS NOTES
Ellis is a 89 year old who is being evaluated via a billable video visit.      How would you like to obtain your AVS? MyChart  If the video visit is dropped, the invitation should be resent by:   Will anyone else be joining your video visit? Yes: daughter normal beside her. How would they like to receive their invitation?           Assessment & Plan     Polyneuropathy, peripheral sensorimotor axonal  -Patient has ongoing weakness as a result of her neuropathy that warrants ongoing physical therapy and Occupational Therapy for strength and balance training. Occupational Therapy is needed to help. Improve transfers and to teach how to use her new power wheelchair.  She is homebound and cannot leave without the assistance of a person or her wheelchair and it is required that patient have in-home services    Comprehensive metabolic panel (BMP + Alb, Alk Phos, ALT, AST, Total. Bili, TP); Future    Major depressive disorder, recurrent episode, in full remission (H)  Stable on current medication regimen at this time patient will continue take 150 mg of bupropion daily    Hypothyroidism, unspecified type  I recommend patient come in for repeat thyroid check at their convenience but otherwise we will continue levothyroxine 75 mcg daily  - TSH with free T4 reflex; Future    Iron deficiency anemia due to chronic blood loss  -Previous history of iron deficiency will recheck levels.  Patient is on ferrous sulfate 325 mg daily  - CBC with platelets; Future  - Vitamin B12; Future    Essential hypertension  -Blood pressure has been elevated in clinic but per her daughter normal they have been stable at home  - Comprehensive metabolic panel (BMP + Alb, Alk Phos, ALT, AST, Total. Bili, TP); Future    Recurrent UTI  -They would like her urine rechecked when she comes in.  We did discuss that we do not typically recommend screening for asymptomatic patients for treatment but rather if she is symptomatic that we should proceed with  further evaluation.  - UA Macro with Reflex to Micro and Culture - lab collect; Standing    Ordering of each unique test  Prescription drug management  20 minutes spent on the date of the encounter doing chart review, history and exam, documentation and further activities per the note            No follow-ups on file.    Whitney Velasquez MD  [facility]    Ashleigh Corral is a 89 year old, presenting for the following health issues:  No chief complaint on file.      HPI   [facility] They work with service for PT/OT. They need  Face to face visit for medicare    Mainly, she is getting PT twice a week to strengthen her legs and improve balance and walking. The OT once a week  to improve transfers and learn to use her power wheelchair.    Taisha does meds.   Rosalva helps shower Monday night.  Person comes in every night to help to bed and help with meals.     Hard to leave apartment because of cat      Mood still stable on the wellbutrin and fluoxetine.     Memory has been better since on Namenda. - scored 30/30 from the 20/30 1 year ago.     Doing tylenol and right leg still not cooperating well, walking with walker and brace -hasnt used since moved there because hadnt been doing exercises. OT supposed to work with it     130s-140s blood pressure there. Checks once a month.   Not sure if they do lab orders there.     Still taking 1 capsule in AM , 2 afternoon, and 1 in evening, no baclofen at noon.     Doing estrace cream twice a week and still getting recurrent frequency. Doesn't always have frequency.       Review of Systems    Review of Systems  Complete ROS reviewed in HPI or otherwise negative        Objective           Vitals:  No vitals were obtained today due to virtual visit.    Physical Exam   GENERAL: Healthy, alert and no distress  EYES: Eyes grossly normal to inspection.  No discharge or erythema, or obvious  scleral/conjunctival abnormalities.  RESP: No audible wheeze, cough, or visible cyanosis.  No visible retractions or increased work of breathing.    SKIN: Visible skin clear. No significant rash, abnormal pigmentation or lesions.  NEURO: Cranial nerves grossly intact.  Mentation and speech appropriate for age.  PSYCH: Mentation appears normal, affect normal/bright, judgement and insight intact, normal speech and appearance well-groomed.                 Video-Visit Details    Video Start Time: 12:10 PM    Type of service:  Video Visit    Video End Time:12:28 PM    Originating Location (pt. Location): Assisted Living    Distant Location (provider location):  [facility]     Platform used for Video Visit: [employer]  ..  Answers for HPI/ROS submitted by the patient on 8/12/2022  If you checked off any problems, how difficult have these problems made it for you to do your work, take care of things at home, or get along with other people?: Somewhat difficult  PHQ9 TOTAL SCORE: 2  GEORGINA 7 TOTAL SCORE: 0

## 2022-08-12 NOTE — Clinical Note
I finally finished this note do you mind faxing to her outside facility where she receives physical therapy?

## 2022-08-20 ENCOUNTER — LAB REQUISITION (OUTPATIENT)
Dept: LAB | Facility: CLINIC | Age: 87
End: 2022-08-20
Payer: MEDICARE

## 2022-08-20 DIAGNOSIS — M47.12 OTHER SPONDYLOSIS WITH MYELOPATHY, CERVICAL REGION: ICD-10-CM

## 2022-08-20 DIAGNOSIS — E03.9 HYPOTHYROIDISM, UNSPECIFIED: ICD-10-CM

## 2022-08-20 DIAGNOSIS — M81.8 OTHER OSTEOPOROSIS WITHOUT CURRENT PATHOLOGICAL FRACTURE: ICD-10-CM

## 2022-08-20 DIAGNOSIS — E27.8 OTHER SPECIFIED DISORDERS OF ADRENAL GLAND (H): ICD-10-CM

## 2022-08-20 DIAGNOSIS — F33.42 MAJOR DEPRESSIVE DISORDER, RECURRENT, IN FULL REMISSION (H): ICD-10-CM

## 2022-08-20 DIAGNOSIS — M48.00 SPINAL STENOSIS, SITE UNSPECIFIED: ICD-10-CM

## 2022-08-20 DIAGNOSIS — D62 ACUTE POSTHEMORRHAGIC ANEMIA: ICD-10-CM

## 2022-08-23 LAB
ALBUMIN SERPL BCG-MCNC: 3.8 G/DL (ref 3.5–5.2)
ALBUMIN SERPL BCG-MCNC: 3.8 G/DL (ref 3.5–5.2)
ALP SERPL-CCNC: 88 U/L (ref 35–104)
ALT SERPL W P-5'-P-CCNC: 40 U/L (ref 10–35)
ALT SERPL W P-5'-P-CCNC: 40 U/L (ref 10–35)
ANION GAP SERPL CALCULATED.3IONS-SCNC: 11 MMOL/L (ref 7–15)
ANION GAP SERPL CALCULATED.3IONS-SCNC: 11 MMOL/L (ref 7–15)
AST SERPL W P-5'-P-CCNC: 34 U/L (ref 10–35)
AST SERPL W P-5'-P-CCNC: 34 U/L (ref 10–35)
BILIRUB SERPL-MCNC: 0.3 MG/DL
BILIRUB SERPL-MCNC: 0.3 MG/DL
BUN SERPL-MCNC: 16.5 MG/DL (ref 8–23)
BUN SERPL-MCNC: 16.5 MG/DL (ref 8–23)
CALCIUM SERPL-MCNC: 9.1 MG/DL (ref 8.2–9.6)
CALCIUM SERPL-MCNC: 9.1 MG/DL (ref 8.2–9.6)
CHLORIDE SERPL-SCNC: 97 MMOL/L (ref 98–107)
CHLORIDE SERPL-SCNC: 97 MMOL/L (ref 98–107)
CREAT SERPL-MCNC: 0.7 MG/DL (ref 0.51–0.95)
CREAT SERPL-MCNC: 0.7 MG/DL (ref 0.51–0.95)
DEPRECATED HCO3 PLAS-SCNC: 27 MMOL/L (ref 22–29)
DEPRECATED HCO3 PLAS-SCNC: 27 MMOL/L (ref 22–29)
ERYTHROCYTE [DISTWIDTH] IN BLOOD BY AUTOMATED COUNT: 12.7 % (ref 10–15)
GFR SERPL CREATININE-BSD FRML MDRD: 81 ML/MIN/1.73M2
GFR SERPL CREATININE-BSD FRML MDRD: 81 ML/MIN/1.73M2
GLUCOSE SERPL-MCNC: 92 MG/DL (ref 70–99)
GLUCOSE SERPL-MCNC: 92 MG/DL (ref 70–99)
HCT VFR BLD AUTO: 40.8 % (ref 35–47)
HGB BLD-MCNC: 12.8 G/DL (ref 11.7–15.7)
MCH RBC QN AUTO: 32.1 PG (ref 26.5–33)
MCHC RBC AUTO-ENTMCNC: 31.4 G/DL (ref 31.5–36.5)
MCV RBC AUTO: 102 FL (ref 78–100)
PLATELET # BLD AUTO: 258 10E3/UL (ref 150–450)
POTASSIUM SERPL-SCNC: 3.8 MMOL/L (ref 3.4–5.3)
POTASSIUM SERPL-SCNC: 3.8 MMOL/L (ref 3.4–5.3)
PROT SERPL-MCNC: 6.2 G/DL (ref 6.4–8.3)
RBC # BLD AUTO: 3.99 10E6/UL (ref 3.8–5.2)
SODIUM SERPL-SCNC: 135 MMOL/L (ref 136–145)
SODIUM SERPL-SCNC: 135 MMOL/L (ref 136–145)
TSH SERPL DL<=0.005 MIU/L-ACNC: 1.9 UIU/ML (ref 0.3–4.2)
VIT B12 SERPL-MCNC: 602 PG/ML (ref 232–1245)
WBC # BLD AUTO: 6.2 10E3/UL (ref 4–11)

## 2022-08-23 PROCEDURE — P9604 ONE-WAY ALLOW PRORATED TRIP: HCPCS | Mod: ORL | Performed by: FAMILY MEDICINE

## 2022-08-23 PROCEDURE — 84450 TRANSFERASE (AST) (SGOT): CPT | Mod: ORL | Performed by: FAMILY MEDICINE

## 2022-08-23 PROCEDURE — 36415 COLL VENOUS BLD VENIPUNCTURE: CPT | Mod: ORL | Performed by: FAMILY MEDICINE

## 2022-08-23 PROCEDURE — 84443 ASSAY THYROID STIM HORMONE: CPT | Mod: ORL | Performed by: FAMILY MEDICINE

## 2022-08-23 PROCEDURE — 82607 VITAMIN B-12: CPT | Mod: ORL | Performed by: FAMILY MEDICINE

## 2022-08-23 PROCEDURE — 85027 COMPLETE CBC AUTOMATED: CPT | Mod: ORL | Performed by: FAMILY MEDICINE

## 2022-08-23 PROCEDURE — 80053 COMPREHEN METABOLIC PANEL: CPT | Mod: ORL | Performed by: FAMILY MEDICINE

## 2022-08-25 ENCOUNTER — DOCUMENTATION ONLY (OUTPATIENT)
Dept: OTHER | Facility: CLINIC | Age: 87
End: 2022-08-25

## 2022-08-27 ENCOUNTER — APPOINTMENT (OUTPATIENT)
Dept: CT IMAGING | Facility: HOSPITAL | Age: 87
End: 2022-08-27
Attending: EMERGENCY MEDICINE
Payer: MEDICARE

## 2022-08-27 ENCOUNTER — HOSPITAL ENCOUNTER (EMERGENCY)
Facility: HOSPITAL | Age: 87
Discharge: HOME | End: 2022-08-27
Attending: EMERGENCY MEDICINE | Admitting: EMERGENCY MEDICINE
Payer: MEDICARE

## 2022-08-27 VITALS
OXYGEN SATURATION: 98 % | RESPIRATION RATE: 16 BRPM | BODY MASS INDEX: 23.96 KG/M2 | TEMPERATURE: 99.8 F | HEART RATE: 84 BPM | SYSTOLIC BLOOD PRESSURE: 214 MMHG | DIASTOLIC BLOOD PRESSURE: 111 MMHG | WEIGHT: 144 LBS
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
## 2022-08-27 DIAGNOSIS — N30.00 ACUTE CYSTITIS WITHOUT HEMATURIA: ICD-10-CM

## 2022-08-27 DIAGNOSIS — S00.81XA ABRASION OF FACE, INITIAL ENCOUNTER: ICD-10-CM

## 2022-08-27 LAB
ALBUMIN UR-MCNC: NEGATIVE MG/DL
ANION GAP SERPL CALCULATED.3IONS-SCNC: 9 MMOL/L (ref 5–18)
APPEARANCE UR: ABNORMAL
APTT PPP: 26 SECONDS (ref 22–38)
BACTERIA #/AREA URNS HPF: ABNORMAL /HPF
BILIRUB UR QL STRIP: NEGATIVE
BUN SERPL-MCNC: 21 MG/DL (ref 8–28)
CALCIUM SERPL-MCNC: 9.1 MG/DL (ref 8.5–10.5)
CHLORIDE BLD-SCNC: 97 MMOL/L (ref 98–107)
CO2 SERPL-SCNC: 25 MMOL/L (ref 22–31)
COLOR UR AUTO: ABNORMAL
CREAT SERPL-MCNC: 0.85 MG/DL (ref 0.6–1.1)
ERYTHROCYTE [DISTWIDTH] IN BLOOD BY AUTOMATED COUNT: 12.4 % (ref 10–15)
GFR SERPL CREATININE-BSD FRML MDRD: 64 ML/MIN/1.73M2
GLUCOSE BLD-MCNC: 106 MG/DL (ref 70–125)
GLUCOSE UR STRIP-MCNC: NEGATIVE MG/DL
HCT VFR BLD AUTO: 41.8 % (ref 35–47)
HGB BLD-MCNC: 13.7 G/DL (ref 11.7–15.7)
HGB UR QL STRIP: NEGATIVE
HOLD SPECIMEN: NORMAL
HOLD SPECIMEN: NORMAL
INR PPP: 0.91 (ref 0.85–1.15)
KETONES UR STRIP-MCNC: NEGATIVE MG/DL
LEUKOCYTE ESTERASE UR QL STRIP: ABNORMAL
MCH RBC QN AUTO: 32.4 PG (ref 26.5–33)
MCHC RBC AUTO-ENTMCNC: 32.8 G/DL (ref 31.5–36.5)
MCV RBC AUTO: 99 FL (ref 78–100)
NITRATE UR QL: NEGATIVE
PH UR STRIP: 6 [PH] (ref 5–7)
PLATELET # BLD AUTO: 297 10E3/UL (ref 150–450)
POTASSIUM BLD-SCNC: 4.1 MMOL/L (ref 3.5–5)
RBC # BLD AUTO: 4.23 10E6/UL (ref 3.8–5.2)
RBC URINE: 1 /HPF
SODIUM SERPL-SCNC: 131 MMOL/L (ref 136–145)
SP GR UR STRIP: 1.01 (ref 1–1.03)
SQUAMOUS EPITHELIAL: 5 /HPF
UROBILINOGEN UR STRIP-MCNC: <2 MG/DL
WBC # BLD AUTO: 9 10E3/UL (ref 4–11)
WBC URINE: 22 /HPF

## 2022-08-27 PROCEDURE — 85610 PROTHROMBIN TIME: CPT | Mod: GZ | Performed by: EMERGENCY MEDICINE

## 2022-08-27 PROCEDURE — 258N000003 HC RX IP 258 OP 636: Performed by: EMERGENCY MEDICINE

## 2022-08-27 PROCEDURE — 80048 BASIC METABOLIC PNL TOTAL CA: CPT | Performed by: EMERGENCY MEDICINE

## 2022-08-27 PROCEDURE — G1010 CDSM STANSON: HCPCS

## 2022-08-27 PROCEDURE — 36415 COLL VENOUS BLD VENIPUNCTURE: CPT | Performed by: EMERGENCY MEDICINE

## 2022-08-27 PROCEDURE — 250N000013 HC RX MED GY IP 250 OP 250 PS 637: Performed by: EMERGENCY MEDICINE

## 2022-08-27 PROCEDURE — 87086 URINE CULTURE/COLONY COUNT: CPT | Performed by: EMERGENCY MEDICINE

## 2022-08-27 PROCEDURE — 99285 EMERGENCY DEPT VISIT HI MDM: CPT | Mod: 25

## 2022-08-27 PROCEDURE — 85730 THROMBOPLASTIN TIME PARTIAL: CPT | Mod: GZ | Performed by: EMERGENCY MEDICINE

## 2022-08-27 PROCEDURE — 81001 URINALYSIS AUTO W/SCOPE: CPT | Performed by: EMERGENCY MEDICINE

## 2022-08-27 PROCEDURE — 85014 HEMATOCRIT: CPT | Performed by: EMERGENCY MEDICINE

## 2022-08-27 RX ORDER — LOSARTAN POTASSIUM 50 MG/1
50 TABLET ORAL DAILY
Status: DISCONTINUED | OUTPATIENT
Start: 2022-08-28 | End: 2022-08-27

## 2022-08-27 RX ORDER — CEFDINIR 300 MG/1
300 CAPSULE ORAL ONCE
Status: COMPLETED | OUTPATIENT
Start: 2022-08-27 | End: 2022-08-27

## 2022-08-27 RX ORDER — CEFDINIR 300 MG/1
300 CAPSULE ORAL 2 TIMES DAILY
Qty: 20 CAPSULE | Refills: 0 | Status: ON HOLD | OUTPATIENT
Start: 2022-08-27 | End: 2022-09-01

## 2022-08-27 RX ORDER — LOSARTAN POTASSIUM 50 MG/1
50 TABLET ORAL ONCE
Status: COMPLETED | OUTPATIENT
Start: 2022-08-27 | End: 2022-08-27

## 2022-08-27 RX ORDER — CEPHALEXIN 500 MG/1
500 CAPSULE ORAL ONCE
Status: DISCONTINUED | OUTPATIENT
Start: 2022-08-27 | End: 2022-08-27

## 2022-08-27 RX ADMIN — CEFDINIR 300 MG: 300 CAPSULE ORAL at 21:22

## 2022-08-27 RX ADMIN — SODIUM CHLORIDE 500 ML: 9 INJECTION, SOLUTION INTRAVENOUS at 20:52

## 2022-08-27 RX ADMIN — LOSARTAN POTASSIUM 50 MG: 50 TABLET, FILM COATED ORAL at 21:47

## 2022-08-27 ASSESSMENT — ACTIVITIES OF DAILY LIVING (ADL)
ADLS_ACUITY_SCORE: 33
ADLS_ACUITY_SCORE: 35

## 2022-08-27 NOTE — ED TRIAGE NOTES
Pt reports that she fell at 2100 last night and since then she reports HA and L facial numbness that radiates into her throat. She is alert in triage. Family reports w/cbound. She has abrasion above L eyebrow.

## 2022-08-27 NOTE — ED PROVIDER NOTES
EMERGENCY DEPARTMENT ENCOUNTER      NAME: Ellis Em  AGE: 89 year old female  YOB: 1931  MRN: [id number]  EVALUATION DATE & TIME: No admission date for patient encounter.    PCP: Whitney Velasquez    ED PROVIDER: Bettina Bain M.D.      Chief Complaint   Patient presents with     Fall     FINAL IMPRESSION:  1. Acute cystitis without hematuria    2. Fall, initial encounter    3. Abrasion of face, initial encounter      ED COURSE & MEDICAL DECISION MAKING:    3:36 PM Met with and introduced myself to the patient. Discussed history and plan of care.  8:15 PM Updated patient on lab and imaging results. Discussed plan for discharge.    Pertinent Labs & Imaging studies reviewed. (See chart for details)  ED Course as of 08/27/22 2144   Sat Aug 27, 2022   1530 Patient is a 89-year-old female who comes in today for evaluation after a fall.  It sounds like she fell at 9 PM last night and then fell again at 6:00 this morning.  The episode last night she was walking across the room without her wheelchair which she normally uses and had a fall.  She hit her left side of the face.  Today sounds like she fell getting on the toilet and hit the left side of her face.  Family was concerned because she did not move out of the way like she typically does.  She complains of little bit of a headache and some numbness on the left side of her face.  She has no other focal deficits.  She is not having difficulty ambulating.  She was initially seen in triage and the plan is to get her down for CT imaging of the head to further evaluate.  We will check some basic labs as well since there may be some weakness.  Patient and family were in agreement with the plan.   1951 Lab work looks pretty good, we are still waiting on urine.  I will order a little fluid for her.  We are waiting on imaging to come back as well.   2013 Patient CT imaging came back unremarkable.  Labs look pretty good.  I will discussed this with the  patient and family and we should hopefully be able to get her discharged home.   2024 I just spoke with the patient and family.  She wants to try to go home tonight.  We will try to help facilitate that.  She did just give us a urine so we are going to see if she has any signs of a UTI and then we can send her back to her care facility.  She has some assistance and they can check on her every few hours if needed.   2033 Urine does appear infected.  I will order a dose of antibiotics here.   2141 Patient's blood pressure is elevated.  She missed her evening dose of her losartan so I ordered it for her.  I had talked to patient and family about going home tonight versus admission to the hospital and I think she do better at home.  She wants to go home.  We will call the facility and try to get her going shortly.       At the conclusion of the encounter I discussed  the results of all of the tests and the disposition with patient.   All questions were answered.  The patient acknowledged understanding and was involved in the decision making regarding the overall care plan.      I discussed with patient the utility, limitations and findings of the exam/interventions/studies done during this visit as well as the list of differential diagnosis and symptoms to monitor/return to ER for.  Additional verbal discharge instructions were provided.     MEDICATIONS GIVEN IN THE EMERGENCY:  Medications   losartan (COZAAR) tablet 50 mg (has no administration in time range)   0.9% sodium chloride BOLUS (0 mLs Intravenous Stopped 8/27/22 2121)   cefdinir (OMNICEF) capsule 300 mg (300 mg Oral Given 8/27/22 2122)       NEW PRESCRIPTIONS STARTED AT TODAY'S ER VISIT  New Prescriptions    CEFDINIR (OMNICEF) 300 MG CAPSULE    Take 1 capsule (300 mg) by mouth 2 times daily for 10 days       =================================================================    HPI    Triage Note:   Pt reports that she fell at 2100 last night and since then she  "reports HA and L facial numbness that radiates into her throat. She is alert in triage. Family reports w/cbound. She has abrasion above L eyebrow.     Patient information was obtained from: Patient, family    Use of : N/A       Ellis Em is a 89 year old female who presents to the ED for evaluation of a fall.    Per family, the patient fell yesterday at 9 PM and landed on her face. Reports she is usually in her wheelchair, so it was not normal for her to be up and waking. The patient fell on her face again at 6 AM today while she was on her way to the bathroom. Endorses generalized weakness. Denies any confusion in the patient since the fall.    The family reports a headache and left-sided facial numbness in the patient that radiates to her throat. States that this started last night. They notes a titanium bridge in the patient's neck.      Per patient: She is not on any blood thinning medication. Denies back pain or any other complaints at this time.      REVIEW OF SYSTEMS   Except as stated in the HPI all other systems reviewed and are negative.    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, salivary gland     Cancer of skin     \"half dollar size area removed from scalp\", precancer in abd (gallbladder & 1/2 of stomach removed\"     Carpal tunnel syndrome      Cervical spondylosis with myelopathy 4/25/2013     Chronic pain      Closed fracture of cervical vertebra (H) 10/21/2015     Replacement Utility updated for latest IMO load     Closed fracture of lumbar vertebra (H) 1/21/2015     Replacement Utility updated for latest IMO load     Cyst of spinal meninges      Dermatitis      Disorders of meninges, not elsewhere classified     Created by Conversion      Gastric ulcer 1983    precancerous     Hip fracture (H) 2007     History of adrenal adenoma 5/25/2021     History of " appendectomy     1943, 12 years old     History of fractured pelvis 1985     Hyponatremia      Hypothyroidism      Leg pain, bilateral     weakness bilaterally     Major depression      Miscarriage 1962    x2     Neck fracture (H) 09/10/2015     Nephrolithiasis      Osteoarthritis      Osteoporosis      Panic attack      Peripheral neuropathy      Reactive confusion      Renal insufficiency      Rib fracture 2008    x2, with pelvix fx     Shingles      Status post lumbar laminectomy 11/7/2014     UTI (urinary tract infection)      Varicella 7/14/2004    Overview:  LW Onset:  [id number] ; Varicella Zoster       PAST SURGICAL HISTORY:  Past Surgical History:   Procedure Laterality Date     ABDOMEN SURGERY  06/30/1983    1/2 of stomach organ removed due to precancerous ulcer     APPENDECTOMY  1943     CATARACT EXTRACTION, BILATERAL       CERVICAL LAMINOPLASTY Bilateral 5/2/2013    C1-3 cervical laminoplasty, removal of intracanaalicular extradural ventrally located cyst on 5/2/2013 by Dr. Corbin at [facility] for treatment of spinal cord compression and myelopathy     CHOLECYSTECTOMY  06/30/1983     COMBINED MAMMAPLASTY REVISION W/ ABDOMINOPLASTY  07/1998     DILATION AND CURETTAGE  09/19/1986     ENDOMETRIAL BIOPSY  09/09/1986     FRACTURE SURGERY  1985    Plate in Left hip; pins in then removed from right hip     JOINT REPLACEMENT      left total knee     LUMBAR LAMINECTOMY N/A 11/6/2014    Procedure: BILATERAL DECOMPRESSIVE LAMINECTOMY L1-L5 (REDO L4 L5);  Surgeon: Trice Corbin MD;  Location: [facility];  Service:      MASTECTOMY Bilateral      OTHER SURGICAL HISTORY      partial colectomy     OVARIAN CYST REMOVAL  1943    chocolate cysts removed     CA ARTHROPLASTY TIBIAL PLATEAU Left     Description: Knee Replacement;  Recorded: 05/23/2013;     REVISE RECONSTRUCTED BREAST Bilateral 11/11/1984    with implants, nipple attachment 1/13/1985     ROOT CANAL  09/14/1998     SALIVARY GLAND  SURGERY  12/05/1984    salivary gland and neck tumor removed     SKIN CANCER EXCISION  08/15/2003    ear     TOTAL HIP ARTHROPLASTY Bilateral     armature installed     TOTAL KNEE ARTHROPLASTY  10/20/2005     TUBAL LIGATION  1962     TUMOR EXCISION  05/01/2013    in spine     [facility] QUIGLEY W/O FACETEC FORAMOT/DSKC 1/2 VRT SEG, LUMBAR      Description: Laminectomy Decompressive Up To Two Lumbar Segments;  Recorded: 11/10/2014;       CURRENT MEDICATIONS:      Current Facility-Administered Medications:      losartan (COZAAR) tablet 50 mg, 50 mg, Oral, Once, Bettina Bain MD    Current Outpatient Medications:      cefdinir (OMNICEF) 300 MG capsule, Take 1 capsule (300 mg) by mouth 2 times daily for 10 days, Disp: 20 capsule, Rfl: 0     alpha lipoic acid 200 mg cap, [ALPHA LIPOIC ACID 200 MG CAP] Take 1 capsule by mouth 2 (two) times a day., Disp: , Rfl:      baclofen (LIORESAL) 10 MG tablet, TAKE 1 TABLET BY MOUTH THREE TIMES DAILY, Disp: 270 tablet, Rfl: 1     buPROPion (WELLBUTRIN SR) 150 MG 12 hr tablet, TAKE 1 TABLET BY MOUTH EVERY DAY, Disp: 90 tablet, Rfl: 2     cranberry fruit extract (CRANBERRY ORAL), [CRANBERRY FRUIT EXTRACT (CRANBERRY ORAL)] Take by mouth., Disp: , Rfl:      estradiol (ESTRACE) 0.1 MG/GM vaginal cream, , Disp: , Rfl:      ferrous sulfate (FEROSUL) 325 (65 Fe) MG tablet, TAKE 1 TABLET BY MOUTH EVERY DAY WITH BREAKFAST, Disp: 90 tablet, Rfl: 3     FLUoxetine (PROZAC) 10 MG capsule, TAKE 3 CAPSULES BY MOUTH EVERY DAY, Disp: 270 capsule, Rfl: 2     gabapentin (NEURONTIN) 100 MG capsule, TAKE 1 CAPSULE IN THE MORNING, 2 CAPSULES IN THE AFTERNOON AND 1 CAPSULE IN THE EVENING., Disp: 270 capsule, Rfl: 1     levothyroxine (SYNTHROID/LEVOTHROID) 75 MCG tablet, TAKE 1 TABLET BY MOUTH EVERY DAY, Disp: 90 tablet, Rfl: 3     losartan (COZAAR) 50 MG tablet, TAKE 1 TABLET BY MOUTH TWICE A DAY, Disp: 180 tablet, Rfl: 1     melatonin 10 mg Tab, [MELATONIN 10 MG TAB] Take 10 mg by mouth bedtime., Disp: ,  Rfl:      memantine (NAMENDA) 10 MG tablet, TAKE 1 TABLET BY MOUTH TWICE A DAY, Disp: 180 tablet, Rfl: 3     omega-3 fatty acids 500 mg cap, [OMEGA-3 FATTY ACIDS 500 MG CAP] Take by mouth., Disp: , Rfl:      STRONTIUM CHLORIDE HEXAHYDRATE (STRONTIUM CHL HEXAHYD, BULK,) 100 % Kailyn, [STRONTIUM CHLORIDE HEXAHYDRATE (STRONTIUM CHL HEXAHYD, BULK,) 100 % KAILYN] Use As Directed., Disp: , Rfl:      VIT C/E/ZN/COPPR/LUTEIN/ZEAXAN (PRESERVISION AREDS 2 ORAL), [VIT C/E/ZN/COPPR/LUTEIN/ZEAXAN (PRESERVISION AREDS 2 ORAL)] Take 1 tablet by mouth 2 (two) times a day. , Disp: , Rfl:     ALLERGIES:  Allergies   Allergen Reactions     Iodinated Contrast Media [Diagnostic X-Ray Materials] Hives     Adhesive [Mecrylate] Unknown     Adhesive tape     Amlodipine Unknown     drowsiness     Asparaginase Unknown     Atorvastatin Unknown     PN: LW Reaction: arthalgia     Ciprofloxacin Unknown     Codeine Unknown     Latex Unknown     PN: Converted from LW Latex Sensitivity Flag     Lisinopril Cough     Pravastatin Unknown     Propoxyphene N-Acetaminophen [Propoxyphene N-Apap] Unknown     PN: LW Reaction: hallucinations     Sulfa (Sulfonamide Antibiotics) [Sulfa Drugs] Unknown     Albuterol Unknown     Other reaction(s): Urinary Retention       FAMILY HISTORY:  Family History   Problem Relation Age of Onset     Cancer Mother      Kidney Disease Father      Alcoholism Brother      Diabetes Brother      Dementia Brother        SOCIAL HISTORY:   Social History     Socioeconomic History     Marital status: Single   Tobacco Use     Smoking status: Never Smoker     Smokeless tobacco: Never Used   Substance and Sexual Activity     Alcohol use: No     Drug use: No       PHYSICAL EXAM    VITAL SIGNS: BP (!) 214/111   Pulse 84   Temp 99.8  F (37.7  C) (Temporal)   Resp 16   Wt 65.3 kg (144 lb)   LMP  (LMP Unknown)   SpO2 98%   BMI 23.96 kg/m     GENERAL: Awake, Alert, answering questions, No acute distress, Well nourished  HEENT: Normal  cephalic, Atraumatic, bilateral external ears normal, No scleral icterus, mask in place  NECK: No obvious swelling or abnormality, No stridor. Tender.  PULMONARY:Normal and symmetric breath sounds, No respiratory distress, Lungs clear to auscultation bilaterally. No wheezing  CARDIOVASCULAR: Regular rate and rhythm, Distal pulses present and normal.  ABDOMINAL: Soft, Nondistended, Nontender, No flank tenderness, No palpable masses  BACK: No tenderness.  EXTREMITIES: Moves all extremities spontaneously, warm, no edema, No major deformities  NEURO: No facial droop, normal motor function, Normal speech. Decreased sensation to left lateral face around ear. Equal  strength. Equal sensation in extremities.   PSYCH: Normal mood and affect  SKIN: No rashes on visualized skin, dry, warm. Abrasion to left face and cheek.      LAB:  All pertinent labs reviewed and interpreted.  Results for orders placed or performed during the hospital encounter of 08/27/22   Head CT w/o contrast    Impression    IMPRESSION:  1.  No acute intracranial process.   Cervical spine CT w/o contrast    Impression    IMPRESSION:  1.  No fracture or posttraumatic subluxation.  2.  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   CBC (+ platelets, no diff)   Result Value Ref Range    WBC Count 9.0 4.0 - 11.0 10e3/uL    RBC Count 4.23 3.80 - 5.20 10e6/uL    Hemoglobin 13.7 11.7 - 15.7 g/dL    Hematocrit 41.8 35.0 - 47.0 %    MCV 99 78 - 100 fL    MCH 32.4 26.5 - 33.0 pg    MCHC 32.8 31.5 - 36.5 g/dL    RDW 12.4 10.0 - 15.0 %    Platelet Count 297 150 - 450 10e3/uL   Basic metabolic panel   Result Value Ref Range    Sodium 131 (L) 136 - 145 mmol/L    Potassium 4.1 3.5 - 5.0 mmol/L    Chloride 97 (L) 98 - 107 mmol/L    Carbon Dioxide (CO2) 25 22 - 31 mmol/L    Anion Gap 9 5 - 18 mmol/L    Urea Nitrogen 21 8 - 28 mg/dL    Creatinine 0.85 0.60 - 1.10 mg/dL    Calcium 9.1 8.5 - 10.5 mg/dL    Glucose 106 70 - 125 mg/dL    GFR Estimate 64 >60  mL/min/1.73m2   Result Value Ref Range    INR 0.91 0.85 - 1.15   Result Value Ref Range    aPTT 26 22 - 38 Seconds   Extra Red Top Tube   Result Value Ref Range    Hold Specimen JIC    Extra Green Top (Lithium Heparin) Tube   Result Value Ref Range    Hold Specimen JIC    UA with Microscopic reflex to Culture    Specimen: Urine, Clean Catch   Result Value Ref Range    Color Urine Light Yellow Colorless, Straw, Light Yellow, Yellow    Appearance Urine Turbid (A) Clear    Glucose Urine Negative Negative mg/dL    Bilirubin Urine Negative Negative    Ketones Urine Negative Negative mg/dL    Specific Gravity Urine 1.015 1.001 - 1.030    Blood Urine Negative Negative    pH Urine 6.0 5.0 - 7.0    Protein Albumin Urine Negative Negative mg/dL    Urobilinogen Urine <2.0 <2.0 mg/dL    Nitrite Urine Negative Negative    Leukocyte Esterase Urine 250 Nannette/uL (A) Negative    Bacteria Urine Moderate (A) None Seen /HPF    RBC Urine 1 <=2 /HPF    WBC Urine 22 (H) <=5 /HPF    Squamous Epithelials Urine 5 (H) <=1 /HPF       RADIOLOGY:  Cervical spine CT w/o contrast   Final Result   IMPRESSION:   1.  No fracture or posttraumatic subluxation.   2.  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  No acute intracranial process.          I, Misti Pak, am serving as a scribe to document services personally performed by Dr. Bain based on my observation and the provider's statements to me. I, Bettina Bain MD attest that Misti Pak is acting in a scribe capacity, has observed my performance of the services and has documented them in accordance with my direction.    Bettina Bain M.D.  Emergency Medicine  [facility] EMERGENCY DEPARTMENT  [address] [facility] [facility] [address]  [phone]  Dept: [phone]     Bettina Bain MD  08/27/22 4777

## 2022-08-27 NOTE — ED NOTES
Expected Patient Referral to ED  3:04 PM    Referring Clinic/Provider:  Care facility    Reason for referral/Clinical facts:  Multiple falls, reported difficulty swallowing, facial numbness.  Fall x 2, struck head x 2.  Initial fall 0600.  Patient does not know why she is falling.  Facial numbness per patient occurring after fall.      Recommendations provided:  Send to ED for further evaluation    Caller was informed that this institution does possess the capabilities and/or resources to provide for patient and should be transferred to our facility.    Discussed that if direct admit is sought and any hurdles are encountered, this ED would be happy to see the patient and evaluate.    Informed caller that recommendations provided are recommendations based only on the facts provided and that they responsible to accept or reject the advice, or to seek a formal in person consultation as needed and that this ED will see/treat patient should they arrive.      Juan Cristobal MD  [facility] EMERGENCY DEPARTMENT  [address] [place] [id number]  [phone]       Juan Cristobal MD  08/27/22 3314

## 2022-08-28 NOTE — DISCHARGE INSTRUCTIONS
You were seen in the Emergency Department today for evaluation of a fall.  Your lab work showed possible UTI.  Your imaging studies showed no broken bones.   You were prescribed cefdinir twice a day for 10 days. You should take all medications as prescribed.  Follow up with your primary care physician to ensure resolution of symptoms. Return if you have new or worsening symptoms.

## 2022-08-29 ENCOUNTER — APPOINTMENT (OUTPATIENT)
Dept: RADIOLOGY | Facility: CLINIC | Age: 87
DRG: 065 | End: 2022-08-29
Payer: MEDICARE

## 2022-08-29 ENCOUNTER — TELEPHONE (OUTPATIENT)
Dept: FAMILY MEDICINE | Facility: CLINIC | Age: 87
End: 2022-08-29

## 2022-08-29 ENCOUNTER — TRANSFERRED RECORDS (OUTPATIENT)
Dept: HEALTH INFORMATION MANAGEMENT | Facility: CLINIC | Age: 87
End: 2022-08-29

## 2022-08-29 ENCOUNTER — APPOINTMENT (OUTPATIENT)
Dept: CT IMAGING | Facility: CLINIC | Age: 87
DRG: 065 | End: 2022-08-29
Payer: MEDICARE

## 2022-08-29 ENCOUNTER — HOSPITAL ENCOUNTER (INPATIENT)
Facility: CLINIC | Age: 87
LOS: 3 days | Discharge: HOME | DRG: 065 | End: 2022-09-01
Attending: EMERGENCY MEDICINE | Admitting: INTERNAL MEDICINE
Payer: MEDICARE

## 2022-08-29 DIAGNOSIS — I63.81 THALAMIC INFARCT, ACUTE (H): ICD-10-CM

## 2022-08-29 DIAGNOSIS — R06.83 SNORING: Primary | ICD-10-CM

## 2022-08-29 DIAGNOSIS — I63.81 THALAMIC STROKE (H): ICD-10-CM

## 2022-08-29 DIAGNOSIS — I10 ESSENTIAL HYPERTENSION: ICD-10-CM

## 2022-08-29 DIAGNOSIS — R53.1 LEFT-SIDED WEAKNESS: Primary | ICD-10-CM

## 2022-08-29 DIAGNOSIS — R20.0 LIP NUMBNESS: ICD-10-CM

## 2022-08-29 DIAGNOSIS — K59.00 CONSTIPATION, UNSPECIFIED CONSTIPATION TYPE: ICD-10-CM

## 2022-08-29 DIAGNOSIS — I10 HYPERTENSION, UNSPECIFIED TYPE: ICD-10-CM

## 2022-08-29 LAB
ALBUMIN SERPL-MCNC: 3.3 G/DL (ref 3.5–5)
ALBUMIN UR-MCNC: NEGATIVE MG/DL
ALP SERPL-CCNC: 95 U/L (ref 45–120)
ALT SERPL W P-5'-P-CCNC: 41 U/L (ref 0–45)
ANION GAP SERPL CALCULATED.3IONS-SCNC: 7 MMOL/L (ref 5–18)
APPEARANCE UR: CLEAR
AST SERPL W P-5'-P-CCNC: 32 U/L (ref 0–40)
ATRIAL RATE - MUSE: 71 BPM
BACTERIA #/AREA URNS HPF: ABNORMAL /HPF
BACTERIA UR CULT: ABNORMAL
BASOPHILS # BLD AUTO: 0.1 10E3/UL (ref 0–0.2)
BASOPHILS NFR BLD AUTO: 1 %
BILIRUB SERPL-MCNC: 0.3 MG/DL (ref 0–1)
BILIRUB UR QL STRIP: NEGATIVE
BUN SERPL-MCNC: 18 MG/DL (ref 8–28)
C REACTIVE PROTEIN LHE: 0.2 MG/DL (ref 0–?)
CALCIUM SERPL-MCNC: 9.1 MG/DL (ref 8.5–10.5)
CHLORIDE BLD-SCNC: 99 MMOL/L (ref 98–107)
CO2 SERPL-SCNC: 27 MMOL/L (ref 22–31)
COLOR UR AUTO: COLORLESS
CREAT SERPL-MCNC: 0.63 MG/DL (ref 0.6–1.1)
DIASTOLIC BLOOD PRESSURE - MUSE: NORMAL MMHG
EOSINOPHIL # BLD AUTO: 0.2 10E3/UL (ref 0–0.7)
EOSINOPHIL NFR BLD AUTO: 2 %
ERYTHROCYTE [DISTWIDTH] IN BLOOD BY AUTOMATED COUNT: 12.7 % (ref 10–15)
GFR SERPL CREATININE-BSD FRML MDRD: 83 ML/MIN/1.73M2
GLUCOSE BLD-MCNC: 96 MG/DL (ref 70–125)
GLUCOSE UR STRIP-MCNC: NEGATIVE MG/DL
HCT VFR BLD AUTO: 39.2 % (ref 35–47)
HGB BLD-MCNC: 12.7 G/DL (ref 11.7–15.7)
HGB UR QL STRIP: ABNORMAL
IMM GRANULOCYTES # BLD: 0.1 10E3/UL
IMM GRANULOCYTES NFR BLD: 1 %
INTERPRETATION ECG - MUSE: NORMAL
KETONES UR STRIP-MCNC: NEGATIVE MG/DL
LEUKOCYTE ESTERASE UR QL STRIP: NEGATIVE
LYMPHOCYTES # BLD AUTO: 1.2 10E3/UL (ref 0.8–5.3)
LYMPHOCYTES NFR BLD AUTO: 15 %
MCH RBC QN AUTO: 31.9 PG (ref 26.5–33)
MCHC RBC AUTO-ENTMCNC: 32.4 G/DL (ref 31.5–36.5)
MCV RBC AUTO: 99 FL (ref 78–100)
MONOCYTES # BLD AUTO: 0.7 10E3/UL (ref 0–1.3)
MONOCYTES NFR BLD AUTO: 9 %
NEUTROPHILS # BLD AUTO: 5.7 10E3/UL (ref 1.6–8.3)
NEUTROPHILS NFR BLD AUTO: 72 %
NITRATE UR QL: POSITIVE
NRBC # BLD AUTO: 0 10E3/UL
NRBC BLD AUTO-RTO: 0 /100
P AXIS - MUSE: 50 DEGREES
PH UR STRIP: 6.5 [PH] (ref 5–7)
PLATELET # BLD AUTO: 282 10E3/UL (ref 150–450)
POTASSIUM BLD-SCNC: 4.3 MMOL/L (ref 3.5–5)
PR INTERVAL - MUSE: 152 MS
PROT SERPL-MCNC: 6.6 G/DL (ref 6–8)
QRS DURATION - MUSE: 84 MS
QT - MUSE: 394 MS
QTC - MUSE: 428 MS
R AXIS - MUSE: -7 DEGREES
RBC # BLD AUTO: 3.98 10E6/UL (ref 3.8–5.2)
RBC URINE: 1 /HPF
SARS-COV-2 RNA RESP QL NAA+PROBE: NEGATIVE
SODIUM SERPL-SCNC: 133 MMOL/L (ref 136–145)
SP GR UR STRIP: 1.01 (ref 1–1.03)
SQUAMOUS EPITHELIAL: <1 /HPF
SYSTOLIC BLOOD PRESSURE - MUSE: NORMAL MMHG
T AXIS - MUSE: 57 DEGREES
TROPONIN I SERPL-MCNC: <0.01 NG/ML (ref 0–0.29)
UROBILINOGEN UR STRIP-MCNC: <2 MG/DL
VENTRICULAR RATE- MUSE: 71 BPM
WBC # BLD AUTO: 7.9 10E3/UL (ref 4–11)
WBC URINE: <1 /HPF

## 2022-08-29 PROCEDURE — 36415 COLL VENOUS BLD VENIPUNCTURE: CPT | Performed by: PHYSICIAN ASSISTANT

## 2022-08-29 PROCEDURE — 250N000009 HC RX 250: Performed by: EMERGENCY MEDICINE

## 2022-08-29 PROCEDURE — 82040 ASSAY OF SERUM ALBUMIN: CPT | Performed by: PHYSICIAN ASSISTANT

## 2022-08-29 PROCEDURE — 80053 COMPREHEN METABOLIC PANEL: CPT | Performed by: PHYSICIAN ASSISTANT

## 2022-08-29 PROCEDURE — 85025 COMPLETE CBC W/AUTO DIFF WBC: CPT | Performed by: PHYSICIAN ASSISTANT

## 2022-08-29 PROCEDURE — 250N000011 HC RX IP 250 OP 636: Performed by: INTERNAL MEDICINE

## 2022-08-29 PROCEDURE — 84443 ASSAY THYROID STIM HORMONE: CPT | Performed by: INTERNAL MEDICINE

## 2022-08-29 PROCEDURE — 71046 X-RAY EXAM CHEST 2 VIEWS: CPT

## 2022-08-29 PROCEDURE — 84484 ASSAY OF TROPONIN QUANT: CPT | Performed by: INTERNAL MEDICINE

## 2022-08-29 PROCEDURE — 96361 HYDRATE IV INFUSION ADD-ON: CPT

## 2022-08-29 PROCEDURE — 81001 URINALYSIS AUTO W/SCOPE: CPT | Performed by: PHYSICIAN ASSISTANT

## 2022-08-29 PROCEDURE — 93005 ELECTROCARDIOGRAM TRACING: CPT | Performed by: PHYSICIAN ASSISTANT

## 2022-08-29 PROCEDURE — 87086 URINE CULTURE/COLONY COUNT: CPT | Performed by: PHYSICIAN ASSISTANT

## 2022-08-29 PROCEDURE — 250N000013 HC RX MED GY IP 250 OP 250 PS 637: Performed by: INTERNAL MEDICINE

## 2022-08-29 PROCEDURE — 250N000013 HC RX MED GY IP 250 OP 250 PS 637: Performed by: EMERGENCY MEDICINE

## 2022-08-29 PROCEDURE — 36415 COLL VENOUS BLD VENIPUNCTURE: CPT | Performed by: INTERNAL MEDICINE

## 2022-08-29 PROCEDURE — U0003 INFECTIOUS AGENT DETECTION BY NUCLEIC ACID (DNA OR RNA); SEVERE ACUTE RESPIRATORY SYNDROME CORONAVIRUS 2 (SARS-COV-2) (CORONAVIRUS DISEASE [COVID-19]), AMPLIFIED PROBE TECHNIQUE, MAKING USE OF HIGH THROUGHPUT TECHNOLOGIES AS DESCRIBED BY CMS-2020-01-R: HCPCS | Performed by: EMERGENCY MEDICINE

## 2022-08-29 PROCEDURE — 258N000003 HC RX IP 258 OP 636: Performed by: PHYSICIAN ASSISTANT

## 2022-08-29 PROCEDURE — 86140 C-REACTIVE PROTEIN: CPT | Performed by: PHYSICIAN ASSISTANT

## 2022-08-29 PROCEDURE — 93005 ELECTROCARDIOGRAM TRACING: CPT

## 2022-08-29 PROCEDURE — 99285 EMERGENCY DEPT VISIT HI MDM: CPT | Mod: 25

## 2022-08-29 PROCEDURE — 210N000002 HC R&B HEART CARE

## 2022-08-29 PROCEDURE — 82607 VITAMIN B-12: CPT | Performed by: INTERNAL MEDICINE

## 2022-08-29 PROCEDURE — 84484 ASSAY OF TROPONIN QUANT: CPT | Performed by: PHYSICIAN ASSISTANT

## 2022-08-29 PROCEDURE — 99223 1ST HOSP IP/OBS HIGH 75: CPT | Performed by: INTERNAL MEDICINE

## 2022-08-29 PROCEDURE — 83036 HEMOGLOBIN GLYCOSYLATED A1C: CPT | Performed by: INTERNAL MEDICINE

## 2022-08-29 PROCEDURE — C9803 HOPD COVID-19 SPEC COLLECT: HCPCS

## 2022-08-29 PROCEDURE — G1010 CDSM STANSON: HCPCS

## 2022-08-29 RX ORDER — GABAPENTIN 100 MG/1
100 CAPSULE ORAL 2 TIMES DAILY
Status: DISCONTINUED | OUTPATIENT
Start: 2022-08-29 | End: 2022-09-01 | Stop reason: HOSPADM

## 2022-08-29 RX ORDER — BACLOFEN 10 MG/1
10 TABLET ORAL 2 TIMES DAILY
Status: DISCONTINUED | OUTPATIENT
Start: 2022-08-29 | End: 2022-09-01 | Stop reason: HOSPADM

## 2022-08-29 RX ORDER — ONDANSETRON 4 MG/1
4 TABLET, ORALLY DISINTEGRATING ORAL EVERY 6 HOURS PRN
Status: DISCONTINUED | OUTPATIENT
Start: 2022-08-29 | End: 2022-09-01 | Stop reason: HOSPADM

## 2022-08-29 RX ORDER — BUPROPION HYDROCHLORIDE 150 MG/1
150 TABLET, EXTENDED RELEASE ORAL DAILY
Status: DISCONTINUED | OUTPATIENT
Start: 2022-08-30 | End: 2022-09-01 | Stop reason: HOSPADM

## 2022-08-29 RX ORDER — GABAPENTIN 100 MG/1
200 CAPSULE ORAL DAILY
Status: DISCONTINUED | OUTPATIENT
Start: 2022-08-30 | End: 2022-09-01 | Stop reason: HOSPADM

## 2022-08-29 RX ORDER — LIDOCAINE 40 MG/G
CREAM TOPICAL
Status: DISCONTINUED | OUTPATIENT
Start: 2022-08-29 | End: 2022-09-01 | Stop reason: HOSPADM

## 2022-08-29 RX ORDER — ASPIRIN 81 MG/1
81 TABLET ORAL DAILY
Status: DISCONTINUED | OUTPATIENT
Start: 2022-08-29 | End: 2022-09-01 | Stop reason: HOSPADM

## 2022-08-29 RX ORDER — HEPARIN SODIUM 5000 [USP'U]/.5ML
5000 INJECTION, SOLUTION INTRAVENOUS; SUBCUTANEOUS EVERY 12 HOURS
Status: DISCONTINUED | OUTPATIENT
Start: 2022-08-29 | End: 2022-09-01 | Stop reason: HOSPADM

## 2022-08-29 RX ORDER — MEMANTINE HYDROCHLORIDE 5 MG/1
10 TABLET ORAL 2 TIMES DAILY
Status: DISCONTINUED | OUTPATIENT
Start: 2022-08-29 | End: 2022-09-01 | Stop reason: HOSPADM

## 2022-08-29 RX ORDER — GINSENG 100 MG
CAPSULE ORAL ONCE
Status: COMPLETED | OUTPATIENT
Start: 2022-08-29 | End: 2022-08-29

## 2022-08-29 RX ORDER — HYDRALAZINE HYDROCHLORIDE 20 MG/ML
10-20 INJECTION INTRAMUSCULAR; INTRAVENOUS
Status: DISCONTINUED | OUTPATIENT
Start: 2022-08-29 | End: 2022-09-01 | Stop reason: HOSPADM

## 2022-08-29 RX ORDER — ASPIRIN 81 MG/1
81 TABLET, CHEWABLE ORAL DAILY
Status: DISCONTINUED | OUTPATIENT
Start: 2022-08-29 | End: 2022-09-01 | Stop reason: HOSPADM

## 2022-08-29 RX ORDER — LEVOTHYROXINE SODIUM 75 UG/1
75 TABLET ORAL DAILY
Status: DISCONTINUED | OUTPATIENT
Start: 2022-08-30 | End: 2022-09-01 | Stop reason: HOSPADM

## 2022-08-29 RX ORDER — FERROUS SULFATE 325(65) MG
325 TABLET ORAL DAILY
Status: DISCONTINUED | OUTPATIENT
Start: 2022-08-30 | End: 2022-09-01 | Stop reason: HOSPADM

## 2022-08-29 RX ORDER — LOSARTAN POTASSIUM 25 MG/1
50 TABLET ORAL 2 TIMES DAILY
Status: DISCONTINUED | OUTPATIENT
Start: 2022-08-29 | End: 2022-08-31

## 2022-08-29 RX ORDER — ACETAMINOPHEN 325 MG/1
650 TABLET ORAL ONCE
Status: COMPLETED | OUTPATIENT
Start: 2022-08-29 | End: 2022-08-29

## 2022-08-29 RX ORDER — ACETAMINOPHEN 500 MG
1000 TABLET ORAL EVERY 6 HOURS
Status: DISCONTINUED | OUTPATIENT
Start: 2022-08-29 | End: 2022-09-01 | Stop reason: HOSPADM

## 2022-08-29 RX ORDER — SODIUM CHLORIDE 9 MG/ML
INJECTION, SOLUTION INTRAVENOUS CONTINUOUS PRN
Status: DISCONTINUED | OUTPATIENT
Start: 2022-08-29 | End: 2022-09-01 | Stop reason: HOSPADM

## 2022-08-29 RX ORDER — CEFDINIR 300 MG/1
300 CAPSULE ORAL 2 TIMES DAILY
Status: DISCONTINUED | OUTPATIENT
Start: 2022-08-29 | End: 2022-08-30

## 2022-08-29 RX ORDER — LABETALOL HYDROCHLORIDE 5 MG/ML
10-20 INJECTION, SOLUTION INTRAVENOUS EVERY 10 MIN PRN
Status: DISCONTINUED | OUTPATIENT
Start: 2022-08-29 | End: 2022-09-01 | Stop reason: HOSPADM

## 2022-08-29 RX ORDER — ONDANSETRON 2 MG/ML
4 INJECTION INTRAMUSCULAR; INTRAVENOUS EVERY 6 HOURS PRN
Status: DISCONTINUED | OUTPATIENT
Start: 2022-08-29 | End: 2022-09-01 | Stop reason: HOSPADM

## 2022-08-29 RX ORDER — ACETAMINOPHEN 500 MG
1000 TABLET ORAL 3 TIMES DAILY
COMMUNITY
End: 2022-12-09

## 2022-08-29 RX ORDER — ASPIRIN 325 MG
325 TABLET ORAL ONCE
Status: COMPLETED | OUTPATIENT
Start: 2022-08-29 | End: 2022-08-29

## 2022-08-29 RX ORDER — LOSARTAN POTASSIUM 25 MG/1
50 TABLET ORAL ONCE
Status: COMPLETED | OUTPATIENT
Start: 2022-08-29 | End: 2022-08-29

## 2022-08-29 RX ADMIN — BACITRACIN: 500 OINTMENT TOPICAL at 20:24

## 2022-08-29 RX ADMIN — SODIUM CHLORIDE 1000 ML: 9 INJECTION, SOLUTION INTRAVENOUS at 15:42

## 2022-08-29 RX ADMIN — LOSARTAN POTASSIUM 50 MG: 25 TABLET, FILM COATED ORAL at 18:47

## 2022-08-29 RX ADMIN — BACLOFEN 10 MG: 10 TABLET ORAL at 22:53

## 2022-08-29 RX ADMIN — ASPIRIN 81 MG: 81 TABLET, COATED ORAL at 22:53

## 2022-08-29 RX ADMIN — MEMANTINE 10 MG: 10 TABLET ORAL at 22:54

## 2022-08-29 RX ADMIN — LOSARTAN POTASSIUM 50 MG: 25 TABLET, FILM COATED ORAL at 23:44

## 2022-08-29 RX ADMIN — ACETAMINOPHEN 650 MG: 325 TABLET ORAL at 18:47

## 2022-08-29 RX ADMIN — ACETAMINOPHEN 1000 MG: 500 TABLET ORAL at 22:52

## 2022-08-29 RX ADMIN — GABAPENTIN 100 MG: 100 CAPSULE ORAL at 22:53

## 2022-08-29 RX ADMIN — ASPIRIN 325 MG ORAL TABLET 325 MG: 325 PILL ORAL at 18:46

## 2022-08-29 RX ADMIN — CEFDINIR 300 MG: 300 CAPSULE ORAL at 22:53

## 2022-08-29 RX ADMIN — HEPARIN SODIUM 5000 UNITS: 5000 INJECTION, SOLUTION INTRAVENOUS; SUBCUTANEOUS at 22:49

## 2022-08-29 ASSESSMENT — ACTIVITIES OF DAILY LIVING (ADL)
ADLS_ACUITY_SCORE: 35

## 2022-08-29 NOTE — ED PROVIDER NOTES
"ED Provider In Triage Note  [facility]  Encounter Date: Aug 29, 2022    Chief Complaint   Patient presents with     Numbness     Generalized Weakness     Nausea       Brief HPI:   Ellis Em is a 89 year old female presenting to the Emergency Department with a chief complaint of continued headache status post fall and head injury from 3 days ago.  Original CT scans were negative however headache has persisted.  Because of persistent headache generalized weakness, she presented for assessment.    Patient did have an outpatient MRI prior to arrival however no results are reported at this time.  This was performed at [facility].    Brief Physical Exam:  BP (!) 147/80   Pulse 76   Temp 98.8  F (37.1  C) (Oral)   Resp 16   Ht 1.651 m (5' 5\")   Wt 65.8 kg (145 lb)   LMP  (LMP Unknown)   SpO2 96%   BMI 24.13 kg/m    General: Non-toxic appearing  HEENT: Atraumatic  Resp: No respiratory distress  Abdomen: Non-peritoneal  Neuro: Alert, oriented, answers questions appropriately  Psych: Behavior appropriate      Plan Initiated in Triage:  Orders Placed This Encounter     Head CT w/o contrast     Chest XR,  PA & LAT     Comprehensive metabolic panel     Troponin I     CRP inflammation     UA with Microscopic reflex to Culture     0.9% sodium chloride BOLUS       PIT Dispo:   Return to [facility] while awaiting workup and ED bed availability      Was notified by radiology service that as an outpatient the rayus MRI of the brain is positive for acute thalamic stroke.  Based on the patient's recent falls and description of progressive weakness that has been going on for days, no stroke code initiated.  This information will be passed on to the admitting physician, and appropriate care will be taken as needed.    Yan Solo PA-C on 8/29/2022 at 3:20 PM    Patient was evaluated by the Physician in Triage due to a limitation of available rooms in the Emergency Department. A plan of care was discussed " based on the information obtained on the initial evaluation and patient was consuled to return back to the Emergency Department lobby after this initial evalutaiton until results were obtained or a room became available in the Emergency Department. Patient was counseled not to leave prior to receiving the results of their workup.     Yan Solo PA-C  [facility] EMERGENCY ROOM  [address] [place] [id number]  [phone]       Yan Solo PA-C  08/29/22 1521       Yan Solo PA-C  08/29/22 1542

## 2022-08-29 NOTE — ED PROVIDER NOTES
EMERGENCY DEPARTMENT ENCOUNTER      NAME: Ellis Em  AGE: 89 year old female  YOB: 1931  MRN: [id number]  EVALUATION DATE & TIME: 8/29/2022  3:22 PM    PCP: Whitney Velasquez    ED PROVIDER: Serjio Pride M.D.      Chief Complaint   Patient presents with     Numbness     Generalized Weakness     Nausea         FINAL IMPRESSION:  1. Thalamic infarct, acute (H)    2. Lip numbness    3. Hypertension, unspecified type          ED COURSE & MEDICAL DECISION MAKING:    Pertinent Labs & Imaging studies reviewed. (See chart for details)  89 year old female presents to the Emergency Department for evaluation of lip numbness, weakness. Patient appears non toxic with stable vitals signs, patient is afebrile with no tachycardia or hypoxia, no increased work of breathing.  Lungs are clear and abdomen is benign.  Patient has a GCS of 15, does endorse some numbness to her upper lip, otherwise benign neurologic exam with no gross focal deficits.  She is not altered or confused.  Review the medical record shows recent diagnosis of UTI and started on cefdinir.  We did obtain screening labs from triage which showed no acute concerning findings.  CT and chest x-ray imaging obtained from triage showed no acute concerning findings.  MRI imaging of the brain with and without contrast performed at outside facility, [facility], reports acute right thalamic lacunar infarct but no intracranial hemorrhage or intracranial mass.  Did discuss patient case with neurology.  At this time they recommend admission for observation or discharge and outpatient neurological evaluation, essentially neurology recommended discharge but only if patient and family were comfortable.  Repeat exam is benign I discussed these findings recommendations and at this time patient and family request admission which I feel is reasonable, patient is in assisted living and has no one to help care for her at home.  Therefore, patient will be  admitted for continued observation and neurological consultation.  Discussed these findings with the hospitalist who agrees with care plan.  Also reported on the MRI was unchanged fluid signal and numerous left mastoid air cells within the left tympanic cavity, these are nonspecific but can be seen in the setting of otomastoiditis, on my clinical exam she does have some minor tenderness behind her right ear but likely secondary to her fall, she has absolutely no mastoid tenderness over the left side, left TM is clear.      4:23 PM I met with patient for initial encounter.  5:37 PM I spoke to Dr Thomas with neuro regarding patient.  6:13 PM I met with patient to discuss neuro recommendations for admission vs discharge.  6:39 PM I spoke to the hospitalist Dr Hobbs regarding patient.    At the conclusion of the encounter I discussed the results of all of the tests and the disposition. The questions were answered and return precautions provided. The patient or family acknowledged understanding and was agreeable with the care plan.         MEDICATIONS GIVEN IN THE EMERGENCY:  Medications   acetaminophen (TYLENOL) tablet 1,000 mg (has no administration in time range)   baclofen (LIORESAL) tablet 10 mg (has no administration in time range)   buPROPion (WELLBUTRIN SR) 12 hr tablet 150 mg (has no administration in time range)   cefdinir (OMNICEF) capsule 300 mg (has no administration in time range)   ferrous sulfate (FEROSUL) tablet 325 mg (has no administration in time range)   FLUoxetine (PROzac) capsule 30 mg (has no administration in time range)   gabapentin (NEURONTIN) capsule 100 mg (has no administration in time range)   levothyroxine (SYNTHROID/LEVOTHROID) tablet 75 mcg (has no administration in time range)   losartan (COZAAR) tablet 50 mg (has no administration in time range)   memantine (NAMENDA) tablet 10 mg (has no administration in time range)   psyllium (METAMUCIL/KONSYL) Packet 1 packet (has no administration  in time range)   lidocaine 1 % 0.1-1 mL (has no administration in time range)   lidocaine (LMX4) cream (has no administration in time range)   sodium chloride (PF) 0.9% PF flush 3 mL (has no administration in time range)   sodium chloride (PF) 0.9% PF flush 3 mL (has no administration in time range)   Medication Instructions - Avoid dextrose in IV solutions. (has no administration in time range)   medication instruction - No oral meds if patient didn't pass dysphagia screen (has no administration in time range)   heparin ANTICOAGULANT injection 5,000 Units (has no administration in time range)   sodium chloride 0.9% infusion (has no administration in time range)   labetalol (NORMODYNE/TRANDATE) injection 10-20 mg (has no administration in time range)     Or   hydrALAZINE (APRESOLINE) injection 10-20 mg (has no administration in time range)   aspirin EC tablet 81 mg (has no administration in time range)     Or   aspirin (ASA) chewable tablet 81 mg (has no administration in time range)   ondansetron (ZOFRAN ODT) ODT tab 4 mg (has no administration in time range)     Or   ondansetron (ZOFRAN) injection 4 mg (has no administration in time range)   gabapentin (NEURONTIN) capsule 200 mg (has no administration in time range)   0.9% sodium chloride BOLUS (0 mLs Intravenous Stopped 8/29/22 1746)   losartan (COZAAR) tablet 50 mg (50 mg Oral Given 8/29/22 1847)   aspirin (ASA) tablet 325 mg (325 mg Oral Given 8/29/22 1846)   acetaminophen (TYLENOL) tablet 650 mg (650 mg Oral Given 8/29/22 1847)   bacitracin ointment ( Topical Given 8/29/22 2024)       NEW PRESCRIPTIONS STARTED AT TODAY'S ER VISIT  New Prescriptions    No medications on file            =================================================================    HPI    Patient information was obtained from: Patient    Use of Intrepreter: N/A        Ellis Em is a 89 year old female with a pertinent history of HTN, HLD, who presents via walk-in for evaluation of  "numbness, generalized weakness, nausea.    Daughter states patient fell Friday night and again Saturday morning. Unknown LOC as patient found herself on the floor after the 2nd fall with no recollection of how she got there. Patient endorses lip numbness and generalized weakness. Patient was diagnosed with a UTI at [facility], negative imaging, and started on antibiotics on Saturday (2 days ago.) Patient noticed numbness on left lip which has progressed.  Patient was referred for MRI today, reports feeling weak after MRI and came to this ED for reevaluation. MRI results pending at time of arrival.     Patient denies chest pain, shortness of breath, new falls, blood in urine or stool, focal one sided weakness.      REVIEW OF SYSTEMS   Constitutional:  Denies fever, chills. Positive for generalized weakness.  Respiratory:  Denies productive cough or increased work of breathing  Cardiovascular:  Denies chest pain, palpitations  GI:  Denies abdominal pain, nausea, vomiting, or change in bowel or bladder habits   Musculoskeletal:  Denies any new muscle/joint swelling  Skin:  Denies rash   Neurologic:  Denies focal weakness. Positive for lip numbness.  All systems negative except as marked.     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, salivary gland     Cancer of skin     \"half dollar size area removed from scalp\", precancer in abd (gallbladder & 1/2 of stomach removed\"     Carpal tunnel syndrome      Cervical spondylosis with myelopathy 4/25/2013     Chronic pain      Closed fracture of cervical vertebra (H) 10/21/2015     Replacement Utility updated for latest IMO load     Closed fracture of lumbar vertebra (H) 1/21/2015     Replacement Utility updated for latest IMO load     Cyst of spinal meninges      Dermatitis      Disorders of meninges, not elsewhere classified     Created by Conversion  "     Gastric ulcer 1983    precancerous     Hip fracture (H) 2007     History of adrenal adenoma 5/25/2021     History of appendectomy     1943, 12 years old     History of fractured pelvis 1985     Hyponatremia      Hypothyroidism      Leg pain, bilateral     weakness bilaterally     Major depression      Miscarriage 1962    x2     Neck fracture (H) 09/10/2015     Nephrolithiasis      Osteoarthritis      Osteoporosis      Panic attack      Peripheral neuropathy      Reactive confusion      Renal insufficiency      Rib fracture 2008    x2, with pelvix fx     Shingles      Status post lumbar laminectomy 11/7/2014     UTI (urinary tract infection)      Varicella 7/14/2004    Overview:  LW Onset:  [id number] ; Varicella Zoster       PAST SURGICAL HISTORY:  Past Surgical History:   Procedure Laterality Date     ABDOMEN SURGERY  06/30/1983 1/2 of stomach organ removed due to precancerous ulcer     APPENDECTOMY  1943     CATARACT EXTRACTION, BILATERAL       CERVICAL LAMINOPLASTY Bilateral 5/2/2013    C1-3 cervical laminoplasty, removal of intracanaalicular extradural ventrally located cyst on 5/2/2013 by Dr. Corbin at [facility] for treatment of spinal cord compression and myelopathy     CHOLECYSTECTOMY  06/30/1983     COMBINED MAMMAPLASTY REVISION W/ ABDOMINOPLASTY  07/1998     DILATION AND CURETTAGE  09/19/1986     ENDOMETRIAL BIOPSY  09/09/1986     FRACTURE SURGERY  1985    Plate in Left hip; pins in then removed from right hip     JOINT REPLACEMENT      left total knee     LUMBAR LAMINECTOMY N/A 11/6/2014    Procedure: BILATERAL DECOMPRESSIVE LAMINECTOMY L1-L5 (REDO L4 L5);  Surgeon: Trice Corbin MD;  Location: [facility];  Service:      MASTECTOMY Bilateral      OTHER SURGICAL HISTORY      partial colectomy     OVARIAN CYST REMOVAL  1943    chocolate cysts removed     KY ARTHROPLASTY TIBIAL PLATEAU Left     Description: Knee Replacement;  Recorded: 05/23/2013;     REVISE  RECONSTRUCTED BREAST Bilateral 11/11/1984    with implants, nipple attachment 1/13/1985     ROOT CANAL  09/14/1998     SALIVARY GLAND SURGERY  12/05/1984    salivary gland and neck tumor removed     SKIN CANCER EXCISION  08/15/2003    ear     TOTAL HIP ARTHROPLASTY Bilateral     armature installed     TOTAL KNEE ARTHROPLASTY  10/20/2005     TUBAL LIGATION  1962     TUMOR EXCISION  05/01/2013    in spine     [facility] QUIGLEY W/O FACETEC FORAMOT/[facility]KC 1/2 VRT SEG, LUMBAR      Description: Laminectomy Decompressive Up To Two Lumbar Segments;  Recorded: 11/10/2014;         CURRENT MEDICATIONS:    Prior to Admission medications    Medication Sig Start Date End Date Taking? Authorizing Provider   alpha lipoic acid 200 mg cap [ALPHA LIPOIC ACID 200 MG CAP] Take 1 capsule by mouth 2 (two) times a day. 1/21/15   Marsha Lima MD   baclofen (LIORESAL) 10 MG tablet TAKE 1 TABLET BY MOUTH THREE TIMES DAILY 5/24/22   Whitney Velasquez MD   buPROPion (WELLBUTRIN SR) 150 MG 12 hr tablet TAKE 1 TABLET BY MOUTH EVERY DAY 4/20/22   Whitney Velasquez MD   cefdinir (OMNICEF) 300 MG capsule Take 1 capsule (300 mg) by mouth 2 times daily for 10 days 8/27/22 9/6/22  Bettina Bain MD   cranberry fruit extract (CRANBERRY ORAL) [CRANBERRY FRUIT EXTRACT (CRANBERRY ORAL)] Take by mouth. 1/13/20   Provider, Historical   estradiol (ESTRACE) 0.1 MG/GM vaginal cream  2/19/22   Reported, Patient   ferrous sulfate (FEROSUL) 325 (65 Fe) MG tablet TAKE 1 TABLET BY MOUTH EVERY DAY WITH BREAKFAST 4/3/22   Whitney Velasquez MD   FLUoxetine (PROZAC) 10 MG capsule TAKE 3 CAPSULES BY MOUTH EVERY DAY 8/7/22   Whitney Velasquez MD   gabapentin (NEURONTIN) 100 MG capsule TAKE 1 CAPSULE IN THE MORNING, 2 CAPSULES IN THE AFTERNOON AND 1 CAPSULE IN THE EVENING. 5/24/22   Whitney Velasquez MD   levothyroxine (SYNTHROID/LEVOTHROID) 75 MCG tablet TAKE 1 TABLET BY MOUTH EVERY DAY 4/3/22   Whitney Velasquez MD   losartan (COZAAR) 50 MG tablet  TAKE 1 TABLET BY MOUTH TWICE A DAY 4/25/22   Whitney Velasquez MD   melatonin 10 mg Tab [MELATONIN 10 MG TAB] Take 10 mg by mouth bedtime. 10/12/15   Provider, Historical   memantine (NAMENDA) 10 MG tablet TAKE 1 TABLET BY MOUTH TWICE A DAY 7/22/22   Yoni Fam MD   omega-3 fatty acids 500 mg cap [OMEGA-3 FATTY ACIDS 500 MG CAP] Take by mouth. 7/13/04   Provider, Historical   STRONTIUM CHLORIDE HEXAHYDRATE (STRONTIUM CHL HEXAHYD, BULK,) 100 % Kailyn [STRONTIUM CHLORIDE HEXAHYDRATE (STRONTIUM CHL HEXAHYD, BULK,) 100 % KAILYN] Use As Directed. 3/25/16   Provider, Historical   VIT C/E/ZN/COPPR/LUTEIN/ZEAXAN (PRESERVISION AREDS 2 ORAL) [VIT C/E/ZN/COPPR/LUTEIN/ZEAXAN (PRESERVISION AREDS 2 ORAL)] Take 1 tablet by mouth 2 (two) times a day.  10/12/15   Provider, Historical        ALLERGIES:  Allergies   Allergen Reactions     Iodinated Contrast Media [Diagnostic X-Ray Materials] Hives     Adhesive [Mecrylate] Unknown     Adhesive tape     Amlodipine Unknown     drowsiness     Asparaginase Unknown     Atorvastatin Unknown     PN: LW Reaction: arthalgia     Ciprofloxacin Unknown     Codeine Unknown     Lactose      Food, okay if in medicine.      Latex Unknown     PN: Converted from LW Latex Sensitivity Flag     Lisinopril Cough     Pravastatin Unknown     Propoxyphene N-Acetaminophen [Propoxyphene N-Apap] Unknown     PN: LW Reaction: hallucinations     Sulfa (Sulfonamide Antibiotics) [Sulfa Drugs] Unknown     Albuterol Unknown     Other reaction(s): Urinary Retention       FAMILY HISTORY:  Family History   Problem Relation Age of Onset     Cancer Mother      Kidney Disease Father      Alcoholism Brother      Diabetes Brother      Dementia Brother        SOCIAL HISTORY:   Social History     Socioeconomic History     Marital status: Single   Tobacco Use     Smoking status: Never Smoker     Smokeless tobacco: Never Used   Substance and Sexual Activity     Alcohol use: No     Drug use: No       VITALS:  Patient Vitals for  "the past 24 hrs:   BP Temp Temp src Pulse Resp SpO2 Height Weight   08/29/22 2000 (!) 214/88 -- -- 71 22 99 % -- --   08/29/22 1945 (!) 218/88 -- -- 74 22 99 % -- --   08/29/22 1900 (!) 143/97 -- -- 70 16 99 % -- --   08/29/22 1800 (!) 222/82 -- -- 69 18 -- -- --   08/29/22 1730 (!) 236/103 -- -- 89 16 -- -- --   08/29/22 1715 -- -- -- 66 14 -- -- --   08/29/22 1700 (!) 228/102 -- -- 69 17 -- -- --   08/29/22 1645 -- -- -- 70 15 -- -- --   08/29/22 1630 (!) 211/94 -- -- 66 14 -- -- --   08/29/22 1615 -- -- -- 66 17 100 % -- --   08/29/22 1600 (!) 197/88 -- -- 67 29 97 % -- --   08/29/22 1545 -- -- -- 69 17 97 % -- --   08/29/22 1530 (!) 192/81 -- -- 70 (!) 58 94 % -- --   08/29/22 1516 (!) 147/80 98.8  F (37.1  C) Oral 76 16 96 % 1.651 m (5' 5\") 65.8 kg (145 lb)        PHYSICAL EXAM    Constitutional:  Awake, alert, in no apparent distress  HENT: Superficial abrasion over the forehead just left of midline. Bilateral external ears normal, TMs clear bilaterally, no tenderness over the left mastoid, minimal nonspecific tenderness behind the right ear. Oropharynx moist. Nose normal. Neck- Normal range of motion with no guarding, No midline cervical tenderness, Supple, No stridor.   Eyes:  PERRL, EOMI with no signs of entrapment, Conjunctiva normal, No discharge.   Respiratory:  Normal breath sounds, No respiratory distress, No wheezing.    Cardiovascular:  Normal heart rate, Normal rhythm, No appreciable rubs or gallops.   GI:  Soft, No tenderness, No distension, No palpable masses  Musculoskeletal:  Intact distal pulses, No edema. No tenderness to palpation or major deformities noted.  Integument:  Warm, Dry, No erythema, No rash.   Neurologic:  Alert & oriented, Normal motor function, cranial nerves II through XII intact no facial droop or dysarthria.    Psychiatric:  Affect normal, Judgment normal, Mood normal.     LAB:  All pertinent labs reviewed and interpreted.  Results for orders placed or performed during the " hospital encounter of 08/29/22   Head CT w/o contrast    Impression    IMPRESSION:  1.  No acute intracranial process.   Chest XR,  PA & LAT    Impression    IMPRESSION: The right hemidiaphragm is elevated.The heart is enlarged.  The lungs are clear.    Comprehensive metabolic panel   Result Value Ref Range    Sodium 133 (L) 136 - 145 mmol/L    Potassium 4.3 3.5 - 5.0 mmol/L    Chloride 99 98 - 107 mmol/L    Carbon Dioxide (CO2) 27 22 - 31 mmol/L    Anion Gap 7 5 - 18 mmol/L    Urea Nitrogen 18 8 - 28 mg/dL    Creatinine 0.63 0.60 - 1.10 mg/dL    Calcium 9.1 8.5 - 10.5 mg/dL    Glucose 96 70 - 125 mg/dL    Alkaline Phosphatase 95 45 - 120 U/L    AST 32 0 - 40 U/L    ALT 41 0 - 45 U/L    Protein Total 6.6 6.0 - 8.0 g/dL    Albumin 3.3 (L) 3.5 - 5.0 g/dL    Bilirubin Total 0.3 0.0 - 1.0 mg/dL    GFR Estimate 83 >60 mL/min/1.73m2   Result Value Ref Range    Troponin I <0.01 0.00 - 0.29 ng/mL   CRP inflammation   Result Value Ref Range    CRP 0.2 0.0 - <0.8 mg/dL   UA with Microscopic reflex to Culture    Specimen: Urine, Clean Catch   Result Value Ref Range    Color Urine Colorless Colorless, Straw, Light Yellow, Yellow    Appearance Urine Clear Clear    Glucose Urine Negative Negative mg/dL    Bilirubin Urine Negative Negative    Ketones Urine Negative Negative mg/dL    Specific Gravity Urine 1.008 1.001 - 1.030    Blood Urine 0.06 mg/dL (A) Negative    pH Urine 6.5 5.0 - 7.0    Protein Albumin Urine Negative Negative mg/dL    Urobilinogen Urine <2.0 <2.0 mg/dL    Nitrite Urine Positive (A) Negative    Leukocyte Esterase Urine Negative Negative    Bacteria Urine Many (A) None Seen /HPF    RBC Urine 1 <=2 /HPF    WBC Urine <1 <=5 /HPF    Squamous Epithelials Urine <1 <=1 /HPF   CBC with platelets and differential   Result Value Ref Range    WBC Count 7.9 4.0 - 11.0 10e3/uL    RBC Count 3.98 3.80 - 5.20 10e6/uL    Hemoglobin 12.7 11.7 - 15.7 g/dL    Hematocrit 39.2 35.0 - 47.0 %    MCV 99 78 - 100 fL    MCH 31.9 26.5 -  33.0 pg    MCHC 32.4 31.5 - 36.5 g/dL    RDW 12.7 10.0 - 15.0 %    Platelet Count 282 150 - 450 10e3/uL    % Neutrophils 72 %    % Lymphocytes 15 %    % Monocytes 9 %    % Eosinophils 2 %    % Basophils 1 %    % Immature Granulocytes 1 %    NRBCs per 100 WBC 0 <1 /100    Absolute Neutrophils 5.7 1.6 - 8.3 10e3/uL    Absolute Lymphocytes 1.2 0.8 - 5.3 10e3/uL    Absolute Monocytes 0.7 0.0 - 1.3 10e3/uL    Absolute Eosinophils 0.2 0.0 - 0.7 10e3/uL    Absolute Basophils 0.1 0.0 - 0.2 10e3/uL    Absolute Immature Granulocytes 0.1 <=0.4 10e3/uL    Absolute NRBCs 0.0 10e3/uL   Asymptomatic COVID-19 Virus (Coronavirus) by PCR Nasopharyngeal    Specimen: Nasopharyngeal; Swab   Result Value Ref Range    SARS CoV2 PCR Negative Negative   ECG 12-LEAD WITH MUSE (LHE)   Result Value Ref Range    Systolic Blood Pressure  mmHg    Diastolic Blood Pressure  mmHg    Ventricular Rate 71 BPM    Atrial Rate 71 BPM    NM Interval 152 ms    QRS Duration 84 ms     ms    QTc 428 ms    P Axis 50 degrees    R AXIS -7 degrees    T Axis 57 degrees    Interpretation ECG       Sinus rhythm  Possible Inferior infarct , age undetermined  Possible Anterior infarct , age undetermined  Abnormal ECG  When compared with ECG of 23-MAY-2016 14:01,  No significant change was found  Confirmed by SEE ED PROVIDER NOTE FOR, ECG INTERPRETATION ([id number]),  MITA SCHULTZ ([id number]) on 8/29/2022 3:42:29 PM         RADIOLOGY:  Head CT w/o contrast   Final Result   IMPRESSION:   1.  No acute intracranial process.      Chest XR,  PA & LAT   Final Result   IMPRESSION: The right hemidiaphragm is elevated.The heart is enlarged.  The lungs are clear.       Echocardiogram Complete - For age > 60 yrs    (Results Pending)          EKG:    Sinus rhythm, no specific ST acute ischemic changes, no concerning dysrhythmias or interval prolongation, when compared to ECG of May 23, 2016, no specific ST acute ischemic changes appreciated  I have independently reviewed and  interpreted the EKG(s) documented above.    PROCEDURES:            I, De Grimes, am serving as a scribe to document services personally performed by Serjio Pride MD, based on my observation and the provider's statements to me. I, Serjio Pride MD attest that De Grimes is acting in a scribe capacity, has observed my performance of the services and has documented them in accordance with my direction.    Serjio Pride M.D.  Emergency Medicine  [facility] EMERGENCY ROOM  [address] [address]  [phone]  Dept: [phone]      Serjio Pride MD  08/29/22 [id number]

## 2022-08-29 NOTE — ED TRIAGE NOTES
"Arrives to ED with c/o increased L sided facial numbness and generalized weakness following fall on Friday night. Reports fall x2, has been evaluated in ED. Lives at assisted living facility. Had MRI performed 1 hour PTA. +N. Abrasion to L side of forehead. Endorses HA. \"I feel like I'm running out of gas\".      Triage Assessment     Row Name 08/29/22 4047       Triage Assessment (Adult)    Airway WDL WDL       Respiratory WDL    Respiratory WDL WDL       Skin Circulation/Temperature WDL    Skin Circulation/Temperature WDL WDL       Cardiac WDL    Cardiac WDL WDL       Peripheral/Neurovascular WDL    Peripheral Neurovascular WDL WDL       Cognitive/Neuro/Behavioral WDL    Cognitive/Neuro/Behavioral WDL WDL              "

## 2022-08-29 NOTE — TELEPHONE ENCOUNTER
Patient's daughter is calling to speak to someone on [place]'s care team about her mother. She stated she fell two times over the weekend and was seen at the ER on Saturday evening and they want her evaluated right away. She has numbness on the left side of her face and the left side of her body is a little weak. They did a CT on Saturday and did not find anything for stroke that they could see. Please contact them back to see when we can get her in right away per daughter Rosalva.

## 2022-08-30 ENCOUNTER — APPOINTMENT (OUTPATIENT)
Dept: CARDIOLOGY | Facility: CLINIC | Age: 87
DRG: 065 | End: 2022-08-30
Attending: INTERNAL MEDICINE
Payer: MEDICARE

## 2022-08-30 ENCOUNTER — APPOINTMENT (OUTPATIENT)
Dept: SPEECH THERAPY | Facility: CLINIC | Age: 87
DRG: 065 | End: 2022-08-30
Attending: INTERNAL MEDICINE
Payer: MEDICARE

## 2022-08-30 ENCOUNTER — APPOINTMENT (OUTPATIENT)
Dept: MRI IMAGING | Facility: CLINIC | Age: 87
DRG: 065 | End: 2022-08-30
Attending: PHYSICIAN ASSISTANT
Payer: MEDICARE

## 2022-08-30 LAB
ANION GAP SERPL CALCULATED.3IONS-SCNC: 9 MMOL/L (ref 5–18)
BACTERIA UR CULT: ABNORMAL
BUN SERPL-MCNC: 13 MG/DL (ref 8–28)
CALCIUM SERPL-MCNC: 9.2 MG/DL (ref 8.5–10.5)
CHLORIDE BLD-SCNC: 99 MMOL/L (ref 98–107)
CHOLEST SERPL-MCNC: 203 MG/DL
CO2 SERPL-SCNC: 27 MMOL/L (ref 22–31)
CREAT SERPL-MCNC: 0.63 MG/DL (ref 0.6–1.1)
ERYTHROCYTE [DISTWIDTH] IN BLOOD BY AUTOMATED COUNT: 12.8 % (ref 10–15)
GFR SERPL CREATININE-BSD FRML MDRD: 83 ML/MIN/1.73M2
GLUCOSE BLD-MCNC: 113 MG/DL (ref 70–125)
HBA1C MFR BLD: 5.1 %
HCT VFR BLD AUTO: 42.4 % (ref 35–47)
HDLC SERPL-MCNC: 58 MG/DL
HGB BLD-MCNC: 13.6 G/DL (ref 11.7–15.7)
LDLC SERPL CALC-MCNC: 117 MG/DL
MCH RBC QN AUTO: 31.6 PG (ref 26.5–33)
MCHC RBC AUTO-ENTMCNC: 32.1 G/DL (ref 31.5–36.5)
MCV RBC AUTO: 99 FL (ref 78–100)
PLATELET # BLD AUTO: 269 10E3/UL (ref 150–450)
POTASSIUM BLD-SCNC: 4.3 MMOL/L (ref 3.5–5)
RBC # BLD AUTO: 4.3 10E6/UL (ref 3.8–5.2)
SODIUM SERPL-SCNC: 135 MMOL/L (ref 136–145)
TRIGL SERPL-MCNC: 138 MG/DL
TROPONIN I SERPL-MCNC: 0.01 NG/ML (ref 0–0.29)
TROPONIN I SERPL-MCNC: 0.01 NG/ML (ref 0–0.29)
TSH SERPL DL<=0.005 MIU/L-ACNC: 2.34 UIU/ML (ref 0.3–5)
WBC # BLD AUTO: 7.2 10E3/UL (ref 4–11)

## 2022-08-30 PROCEDURE — G1010 CDSM STANSON: HCPCS

## 2022-08-30 PROCEDURE — 92610 EVALUATE SWALLOWING FUNCTION: CPT | Mod: GN

## 2022-08-30 PROCEDURE — 210N000002 HC R&B HEART CARE

## 2022-08-30 PROCEDURE — 80061 LIPID PANEL: CPT | Performed by: INTERNAL MEDICINE

## 2022-08-30 PROCEDURE — 255N000002 HC RX 255 OP 636: Performed by: INTERNAL MEDICINE

## 2022-08-30 PROCEDURE — 92526 ORAL FUNCTION THERAPY: CPT | Mod: GN

## 2022-08-30 PROCEDURE — 82746 ASSAY OF FOLIC ACID SERUM: CPT | Performed by: INTERNAL MEDICINE

## 2022-08-30 PROCEDURE — 250N000013 HC RX MED GY IP 250 OP 250 PS 637: Performed by: HOSPITALIST

## 2022-08-30 PROCEDURE — 85027 COMPLETE CBC AUTOMATED: CPT | Performed by: INTERNAL MEDICINE

## 2022-08-30 PROCEDURE — A9585 GADOBUTROL INJECTION: HCPCS | Performed by: INTERNAL MEDICINE

## 2022-08-30 PROCEDURE — 96365 THER/PROPH/DIAG IV INF INIT: CPT

## 2022-08-30 PROCEDURE — 250N000013 HC RX MED GY IP 250 OP 250 PS 637: Performed by: INTERNAL MEDICINE

## 2022-08-30 PROCEDURE — 96366 THER/PROPH/DIAG IV INF ADDON: CPT

## 2022-08-30 PROCEDURE — 80048 BASIC METABOLIC PNL TOTAL CA: CPT | Performed by: INTERNAL MEDICINE

## 2022-08-30 PROCEDURE — 99233 SBSQ HOSP IP/OBS HIGH 50: CPT | Performed by: INTERNAL MEDICINE

## 2022-08-30 PROCEDURE — G0427 INPT/ED TELECONSULT70: HCPCS | Mod: G0 | Performed by: PHYSICIAN ASSISTANT

## 2022-08-30 PROCEDURE — 70544 MR ANGIOGRAPHY HEAD W/O DYE: CPT | Mod: MG

## 2022-08-30 PROCEDURE — 93306 TTE W/DOPPLER COMPLETE: CPT

## 2022-08-30 PROCEDURE — 250N000011 HC RX IP 250 OP 636: Performed by: INTERNAL MEDICINE

## 2022-08-30 PROCEDURE — 84484 ASSAY OF TROPONIN QUANT: CPT | Performed by: INTERNAL MEDICINE

## 2022-08-30 PROCEDURE — 36415 COLL VENOUS BLD VENIPUNCTURE: CPT | Performed by: INTERNAL MEDICINE

## 2022-08-30 PROCEDURE — 93306 TTE W/DOPPLER COMPLETE: CPT | Mod: 26 | Performed by: INTERNAL MEDICINE

## 2022-08-30 PROCEDURE — 70553 MRI BRAIN STEM W/O & W/DYE: CPT | Mod: MG

## 2022-08-30 PROCEDURE — 250N000009 HC RX 250: Performed by: HOSPITALIST

## 2022-08-30 RX ORDER — CALCIUM CARBONATE 500 MG/1
500-1000 TABLET, CHEWABLE ORAL 2 TIMES DAILY PRN
Status: DISCONTINUED | OUTPATIENT
Start: 2022-08-30 | End: 2022-09-01 | Stop reason: HOSPADM

## 2022-08-30 RX ORDER — GADOBUTROL 604.72 MG/ML
10 INJECTION INTRAVENOUS ONCE
Status: COMPLETED | OUTPATIENT
Start: 2022-08-30 | End: 2022-08-30

## 2022-08-30 RX ORDER — CEFEPIME HYDROCHLORIDE 1 G/1
1 INJECTION, POWDER, FOR SOLUTION INTRAMUSCULAR; INTRAVENOUS EVERY 12 HOURS
Status: DISCONTINUED | OUTPATIENT
Start: 2022-08-30 | End: 2022-08-31

## 2022-08-30 RX ADMIN — LOSARTAN POTASSIUM 50 MG: 25 TABLET, FILM COATED ORAL at 09:51

## 2022-08-30 RX ADMIN — ASPIRIN 81 MG: 81 TABLET, COATED ORAL at 09:51

## 2022-08-30 RX ADMIN — GABAPENTIN 200 MG: 100 CAPSULE ORAL at 14:02

## 2022-08-30 RX ADMIN — BUPROPION HYDROCHLORIDE 150 MG: 150 TABLET, FILM COATED, EXTENDED RELEASE ORAL at 09:56

## 2022-08-30 RX ADMIN — HYDRALAZINE HYDROCHLORIDE 10 MG: 20 INJECTION, SOLUTION INTRAMUSCULAR; INTRAVENOUS at 13:54

## 2022-08-30 RX ADMIN — HEPARIN SODIUM 5000 UNITS: 5000 INJECTION, SOLUTION INTRAVENOUS; SUBCUTANEOUS at 09:52

## 2022-08-30 RX ADMIN — ACETAMINOPHEN 1000 MG: 500 TABLET ORAL at 03:34

## 2022-08-30 RX ADMIN — CEFEPIME 1 G: 1 INJECTION, POWDER, FOR SOLUTION INTRAMUSCULAR; INTRAVENOUS at 13:05

## 2022-08-30 RX ADMIN — ACETAMINOPHEN 1000 MG: 500 TABLET ORAL at 09:50

## 2022-08-30 RX ADMIN — ACETAMINOPHEN 1000 MG: 500 TABLET ORAL at 20:54

## 2022-08-30 RX ADMIN — GADOBUTROL 10 ML: 604.72 INJECTION INTRAVENOUS at 12:25

## 2022-08-30 RX ADMIN — PSYLLIUM HUSK 1 PACKET: 3.4 POWDER ORAL at 09:59

## 2022-08-30 RX ADMIN — FERROUS SULFATE TAB 325 MG (65 MG ELEMENTAL FE) 325 MG: 325 (65 FE) TAB at 09:56

## 2022-08-30 RX ADMIN — LEVOTHYROXINE SODIUM 75 MCG: 0.07 TABLET ORAL at 09:57

## 2022-08-30 RX ADMIN — ALUMINUM HYDROXIDE, MAGNESIUM HYDROXIDE, AND SIMETHICONE 15 ML: 200; 200; 20 SUSPENSION ORAL at 04:10

## 2022-08-30 RX ADMIN — HEPARIN SODIUM 5000 UNITS: 5000 INJECTION, SOLUTION INTRAVENOUS; SUBCUTANEOUS at 20:54

## 2022-08-30 RX ADMIN — FLUOXETINE 30 MG: 20 CAPSULE ORAL at 09:56

## 2022-08-30 RX ADMIN — CEFDINIR 300 MG: 300 CAPSULE ORAL at 09:51

## 2022-08-30 RX ADMIN — BACLOFEN 10 MG: 10 TABLET ORAL at 09:55

## 2022-08-30 RX ADMIN — MEMANTINE 10 MG: 10 TABLET ORAL at 20:54

## 2022-08-30 RX ADMIN — GABAPENTIN 100 MG: 100 CAPSULE ORAL at 09:52

## 2022-08-30 RX ADMIN — MEMANTINE 10 MG: 10 TABLET ORAL at 09:59

## 2022-08-30 RX ADMIN — CALCIUM CARBONATE (ANTACID) CHEW TAB 500 MG 1000 MG: 500 CHEW TAB at 03:44

## 2022-08-30 RX ADMIN — BACLOFEN 10 MG: 10 TABLET ORAL at 20:54

## 2022-08-30 RX ADMIN — GABAPENTIN 100 MG: 100 CAPSULE ORAL at 20:54

## 2022-08-30 ASSESSMENT — ACTIVITIES OF DAILY LIVING (ADL)
ADLS_ACUITY_SCORE: 47
ADLS_ACUITY_SCORE: 43
ADLS_ACUITY_SCORE: 47
ADLS_ACUITY_SCORE: 43
DEPENDENT_IADLS:: CLEANING;COOKING;LAUNDRY;SHOPPING;MEAL PREPARATION;MEDICATION MANAGEMENT;MONEY MANAGEMENT;TRANSPORTATION
ADLS_ACUITY_SCORE: 47
ADLS_ACUITY_SCORE: 43
ADLS_ACUITY_SCORE: 43
ADLS_ACUITY_SCORE: 47
ADLS_ACUITY_SCORE: 47
ADLS_ACUITY_SCORE: 35

## 2022-08-30 NOTE — CONSULTS
[facility]    Stroke Consult Note    Reason for Consult:  Stroke    Chief Complaint: Numbness, Generalized Weakness, and Nausea       HPI  Ellis Em is a 89 year old female with pertinent past medical history of hypertension, hyperlipidemia, hypothyroidism, dementia living at [facility], recent UTI treated with cefdinir at [facility].    She presented to [facility] emergency department 8/29/2022 with persistent headache following a fall a couple days prior, she then developed left face numbness/weakness and daughter noticed left arm weakness.  Patient did not appreciate left arm weakness herself.  Daughter stated that while sitting at the table she had a difficult time lifting her arm. She had an outpatient CTh and MRI performed at [facility] which showed an acute thalamic infarct.  Repeat CTH in emergency department showed chronic lacunar bilateral basal ganglia infarcts.  Stroke team recommended vessel imaging which showed mild-moderate rM1 stenosis, MRI confirmed right thalamic/internal capsule early subacute infarct.  She was started on aspirin and admitted for stroke work-up (described below).    She was evaluated today on telemedicine video exam with her daughter present.  She is feeling as though her symptoms have improved significantly.  Very slight weakness to her left arm and face are still appreciated, she has chronic right lower extremity weakness.     Stroke Evaluation Summarized    MRI/Head CT MRI brain: 6 mm acute/early subacute right thalamic/internal capsule infarct, moderate chronic SVID  CTH 8/29/22: small chronic b/l lacunar basal ganglia infarcts, no acute pathology  MRI (done at [facility]): not available for review- reported acute thalamic infarct   Intracranial Vasculature MRA brain: Motion artifact, mild-moderate rM1 stenosis   Cervical Vasculature MRA neck: Unremarkable     Echocardiogram TTE: EF 60%, no wma, mild LVH, mild to moderate left atrial enlargement, moderate  mitral annular calcification   EKG/Telemetry SR, Possible Inferior infarct, age undetermined   Possible Anterior infarct , age undetermined   Abnormal ECG, No significant change was found    Other Testing Not Applicable      LDL  8/30/2022: 117 mg/dL   A1C  8/29/2022: 5.1 %   Troponin <0.01>0.01>0.01       Impression  Acute R thalamic/internal capsule ischemic infarct, suspect secondary to small vessel ischemic disease but does have areas of mild-moderate R M1 stenosis moderate mitral annular calcification and mild-moderate left atrial enlargement, rule out cardioembolic    Recommendations  -Discussed with vascular neurology attending, Dr. King  -Neuro checks and vitals every 4 hours  - goal SBP <180 while inpatient  -long term outpatient blood pressure goal <140/90, tighter control associated with overall decreased cardiovascular risk, recommend home blood pressure monitoring, keep log and bring to f/u with PCP  -long term outpatient blood pressure goal <130/80, recommend home monitoring twice daily in AM and PM, keep log and bring to f/u with PCP  -Aspirin 81 mg daily indefinitely, recommend stopping PTA estradiol  -, Lipitor 40 mg daily, follow-up with PCP for titration to goal LDL 40-70, <40 increases risk of Intracranial hemorrhage  -Blood glucose monitoring, Hgb A1c 5.1%, (goal <7% for secondary stroke prevention), follow-up with PCP  -Mediterranean diet can be beneficial for overall decreased cardiovascular risk, please print hand out for patient at discharg  -telemetry, 30 day CardioNet monitoring at discharge if no Afib found on telemetry (ordered)  -PT/OT/SPT  -Smoking screen: Never smoker  -Sleep Apnea screen: Positive for snoring  -Euthermia, euglycemia, eunatremia  -Stroke Education  -Stroke Class per [facility]      Patient Follow-up    -Follow-up with PCP in 1-2 weeks  - Follow-up with neurology team in 6-8 weeks (ordered)  - Follow-up with sleep medicine clinic to evaluate  "for sleep apnea (ordered)    Thank you for this consult. No further stroke evaluation is recommended, so we will sign off. Please contact us with any additional questions.    Rekha Mccoy PA-C  Vascular Neurology  To page me or covering stroke neurology team member, click here: [facility]   Choose \"On Call\" tab at top, then search dropdown box for \"Neurology Adult\", select location, press Enter, then look for stroke/neuro ICU/telestroke.    _____________________________________________________    Clinically Significant Risk Factors Present on Admission             # Hypoalbuminemia: Albumin = 3.3 g/dL (Ref range: 3.5 - 5.0 g/dL) on admission, will monitor as appropriate         Past Medical History   Past Medical History:   Diagnosis Date     Bowel obstruction (H) 1974     C1 cervical fracture (H) 10/12/2015     Cancer (H) breast, 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, salivary gland     Cancer of skin     \"half dollar size area removed from scalp\", precancer in abd (gallbladder & 1/2 of stomach removed\"     Carpal tunnel syndrome      Cervical spondylosis with myelopathy 4/25/2013     Chronic pain      Closed fracture of cervical vertebra (H) 10/21/2015     Replacement Utility updated for latest IMO load     Closed fracture of lumbar vertebra (H) 1/21/2015     Replacement Utility updated for latest IMO load     Cyst of spinal meninges      Dermatitis      Disorders of meninges, not elsewhere classified     Created by Conversion      Gastric ulcer 1983    precancerous     Hip fracture (H) 2007     History of adrenal adenoma 5/25/2021     History of appendectomy     1943, 12 years old     History of fractured pelvis 1985     Hyponatremia      Hypothyroidism      Leg pain, bilateral     weakness bilaterally     Major depression      Miscarriage 1962    x2     Neck fracture (H) 09/10/2015     Nephrolithiasis      Osteoarthritis      Osteoporosis      Panic attack      Peripheral neuropathy      Reactive confusion      Renal insufficiency      " Rib fracture 2008    x2, with pelvix fx     Shingles      Status post lumbar laminectomy 11/7/2014     UTI (urinary tract infection)      Varicella 7/14/2004    Overview:  LW Onset:  [id number] ; Varicella Zoster     Past Surgical History   Past Surgical History:   Procedure Laterality Date     ABDOMEN SURGERY  06/30/1983    1/2 of stomach organ removed due to precancerous ulcer     APPENDECTOMY  1943     CATARACT EXTRACTION, BILATERAL       CERVICAL LAMINOPLASTY Bilateral 5/2/2013    C1-3 cervical laminoplasty, removal of intracanaalicular extradural ventrally located cyst on 5/2/2013 by Dr. Corbin at [facility] for treatment of spinal cord compression and myelopathy     CHOLECYSTECTOMY  06/30/1983     COMBINED MAMMAPLASTY REVISION W/ ABDOMINOPLASTY  07/1998     DILATION AND CURETTAGE  09/19/1986     ENDOMETRIAL BIOPSY  09/09/1986     FRACTURE SURGERY  1985    Plate in Left hip; pins in then removed from right hip     JOINT REPLACEMENT      left total knee     LUMBAR LAMINECTOMY N/A 11/6/2014    Procedure: BILATERAL DECOMPRESSIVE LAMINECTOMY L1-L5 (REDO L4 L5);  Surgeon: Trice Corbin MD;  Location: [facility];  Service:      MASTECTOMY Bilateral      OTHER SURGICAL HISTORY      partial colectomy     OVARIAN CYST REMOVAL  1943    chocolate cysts removed     TN ARTHROPLASTY TIBIAL PLATEAU Left     Description: Knee Replacement;  Recorded: 05/23/2013;     REVISE RECONSTRUCTED BREAST Bilateral 11/11/1984    with implants, nipple attachment 1/13/1985     ROOT CANAL  09/14/1998     SALIVARY GLAND SURGERY  12/05/1984    salivary gland and neck tumor removed     SKIN CANCER EXCISION  08/15/2003    ear     TOTAL HIP ARTHROPLASTY Bilateral     armature installed     TOTAL KNEE ARTHROPLASTY  10/20/2005     TUBAL LIGATION  1962     TUMOR EXCISION  05/01/2013    in spine     ZZC QUIGLEY W/O FACETEC FORAMOT/DSKC 1/2 VRT SEG, LUMBAR      Description: Laminectomy Decompressive Up To Two Lumbar Segments;   Recorded: 11/10/2014;     Medications   Home Meds  Prior to Admission medications    Medication Sig Start Date End Date Taking? Authorizing Provider   acetaminophen (TYLENOL) 500 MG tablet Take 1,000 mg by mouth every 6 hours   Yes Unknown, Entered By History   alpha lipoic acid 200 mg cap [ALPHA LIPOIC ACID 200 MG CAP] Take 1 capsule by mouth 2 (two) times a day. 1/21/15  Yes Marsha Lima MD   baclofen (LIORESAL) 10 MG tablet TAKE 1 TABLET BY MOUTH THREE TIMES DAILY  Patient taking differently: Take 10 mg by mouth 2 times daily 5/24/22  Yes Whitney Velasquez MD   buPROPion (WELLBUTRIN SR) 150 MG 12 hr tablet TAKE 1 TABLET BY MOUTH EVERY DAY  Patient taking differently: Take 150 mg by mouth daily TAKE 1 TABLET BY MOUTH EVERY DAY 4/20/22  Yes Whitney Velasquez MD   cefdinir (OMNICEF) 300 MG capsule Take 1 capsule (300 mg) by mouth 2 times daily for 10 days 8/27/22 9/6/22 Yes Bettina Bain MD   Cranberry 400 MG CAPS Take 400 mg by mouth daily   Yes Unknown, Entered By History   estradiol (ESTRACE) 0.1 MG/GM vaginal cream Place 1 g vaginally twice a week Sundays and Thursdays 2/19/22  Yes Reported, Patient   ferrous sulfate (FEROSUL) 325 (65 Fe) MG tablet TAKE 1 TABLET BY MOUTH EVERY DAY WITH BREAKFAST  Patient taking differently: Take 325 mg by mouth daily (with breakfast) 4/3/22  Yes Whitney Velasquez MD   FLUoxetine (PROZAC) 10 MG capsule TAKE 3 CAPSULES BY MOUTH EVERY DAY 8/7/22  Yes Whitney Velasquez MD   gabapentin (NEURONTIN) 100 MG capsule TAKE 1 CAPSULE IN THE MORNING, 2 CAPSULES IN THE AFTERNOON AND 1 CAPSULE IN THE EVENING. 5/24/22  Yes Whitney Velasquez MD   levothyroxine (SYNTHROID/LEVOTHROID) 75 MCG tablet TAKE 1 TABLET BY MOUTH EVERY DAY  Patient taking differently: Take 75 mcg by mouth daily 4/3/22  Yes Whitney Velasquez MD   losartan (COZAAR) 50 MG tablet TAKE 1 TABLET BY MOUTH TWICE A DAY  Patient taking differently: Take 50 mg by mouth 2 times daily 4/25/22  Yes  Whitney Velasquez MD   melatonin 10 mg Tab [MELATONIN 10 MG TAB] Take 10 mg by mouth bedtime. 10/12/15  Yes Provider, Historical   memantine (NAMENDA) 10 MG tablet TAKE 1 TABLET BY MOUTH TWICE A DAY  Patient taking differently: Take 10 mg by mouth 2 times daily 7/22/22  Yes Yoni Fam MD   omega-3 fatty acids 500 mg cap Take 500 mg by mouth daily 7/13/04  Yes Provider, Historical   psyllium (METAMUCIL/KONSYL) Packet Take 1 packet by mouth daily   Yes Unknown, Entered By History   STRONTIUM CHLORIDE HEXAHYDRATE (STRONTIUM CHL HEXAHYD, BULK,) 100 % Kailyn [STRONTIUM CHLORIDE HEXAHYDRATE (STRONTIUM CHL HEXAHYD, BULK,) 100 % KAILYN] Use As Directed. 3/25/16  Yes Provider, Historical   UNABLE TO FIND Take 1 capsule by mouth 2 times daily MEDICATION NAME: Uric Acid formula, supplement.   Yes Unknown, Entered By History   VIT C/E/ZN/COPPR/LUTEIN/ZEAXAN (PRESERVISION AREDS 2 ORAL) [VIT C/E/ZN/COPPR/LUTEIN/ZEAXAN (PRESERVISION AREDS 2 ORAL)] Take 1 tablet by mouth 2 (two) times a day.  10/12/15  Yes Provider, Historical       Scheduled Meds    acetaminophen  1,000 mg Oral Q6H     aspirin  81 mg Oral Daily    Or     aspirin  81 mg Oral or NG Tube Daily     baclofen  10 mg Oral BID     buPROPion  150 mg Oral Daily     ceFEPIme  1 g Intravenous Q12H     ferrous sulfate  325 mg Oral Daily     FLUoxetine  30 mg Oral Daily     gabapentin  100 mg Oral BID     gabapentin  200 mg Oral Daily     heparin ANTICOAGULANT  5,000 Units Subcutaneous Q12H     levothyroxine  75 mcg Oral Daily     losartan  50 mg Oral BID     memantine  10 mg Oral BID     psyllium  1 packet Oral Daily     sodium chloride (PF)  3 mL Intracatheter Q8H       Infusion Meds    - MEDICATION INSTRUCTIONS -       - MEDICATION INSTRUCTIONS -       sodium chloride         PRN Meds  calcium carbonate, labetalol **OR** hydrALAZINE, lidocaine 4%, lidocaine (buffered or not buffered), - MEDICATION INSTRUCTIONS -, - MEDICATION INSTRUCTIONS -, ondansetron **OR**  ondansetron, sodium chloride (PF), sodium chloride    Allergies   Allergies   Allergen Reactions     Iodinated Contrast Media [Diagnostic X-Ray Materials] Hives     Adhesive [Mecrylate] Unknown     Adhesive tape     Amlodipine Unknown     drowsiness     Asparaginase Unknown     Atorvastatin Unknown     PN: LW Reaction: arthalgia     Ciprofloxacin Unknown     Codeine Unknown     Lactose      Food, okay if in medicine.      Latex Unknown     PN: Converted from LW Latex Sensitivity Flag     Lisinopril Cough     Pravastatin Unknown     Propoxyphene N-Acetaminophen [Propoxyphene N-Apap] Unknown     PN: LW Reaction: hallucinations     Sulfa (Sulfonamide Antibiotics) [Sulfa Drugs] Unknown     Albuterol Unknown     Other reaction(s): Urinary Retention     Family History   Family History   Problem Relation Age of Onset     Cancer Mother      Kidney Disease Father      Alcoholism Brother      Diabetes Brother      Dementia Brother      Social History   Social History     Tobacco Use     Smoking status: Never Smoker     Smokeless tobacco: Never Used   Substance Use Topics     Alcohol use: No     Drug use: No       Review of Systems   The 10 point Review of Systems is negative other than noted in the HPI or here.        PHYSICAL EXAMINATION   Temp:  [98  F (36.7  C)-98.6  F (37  C)] 98  F (36.7  C)  Pulse:  [65-98] 91  Resp:  [9-99] 15  BP: (117-240)/() 124/60  SpO2:  [89 %-99 %] 94 %    General Exam  General:  patient lying in bed without any acute distress    HEENT:  normocephalic/atraumatic  Pulmonary:  no respiratory distress    Neuro Exam  Mental Status:  alert, oriented x 3, follows commands, speech clear and fluent, naming and repetition normal  Cranial Nerves:  visual fields intact (tested by nurse), EOMI with normal smooth pursuit, facial sensation intact and symmetric (tested by nurse), hearing not formally tested but intact to conversation, no dysarthria, tongue protrusion midline left facial droop  Motor:   Chronic right lower extremity weakness due to neuropathy/other chronic issues reported unchanged, left arm drift, no drift to left lower extremity  Reflexes:  unable to test (telestroke)  Sensory:  light touch sensation intact and symmetric throughout upper and lower extremities (assessed by nurse), no extinction on double simultaneous stimulation (assessed by nurse)  Coordination:  normal finger-to-nose and heel-to-shin bilaterally without dysmetria (no dysmetria out of proportion to right lower extremity chronic weakness)  Station/Gait:  unable to test due to telestroke    Dysphagia Screen  Dysarthria or facial droop present - Maintain NPO, consult SLP    Stroke Scales    NIHSS  1a. Level of Consciousness 0-->Alert, keenly responsive   1b. LOC Questions 0-->Answers both questions correctly   1c. LOC Commands 0-->Performs both tasks correctly   2.   Best Gaze 0-->Normal   3.   Visual 0-->No visual loss   4.   Facial Palsy 1-->Minor paralysis (flattened nasolabial fold, asymmetry on smiling)   5a. Motor Arm, Left 1-->Drift, limb holds 90 (or 45) degrees, but drifts down before full 10 seconds, does not hit bed or other support   5b. Motor Arm, Right 0-->No drift, limb holds 90 (or 45) degrees for full 10 secs   6a. Motor Leg, Left 0-->No drift, leg holds 30 degree position for full 5 secs   6b. Motor Leg, right 0-->No drift, leg holds 30 degree position for full 5 secs   7.   Limb Ataxia 0-->Absent   8.   Sensory 0-->Normal, no sensory loss   9.   Best Language 0-->No aphasia, normal   10. Dysarthria 0-->Normal   11. Extinction and Inattention  0-->No abnormality   Total 2 (08/30/22 0094)       Modified Mayra Score (Pre-morbid)  (S) 5 (daughter states she has been in wheelchair most of the day and concerned with her being at [facility]) - (S) Severe disability.  Requires constant nursing care and attention, bedridden. (daughter states she has been in wheelchair most of the day and concerned with her being at [facility])      Imaging  I personally reviewed all imaging; relevant findings per HPI.    Labs Data   CBC  Recent Labs   Lab 08/30/22  1030 08/29/22  1541 08/27/22  1544   WBC 7.2 7.9 9.0   RBC 4.30 3.98 4.23   HGB 13.6 12.7 13.7   HCT 42.4 39.2 41.8    282 297     Basic Metabolic Panel   Recent Labs   Lab 08/30/22  1030 08/29/22  1541 08/27/22  1544   * 133* 131*   POTASSIUM 4.3 4.3 4.1   CHLORIDE 99 99 97*   CO2 27 27 25   BUN 13 18 21   CR 0.63 0.63 0.85    96 106   LORENZA 9.2 9.1 9.1     Liver Panel  Recent Labs   Lab 08/29/22  1541   PROTTOTAL 6.6   ALBUMIN 3.3*   BILITOTAL 0.3   ALKPHOS 95   AST 32   ALT 41     INR    Recent Labs   Lab Test 08/27/22  1544   INR 0.91      Lipid Profile    Recent Labs   Lab Test 08/30/22  1030   CHOL 203*   HDL 58      TRIG 138     A1C    Recent Labs   Lab Test 08/29/22  1541   A1C 5.1     Troponin    Recent Labs   Lab 08/30/22  1030 08/29/22  2349 08/29/22  1541   TROPONINI 0.01 0.01 <0.01          Stroke Consult Data Data   Telestroke Service Details  (for non-emergent stroke consult with tele)  Video start time 08/30/22   1549   Video end time 08/30/22   1621   Type of service telemedicine diagnostic assessment of acute neurological changes   Reason telemedicine is appropriate patient requires assessment with a specialist for diagnosis and treatment of neurological symptoms   Mode of transmission secure interactive audio and video communication per Matias   Originating site (patient location) [facility]    Distant site (provider location) [facility]   Billing: I have personally spent a total of 70 minutes providing care today, time spent in reviewing medical records and reviewing tests, examining the patient and obtaining history, coordination of care, and discussion with the patient and/or family regarding diagnostic results, prognosis, symptom management, risks and benefits of management options,  and development of plan of care. Greater than 50% was spent in counseling and coordination of care.

## 2022-08-30 NOTE — PROGRESS NOTES
"[facility] Medicine PROGRESS NOTE      Identification/Summary: Ellis Em is a 89 year old female with a past medical history of hypertension, dyslipidemia, hypothyroidism, mild dementia lives in assisted living facility that presented to ED w/ c/o L sided weakness and lip numbness x 1 day, couple of falls Who was admitted on 8/29/2022 for acute versus subacute thalamic infarct.  Neurologically stable.  Neurology consult pending.    Assessment and Plan:  Acute thalamic lacunar infarct   - continue ASA, statin allergy noted  - permissive HTN, consider antihypertensive if BP >200 systolic   neurology consult pending  Lipids noted.  PT OT eval  Telemetry monitor no arrhythmias  Echocardiogram no thrombus.    Recent UTI   Urine culture Klebsiella resistant to ceftriaxone  Allergy to fluoroquinolones  Started on cefepime.     Peripheral neuropathy   - continue at home meds      Major depression, panic attack   - continue at home meds   DVT prophylaxis: Subcu heparin  Code: Full    Diet: Regular Diet Adult  Code Status: Full Code    Anticipated possible discharge in 1 days once clinical improvement milestones are met.    Interval History/Subjective:  She has persistent left-sided facial numbness, no other new focal weakness tingling numbness, urinary or bowel complaints.    Physical Exam/Objective:  Vitals I/O   Vital signs:  Temp: 98  F (36.7  C) Temp src: Oral BP: (!) 186/85 Pulse: 66   Resp: 22 SpO2: 96 % O2 Device: None (Room air)   Height: 165.1 cm (5' 5\") Weight: 65.8 kg (145 lb)  Estimated body mass index is 24.13 kg/m  as calculated from the following:    Height as of this encounter: 1.651 m (5' 5\").    Weight as of this encounter: 65.8 kg (145 lb). I/O last 3 completed shifts:  In: 1240 [P.O.:240; IV Piggyback:1000]  Out: 775 [Urine:775]     Body mass index is 24.13 kg/m .    General Appearance:  Alert, cooperative, no distress   Head:  Normocephalic, atraumatic   Eyes:  PERRL    Throat:  mucosa; moist "   Neck: No JVD, thyromegaly   Lungs:   Clear to auscultation bilaterally, respirations unlabored   Chest Wall:  No tenderness or deformity   Heart:  Regular rate and rhythm, S1, S2 normal,no murmur   Abdomen:   Soft, non tender, non distended, bowel sounds present, no guarding or rigidity   Extremities: No edema, no joint swelling   Skin: Skin color, texture, turgor normal, no rashes or lesions   Neurologic: Alert and oriented X 3, no new focal deficits       Medications:   Personally Reviewed.    acetaminophen  1,000 mg Oral Q6H     aspirin  81 mg Oral Daily    Or     aspirin  81 mg Oral or NG Tube Daily     baclofen  10 mg Oral BID     buPROPion  150 mg Oral Daily     ceFEPIme  1 g Intravenous Q12H     ferrous sulfate  325 mg Oral Daily     FLUoxetine  30 mg Oral Daily     gabapentin  100 mg Oral BID     gabapentin  200 mg Oral Daily     heparin ANTICOAGULANT  5,000 Units Subcutaneous Q12H     levothyroxine  75 mcg Oral Daily     losartan  50 mg Oral BID     memantine  10 mg Oral BID     psyllium  1 packet Oral Daily     sodium chloride (PF)  3 mL Intracatheter Q8H       Data reviewed today: I personally reviewed all new medications, labs, imaging/diagnostics reports over the past 24 hours. Pertinent findings include    Labs:  Most Recent 3 CBC's:Recent Labs   Lab Test 08/30/22  1030 08/29/22  1541 08/27/22  1544   WBC 7.2 7.9 9.0   HGB 13.6 12.7 13.7   MCV 99 99 99    282 297     Most Recent 3 BMP's:Recent Labs   Lab Test 08/30/22  1030 08/29/22  1541 08/27/22  1544   * 133* 131*   POTASSIUM 4.3 4.3 4.1   CHLORIDE 99 99 97*   CO2 27 27 25   BUN 13 18 21   CR 0.63 0.63 0.85   ANIONGAP 9 7 9   LORENZA 9.2 9.1 9.1    96 106     Most Recent 2 LFT's:Recent Labs   Lab Test 08/29/22  1541 08/23/22  0910   AST 32 34  34   ALT 41 40*  40*   ALKPHOS 95 88   BILITOTAL 0.3 0.3  0.3     Most Recent 3 INR's:Recent Labs   Lab Test 08/27/22  1544   INR 0.91       Imaging:   Recent Results (from the past 24  hour(s))   Chest XR,  PA & LAT    Narrative    EXAM: XR CHEST 2 VIEWS  LOCATION: [facility]  DATE/TIME: 2022 4:14 PM    INDICATION: weakness  COMPARISON: 2022      Impression    IMPRESSION: The right hemidiaphragm is elevated.The heart is enlarged.  The lungs are clear.    Head CT w/o contrast    Narrative    EXAM: CT HEAD W/O CONTRAST  LOCATION: [facility]  DATE/TIME: 2022 4:23 PM    INDICATION: headache after trauma  COMPARISON: 2022  TECHNIQUE: Routine CT Head without IV contrast. Multiplanar reformats. Dose reduction techniques were used.    FINDINGS:  INTRACRANIAL CONTENTS: No intracranial hemorrhage, extraaxial collection, or mass effect.  No CT evidence of acute infarct. Moderate presumed chronic small vessel ischemic changes. Small chronic lacunar infarction anterior aspect basal ganglia   bilaterally. Moderate generalized volume loss. No hydrocephalus.     VISUALIZED ORBITS/SINUSES/MASTOIDS: No intraorbital abnormality. No paranasal sinus mucosal disease. Opacification left mastoid air cells and middle ear cavity.    BONES/SOFT TISSUES: No acute abnormality.      Impression    IMPRESSION:  1.  No acute intracranial process.   Echocardiogram Complete - For age > 60 yrs    Narrative    [id number]  [id number]  [id number]  [id number]^SAMANTHA^[place] [address]     Name: FLORENTINO SPRING  MRN: [id number]  : 1931  Study Date: 2022 08:15 AM  Age: 89 yrs  Gender: Female  Patient Location: [facility]  Reason For Study: Cerebrovascular Incident  Ordering Physician: WILLY SINGH  Performed By: MARTIN     BSA: 1.7 m2  Height: 65 in  Weight: 145 lb  HR: 67  BP: 221/100 mmHg  ______________________________________________________________________________  Procedure  Complete Portable Echo Adult.  ______________________________________________________________________________  Interpretation Summary     1.  Normal left ventricular size and systolic performance with a visually  estimated ejection fraction of 60%.  2. There is mild concentric increase in left ventricular wall thickness.  3. No significant valvular heart disease is identified on this study.  4. Normal right ventricular size and systolic performance.  5. There is mild to moderate left atrial enlargement.     When compared to the prior real-time echocardiogram dated 25 March 2022, there  has been little appreciable interval change.  ______________________________________________________________________________  Left ventricle:  Normal left ventricular size and systolic performance with a visually  estimated ejection fraction of 60%. There is normal regional wall motion.  There is mild concentric increase in left ventricular wall thickness.     Assessment of LV Diastolic Function: The cumulative findings are indeterminate  in the evaluation of diastolic function [The septal e' velocity is < 7 cm/s &  lateral e' velocity is < 10 cm/s. /The average E/e' is >14. TR velocity is <  2.8 m/s. Left atrial volume index is less than 34 mL/mÂ ].     Right ventricle:  Normal right ventricular size and systolic performance.     Left atrium:  There is mild to moderate left atrial enlargement.     Right atrium:  The right atrium is of normal size.     IVC:  The IVC is of normal caliber.     Aortic valve:  The aortic valve is comprised of three cusps. No significant aortic stenosis  or aortic insufficiency is detected on this study.     Mitral valve:  There is moderate mitral annular calcification. There is trace mitral  insufficiency.     Tricuspid valve:  The tricuspid valve is grossly morphologically normal. There is mild tricuspid  insufficiency.     Pulmonic valve:  The pulmonic valve is grossly morphologically normal. There is trace pulmonic  insufficiency.     Thoracic aorta:  The aortic root and proximal ascending aorta are of normal dimension.      Pericardium:  There is no significant pericardial effusion.  ______________________________________________________________________________  ______________________________________________________________________________  MMode/2D Measurements & Calculations  IVSd: 1.3 cm  LVIDd: 3.8 cm  LVIDs: 2.4 cm  LVPWd: 1.3 cm  FS: 37.8 %  LV mass(C)d: 177.4 grams  LV mass(C)dI: 102.8 grams/m2  Ao root diam: 2.7 cm  LA dimension: 4.4 cm  LA/Ao: 1.6  LVOT diam: 2.0 cm  LVOT area: 3.1 cm2  LA Volume (BP): 42.3 ml  LA Volume Index (BP): 24.5 ml/m2     LA Volume Indexed (AL/bp): 26.0 ml/m2  RWT: 0.67     Doppler Measurements & Calculations  MV E max geovani: 122.0 cm/sec  MV A max geovani: 126.4 cm/sec  MV E/A: 0.97  MV max P.8 mmHg  MV mean P.5 mmHg  MV V2 VTI: 32.9 cm  MVA(VTI): 2.8 cm2  MV dec slope: 707.0 cm/sec2  MV dec time: 0.17 sec  Ao V2 max: 141.9 cm/sec  Ao max P.0 mmHg  Ao V2 mean: 95.3 cm/sec  Ao mean P.0 mmHg  Ao V2 VTI: 29.5 cm  MICHELLE(I,D): 3.1 cm2  MICHELLE(V,D): 2.8 cm2  LV V1 max P.5 mmHg  LV V1 max: 127.4 cm/sec  LV V1 VTI: 28.8 cm     SV(LVOT): 90.7 ml  SI(LVOT): 52.6 ml/m2  PA V2 max: 115.4 cm/sec  PA max P.3 mmHg  PA acc time: 0.09 sec  PI end-d geovani: 84.2 cm/sec  TR max geovani: 275.4 cm/sec  TR max P.3 mmHg  AV Geovani Ratio (DI): 0.90  MICHELLE Index (cm2/m2): 1.8  E/E' av.0  Lateral E/e': 23.9  Medial E/e': 26.1     ______________________________________________________________________________  Report approved by: Vianney Garcia 2022 09:41 AM         MR Brain w/o & w Contrast    Narrative    1. HEAD MRI WITHOUT AND WITH IV CONTRAST  2. HEAD MRA WITHOUT IV CONTRAST   3. NECK MRA WITHOUT AND WITH IV CONTRAST   2022 12:27 PM      INDICATION: Recent thalamic stroke. Lip numbness and weakness.  TECHNIQUE:  1. Head MRI without and with IV contrast.  2. Head MRA without IV contrast.  3. Neck MRA without and with IV contrast including gadolinium bolus sequence.  CONTRAST: 10 mL Gadavist    COMPARISON: Head CT 8/29/2022, brain MRI/MRA and neck MRA 5/29/2021.    FINDINGS:  HEAD MRI: 6 mm ovoid focus of restricted diffusion at the junction of the right thalamus and posterior internal capsule demonstrates T2/FLAIR hyperintensity without enhancement or evidence for hemorrhage consistent with a recent acute or early subacute   infarct. Mild mucosal thickening within the ethmoid air cells. Paranasal sinuses are otherwise free from significant disease. Patchy fluid within the left mastoid air cells. Right appear clear. This is unchanged. Intraorbital contents are unremarkable.   There is mild generalized prominence of the sulci and ventricles, consistent with underlying volume loss. Intracranial flow voids are intact. There is no mass effect, midline shift, or extraaxial collection. There are patchy areas of T2/FLAIR   hyperintensity within the cerebral white matter that are nonspecific but probably reflect the sequela of chronic small vessel ischemic disease. No evidence for acute or chronic intracranial blood products.    HEAD MRA: Examination is degraded by motion artifact, but there does appear to be mild to moderate narrowing of the M1 segment of the right middle cerebral artery. No proximal large vessel occlusion, aneurysm or AVM.    NECK MRA: Three-vessel arch. Antegrade flow within both carotid and vertebral arteries. No evidence for dissection or hemodynamically significant narrowing in the neck by NASCET criteria.       Impression    CONCLUSION:  HEAD MRI:  1.  6 mm acute or early subacute infarct in the right thalamus/internal capsule.  2.  No hemorrhage.  3.  Moderate burden of presumed chronic small vessel ischemic changes.  4.  Mild generalized atrophy.  5.  Chronic left mastoid effusion.    HEAD MRA:  1.  Examination is degraded by motion artifact, but there does appear to be mild to moderate narrowing of the M1 segment of the right middle cerebral artery. This appears progressed versus 2021.    2.  No proximal large vessel occlusion, aneurysm or AVM.    NECK MRA:  1.  No evidence for dissection or hemodynamically significant narrowing in the neck based on NASCET criteria.   MRA Brain ([place] [facility] [place]) w/o Contrast    Narrative    1. HEAD MRI WITHOUT AND WITH IV CONTRAST  2. HEAD MRA WITHOUT IV CONTRAST   3. NECK MRA WITHOUT AND WITH IV CONTRAST   8/30/2022 12:27 PM      INDICATION: Recent thalamic stroke. Lip numbness and weakness.  TECHNIQUE:  1. Head MRI without and with IV contrast.  2. Head MRA without IV contrast.  3. Neck MRA without and with IV contrast including gadolinium bolus sequence.  CONTRAST: 10 mL Gadavist   COMPARISON: Head CT 8/29/2022, brain MRI/MRA and neck MRA 5/29/2021.    FINDINGS:  HEAD MRI: 6 mm ovoid focus of restricted diffusion at the junction of the right thalamus and posterior internal capsule demonstrates T2/FLAIR hyperintensity without enhancement or evidence for hemorrhage consistent with a recent acute or early subacute   infarct. Mild mucosal thickening within the ethmoid air cells. Paranasal sinuses are otherwise free from significant disease. Patchy fluid within the left mastoid air cells. Right appear clear. This is unchanged. Intraorbital contents are unremarkable.   There is mild generalized prominence of the sulci and ventricles, consistent with underlying volume loss. Intracranial flow voids are intact. There is no mass effect, midline shift, or extraaxial collection. There are patchy areas of T2/FLAIR   hyperintensity within the cerebral white matter that are nonspecific but probably reflect the sequela of chronic small vessel ischemic disease. No evidence for acute or chronic intracranial blood products.    HEAD MRA: Examination is degraded by motion artifact, but there does appear to be mild to moderate narrowing of the M1 segment of the right middle cerebral artery. No proximal large vessel occlusion, aneurysm or AVM.    NECK MRA: Three-vessel arch.  Antegrade flow within both carotid and vertebral arteries. No evidence for dissection or hemodynamically significant narrowing in the neck by NASCET criteria.       Impression    CONCLUSION:  HEAD MRI:  1.  6 mm acute or early subacute infarct in the right thalamus/internal capsule.  2.  No hemorrhage.  3.  Moderate burden of presumed chronic small vessel ischemic changes.  4.  Mild generalized atrophy.  5.  Chronic left mastoid effusion.    HEAD MRA:  1.  Examination is degraded by motion artifact, but there does appear to be mild to moderate narrowing of the M1 segment of the right middle cerebral artery. This appears progressed versus 2021.   2.  No proximal large vessel occlusion, aneurysm or AVM.    NECK MRA:  1.  No evidence for dissection or hemodynamically significant narrowing in the neck based on NASCET criteria.   MRA Neck (Carotids) wo & w Contrast    Narrative    1. HEAD MRI WITHOUT AND WITH IV CONTRAST  2. HEAD MRA WITHOUT IV CONTRAST   3. NECK MRA WITHOUT AND WITH IV CONTRAST   8/30/2022 12:27 PM      INDICATION: Recent thalamic stroke. Lip numbness and weakness.  TECHNIQUE:  1. Head MRI without and with IV contrast.  2. Head MRA without IV contrast.  3. Neck MRA without and with IV contrast including gadolinium bolus sequence.  CONTRAST: 10 mL Gadavist   COMPARISON: Head CT 8/29/2022, brain MRI/MRA and neck MRA 5/29/2021.    FINDINGS:  HEAD MRI: 6 mm ovoid focus of restricted diffusion at the junction of the right thalamus and posterior internal capsule demonstrates T2/FLAIR hyperintensity without enhancement or evidence for hemorrhage consistent with a recent acute or early subacute   infarct. Mild mucosal thickening within the ethmoid air cells. Paranasal sinuses are otherwise free from significant disease. Patchy fluid within the left mastoid air cells. Right appear clear. This is unchanged. Intraorbital contents are unremarkable.   There is mild generalized prominence of the sulci and ventricles,  consistent with underlying volume loss. Intracranial flow voids are intact. There is no mass effect, midline shift, or extraaxial collection. There are patchy areas of T2/FLAIR   hyperintensity within the cerebral white matter that are nonspecific but probably reflect the sequela of chronic small vessel ischemic disease. No evidence for acute or chronic intracranial blood products.    HEAD MRA: Examination is degraded by motion artifact, but there does appear to be mild to moderate narrowing of the M1 segment of the right middle cerebral artery. No proximal large vessel occlusion, aneurysm or AVM.    NECK MRA: Three-vessel arch. Antegrade flow within both carotid and vertebral arteries. No evidence for dissection or hemodynamically significant narrowing in the neck by NASCET criteria.       Impression    CONCLUSION:  HEAD MRI:  1.  6 mm acute or early subacute infarct in the right thalamus/internal capsule.  2.  No hemorrhage.  3.  Moderate burden of presumed chronic small vessel ischemic changes.  4.  Mild generalized atrophy.  5.  Chronic left mastoid effusion.    HEAD MRA:  1.  Examination is degraded by motion artifact, but there does appear to be mild to moderate narrowing of the M1 segment of the right middle cerebral artery. This appears progressed versus 2021.   2.  No proximal large vessel occlusion, aneurysm or AVM.    NECK MRA:  1.  No evidence for dissection or hemodynamically significant narrowing in the neck based on NASCET criteria.       Marina Hobbs MD  Hospitalist  [facility]

## 2022-08-30 NOTE — PHARMACY-ADMISSION MEDICATION HISTORY
Pharmacy Note - Admission Medication History    Pertinent Provider Information: none     ______________________________________________________________________    Prior To Admission (PTA) med list completed and updated in EMR.       PTA Med List   Medication Sig Note Last Dose     acetaminophen (TYLENOL) 500 MG tablet Take 1,000 mg by mouth every 6 hours 8/29/2022: Takes scheduled, sometimes more, per daughter.  8/29/2022 at am     alpha lipoic acid 200 mg cap [ALPHA LIPOIC ACID 200 MG CAP] Take 1 capsule by mouth 2 (two) times a day.  8/29/2022 at am     baclofen (LIORESAL) 10 MG tablet TAKE 1 TABLET BY MOUTH THREE TIMES DAILY (Patient taking differently: Take 10 mg by mouth 2 times daily)  8/29/2022 at am     buPROPion (WELLBUTRIN SR) 150 MG 12 hr tablet TAKE 1 TABLET BY MOUTH EVERY DAY  8/29/2022 at Unknown time     cefdinir (OMNICEF) 300 MG capsule Take 1 capsule (300 mg) by mouth 2 times daily for 10 days 8/29/2022: Take 8/27 - 9/6/22.  8/29/2022 at am (day 2 of 10)     Cranberry 400 MG CAPS Take 400 mg by mouth daily  8/29/2022 at Unknown time     estradiol (ESTRACE) 0.1 MG/GM vaginal cream Place 1 g vaginally twice a week Sundays and Thursdays 8/28/2022 at Unknown time     ferrous sulfate (FEROSUL) 325 (65 Fe) MG tablet TAKE 1 TABLET BY MOUTH EVERY DAY WITH BREAKFAST  8/29/2022 at Unknown time     FLUoxetine (PROZAC) 10 MG capsule TAKE 3 CAPSULES BY MOUTH EVERY DAY  8/29/2022 at Unknown time     gabapentin (NEURONTIN) 100 MG capsule TAKE 1 CAPSULE IN THE MORNING, 2 CAPSULES IN THE AFTERNOON AND 1 CAPSULE IN THE EVENING.  8/29/2022 at am     levothyroxine (SYNTHROID/LEVOTHROID) 75 MCG tablet TAKE 1 TABLET BY MOUTH EVERY DAY  8/29/2022 at Unknown time     losartan (COZAAR) 50 MG tablet TAKE 1 TABLET BY MOUTH TWICE A DAY  8/29/2022 at am     melatonin 10 mg Tab [MELATONIN 10 MG TAB] Take 10 mg by mouth bedtime.  8/28/2022 at Unknown time     memantine (NAMENDA) 10 MG tablet TAKE 1 TABLET BY MOUTH TWICE A DAY   8/29/2022 at am     omega-3 fatty acids 500 mg cap Take 500 mg by mouth daily  8/29/2022 at Unknown time     psyllium (METAMUCIL/KONSYL) Packet Take 1 packet by mouth daily  8/28/2022 at Unknown time     STRONTIUM CHLORIDE HEXAHYDRATE (STRONTIUM CHL HEXAHYD, BULK,) 100 % Kailyn [STRONTIUM CHLORIDE HEXAHYDRATE (STRONTIUM CHL HEXAHYD, BULK,) 100 % KAILYN] Use As Directed.  Unknown at Unknown time     UNABLE TO FIND Take 1 capsule by mouth 2 times daily MEDICATION NAME: Uric Acid formula, supplement.  8/29/2022 at am     VIT C/E/ZN/COPPR/LUTEIN/ZEAXAN (PRESERVISION AREDS 2 ORAL) [VIT C/E/ZN/COPPR/LUTEIN/ZEAXAN (PRESERVISION AREDS 2 ORAL)] Take 1 tablet by mouth 2 (two) times a day.   8/29/2022 at am       Information source(s): Patient, Family member and [facility]  Method of interview communication: in-person    Summary of Changes to PTA Med List  New: uric acid, metamucil, extra strength Tylenol   Discontinued: none  Changed: baclofen prescribed tid, taken bid; estrace applied Sun + Thurs, cranberry 400 mg once daily, fish oil 500 mg once daily    Patient was asked about OTC/herbal products specifically.  PTA med list reflects this.    In the past week, patient estimated taking medication this percent of the time:  greater than 90%.    Allergies were reviewed, assessed, and updated with the patient.      Patient does not anticipate needing any multi-use medications during admission.    The information provided in this note is only as accurate as the sources available at the time of the update(s).    Thank you for the opportunity to participate in the care of this patient.    Lavonne Muir [facility]  8/29/2022 7:06 PM

## 2022-08-30 NOTE — ED NOTES
Pt had recent UTI and on ABX for this.  She is here after falls over past couple of days at her assisted living (sounds like she doesn't really have many services yet).  Pt fell onto knees and face planted a few days ago.  Has an abrasion to forehead and bridge of nose.  She was brought in because of recent falls, generalized weakness and c/o numbness to face.  Pt has what appears to be a drop foot on the right and has trouble with plantar and dorsiflexion.  Her left leg is shorter as well than her right which also may contribute to her falls.  Pt does have some numbness to the left side of her face (mostly cheek) that she claims is getting better.  Overnight pt did have episode of chest/esophageal/throat pain 8/10 that she felt was heartburn.  She was given TUMS with no relief and then a GI cocktail.  Fell asleep soon after GI cocktail and later when she awoke said her pain was 2/10 and getting better.  B/P have been elevated most of the night but she did not require any PRN antihypertensives.

## 2022-08-30 NOTE — PROGRESS NOTES
Speech-Language Pathology: Clinical Swallow Evaluation     08/30/22 1000   General Information   Onset of Illness/Injury or Date of Surgery 08/29/22   Referring Physician Marina Hobbs MD   Patient/Family Therapy Goal Statement (SLP) To eat breakfast   Pertinent History of Current Problem Ellis Em is a 89 year old female past medical history significant for hypertension, dyslipidemia, hypothyroidism, mild dementia lives in assisted living facility that presented to ED w/ c/o L sided weakness and lip numbness x 1 day. Pt had significant hx of x 2 falls over the last 2 days and underwent MRI imaging with outside source which demonstrated acute thalamic lacunar infarct. Pt presented today for Follow-up and continuing lip and L sided facial numbness. Recently dx'd with UTI and rx'd cefdinir at [facility] 2 days ago.. Repeat CT head in ED today showed chronic infarcts. pt is A&Ox3 without any other focal deficits. No urinary complaints today. Neurology consult recommending admit overnight for stroke work up and evaluation.   She denied any other speech or vision changes, headache.  No urinary or bowel complaints.   General Observations Patient is alert and cooperative. Her dtr is present in the room.   Past History of Dysphagia None   Type of Evaluation   Type of Evaluation Swallow Evaluation   Oral Motor   Oral Musculature generally intact   Dentition (Oral Motor)   Dentition (Oral Motor) natural dentition;adequate dentition   Facial Symmetry (Oral Motor)   Facial Symmetry (Oral Motor) left side impairment   Left Side Facial Asymmetry minimal impairment   Lip Function (Oral Motor)   Lip Range of Motion (Oral Motor) WNL   Lip Sensitivity (Oral Motor) left upper lip decreased;left lower lip decreased   Lip Strength (Oral Motor) WNL   Tongue Function (Oral Motor)   Tongue Sensitivity (Oral Motor) intact   Tongue Strength (Oral Motor) WNL   Tongue Coordination/Speed (Oral Motor) WNL   Tongue ROM (Oral Motor) WNL   Jaw  Function (Oral Motor)   Jaw Function (Oral Motor) WNL   Cough/Swallow/Gag Reflex (Oral Motor)   Soft Palate/Velum (Oral Motor) WNL   Vocal Quality/Secretion Management (Oral Motor)   Vocal Quality (Oral Motor) WNL   Secretion Management (Oral Motor) WNL   General Swallowing Observations   Current Diet/Method of Nutritional Intake (General Swallowing Observations, NIS) thin liquids (level 0);regular diet   Swallowing Evaluation Clinical swallow evaluation   Clinical Swallow Evaluation   Clinical Swallow Evaluation Textures Trialed solid foods;thin liquids   Clinical Swallow Eval: Thin Liquid Texture Trial   Mode of Presentation, Thin Liquids straw   Volume of Liquid or Food Presented 4oz   Oral Phase of Swallow WFL   Pharyngeal Phase of Swallow coughing/choking  (x1 via large consecutive straw sips only. No s/s with strategies.)   Successful Strategies Trialed During Procedure   (Small single sips)   Diagnostic Statement Patient reported that she needs to use a straw to avoid left anterior oral loss. She did not have oral loss during evaluation via cup or straw.   Clinical Swallow Evaluation: Solid Food Texture Trial   Mode of Presentation self-fed   Volume Presented x4 bites (declined further trials including breakfast tray)   Oral Phase WFL   Pharyngeal Phase intact   Diagnostic Statement Patient independently used lingual sweep to clear debris.   Esophageal Phase of Swallow   Patient reports or presents with symptoms of esophageal dysphagia Yes   Esophageal comments Possible reflux symptoms reported and treated this morning but not observed during evaluation.   Swallowing Recommendations   Diet Consistency Recommendations thin liquids (level 0);regular diet   Supervision Level for Intake close supervision needed   Mode of Delivery Recommendations bolus size, small;slow rate of intake   Monitoring/Assistance Required (Eating/Swallowing) cue for finger/lingual sweep if oral pocketing present;stop eating activities  when fatigue is present   Recommended Feeding/Eating Techniques (Swallow Eval) maintain upright sitting position for eating   Medication Administration Recommendations, Swallowing (SLP) One at a time   Instrumental Assessment Recommendations instrumental evaluation not recommended at this time   General Therapy Interventions   Planned Therapy Interventions Dysphagia Treatment   Dysphagia treatment Instruction of safe swallow strategies   Clinical Impression   Criteria for Skilled Therapeutic Interventions Met (SLP Eval) Yes, treatment indicated   Risks & Benefits of therapy have been explained evaluation/treatment results reviewed;care plan/treatment goals reviewed;patient;daughter   Clinical Impression Comments Clinical Swallow Study completed. Patient had immediate cough x1 via large consecutive straw sips. No other s/s aspiration present with use of swallow strategies. Oral motor function was mildly impaired but functional. Patient has good awareness of deficits related to facial numbness. Mastication was WFL. Hyolaryngeal elevation appears present upon visualization and palpation. SLP will follow up to ensure diet tolerance and strategy use.   SLP Discharge Planning   SLP Discharge Recommendation   (Per treatment team)   SLP Rationale for DC Rec No anticipated ST at d/c   SLP Brief overview of current status  Patient is appropriate to continue regular textures and thin liquids. She must be upright to 90 degrees, take small single sips, slow rate, and use lingual/finger sweep as needed. Patient and her dtr deny change in speech, language, or cognition. She follows instructions well, communicates her needs verbally, and does not present with dysarthria.    Total Evaluation Time   Total Evaluation Time (Minutes) 20

## 2022-08-30 NOTE — PROGRESS NOTES
Chart reviewed. Resides at [facility]. PT/OT pending. CM to assess and continue to follow.

## 2022-08-30 NOTE — H&P
"Admission History & Physical  Ellis Em, 4/21/1931, [id number]    [facility]  Whitney Velasquez, [phone]    Assessment and Plan:    Acute thalamic lacunar infarct   - continue ASA, statin allergy noted  - permissive HTN, consider antihypertensive if BP >200 systolic   - neuro check q 2 hrs x 3 then q 4 hrs   neurology consult   - order lipid panel   PT OT eval  Obtain MRI results  Telemetry monitor  Stroke order set    Recent UTI   - continue cefdinir and tailor abx as indicated by pending UC     Peripheral neuropathy   - continue at home meds     Major depression, panic attack   - continue at home meds   DVT prophylaxis: Subcu heparin  Code: Full    Expected length of stay: 1-2 days     Chief Complaint: facial numbness     HPI:     Ellis Em is a 89 year old female past medical history significant for hypertension, dyslipidemia, hypothyroidism, mild dementia lives in assisted living facility that presented to ED w/ c/o L sided weakness and lip numbness x 1 day. Pt had significant hx of x 2 falls over the last 2 days and underwent MRI imaging with outside source which demonstrated acute thalamic lacunar infarct. Pt presented today for Follow-up and continuing lip and L sided facial numbness. Recently dx'd with UTI and rx'd cefdinir at [facility] 2 days ago.. Repeat CT head in ED today showed chronic infarcts. pt is A&Ox3 without any other focal deficits. No urinary complaints today. Neurology consult recommending admit overnight for stroke work up and evaluation.   She denied any other speech or vision changes, headache.  No urinary or bowel complaints.    Medical History  Past Medical History:   Diagnosis Date     Bowel obstruction (H) 1974     C1 cervical fracture (H) 10/12/2015     Cancer (H) breast, 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, salivary gland     Cancer of skin     \"half dollar size area removed from scalp\", precancer in abd (gallbladder & 1/2 of stomach removed\"     Carpal tunnel syndrome  "     Cervical spondylosis with myelopathy 4/25/2013     Chronic pain      Closed fracture of cervical vertebra (H) 10/21/2015     Replacement Utility updated for latest IMO load     Closed fracture of lumbar vertebra (H) 1/21/2015     Replacement Utility updated for latest IMO load     Cyst of spinal meninges      Dermatitis      Disorders of meninges, not elsewhere classified     Created by Conversion      Gastric ulcer 1983    precancerous     Hip fracture (H) 2007     History of adrenal adenoma 5/25/2021     History of appendectomy     1943, 12 years old     History of fractured pelvis 1985     Hyponatremia      Hypothyroidism      Leg pain, bilateral     weakness bilaterally     Major depression      Miscarriage 1962    x2     Neck fracture (H) 09/10/2015     Nephrolithiasis      Osteoarthritis      Osteoporosis      Panic attack      Peripheral neuropathy      Reactive confusion      Renal insufficiency      Rib fracture 2008    x2, with pelvix fx     Shingles      Status post lumbar laminectomy 11/7/2014     UTI (urinary tract infection)      Varicella 7/14/2004    Overview:  LW Onset:  [id number] ; Varicella Zoster      Patient Active Problem List    Diagnosis Date Noted     Thalamic infarct, acute (H) 08/29/2022     Priority: Medium     Lip numbness 08/29/2022     Priority: Medium     Hypertension, unspecified type 08/29/2022     Priority: Medium     Mobility impaired 03/07/2022     Priority: Medium     Foot drop, right 03/07/2022     Priority: Medium     Cognitive changes 03/07/2022     Priority: Medium     Plantar fasciitis 03/07/2022     Priority: Medium     Right thyroid nodule 03/07/2022     Priority: Medium     Hoarse voice quality 03/07/2022     Priority: Medium     Shortness of breath  03/07/2022     Priority: Medium     Recurrent urinary tract infection 10/05/2021     Priority: Medium     Antalgic gait 05/25/2021     Priority: Medium     Fatigue, unspecified type 05/25/2021     Priority: Medium      Major depressive disorder, recurrent episode, in full remission (H)      Priority: Medium     Created by Conversion         Breast implant rupture bilateral  07/27/2017     Priority: Medium     Macular degeneration mild  07/06/2017     Priority: Medium     AMD         Hypothyroidism      Priority: Medium     Created by Conversion  Replacement Utility updated for latest IMO load         Tinnitus 04/06/2016     Priority: Medium     Allergic rhinitis 03/24/2015     Priority: Medium     Generalized osteoarthritis of multiple sites 01/21/2015     Priority: Medium     Polyneuropathy, peripheral sensorimotor axonal 01/21/2015     Priority: Medium     Essential hypertension 12/21/2014     Priority: Medium     Idiopathic osteoporosis      Priority: Medium     Created by Conversion  [facility] Annotation: May 23 2013 11:44AM Ema Mejia: Has been   on   reclast and prolia, d/c due to side effects         Lumbar stenosis with neurogenic claudication 11/06/2014     Priority: Medium     Adrenal mass, left (H) 09/24/2014     Priority: Medium     Neurogenic bladder 10/23/2013     Priority: Medium     ACP (advance care planning) 05/03/2013     Priority: Medium     Overview:   Patient has identified Health Care Agent(s): No  Add Health Care Agents: No  Patient has Advance Care Plan Documents (Health Care Directive, POLST):   No,  .  Patient has identified Specific Treatment Preferences: No   Specific limits to treatment preferences NOT identified: ASSUME FULL   TREATMENT.         Cervical spondylosis with myelopathy 04/25/2013     Priority: Medium     Actinic keratosis 07/14/2004     Priority: Medium     Overview:   LW Onset:  [id number]         Cyst of ovary 07/14/2004     Priority: Medium     Overview:   LW Onset:  32Qsx61  ; Cyst Ovary         Hyperlipidemia 07/14/2004     Priority: Medium     Overview:   LW Onset:  10Qby42         Malignant neoplasm of skin of parts of face 07/14/2004     Priority: Medium     Overview:   LW  Onset:  96Onp99  ; Basal Cell Ca Face         Hearing loss 06/07/2003     Priority: Medium     Overview:   Hearing Loss  NOS            Surgical History  She  has a past surgical history that includes Pr Arthroplasty Tibial Plateau (Left); Salivary gland surgery (12/05/1984); Cataract Extraction, Bilateral; Cholecystectomy (06/30/1983); other surgical history; Total Hip Arthroplasty (Bilateral); Ovarian Cyst Removal (1943); Abdomen surgery (06/30/1983); joint replacement; fracture surgery (1985); Cervical Laminoplasty (Bilateral, 5/2/2013); Mastectomy (Bilateral); Lumbar Laminectomy (N/A, 11/6/2014); QUIGLEY W/O FACETEC FORAMOT/DSKC 1/2 VRT SEG, LUMBAR; appendectomy (1943); tubal ligation (1962); Revise reconstructed breast (Bilateral, 11/11/1984); Endometrial Biopsy (09/09/1986); Dilation and curettage (09/19/1986); Combined Mammaplasty Revision W/ Abdominoplasty (07/1998); Root Canal (09/14/1998); Skin Cancer Excision (08/15/2003); Total Knee Arthroplasty (10/20/2005); and Tumor Excision (05/01/2013).     Past Surgical History:   Procedure Laterality Date     ABDOMEN SURGERY  06/30/1983    1/2 of stomach organ removed due to precancerous ulcer     APPENDECTOMY  1943     CATARACT EXTRACTION, BILATERAL       CERVICAL LAMINOPLASTY Bilateral 5/2/2013    C1-3 cervical laminoplasty, removal of intracanaalicular extradural ventrally located cyst on 5/2/2013 by Dr. Corbin at [facility] for treatment of spinal cord compression and myelopathy     CHOLECYSTECTOMY  06/30/1983     COMBINED MAMMAPLASTY REVISION W/ ABDOMINOPLASTY  07/1998     DILATION AND CURETTAGE  09/19/1986     ENDOMETRIAL BIOPSY  09/09/1986     FRACTURE SURGERY  1985    Plate in Left hip; pins in then removed from right hip     JOINT REPLACEMENT      left total knee     LUMBAR LAMINECTOMY N/A 11/6/2014    Procedure: BILATERAL DECOMPRESSIVE LAMINECTOMY L1-L5 (REDO L4 L5);  Surgeon: Trice Corbin MD;  Location: [facility];   Service:      MASTECTOMY Bilateral      OTHER SURGICAL HISTORY      partial colectomy     OVARIAN CYST REMOVAL  1943    chocolate cysts removed     TX ARTHROPLASTY TIBIAL PLATEAU Left     Description: Knee Replacement;  Recorded: 05/23/2013;     REVISE RECONSTRUCTED BREAST Bilateral 11/11/1984    with implants, nipple attachment 1/13/1985     ROOT CANAL  09/14/1998     SALIVARY GLAND SURGERY  12/05/1984    salivary gland and neck tumor removed     SKIN CANCER EXCISION  08/15/2003    ear     TOTAL HIP ARTHROPLASTY Bilateral     armature installed     TOTAL KNEE ARTHROPLASTY  10/20/2005     TUBAL LIGATION  1962     TUMOR EXCISION  05/01/2013    in spine     [facility] QUIGLEY W/O FACETEC FORAMOT/[facility]KC 1/2 VRT SEG, LUMBAR      Description: Laminectomy Decompressive Up To Two Lumbar Segments;  Recorded: 11/10/2014;       Allergies  Allergies   Allergen Reactions     Iodinated Contrast Media [Diagnostic X-Ray Materials] Hives     Adhesive [Mecrylate] Unknown     Adhesive tape     Amlodipine Unknown     drowsiness     Asparaginase Unknown     Atorvastatin Unknown     PN: LW Reaction: arthalgia     Ciprofloxacin Unknown     Codeine Unknown     Lactose      Food, okay if in medicine.      Latex Unknown     PN: Converted from LW Latex Sensitivity Flag     Lisinopril Cough     Pravastatin Unknown     Propoxyphene N-Acetaminophen [Propoxyphene N-Apap] Unknown     PN: LW Reaction: hallucinations     Sulfa (Sulfonamide Antibiotics) [Sulfa Drugs] Unknown     Albuterol Unknown     Other reaction(s): Urinary Retention       Prior to Admission Medications   (Not in a hospital admission)      Social History  Reviewed, and she  reports that she has never smoked. She has never used smokeless tobacco. She reports that she does not drink alcohol and does not use drugs.  Social History     Tobacco Use     Smoking status: Never Smoker     Smokeless tobacco: Never Used   Substance Use Topics     Alcohol use: No       Family History  Reviewed, and I  "have reviewed this patient's family history and updated it with pertinent information if needed.  Family History   Problem Relation Age of Onset     Cancer Mother      Kidney Disease Father      Alcoholism Brother      Diabetes Brother      Dementia Brother           Review Of Systems  Complete As per admission HPI, all other systems reviewed and negative.     Physical Exam:  Vital signs:  Temp: 98.8  F (37.1  C) Temp src: Oral BP: (!) 214/88 Pulse: 71   Resp: 22 SpO2: 99 % O2 Device: None (Room air)   Height: 165.1 cm (5' 5\") Weight: 65.8 kg (145 lb)  Estimated body mass index is 24.13 kg/m  as calculated from the following:    Height as of this encounter: 1.651 m (5' 5\").    Weight as of this encounter: 65.8 kg (145 lb).    General Appearance:  Awake Alert, orientedx3, not in any apparent distress   Head:  Normocephalic, without obvious abnormality   Eyes:  PERRL, EOMI. conjunctiva/corneas clear   Throat: Oral mucosa moist   Neck: Supple,  no JVD   Lungs:   Clear to auscultation bilaterally, respirations unlabored   Chest Wall:  No tenderness   Heart:  Regular rate and rhythm, S1, S2 normal,no murmur   Abdomen:   Soft, non-tender, bowel sounds present,  no guarding, rigidity    Extremities:  no edema, no joint swelling   Skin: Minor abrasions are noted to forehead and nasal bridge. Skin color, texture, turgor normal.   Neurologic: Alert and oriented X 3, cranial nerves II to XII are intact, sensation grossly intact no focal deficits.  strength intact. LE strength intact.      Results:  Labs Ordered and Resulted from Time of ED Arrival to Time of ED Departure   COMPREHENSIVE METABOLIC PANEL - Abnormal       Result Value    Sodium 133 (*)     Potassium 4.3      Chloride 99      Carbon Dioxide (CO2) 27      Anion Gap 7      Urea Nitrogen 18      Creatinine 0.63      Calcium 9.1      Glucose 96      Alkaline Phosphatase 95      AST 32      ALT 41      Protein Total 6.6      Albumin 3.3 (*)     Bilirubin Total 0.3  "     GFR Estimate 83     ROUTINE UA WITH MICROSCOPIC REFLEX TO CULTURE - Abnormal    Color Urine Colorless      Appearance Urine Clear      Glucose Urine Negative      Bilirubin Urine Negative      Ketones Urine Negative      Specific Gravity Urine 1.008      Blood Urine 0.06 mg/dL (*)     pH Urine 6.5      Protein Albumin Urine Negative      Urobilinogen Urine <2.0      Nitrite Urine Positive (*)     Leukocyte Esterase Urine Negative      Bacteria Urine Many (*)     RBC Urine 1      WBC Urine <1      Squamous Epithelials Urine <1     TROPONIN I - Normal    Troponin I <0.01     CRP INFLAMMATION - Normal    CRP 0.2     COVID-19 VIRUS (CORONAVIRUS) BY PCR - Normal    SARS CoV2 PCR Negative     CBC WITH PLATELETS AND DIFFERENTIAL    WBC Count 7.9      RBC Count 3.98      Hemoglobin 12.7      Hematocrit 39.2      MCV 99      MCH 31.9      MCHC 32.4      RDW 12.7      Platelet Count 282      % Neutrophils 72      % Lymphocytes 15      % Monocytes 9      % Eosinophils 2      % Basophils 1      % Immature Granulocytes 1      NRBCs per 100 WBC 0      Absolute Neutrophils 5.7      Absolute Lymphocytes 1.2      Absolute Monocytes 0.7      Absolute Eosinophils 0.2      Absolute Basophils 0.1      Absolute Immature Granulocytes 0.1      Absolute NRBCs 0.0     URINE CULTURE      EKG:  EKG: Normal sinus rhythm, Q waves in anterior leads    Imaging:   Chest XR,  PA & LAT    Result Date: 8/29/2022  EXAM: XR CHEST 2 VIEWS LOCATION: [facility] DATE/TIME: 8/29/2022 4:14 PM INDICATION: weakness COMPARISON: 1/8/2022     IMPRESSION: The right hemidiaphragm is elevated.The heart is enlarged.  The lungs are clear.     Cervical spine CT w/o contrast    Result Date: 8/27/2022  EXAM: CT CERVICAL SPINE W/O CONTRAST LOCATION: [facility] DATE/TIME: 8/27/2022 7:41 PM INDICATION: Neck injury. COMPARISON: None. TECHNIQUE: Routine CT Cervical Spine without IV contrast. Multiplanar reformats. Dose  reduction techniques were used. FINDINGS: VERTEBRA: Normal vertebral body heights. No fracture or posttraumatic subluxation. 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. Left laminoplasty C1-C2. CANAL/FORAMINA: At C4-C5 and C5-C6 there is moderate right foraminal stenosis. PARASPINAL: No extraspinal abnormality.     IMPRESSION: 1.  No fracture or posttraumatic subluxation. 2.  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    Head CT w/o contrast    Result Date: 8/29/2022  EXAM: CT HEAD W/O CONTRAST LOCATION: [facility] DATE/TIME: 8/29/2022 4:23 PM INDICATION: headache after trauma COMPARISON: 08/27/2022 TECHNIQUE: Routine CT Head without IV contrast. Multiplanar reformats. Dose reduction techniques were used. FINDINGS: INTRACRANIAL CONTENTS: No intracranial hemorrhage, extraaxial collection, or mass effect.  No CT evidence of acute infarct. Moderate presumed chronic small vessel ischemic changes. Small chronic lacunar infarction anterior aspect basal ganglia bilaterally. Moderate generalized volume loss. No hydrocephalus. VISUALIZED ORBITS/SINUSES/MASTOIDS: No intraorbital abnormality. No paranasal sinus mucosal disease. Opacification left mastoid air cells and middle ear cavity. BONES/SOFT TISSUES: No acute abnormality.     IMPRESSION: 1.  No acute intracranial process.    Head CT w/o contrast    Result Date: 8/27/2022  EXAM: CT HEAD W/O CONTRAST LOCATION: [facility] DATE/TIME: 8/27/2022 7:40 PM INDICATION: Head injury COMPARISON: None. TECHNIQUE: Routine CT Head without IV contrast. Multiplanar reformats. Dose reduction techniques were used. FINDINGS: INTRACRANIAL CONTENTS: No intracranial hemorrhage, extraaxial collection, or mass effect.  No CT evidence of acute infarct. Mild to moderate presumed chronic small vessel ischemic changes. Mild to moderate generalized volume loss. No hydrocephalus. VISUALIZED ORBITS/SINUSES/MASTOIDS: Prior  bilateral cataract surgery. Visualized portions of the orbits are otherwise unremarkable. No paranasal sinus mucosal disease. No middle ear or mastoid effusion. BONES/SOFT TISSUES: No acute abnormality.     IMPRESSION: 1.  No acute intracranial process.      YANNI Pearson    I agree with the documentation and findings as written by Taylor LIAO and our discussed and formulated assessment and plan. I was present with her who participated in the service and documentation of the note. I have also personally verified the history and personally performed the physical exam and medical decision-making. I agree with the assessment and plan of care as documented in the note.    Marina Hobbs MD  Internal Medicine  Hospitalist  [facility]  Phone: #[phone]    8/29/2022  8:09 PM

## 2022-08-31 ENCOUNTER — APPOINTMENT (OUTPATIENT)
Dept: PHYSICAL THERAPY | Facility: CLINIC | Age: 87
DRG: 065 | End: 2022-08-31
Attending: INTERNAL MEDICINE
Payer: MEDICARE

## 2022-08-31 ENCOUNTER — APPOINTMENT (OUTPATIENT)
Dept: OCCUPATIONAL THERAPY | Facility: CLINIC | Age: 87
DRG: 065 | End: 2022-08-31
Attending: INTERNAL MEDICINE
Payer: MEDICARE

## 2022-08-31 LAB
FOLATE SERPL-MCNC: 6.8 NG/ML (ref 4.6–34.8)
VIT B12 SERPL-MCNC: 721 PG/ML (ref 232–1245)

## 2022-08-31 PROCEDURE — 97535 SELF CARE MNGMENT TRAINING: CPT | Mod: GO

## 2022-08-31 PROCEDURE — 99233 SBSQ HOSP IP/OBS HIGH 50: CPT | Performed by: INTERNAL MEDICINE

## 2022-08-31 PROCEDURE — 97530 THERAPEUTIC ACTIVITIES: CPT | Mod: GP

## 2022-08-31 PROCEDURE — 250N000013 HC RX MED GY IP 250 OP 250 PS 637: Performed by: INTERNAL MEDICINE

## 2022-08-31 PROCEDURE — 250N000011 HC RX IP 250 OP 636: Performed by: INTERNAL MEDICINE

## 2022-08-31 PROCEDURE — 97161 PT EVAL LOW COMPLEX 20 MIN: CPT | Mod: GP

## 2022-08-31 PROCEDURE — 210N000002 HC R&B HEART CARE

## 2022-08-31 PROCEDURE — 96366 THER/PROPH/DIAG IV INF ADDON: CPT

## 2022-08-31 PROCEDURE — 97165 OT EVAL LOW COMPLEX 30 MIN: CPT | Mod: GO

## 2022-08-31 RX ORDER — AMOXICILLIN 250 MG
1 CAPSULE ORAL 2 TIMES DAILY
Status: DISCONTINUED | OUTPATIENT
Start: 2022-08-31 | End: 2022-09-01 | Stop reason: HOSPADM

## 2022-08-31 RX ORDER — GRANULES FOR ORAL 3 G/1
3 POWDER ORAL ONCE
Status: COMPLETED | OUTPATIENT
Start: 2022-08-31 | End: 2022-08-31

## 2022-08-31 RX ORDER — GRANULES FOR ORAL 3 G/1
3 POWDER ORAL ONCE
Status: DISCONTINUED | OUTPATIENT
Start: 2022-08-31 | End: 2022-08-31

## 2022-08-31 RX ORDER — POLYETHYLENE GLYCOL 3350 17 G/17G
17 POWDER, FOR SOLUTION ORAL DAILY
Status: DISCONTINUED | OUTPATIENT
Start: 2022-08-31 | End: 2022-09-01 | Stop reason: HOSPADM

## 2022-08-31 RX ORDER — LOSARTAN POTASSIUM 50 MG/1
100 TABLET ORAL 2 TIMES DAILY
Status: DISCONTINUED | OUTPATIENT
Start: 2022-08-31 | End: 2022-09-01 | Stop reason: HOSPADM

## 2022-08-31 RX ADMIN — ACETAMINOPHEN 1000 MG: 500 TABLET ORAL at 02:00

## 2022-08-31 RX ADMIN — ACETAMINOPHEN 1000 MG: 500 TABLET ORAL at 08:57

## 2022-08-31 RX ADMIN — BACLOFEN 10 MG: 10 TABLET ORAL at 21:15

## 2022-08-31 RX ADMIN — HEPARIN SODIUM 5000 UNITS: 5000 INJECTION, SOLUTION INTRAVENOUS; SUBCUTANEOUS at 08:57

## 2022-08-31 RX ADMIN — HEPARIN SODIUM 5000 UNITS: 5000 INJECTION, SOLUTION INTRAVENOUS; SUBCUTANEOUS at 21:15

## 2022-08-31 RX ADMIN — CEFEPIME 1 G: 1 INJECTION, POWDER, FOR SOLUTION INTRAMUSCULAR; INTRAVENOUS at 01:34

## 2022-08-31 RX ADMIN — ACETAMINOPHEN 1000 MG: 500 TABLET ORAL at 15:21

## 2022-08-31 RX ADMIN — BUPROPION HYDROCHLORIDE 150 MG: 150 TABLET, FILM COATED, EXTENDED RELEASE ORAL at 08:57

## 2022-08-31 RX ADMIN — BACLOFEN 10 MG: 10 TABLET ORAL at 08:56

## 2022-08-31 RX ADMIN — SENNOSIDES AND DOCUSATE SODIUM 1 TABLET: 50; 8.6 TABLET ORAL at 21:14

## 2022-08-31 RX ADMIN — GABAPENTIN 100 MG: 100 CAPSULE ORAL at 21:14

## 2022-08-31 RX ADMIN — LOSARTAN POTASSIUM 100 MG: 50 TABLET, FILM COATED ORAL at 21:14

## 2022-08-31 RX ADMIN — ASPIRIN 81 MG CHEWABLE TABLET 81 MG: 81 TABLET CHEWABLE at 08:56

## 2022-08-31 RX ADMIN — ACETAMINOPHEN 1000 MG: 500 TABLET ORAL at 21:14

## 2022-08-31 RX ADMIN — LEVOTHYROXINE SODIUM 75 MCG: 0.07 TABLET ORAL at 08:57

## 2022-08-31 RX ADMIN — FERROUS SULFATE TAB 325 MG (65 MG ELEMENTAL FE) 325 MG: 325 (65 FE) TAB at 08:58

## 2022-08-31 RX ADMIN — GABAPENTIN 100 MG: 100 CAPSULE ORAL at 08:56

## 2022-08-31 RX ADMIN — FLUOXETINE 30 MG: 20 CAPSULE ORAL at 08:57

## 2022-08-31 RX ADMIN — MEMANTINE 10 MG: 10 TABLET ORAL at 08:56

## 2022-08-31 RX ADMIN — LOSARTAN POTASSIUM 100 MG: 50 TABLET, FILM COATED ORAL at 09:38

## 2022-08-31 RX ADMIN — GABAPENTIN 200 MG: 100 CAPSULE ORAL at 13:49

## 2022-08-31 RX ADMIN — MEMANTINE 10 MG: 10 TABLET ORAL at 21:13

## 2022-08-31 RX ADMIN — GRANULES FOR ORAL SOLUTION 3 G: 3 POWDER ORAL at 13:49

## 2022-08-31 ASSESSMENT — ACTIVITIES OF DAILY LIVING (ADL)
ADLS_ACUITY_SCORE: 48
ADLS_ACUITY_SCORE: 47
ADLS_ACUITY_SCORE: 47
CHANGE_IN_FUNCTIONAL_STATUS_SINCE_ONSET_OF_CURRENT_ILLNESS/INJURY: YES
ADLS_ACUITY_SCORE: 48
NUMBER_OF_TIMES_PATIENT_HAS_FALLEN_WITHIN_LAST_SIX_MONTHS: 4
WALKING_OR_CLIMBING_STAIRS: AMBULATION DIFFICULTY, REQUIRES EQUIPMENT
FALL_HISTORY_WITHIN_LAST_SIX_MONTHS: YES
ADLS_ACUITY_SCORE: 47
WERE_AUXILIARY_AIDS_OFFERED?: YES
TOILETING: 1-->ASSISTANCE (EQUIPMENT/PERSON) NEEDED
USE_OF_HEARING_ASSISTIVE_DEVICES: BILATERAL HEARING AIDS
TOILETING_MANAGEMENT: A1
TOILETING: 1-->ASSISTANCE (EQUIPMENT/PERSON) NEEDED (NOT DEVELOPMENTALLY APPROPRIATE)
TRANSFERRING: 1-->ASSISTANCE (EQUIPMENT/PERSON) NEEDED (NOT DEVELOPMENTALLY APPROPRIATE)
VISION_MANAGEMENT: GLASSES
TOILETING_ISSUES: YES
TOILETING_ASSISTANCE: TOILETING DIFFICULTY, ASSISTANCE 1 PERSON
ADLS_ACUITY_SCORE: 47
DRESSING/BATHING: BATHING DIFFICULTY, REQUIRES EQUIPMENT;BATHING DIFFICULTY, ASSISTANCE 1 PERSON;DRESSING DIFFICULTY, ASSISTANCE 1 PERSON
WEAR_GLASSES_OR_BLIND: YES
DRESSING/BATHING_DIFFICULTY: YES
DRESS: 1-->ASSISTANCE (EQUIPMENT/PERSON) NEEDED
DIFFICULTY_COMMUNICATING: NO
CONCENTRATING,_REMEMBERING_OR_MAKING_DECISIONS_DIFFICULTY: NO
HEARING_DIFFICULTY_OR_DEAF: YES
ADLS_ACUITY_SCORE: 51
ADLS_ACUITY_SCORE: 51
DESCRIBE_HEARING_LOSS: BILATERAL HEARING LOSS
DOING_ERRANDS_INDEPENDENTLY_DIFFICULTY: YES
WALKING_OR_CLIMBING_STAIRS_DIFFICULTY: YES
DRESS: 0-->ASSISTANCE NEEDED (DEVELOPMENTALLY APPROPRIATE)
ADLS_ACUITY_SCORE: 47
ADLS_ACUITY_SCORE: 47
DIFFICULTY_EATING/SWALLOWING: NO
TRANSFERRING: 1-->ASSISTANCE (EQUIPMENT/PERSON) NEEDED
BATHING: 1-->ASSISTANCE NEEDED
PATIENT'S_PREFERRED_MEANS_OF_COMMUNICATION: VERBAL
THE_FOLLOWING_AIDS_WERE_PROVIDED;: PATIENT DECLINED OFFER OF AUXILIARY AIDS

## 2022-08-31 NOTE — PLAN OF CARE
Patient has been A&Ox4.  Patient is a boarder this evening in the ED.  Patient states that she normally uses a wheelchair. Patient was pivot-transferred to bedside commode and back to bed a couple times through the night. Patient did really well.  Patient denies pain.  Blood pressures elevated, but per order, permissive pressures.  PRN meds to give if pressures >220.  VSS.

## 2022-08-31 NOTE — ED NOTES
Report given to Carissa MCPHERSON patient is ready to transfer to room 304.  Trevor Gonzalez RN  8/31/2022  3:46 PM

## 2022-08-31 NOTE — PROGRESS NOTES
08/31/22 1153   Quick Adds   Type of Visit Initial PT Evaluation   Living Environment   People in Home alone   Current Living Arrangements assisted living   Home Accessibility no concerns   Transportation Anticipated family or friend will provide   Self-Care   Usual Activity Tolerance fair   Number of times patient has fallen within last six months 4   Activity/Exercise/Self-Care Comment was getting help with toileting, bathing,going to bed at night, dressing.   General Information   Onset of Illness/Injury or Date of Surgery 08/29/22   Pertinent History of Current Problem (include personal factors and/or comorbidities that impact the POC) thalamic stroke   Existing Precautions/Restrictions no known precautions/restrictions   Cognition   Affect/Mental Status (Cognition) WFL   Follows Commands (Cognition) WFL   Range of Motion (ROM)   Range of Motion ROM deficits secondary to pain   Strength (Manual Muscle Testing)   Strength Comments general weakness, L LE grossly 3/5,  R LE has some prior strength limitations on R LE, quads 3-, hip  flex1,   Transfers   Transfers bed-chair transfer   Maintains Weight-bearing Status (Transfers) able to maintain   Transfer Safety Concerns Noted decreased sequencing ability;decreased weight-shifting ability   Impairments Contributing to Impaired Transfers pain;decreased ROM;decreased strength;impaired balance   Bed-Chair Transfer   Bed-Chair [place] (Transfers) contact guard   Comment, (Bed-Chair Transfer) contact guard to perform bed to chair transfer.  Difficult to fully accurately assess due to limitations in set up in assessing transfers within ED room   Balance   Balance Comments decreased balanace overall   Coordination   Coordination no deficits were identified   Muscle Tone   Muscle Tone no deficits were identified   Clinical Impression   Criteria for Skilled Therapeutic Intervention Yes, treatment indicated   PT Diagnosis (PT) impaired functional mobility   Influenced  by the following impairments weakenss, pain   Functional limitations due to impairments transfers, gait   Clinical Presentation (PT Evaluation Complexity) Evolving/Changing   Clinical Presentation Rationale Pt. presents as medically diagnosed   Clinical Decision Making (Complexity) moderate complexity   Planned Therapy Interventions (PT) balance training;strengthening;transfer training;patient/family education;home exercise program   Risk & Benefits of therapy have been explained evaluation/treatment results reviewed;risks/benefits reviewed;patient;spouse/significant other   PT Discharge Planning   PT Discharge Recommendation (DC Rec) home with assist;home with home care physical therapy;Transitional Care Facility   PT Rationale for DC Rec Patient would be able to return to home if had assist with transfers .  Currently only needing contact guard for all transfers.   If this can not be provided, would recommend TCU until patient is back to her baseline   Total Evaluation Time   Total Evaluation Time (Minutes) 10   Physical Therapy Goals   PT Frequency Daily   PT Predicted Duration/Target Date for Goal Attainment 09/07/22   PT Goals Transfers;PT Goal 1;Gait   PT: Transfers Modified independent;Bed to/from chair;Sit to/from stand   PT: Gait Minimal assist;Rolling walker;5 feet   PT: Goal 1 I with HEP

## 2022-08-31 NOTE — PROGRESS NOTES
"[facility] Medicine PROGRESS NOTE      Identification/Summary: Ellis Em is a 89 year old female with a past medical history of hypertension, dyslipidemia, hypothyroidism, mild dementia lives in assisted living facility that presented to ED w/ c/o L sided weakness and lip numbness x 1 day, couple of falls Who was admitted on 8/29/2022 for acute versus subacute thalamic infarct.  Neurologically stable.Stroke neurology saw her and recommended cardiac monitor at discharge.  Urine culture returned Klebsiella resistant to multiple antibiotics.  We will give her 1 dose of fosfomycin today.  She will likely discharge tomorrow to back to assisted living facility    Assessment and Plan:  Acute thalamic lacunar infarct   - continue ASA, statin allergy noted  - permissive HTN, consider antihypertensive if BP >200 systolic   neurology consult pending  Lipids noted.  PT OT eval recommending assisted living facility  Telemetry monitor no arrhythmias  Echocardiogram no thrombus.  Neurology recommended cardiac monitor for 30 days.    Recent UTI   Urine culture Klebsiella resistant to multiple antibiotics  Allergy to fluoroquinolones  on cefepime.  Discussed with pharmacy  Will give 1 dose of fosfomycin    Peripheral neuropathy   - continue at home meds      Major depression, panic attack   - continue at home meds   DVT prophylaxis: Subcu heparin  Code: Full    Hypertension:  Losartan dose increased  IV hydralazine if needed.    Diet: Regular Diet Adult  Code Status: Full Code    Anticipated possible discharge in 1 days once clinical improvement milestones are met.    Interval History/Subjective:  She reported her left-sided facial numbness is improving.  No other new focal weakness tingling numbness.    Physical Exam/Objective:  Vitals I/O   Vital signs:  Temp: 98.2  F (36.8  C) Temp src: Oral BP: (!) 141/63 Pulse: 71   Resp: 20 SpO2: 96 % O2 Device: None (Room air)   Height: 165.1 cm (5' 5\") Weight: 65.8 kg (145 " "lb)  Estimated body mass index is 24.13 kg/m  as calculated from the following:    Height as of this encounter: 1.651 m (5' 5\").    Weight as of this encounter: 65.8 kg (145 lb). No intake/output data recorded.     Body mass index is 24.13 kg/m .    General Appearance:  Alert, cooperative, no distress   Head:  Normocephalic, atraumatic   Eyes:  PERRL    Throat:  mucosa; moist   Neck: No JVD, thyromegaly   Lungs:   Clear to auscultation bilaterally, respirations unlabored   Chest Wall:  No tenderness or deformity   Heart:  Regular rate and rhythm, S1, S2 normal,no murmur   Abdomen:   Soft, non tender, non distended, bowel sounds present, no guarding or rigidity   Extremities: No edema, no joint swelling   Skin: Skin color, texture, turgor normal, no rashes or lesions   Neurologic: Alert and oriented X 3, no new focal deficits       Medications:   Personally Reviewed.    acetaminophen  1,000 mg Oral Q6H     aspirin  81 mg Oral Daily    Or     aspirin  81 mg Oral or NG Tube Daily     baclofen  10 mg Oral BID     buPROPion  150 mg Oral Daily     ferrous sulfate  325 mg Oral Daily     FLUoxetine  30 mg Oral Daily     gabapentin  100 mg Oral BID     gabapentin  200 mg Oral Daily     heparin ANTICOAGULANT  5,000 Units Subcutaneous Q12H     levothyroxine  75 mcg Oral Daily     losartan  100 mg Oral BID     memantine  10 mg Oral BID     psyllium  1 packet Oral Daily     sodium chloride (PF)  3 mL Intracatheter Q8H       Data reviewed today: I personally reviewed all new medications, labs, imaging/diagnostics reports over the past 24 hours. Pertinent findings include    Labs:  Most Recent 3 CBC's:  Recent Labs   Lab Test 08/30/22  1030 08/29/22  1541 08/27/22  1544   WBC 7.2 7.9 9.0   HGB 13.6 12.7 13.7   MCV 99 99 99    282 297     Most Recent 3 BMP's:  Recent Labs   Lab Test 08/30/22  1030 08/29/22  1541 08/27/22  1544   * 133* 131*   POTASSIUM 4.3 4.3 4.1   CHLORIDE 99 99 97*   CO2 27 27 25   BUN 13 18 21   CR " 0.63 0.63 0.85   ANIONGAP 9 7 9   LORENZA 9.2 9.1 9.1    96 106     Most Recent 2 LFT's:  Recent Labs   Lab Test 08/29/22  1541 08/23/22  0910   AST 32 34  34   ALT 41 40*  40*   ALKPHOS 95 88   BILITOTAL 0.3 0.3  0.3     Most Recent 3 INR's:  Recent Labs   Lab Test 08/27/22  1544   INR 0.91       Imaging:   No results found for this or any previous visit (from the past 24 hour(s)).    Marina Hobbs MD  Hospitalist  [facility]

## 2022-08-31 NOTE — PLAN OF CARE
Problem: Hypertension Acute  Goal: Blood Pressure Within Desired Range  Outcome: Ongoing, Progressing  Intervention: Normalize Blood Pressure  Recent Flowsheet Documentation  Taken 8/30/2022 1700 by Trevor Gonzalez, RN  Medication Review/Management: medications reviewed  Taken 8/30/2022 1230 by Trevor Gnozalez, RN  Medication Review/Management: medications reviewed  Taken 8/30/2022 0800 by Trevor Gonzalez, RN  Medication Review/Management: medications reviewed   Goal Outcome Evaluation:           Blood pressures elevated in the upper 200s Hydralazine administered

## 2022-08-31 NOTE — PHARMACY-CONSULT NOTE
Pharmacy Consult to evaluate for medication related stroke core measures    Ellis Em, 89 year old female admitted for Acute R thalamic/internal capsule ischemic infarct on 8/29/2022.    Thrombolytic was not given because of Time from onset contraindications    VTE Prophylaxis Heparin given on 8/29/22 as appropriate prior to end of hospital day 2.    Antithrombotic: aspirin started on 8/29/22, as appropriate by end of hospital day 2. Continue antithrombotic therapy on discharge to meet quality measures, unless contraindicated.    Anticoagulation if history of A-fib/flutter: Patient does not have history of A-fib/flutter - anticoagulation not required for medication related stroke core measures.     LDL Cholesterol Calculated   Date Value Ref Range Status   08/30/2022 117 <=129 mg/dL Final       No statin currently ordered.     Recommendations: Could try Rosuvastatin 20 mg daily since patient has had side effects with Atorvastatin.     Thank you for the consult.    Thomas Jteer [facility] 8/31/2022 1:39 PM

## 2022-08-31 NOTE — PLAN OF CARE
Admitted to floor via cart.     W/C bound at baseline, lives at [facility].    Multiple bruises/abrasions/falls at [facility], see charting.     VSS on RA. T&R Q2H/PRN.     MRI record obtained from [facility].     Plan to discharge to [facility] tomorrow with cardiac monitor. Daughter Taisha worked for [employer].     Goal Outcome Evaluation:    Plan of Care Reviewed With: patient     Overall Patient Progress: improving    Problem: Plan of Care - These are the overarching goals to be used throughout the patient stay.    Goal: Plan of Care Review/Shift Note  Description: The Plan of Care Review/Shift note should be completed every shift.  The Outcome Evaluation is a brief statement about your assessment that the patient is improving, declining, or no change.  This information will be displayed automatically on your shift note.  Outcome: Ongoing, Progressing  Flowsheets (Taken 8/31/2022 5883)  Plan of Care Reviewed With: patient  Overall Patient Progress: improving     Problem: Plan of Care - These are the overarching goals to be used throughout the patient stay.    Goal: Optimal Comfort and Wellbeing  Outcome: Ongoing, Progressing     Problem: Plan of Care - These are the overarching goals to be used throughout the patient stay.    Goal: Readiness for Transition of Care  Outcome: Ongoing, Progressing  Intervention: Mutually Develop Transition Plan  Recent Flowsheet Documentation  Taken 8/31/2022 1600 by Tsering Coffey, RN  Equipment Currently Used at Home:   grab bar, toilet   walker, standard   wheelchair, manual   wheelchair, power

## 2022-08-31 NOTE — PROGRESS NOTES
Occupational Therapy      08/31/22 1320   Quick Adds   Type of Visit Initial Occupational Therapy Evaluation   Living Environment   People in Home alone   Current Living Arrangements assisted living   Home Accessibility no concerns   Transportation Anticipated family or friend will provide   Self-Care   Usual Activity Tolerance fair   Equipment Currently Used at Home grab bar, toilet;walker, standard;wheelchair, manual;wheelchair, power   Fall history within last six months yes   Number of times patient has fallen within last six months 4   Activity/Exercise/Self-Care Comment Per daughter report prior to Friday pt was mainly Ind. w/ dressing/toileting- pt supposed to be getting assist w/ some dressing. Pt does get help w/ Bathing, pt pivot trnfs bed>w/c>toilet   Instrumental Activities of Daily Living (IADL)   IADL Comments [facility] assists w/ IADL tasks- daughter manages finances/Meds, meal MONTES   General Information   Onset of Illness/Injury or Date of Surgery 08/29/22   Referring Physician Marina Hobbs MD   Patient/Family Therapy Goal Statement (OT) none stated   Additional Occupational Profile Info/Pertinent History of Current Problem 89 year old female with a past medical history of hypertension, dyslipidemia, hypothyroidism, mild dementia lives in assisted living facility that presented to ED w/ c/o L sided weakness and lip numbness x 1 day, couple of falls Who was admitted on 8/29/2022 for acute versus subacute thalamic infarct   Existing Precautions/Restrictions fall   Cognitive Status Examination   Orientation Status person   Bed Mobility   Bed Mobility supine-sit;sit-supine   Supine-Sit [facility] (Bed Mobility) minimum assist (75% patient effort);verbal cues   Sit-Supine [facility] (Bed Mobility) minimum assist (75% patient effort);verbal cues   Assistive Device (Bed Mobility) bed rails   Transfers   Transfers sit-stand transfer   Sit-Stand Transfer   Sit-Stand [facility] (Transfers) contact  guard;supervision   Assistive Device (Sit-Stand Transfers) walker, front-wheeled   Activities of Daily Living   BADL Assessment/Intervention upper body dressing   Upper Body Dressing Assessment/Training   Position (Upper Body Dressing) sitting up in bed   [place] Level (Upper Body Dressing) minimum assist (75% patient effort)   Clinical Impression   Criteria for Skilled Therapeutic Interventions Met (OT) Yes, treatment indicated   OT Diagnosis Decreased ADL ind.   OT Problem List-Impairments impacting ADL problems related to;activity tolerance impaired;mobility;strength;cognition   Assessment of Occupational Performance 1-3 Performance Deficits   Planned Therapy Interventions (OT) ADL retraining;strengthening;transfer training   Clinical Decision Making Complexity (OT) low complexity   Risk & Benefits of therapy have been explained evaluation/treatment results reviewed;patient   OT Discharge Planning   OT Discharge Recommendation (DC Rec) home with assist;home with home care occupational therapy;Transitional Care Facility   OT Rationale for DC Rec Pt trnfs w/ CGA/Min.A pt only pivot trnfs at home mainly uses w/c for mobility- lives in [facility] per daughter report pt was mainly Ind. w/ ADL routine prior to last week but got some assist w/ dressing/toileting and assist w/ bathing. Pt would require assistx1 for all trnfs/mobility/self cares upon d/c back to [facility] to ensure safety if unable to assist pt may benefit from TCU.   Total Evaluation Time (Minutes)   Total Evaluation Time (Minutes) 10   OT Goals   Therapy Frequency (OT) 4 times/wk   OT Predicted Duration/Target Date for Goal Attainment 09/06/22   OT Goals Lower Body Dressing;Transfers;Toilet Transfer/Toileting   OT: Lower Body Dressing Supervision/stand-by assist;using adaptive equipment   OT: Transfer Supervision/stand-by assist;with assistive device   OT: Toilet Transfer/Toileting Supervision/stand-by assist;using adaptive equipment

## 2022-08-31 NOTE — CONSULTS
Care Management Initial Consult    General Information  Assessment completed with: Patient,    Type of CM/SW Visit: Initial Assessment    Primary Care Provider verified and updated as needed: Yes   Readmission within the last 30 days: no previous admission in last 30 days      Reason for Consult: discharge planning  Advance Care Planning: Advance Care Planning Reviewed: present on chart (Present in Epic)     General Information Comments: From [facility]    Communication Assessment  Patient's communication style: spoken language (English or Bilingual)    Hearing Difficulty or Deaf: yes   Wear Glasses or Blind: yes    Cognitive  Cognitive/Neuro/Behavioral: WDL                      Living Environment:   People in home: alone, facility resident     Current living Arrangements: assisted living  Name of Facility: [facility]   Able to return to prior arrangements: other (see comments) (Pending ability of facility to care for patient)  Living Arrangement Comments: May need TCU    Family/Social Support:  Care provided by: child(lulu), other (see comments) (Facility staff)  Provides care for: no one, unable/limited ability to care for self  Marital Status: Single  Children, Facility resident(s)/Staff          Description of Support System: Supportive, Involved    Support Assessment: Adequate family and caregiver support    Current Resources:   Patient receiving home care services: Yes  Skilled Home Care Services: Physicial Therapy, Occupational Therapy  Community Resources: Financial/Insurance, Home Care  Equipment currently used at home: grab bar, toilet, walker, standard, wheelchair, manual, wheelchair, power  Supplies currently used at home: Incontinence Supplies, Hearing Aid Batteries    Employment/Financial:  Employment Status: retired        Financial Concerns: No concerns identified           Lifestyle & Psychosocial Needs:  Social Determinants of Health     Tobacco Use: Low Risk       Smoking Tobacco Use: Never Smoker     Smokeless Tobacco Use: Never Used   Alcohol Use: Not on file   Financial Resource Strain: Not on file   Food Insecurity: Not on file   Transportation Needs: Not on file   Physical Activity: Not on file   Stress: Not on file   Social Connections: Not on file   Intimate Partner Violence: Not on file   Depression: Not at risk     PHQ-2 Score: 0   Housing Stability: Not on file       Functional Status:  Prior to admission patient needed assistance:   Dependent ADLs:: Ambulation-walker, Wheelchair-with assist, Bathing, Dressing, Transfers  Dependent IADLs:: Cleaning, Cooking, Laundry, Shopping, Meal Preparation, Medication Management, Money Management, Transportation  Assesssment of Functional Status: Not at  functional baseline, Needs placement in a SNF/TCF for rehabilitation    Mental Health Status:          Chemical Dependency Status:                Values/Beliefs:  Spiritual, Cultural Beliefs, Worship Practices, Values that affect care:                 Additional Information:  Patient resides alone at [facility]. Patient  presented to [facility] emergency department 8/29/2022 with persistent headache following falls a couple days prior, she then developed left face numbness / weakness and daughter noticed left arm weakness.  Patient did not appreciate left arm weakness herself.  Daughter stated that while sitting at the table she had a difficult time lifting her arm .    Per patient she is supposed to be getting PT/OT to help with leg strength for transfers. She uses a walker with assistance to transfer from bed to wheelchair. Has two daughters who assist with I/ADLs. Daughter Taisha sets up medications and patient states she takes them without reminders four times daily.  Daughter Rosalva assists with baths/showers and other personal needs. Staff person helps patient into bed nightly and is escorted to dining room to get her meals.    Has not been  seen by PT/OT yet but patient may need TCU depending on how much assistance her [facility] staff is able to provide. Other needs pending clinical progress and PT/OT recommendations. Patient states daughter Rosalva will transport on discharge.      EMILY CASTANEDA, RN/CM

## 2022-08-31 NOTE — PROVIDER NOTIFICATION
Text page to Dr. Hobbs daughter Taisha at bedside wants to talk about plan of care.   Trevor Gonzalez RN  8/31/2022  1:12 PM

## 2022-08-31 NOTE — CONSULTS
Care Management Follow Up    Length of Stay (days): 2  Expected Discharge Date: 08/31/2022     Concerns to be Addressed: discharge planning, home safety  May need TCU  Patient plan of care discussed at interdisciplinary rounds: Yes  Anticipated Discharge Disposition: Assisted Living, Home Care, Transitional Care  Anticipated Discharge Services: None  Anticipated Discharge DME: None  Patient/family educated on Medicare website which has current facility and service quality ratings: yes  Education Provided on the Discharge Plan:  Daughter and pt updated on plan to return to the [facility] with assistance if possible.  Patient/Family in Agreement with the Plan: yes  Referrals Placed by CM/SW: Post Acute Facilities  Private pay costs discussed: Not applicable    Additional Information:  Writer has unsuccessfully attempted to contact nursing staff at the current [facility] by phone at ~0930hrs and again at 1130hrs. Writer is awaiting return call to determine appropriateness of pt returning and how to provide support.     Writer spoke to pt and daughter. Dtr provided a cell# for an RN named Zofia LEIGH at the [facility] of [phone]. Writer called and did not get an answer. Upon last call to the facility, the person answering the phone stated they will physically find a facility RN to request they call writer back asap.    At ~1400hrs [facility] RN Helder called from [phone] and stated pt can return to the facility when needed. She further stated that pt is currently working with their therapy department and family can add transfer assist/scheduled toileting service when needed upon discharge. Helder also provided the facility Fax# of [phone] to manually fax discharge instructions and any medication changes if needed.    Per bedside RN report, the hospitalist was recently in pts room and stated the pt will be staying overnight. Anticipate return to [facility] 9/1 per daughters transporting. [facility] RN updated. Daughter present in ED updated and in  agreement with current plan.    [facility] states they can accept the pt at 1300hrs on 9/1. Daughter Rosalva to transport pt at 1230hrs. Dtr Rosalva is the transport person and will reportedly be in the hospital with the pt most of the day.    Phil Mtz RN

## 2022-09-01 ENCOUNTER — APPOINTMENT (OUTPATIENT)
Dept: CARDIOLOGY | Facility: CLINIC | Age: 87
DRG: 065 | End: 2022-09-01
Attending: PHYSICIAN ASSISTANT
Payer: MEDICARE

## 2022-09-01 VITALS
HEIGHT: 65 IN | RESPIRATION RATE: 20 BRPM | OXYGEN SATURATION: 95 % | WEIGHT: 139.7 LBS | DIASTOLIC BLOOD PRESSURE: 63 MMHG | HEART RATE: 69 BPM | TEMPERATURE: 98.6 F | SYSTOLIC BLOOD PRESSURE: 139 MMHG | BODY MASS INDEX: 23.28 KG/M2

## 2022-09-01 PROCEDURE — 250N000011 HC RX IP 250 OP 636: Performed by: INTERNAL MEDICINE

## 2022-09-01 PROCEDURE — 250N000013 HC RX MED GY IP 250 OP 250 PS 637: Performed by: INTERNAL MEDICINE

## 2022-09-01 PROCEDURE — 93270 REMOTE 30 DAY ECG REV/REPORT: CPT

## 2022-09-01 PROCEDURE — 99239 HOSP IP/OBS DSCHRG MGMT >30: CPT | Performed by: INTERNAL MEDICINE

## 2022-09-01 RX ORDER — HYDRALAZINE HYDROCHLORIDE 25 MG/1
25 TABLET, FILM COATED ORAL 2 TIMES DAILY
Qty: 60 TABLET | Refills: 0 | Status: SHIPPED | OUTPATIENT
Start: 2022-09-01 | End: 2022-09-12

## 2022-09-01 RX ORDER — POLYETHYLENE GLYCOL 3350 17 G/17G
17 POWDER, FOR SOLUTION ORAL DAILY
Qty: 510 G | Refills: 0 | Status: SHIPPED | OUTPATIENT
Start: 2022-09-01 | End: 2022-09-06

## 2022-09-01 RX ADMIN — ACETAMINOPHEN 1000 MG: 500 TABLET ORAL at 08:54

## 2022-09-01 RX ADMIN — BACLOFEN 10 MG: 10 TABLET ORAL at 08:55

## 2022-09-01 RX ADMIN — ACETAMINOPHEN 1000 MG: 500 TABLET ORAL at 13:07

## 2022-09-01 RX ADMIN — LOSARTAN POTASSIUM 100 MG: 50 TABLET, FILM COATED ORAL at 08:54

## 2022-09-01 RX ADMIN — ASPIRIN 81 MG: 81 TABLET, COATED ORAL at 08:54

## 2022-09-01 RX ADMIN — BUPROPION HYDROCHLORIDE 150 MG: 150 TABLET, FILM COATED, EXTENDED RELEASE ORAL at 08:55

## 2022-09-01 RX ADMIN — HEPARIN SODIUM 5000 UNITS: 5000 INJECTION, SOLUTION INTRAVENOUS; SUBCUTANEOUS at 09:00

## 2022-09-01 RX ADMIN — GABAPENTIN 200 MG: 100 CAPSULE ORAL at 13:07

## 2022-09-01 RX ADMIN — GABAPENTIN 100 MG: 100 CAPSULE ORAL at 08:55

## 2022-09-01 RX ADMIN — FERROUS SULFATE TAB 325 MG (65 MG ELEMENTAL FE) 325 MG: 325 (65 FE) TAB at 08:54

## 2022-09-01 RX ADMIN — LABETALOL HYDROCHLORIDE 10 MG: 5 INJECTION, SOLUTION INTRAVENOUS at 04:17

## 2022-09-01 RX ADMIN — FLUOXETINE 30 MG: 20 CAPSULE ORAL at 08:55

## 2022-09-01 RX ADMIN — LEVOTHYROXINE SODIUM 75 MCG: 0.07 TABLET ORAL at 08:55

## 2022-09-01 RX ADMIN — MEMANTINE 10 MG: 10 TABLET ORAL at 08:54

## 2022-09-01 RX ADMIN — ACETAMINOPHEN 1000 MG: 500 TABLET ORAL at 04:06

## 2022-09-01 ASSESSMENT — ACTIVITIES OF DAILY LIVING (ADL)
ADLS_ACUITY_SCORE: 55
ADLS_ACUITY_SCORE: 48
ADLS_ACUITY_SCORE: 49
ADLS_ACUITY_SCORE: 55
ADLS_ACUITY_SCORE: 48

## 2022-09-01 NOTE — PLAN OF CARE
Occupational Therapy Discharge Summary    Reason for therapy discharge:    Discharged to [facility]    Progress towards therapy goal(s). See goals on Care Plan in [facility] electronic health record for goal details.  Goals not met.  Barriers to achieving goals:   discharge from facility.    Therapy recommendation(s):    Continued therapy is recommended.  Rationale/Recommendations:  decreased ADLs.    Goal Outcome Evaluation:            Mary Reyes, OTR/L  9/1/2022

## 2022-09-01 NOTE — PROGRESS NOTES
Care Management Discharge Note    Discharge Date: 09/01/2022       Discharge Disposition: Assisted Living, Home Care, Transitional Care    Discharge Services: None    Discharge DME: None    Discharge Transportation: family or friend will provide    Private pay costs discussed: Not applicable    PAS Confirmation Code:    Patient/family educated on Medicare website which has current facility and service quality ratings: yes    Education Provided on the Discharge Plan:    Persons Notified of Discharge Plans: Pt, daughter and Assisted Living  Patient/Family in Agreement with the Plan: yes    Handoff Referral Completed: No    Additional Information:  Spoke to VIDA Pendleton([phone])at [facility] and confirmed pt able to return to facility today.  Updated pt and daughter and both in agreement.  Daughter will transport pt.        EMILY Blanton

## 2022-09-01 NOTE — PROGRESS NOTES
Pt A&Ox4, had elevated BP early AM.  Given labetolol PRN with excellent results.  Pt slept well through the noc.

## 2022-09-01 NOTE — PLAN OF CARE
Speech Language Therapy Discharge Summary    Reason for therapy discharge:    Discharged to home.    Progress towards therapy goal(s). See goals on Care Plan in [facility] electronic health record for goal details.  Goals met    Therapy recommendation(s):    No further therapy is recommended.    Goal Outcome Evaluation:  Introduced swallow strategies including upright posture, slow rate, small single sips, and lingual/finger sweep (with solids). Patient was receptive to education and demonstrated independent use of small single sips with thin via straw. She was not impulsive with rate of intake and used lingual sweep to clear oral stasis. Education provided to the patient and her daughter regarding the importance of strategy use to prevent aspiration.

## 2022-09-01 NOTE — PROGRESS NOTES
Discharged home with daughter Rosalva with all belongings as charted. JOSE has all paperwork.     VSS on RA. BM through the night.

## 2022-09-01 NOTE — DISCHARGE SUMMARY
[facility] MEDICINE  DISCHARGE SUMMARY     Primary Care Physician: Whitney Velasquez  Admission Date: 8/29/2022   Discharge Provider: Marina Hobbs MD Discharge Date: 9/1/2022   Diet: Orders Placed This Encounter      Regular Diet Adult      Diet      Code Status: Full Code   Activity: activity as tolerated   [facility]      Condition at Discharge: Stable      REASON FOR PRESENTATION(See Admission Note for Details)     Facial numbness    PRINCIPAL & ACTIVE DISCHARGE DIAGNOSES     Active Problems:    Thalamic infarct, acute (H)    Lip numbness    Hypertension, unspecified type  Recent UTI  Mild hyponatremia  History of peripheral neuropathy  Depression    SIGNIFICANT FINDINGS (Imaging, labs):     Most Recent 3 CBC's:Recent Labs   Lab Test 08/30/22  1030 08/29/22  1541 08/27/22  1544   WBC 7.2 7.9 9.0   HGB 13.6 12.7 13.7   MCV 99 99 99    282 297      Most Recent 3 BMP's:  Recent Labs   Lab Test 08/30/22  1030 08/29/22  1541 08/27/22  1544   * 133* 131*   POTASSIUM 4.3 4.3 4.1   CHLORIDE 99 99 97*   CO2 27 27 25   BUN 13 18 21   CR 0.63 0.63 0.85   ANIONGAP 9 7 9   LORENZA 9.2 9.1 9.1    96 106     Most Recent 2 LFT's:  Recent Labs   Lab Test 08/29/22  1541 08/23/22  0910   AST 32 34  34   ALT 41 40*  40*   ALKPHOS 95 88   BILITOTAL 0.3 0.3  0.3     Most Recent INR's and Anticoagulation Dosing History:  Anticoagulation Dose History     Recent Dosing and Labs Latest Ref Rng & Units 8/27/2022    INR 0.85 - 1.15 0.91        Most Recent 3 Troponin's:No lab results found.  Most Recent Cholesterol Panel:  Recent Labs   Lab Test 08/30/22  1030   CHOL 203*      HDL 58   TRIG 138     Most Recent 6 Bacteria Isolates From Any Culture (See EPIC Reports for Culture Details):No lab results found.  Most Recent TSH, T4 and A1c Labs:  Recent Labs   Lab Test 08/29/22  2349 08/29/22  1541   TSH 2.34  --    A1C  --  5.1     Results for orders placed or performed during the  hospital encounter of 08/29/22   Head CT w/o contrast    Narrative    EXAM: CT HEAD W/O CONTRAST  LOCATION: [facility]  DATE/TIME: 8/29/2022 4:23 PM    INDICATION: headache after trauma  COMPARISON: 08/27/2022  TECHNIQUE: Routine CT Head without IV contrast. Multiplanar reformats. Dose reduction techniques were used.    FINDINGS:  INTRACRANIAL CONTENTS: No intracranial hemorrhage, extraaxial collection, or mass effect.  No CT evidence of acute infarct. Moderate presumed chronic small vessel ischemic changes. Small chronic lacunar infarction anterior aspect basal ganglia   bilaterally. Moderate generalized volume loss. No hydrocephalus.     VISUALIZED ORBITS/SINUSES/MASTOIDS: No intraorbital abnormality. No paranasal sinus mucosal disease. Opacification left mastoid air cells and middle ear cavity.    BONES/SOFT TISSUES: No acute abnormality.      Impression    IMPRESSION:  1.  No acute intracranial process.   Chest XR,  PA & LAT    Narrative    EXAM: XR CHEST 2 VIEWS  LOCATION: [facility]  DATE/TIME: 8/29/2022 4:14 PM    INDICATION: weakness  COMPARISON: 1/8/2022      Impression    IMPRESSION: The right hemidiaphragm is elevated.The heart is enlarged.  The lungs are clear.    MR Brain w/o & w Contrast    Narrative    1. HEAD MRI WITHOUT AND WITH IV CONTRAST  2. HEAD MRA WITHOUT IV CONTRAST   3. NECK MRA WITHOUT AND WITH IV CONTRAST   8/30/2022 12:27 PM      INDICATION: Recent thalamic stroke. Lip numbness and weakness.  TECHNIQUE:  1. Head MRI without and with IV contrast.  2. Head MRA without IV contrast.  3. Neck MRA without and with IV contrast including gadolinium bolus sequence.  CONTRAST: 10 mL Gadavist   COMPARISON: Head CT 8/29/2022, brain MRI/MRA and neck MRA 5/29/2021.    FINDINGS:  HEAD MRI: 6 mm ovoid focus of restricted diffusion at the junction of the right thalamus and posterior internal capsule demonstrates T2/FLAIR hyperintensity without  enhancement or evidence for hemorrhage consistent with a recent acute or early subacute   infarct. Mild mucosal thickening within the ethmoid air cells. Paranasal sinuses are otherwise free from significant disease. Patchy fluid within the left mastoid air cells. Right appear clear. This is unchanged. Intraorbital contents are unremarkable.   There is mild generalized prominence of the sulci and ventricles, consistent with underlying volume loss. Intracranial flow voids are intact. There is no mass effect, midline shift, or extraaxial collection. There are patchy areas of T2/FLAIR   hyperintensity within the cerebral white matter that are nonspecific but probably reflect the sequela of chronic small vessel ischemic disease. No evidence for acute or chronic intracranial blood products.    HEAD MRA: Examination is degraded by motion artifact, but there does appear to be mild to moderate narrowing of the M1 segment of the right middle cerebral artery. No proximal large vessel occlusion, aneurysm or AVM.    NECK MRA: Three-vessel arch. Antegrade flow within both carotid and vertebral arteries. No evidence for dissection or hemodynamically significant narrowing in the neck by NASCET criteria.       Impression    CONCLUSION:  HEAD MRI:  1.  6 mm acute or early subacute infarct in the right thalamus/internal capsule.  2.  No hemorrhage.  3.  Moderate burden of presumed chronic small vessel ischemic changes.  4.  Mild generalized atrophy.  5.  Chronic left mastoid effusion.    HEAD MRA:  1.  Examination is degraded by motion artifact, but there does appear to be mild to moderate narrowing of the M1 segment of the right middle cerebral artery. This appears progressed versus 2021.   2.  No proximal large vessel occlusion, aneurysm or AVM.    NECK MRA:  1.  No evidence for dissection or hemodynamically significant narrowing in the neck based on NASCET criteria.   MRA Brain ([place]) w/o Contrast    Narrative    1.  HEAD MRI WITHOUT AND WITH IV CONTRAST  2. HEAD MRA WITHOUT IV CONTRAST   3. NECK MRA WITHOUT AND WITH IV CONTRAST   8/30/2022 12:27 PM      INDICATION: Recent thalamic stroke. Lip numbness and weakness.  TECHNIQUE:  1. Head MRI without and with IV contrast.  2. Head MRA without IV contrast.  3. Neck MRA without and with IV contrast including gadolinium bolus sequence.  CONTRAST: 10 mL Gadavist   COMPARISON: Head CT 8/29/2022, brain MRI/MRA and neck MRA 5/29/2021.    FINDINGS:  HEAD MRI: 6 mm ovoid focus of restricted diffusion at the junction of the right thalamus and posterior internal capsule demonstrates T2/FLAIR hyperintensity without enhancement or evidence for hemorrhage consistent with a recent acute or early subacute   infarct. Mild mucosal thickening within the ethmoid air cells. Paranasal sinuses are otherwise free from significant disease. Patchy fluid within the left mastoid air cells. Right appear clear. This is unchanged. Intraorbital contents are unremarkable.   There is mild generalized prominence of the sulci and ventricles, consistent with underlying volume loss. Intracranial flow voids are intact. There is no mass effect, midline shift, or extraaxial collection. There are patchy areas of T2/FLAIR   hyperintensity within the cerebral white matter that are nonspecific but probably reflect the sequela of chronic small vessel ischemic disease. No evidence for acute or chronic intracranial blood products.    HEAD MRA: Examination is degraded by motion artifact, but there does appear to be mild to moderate narrowing of the M1 segment of the right middle cerebral artery. No proximal large vessel occlusion, aneurysm or AVM.    NECK MRA: Three-vessel arch. Antegrade flow within both carotid and vertebral arteries. No evidence for dissection or hemodynamically significant narrowing in the neck by NASCET criteria.       Impression    CONCLUSION:  HEAD MRI:  1.  6 mm acute or early subacute infarct in the  right thalamus/internal capsule.  2.  No hemorrhage.  3.  Moderate burden of presumed chronic small vessel ischemic changes.  4.  Mild generalized atrophy.  5.  Chronic left mastoid effusion.    HEAD MRA:  1.  Examination is degraded by motion artifact, but there does appear to be mild to moderate narrowing of the M1 segment of the right middle cerebral artery. This appears progressed versus 2021.   2.  No proximal large vessel occlusion, aneurysm or AVM.    NECK MRA:  1.  No evidence for dissection or hemodynamically significant narrowing in the neck based on NASCET criteria.   MRA Neck ([place]) wo & w Contrast    Narrative    1. HEAD MRI WITHOUT AND WITH IV CONTRAST  2. HEAD MRA WITHOUT IV CONTRAST   3. NECK MRA WITHOUT AND WITH IV CONTRAST   8/30/2022 12:27 PM      INDICATION: Recent thalamic stroke. Lip numbness and weakness.  TECHNIQUE:  1. Head MRI without and with IV contrast.  2. Head MRA without IV contrast.  3. Neck MRA without and with IV contrast including gadolinium bolus sequence.  CONTRAST: 10 mL Gadavist   COMPARISON: Head CT 8/29/2022, brain MRI/MRA and neck MRA 5/29/2021.    FINDINGS:  HEAD MRI: 6 mm ovoid focus of restricted diffusion at the junction of the right thalamus and posterior internal capsule demonstrates T2/FLAIR hyperintensity without enhancement or evidence for hemorrhage consistent with a recent acute or early subacute   infarct. Mild mucosal thickening within the ethmoid air cells. Paranasal sinuses are otherwise free from significant disease. Patchy fluid within the left mastoid air cells. Right appear clear. This is unchanged. Intraorbital contents are unremarkable.   There is mild generalized prominence of the sulci and ventricles, consistent with underlying volume loss. Intracranial flow voids are intact. There is no mass effect, midline shift, or extraaxial collection. There are patchy areas of T2/FLAIR   hyperintensity within the cerebral white matter that are nonspecific  but probably reflect the sequela of chronic small vessel ischemic disease. No evidence for acute or chronic intracranial blood products.    HEAD MRA: Examination is degraded by motion artifact, but there does appear to be mild to moderate narrowing of the M1 segment of the right middle cerebral artery. No proximal large vessel occlusion, aneurysm or AVM.    NECK MRA: Three-vessel arch. Antegrade flow within both carotid and vertebral arteries. No evidence for dissection or hemodynamically significant narrowing in the neck by NASCET criteria.       Impression    CONCLUSION:  HEAD MRI:  1.  6 mm acute or early subacute infarct in the right thalamus/internal capsule.  2.  No hemorrhage.  3.  Moderate burden of presumed chronic small vessel ischemic changes.  4.  Mild generalized atrophy.  5.  Chronic left mastoid effusion.    HEAD MRA:  1.  Examination is degraded by motion artifact, but there does appear to be mild to moderate narrowing of the M1 segment of the right middle cerebral artery. This appears progressed versus .   2.  No proximal large vessel occlusion, aneurysm or AVM.    NECK MRA:  1.  No evidence for dissection or hemodynamically significant narrowing in the neck based on NASCET criteria.   Echocardiogram Complete - For age > 60 yrs    Narrative    [id number]  [id number]  [id number]  [id number]^SAMANTHA^[place] [address]     Name: FLORENTINO SPRING  MRN: [id number]  : 1931  Study Date: 2022 08:15 AM  Age: 89 yrs  Gender: Female  Patient Location: [facility]  Reason For Study: Cerebrovascular Incident  Ordering Physician: WILLY SINGH  Performed By: MARTIN     BSA: 1.7 m2  Height: 65 in  Weight: 145 lb  HR: 67  BP: 221/100 mmHg  ______________________________________________________________________________  Procedure  Complete Portable Echo Adult.  ______________________________________________________________________________  Interpretation Summary     1.  Normal left ventricular size and systolic performance with a visually  estimated ejection fraction of 60%.  2. There is mild concentric increase in left ventricular wall thickness.  3. No significant valvular heart disease is identified on this study.  4. Normal right ventricular size and systolic performance.  5. There is mild to moderate left atrial enlargement.     When compared to the prior real-time echocardiogram dated 25 March 2022, there  has been little appreciable interval change.  ______________________________________________________________________________  Left ventricle:  Normal left ventricular size and systolic performance with a visually  estimated ejection fraction of 60%. There is normal regional wall motion.  There is mild concentric increase in left ventricular wall thickness.     Assessment of LV Diastolic Function: The cumulative findings are indeterminate  in the evaluation of diastolic function [The septal e' velocity is < 7 cm/s &  lateral e' velocity is < 10 cm/s. /The average E/e' is >14. TR velocity is <  2.8 m/s. Left atrial volume index is less than 34 mL/mÂ ].     Right ventricle:  Normal right ventricular size and systolic performance.     Left atrium:  There is mild to moderate left atrial enlargement.     Right atrium:  The right atrium is of normal size.     IVC:  The IVC is of normal caliber.     Aortic valve:  The aortic valve is comprised of three cusps. No significant aortic stenosis  or aortic insufficiency is detected on this study.     Mitral valve:  There is moderate mitral annular calcification. There is trace mitral  insufficiency.     Tricuspid valve:  The tricuspid valve is grossly morphologically normal. There is mild tricuspid  insufficiency.     Pulmonic valve:  The pulmonic valve is grossly morphologically normal. There is trace pulmonic  insufficiency.     Thoracic aorta:  The aortic root and proximal ascending aorta are of normal dimension.      Pericardium:  There is no significant pericardial effusion.  ______________________________________________________________________________  ______________________________________________________________________________  MMode/2D Measurements & Calculations  IVSd: 1.3 cm  LVIDd: 3.8 cm  LVIDs: 2.4 cm  LVPWd: 1.3 cm  FS: 37.8 %  LV mass(C)d: 177.4 grams  LV mass(C)dI: 102.8 grams/m2  Ao root diam: 2.7 cm  LA dimension: 4.4 cm  LA/Ao: 1.6  LVOT diam: 2.0 cm  LVOT area: 3.1 cm2  LA Volume (BP): 42.3 ml  LA Volume Index (BP): 24.5 ml/m2     LA Volume Indexed (AL/bp): 26.0 ml/m2  RWT: 0.67     Doppler Measurements & Calculations  MV E max geovani: 122.0 cm/sec  MV A max geovani: 126.4 cm/sec  MV E/A: 0.97  MV max P.8 mmHg  MV mean P.5 mmHg  MV V2 VTI: 32.9 cm  MVA(VTI): 2.8 cm2  MV dec slope: 707.0 cm/sec2  MV dec time: 0.17 sec  Ao V2 max: 141.9 cm/sec  Ao max P.0 mmHg  Ao V2 mean: 95.3 cm/sec  Ao mean P.0 mmHg  Ao V2 VTI: 29.5 cm  MICHELLE(I,D): 3.1 cm2  MICHELLE(V,D): 2.8 cm2  LV V1 max P.5 mmHg  LV V1 max: 127.4 cm/sec  LV V1 VTI: 28.8 cm     SV(LVOT): 90.7 ml  SI(LVOT): 52.6 ml/m2  PA V2 max: 115.4 cm/sec  PA max P.3 mmHg  PA acc time: 0.09 sec  PI end-d geovani: 84.2 cm/sec  TR max geovani: 275.4 cm/sec  TR max P.3 mmHg  AV Geovani Ratio (DI): 0.90  MICHELLE Index (cm2/m2): 1.8  E/E' av.0  Lateral E/e': 23.9  Medial E/e': 26.1     ______________________________________________________________________________  Report approved by: Vianney Garcia 2022 09:41 AM                PENDING LABS         PROCEDURES ( this hospitalization only)          RECOMMENDATION FOR F/U VISIT     Discharge with PT/OT continuation    Ambulation and movement as able     DISPOSITION     Assisted Living Facility    SUMMARY OF HOSPITAL COURSE:      Ellis Em is a 89 y/o lady with a past medical history of hypertension, dyslipidemia, hypothyroidism, mild dementia lives in assisted living facility that presented to ED with  Left  sided weakness and lip numbness x 1 day, couple of falls.MRI brain showed 6 mm acute versus subacute thalamic infarct/internal capsule.  Telemetry stroke neurology consulted.  Started on ASA.  Known statin allergy.  Echocardiogram showed normal EF of 60%.  She remained in sinus rhythm.  Neurology recommending cardiac monitor at discharge to rule out any atrial fibrillation.  Pt was previously diagnosed with UTI prior to ED presentation and admission.  Urine culture returned Klebsiella resistant to cephalosporins.  She has multiple allergies including fluoroquinolones.  Fosfomycin x1 administered.  She has minimal residual facial numbness.  She was discharged back to assisted living facility in stable condition.  Initially she was very hypertensive, later on her blood pressure still remained elevated.  Hydralazine was added.  Monitor blood pressure as outpatient.    Discharge Medications with Med changes:        Review of your medicines      START taking      Dose / Directions   aspirin 81 MG EC tablet  Commonly known as: ASA  Used for: Thalamic stroke (H)      Dose: 81 mg  Start taking on: September 2, 2022  Take 1 tablet (81 mg) by mouth daily  Quantity: 30 tablet  Refills: 0     polyethylene glycol 17 GM/Dose powder  Commonly known as: MIRALAX  Used for: Constipation, unspecified constipation type      Dose: 17 g  Take 17 g by mouth daily  Quantity: 510 g  Refills: 0        CONTINUE these medicines which may have CHANGED, or have new prescriptions. If we are uncertain of the size of tablets/capsules you have at home, strength may be listed as something that might have changed.      Dose / Directions   baclofen 10 MG tablet  Commonly known as: LIORESAL  This may have changed: See the new instructions.  Used for: Muscle spasm      TAKE 1 TABLET BY MOUTH THREE TIMES DAILY  Quantity: 270 tablet  Refills: 1     buPROPion 150 MG 12 hr tablet  Commonly known as: WELLBUTRIN SR  This may have changed:   how much to  take  how to take this  when to take this  Used for: Major depressive disorder, recurrent episode, in full remission (H)      TAKE 1 TABLET BY MOUTH EVERY DAY  Quantity: 90 tablet  Refills: 2     ferrous sulfate 325 (65 Fe) MG tablet  Commonly known as: FEROSUL  This may have changed: See the new instructions.  Used for: Iron deficiency anemia due to chronic blood loss      TAKE 1 TABLET BY MOUTH EVERY DAY WITH BREAKFAST  Quantity: 90 tablet  Refills: 3     losartan 50 MG tablet  Commonly known as: COZAAR  This may have changed:   how much to take  how to take this  when to take this  additional instructions  Used for: Essential hypertension      TAKE 1 TABLET BY MOUTH TWICE A DAY  Quantity: 180 tablet  Refills: 1     memantine 10 MG tablet  Commonly known as: NAMENDA  This may have changed:   how much to take  how to take this  when to take this  additional instructions  Used for: Neurocognitive disorder      TAKE 1 TABLET BY MOUTH TWICE A DAY  Quantity: 180 tablet  Refills: 3        CONTINUE these medicines which have NOT CHANGED      Dose / Directions   acetaminophen 500 MG tablet  Commonly known as: TYLENOL      Dose: 1,000 mg  Take 1,000 mg by mouth every 6 hours  Refills: 0     ALPHA LIPOIC ACID 200 MG Caps      Dose: 1 capsule  [ALPHA LIPOIC ACID 200 MG CAP] Take 1 capsule by mouth 2 (two) times a day.  Refills: 0     Cranberry 400 MG Caps      Dose: 400 mg  Take 400 mg by mouth daily  Refills: 0     estradiol 0.1 MG/GM vaginal cream  Commonly known as: ESTRACE      Dose: 1 g  Place 1 g vaginally twice a week Sundays and Thursdays  Refills: 0     FLUoxetine 10 MG capsule  Commonly known as: PROzac  Used for: Major depressive disorder, recurrent episode, in full remission (H)      TAKE 3 CAPSULES BY MOUTH EVERY DAY  Quantity: 270 capsule  Refills: 2     gabapentin 100 MG capsule  Commonly known as: NEURONTIN  Used for: Acute pain of right shoulder      TAKE 1 CAPSULE IN THE MORNING, 2 CAPSULES IN THE AFTERNOON  AND 1 CAPSULE IN THE EVENING.  Quantity: 270 capsule  Refills: 1     levothyroxine 75 MCG tablet  Commonly known as: SYNTHROID/LEVOTHROID  Used for: Hypothyroidism      TAKE 1 TABLET BY MOUTH EVERY DAY  Quantity: 90 tablet  Refills: 3     Melatonin 10 MG Tabs tablet      Dose: 10 mg  [MELATONIN 10 MG TAB] Take 10 mg by mouth bedtime.  Refills: 0     Omega-3 500 MG Caps      Dose: 500 mg  Take 500 mg by mouth daily  Refills: 0     PRESERVISION AREDS 2 PO      Dose: 1 tablet  [VIT C/E/ZN/COPPR/LUTEIN/ZEAXAN (PRESERVISION AREDS 2 ORAL)] Take 1 tablet by mouth 2 (two) times a day.  Refills: 0     psyllium Packet  Commonly known as: METAMUCIL/KONSYL      Dose: 1 packet  Take 1 packet by mouth daily  Refills: 0     UNABLE TO FIND      [STRONTIUM CHLORIDE HEXAHYDRATE (STRONTIUM CHL HEXAHYD, BULK,) 100 % URIAH] Use As Directed.  Refills: 0     UNABLE TO FIND      Dose: 1 capsule  Take 1 capsule by mouth 2 times daily MEDICATION NAME: Uric Acid formula, supplement.  Refills: 0        STOP taking    cefdinir 300 MG capsule  Commonly known as: OMNICEF              Where to get your medicines      These medications were sent to [place] [id number] [facility] - [facility], [address] [address]    Phone: [phone]   aspirin 81 MG EC tablet  polyethylene glycol 17 GM/Dose powder     Hydralazine 25 mg p.o. 3 times daily hold if blood pressure less than 130/80        Rationale for medication changes:      Aspirin for CVA        Consults     Telestroke neurology       Immunizations given this encounter     N/A       Discharge Procedure Orders   Adult Sleep Eval & Management  Referral   Standing Status: Future   Referral Priority: Routine: Next available opening Referral Type: Consultation   Number of Visits Requested: 1     Home Care Referral   Referral Priority: Routine: Next available opening Referral Type: Home Health Therapies & Aides   Number of Visits Requested: 1     Reason for your  "hospital stay   Order Comments: Thalamic stroke     Follow-up and recommended labs and tests   Order Comments: Follow up with primary care provider, Whitney Velasquez, within 7 days for hospital follow- up.   Follow up with neurology in 3-4 weeks.     Activity   Order Comments: Your activity upon discharge: activity as tolerated     Order Specific Question Answer Comments   Is discharge order? Yes      Diet   Order Comments: Follow this diet upon discharge: Orders Placed This Encounter      Regular Diet Adult       Order Specific Question Answer Comments   Is discharge order? Yes      Stroke Hospital Follow Up   Standing Status: Future Standing Exp. Date: 02/29/24   Order Comments: [facility] will call you to coordinate care as prescribed by your provider. If you don t hear from a representative within 2 business days, please call [phone].       Order Specific Question Answer Comments   Schedule Patient With: General Neurology    Specific Diagnosis: Stroke follow-up 6-8 weeks    Contact: Patient    Scheduling Instructions: [facility] [employer] will call you to coordinate care as prescribed by your provider. If you don t hear from a representative within 2 business days, please call [phone].      Examination     Vital Signs in last 24 hours:   Vital signs:  Temp: 98.4  F (36.9  C) Temp src: Axillary BP: 132/61 Pulse: 67   Resp: 20 SpO2: 95 % O2 Device: None (Room air)   Height: 165.1 cm (5' 5\") Weight: 63.4 kg (139 lb 11.2 oz)  Estimated body mass index is 23.25 kg/m  as calculated from the following:    Height as of this encounter: 1.651 m (5' 5\").    Weight as of this encounter: 63.4 kg (139 lb 11.2 oz).      Please see EMR for more detailed significant labs, imaging, consultant notes etc.  Total time spent on discharge: > 35 minutes    YANNI Pearson    I agree with the documentation and findings as written by Taylor LIAO  and our discussed and formulated assessment and plan. I was present with " her who participated in the service and documentation of the note. I have also personally verified the history and personally performed the physical exam and medical decision-making. I agree with the assessment and plan of care as documented in the note.    Marina Hobbs MD  Internal Medicine  Hospitalist  [facility]  Phone: #[phone]    Marina Hobbs MD   [facility]: Ph:[phone]    CC:Whitney Velasquez

## 2022-09-01 NOTE — PLAN OF CARE
Goal Outcome Evaluation:    Plan of Care Reviewed With: patient     Overall Patient Progress: improving    Outcome Evaluation: Pt BP elevated this shift, given PRN labetolol with excellent effect.  No symptoms or defecits noted.  Pre-existing RLE weakness unrelated to infarct.  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    Problem: Plan of Care - These are the overarching goals to be used throughout the patient stay.    Goal: Optimal Comfort and Wellbeing  Intervention: Monitor Pain and Promote Comfort  Recent Flowsheet Documentation  Taken 8/31/2022 2216 by Yumiko Palafox RN  Pain Management Interventions: medication (see MAR)  Taken 8/31/2022 2159 by Yumiko Palafox RN  Pain Management Interventions: medication (see MAR)     Problem: Plan of Care - These are the overarching goals to be used throughout the patient stay.    Goal: Optimal Comfort and Wellbeing  Intervention: Provide Person-Centered Care  Recent Flowsheet Documentation  Taken 9/1/2022 0400 by Yumiko Palafox RN  Trust Relationship/Rapport:   care explained   choices provided   emotional support provided   questions answered   reassurance provided   thoughts/feelings acknowledged  Taken 9/1/2022 0000 by Yumiko Palafox RN  Trust Relationship/Rapport:   care explained   choices provided   emotional support provided   questions answered   reassurance provided   thoughts/feelings acknowledged  Taken 8/31/2022 2000 by Yumiko Palafox RN  Trust Relationship/Rapport:   care explained   choices provided   emotional support provided   questions answered   reassurance provided   thoughts/feelings acknowledged     Problem: Hypertension Acute  Goal: Blood Pressure Within Desired Range  Intervention: Normalize Blood Pressure  Recent Flowsheet Documentation  Taken 9/1/2022 0400 by Yumiko Palafox RN  Medication Review/Management: medications reviewed  Taken 9/1/2022 0000 by Yumiko Palafox, RN  Medication  Review/Management: medications reviewed  Taken 8/31/2022 2000 by Yumiko Palafox RN  Medication Review/Management: medications reviewed     Problem: Cerebral Tissue Perfusion (Stroke, Ischemic/Transient Ischemic Attack)  Goal: Optimal Cerebral Tissue Perfusion  Outcome: Ongoing, Progressing     Problem: Cognitive Impairment (Stroke, Ischemic/Transient Ischemic Attack)  Goal: Optimal Cognitive Function  Outcome: Ongoing, Progressing

## 2022-09-01 NOTE — PLAN OF CARE
Goal Outcome Evaluation:    Plan of Care Reviewed With: patient     Overall Patient Progress: improving         Discharged with Daughter Rosalva. Faxed all information to [facility].

## 2022-09-01 NOTE — PROGRESS NOTES
Physical Therapy Discharge Summary    Reason for therapy discharge:    Patient/family request discontinuation of services.    Progress towards therapy goal(s). See goals on Care Plan in [facility] electronic health record for goal details.  Goals not met.  Barriers to achieving goals:   limited tolerance for therapy and discharge from facility.    Therapy recommendation(s):    Continued therapy is recommended.  Rationale/Recommendations:  to improve functional mobility level.

## 2022-09-04 ENCOUNTER — HEALTH MAINTENANCE LETTER (OUTPATIENT)
Age: 87
End: 2022-09-04

## 2022-09-06 ENCOUNTER — TELEPHONE (OUTPATIENT)
Dept: FAMILY MEDICINE | Facility: CLINIC | Age: 87
End: 2022-09-06

## 2022-09-06 ENCOUNTER — PATIENT OUTREACH (OUTPATIENT)
Dept: CARE COORDINATION | Facility: CLINIC | Age: 87
End: 2022-09-06

## 2022-09-06 ENCOUNTER — MEDICAL CORRESPONDENCE (OUTPATIENT)
Dept: HEALTH INFORMATION MANAGEMENT | Facility: CLINIC | Age: 87
End: 2022-09-06

## 2022-09-06 RX ORDER — PHENAZOPYRIDINE HYDROCHLORIDE 95 MG/1
95 TABLET ORAL DAILY
COMMUNITY
Start: 2022-09-06 | End: 2022-10-10 | Stop reason: ALTCHOICE

## 2022-09-06 NOTE — TELEPHONE ENCOUNTER
Please discontinue Miralax and Metamucil from medication list. Please add AZO and Diclofenac gel to med list. Medications pending for review.

## 2022-09-06 NOTE — TELEPHONE ENCOUNTER
Reason for Call: Request for verbal orders:    Order or referral being requested: PT 1 x for 1 week 2 x a week for 3 weeks, OT Eval the week of 9/12/22. Verbal orders Ok to take uric acid formula 2 capsules a day, AZO once daily, diclofenac 1% gel 4 x a day.     Date needed: as soon as possible    Has the patient been seen by the PCP for this problem? Not Applicable    Additional comments: Incoming call from Luci from [facility] requesting verbal orders    Phone number Patient can be reached at:  Home number on file [phone] (home)    Best Time:  Anytime    Can we leave a detailed message on this number?  YES    Call taken on 9/6/2022 at 8:45 AM by Juana Us

## 2022-09-06 NOTE — PROGRESS NOTES
Clinic Care Coordination Contact  [facility]/Voicemail       Clinical Data: Care Coordinator Outreach  Outreach attempted x 2.  Left message on patient's voicemail with call back information and requested return call.  Plan:Care Coordinator will try to reach patient again in 1-2 business days.    Dominga Okeefe, [facility]  [phone]  [facility]

## 2022-09-07 ENCOUNTER — TELEPHONE (OUTPATIENT)
Dept: FAMILY MEDICINE | Facility: CLINIC | Age: 87
End: 2022-09-07

## 2022-09-07 NOTE — TELEPHONE ENCOUNTER
Called Luci with [facility] back and told her that verbal orders for OT and Pt are ok to move forward with. Per Dr. Henson

## 2022-09-07 NOTE — TELEPHONE ENCOUNTER
Meredith is calling and stated she was just checking in to let PCP know patient's home vitals after being released from the hospital from a stroke.    Meredith stated no symptoms at this time,  vitals as follows:    /78  Temp 97.5  O2 95%  HR  68  Respirations 18    Will forward to PCP for FYI.    Sabrina Mendes RN

## 2022-09-08 ENCOUNTER — TELEPHONE (OUTPATIENT)
Dept: FAMILY MEDICINE | Facility: CLINIC | Age: 87
End: 2022-09-08

## 2022-09-08 NOTE — TELEPHONE ENCOUNTER
Patient's vitals are now stable and if she is asymptomatic, I will see her tomorrow.  Please relay this message    Aman Corral MD

## 2022-09-08 NOTE — TELEPHONE ENCOUNTER
Whitney Velasquez MD [facility] Care Team Pool 26 minutes ago (3:29 PM)     JA    Tried calling Number and person named Braxton answered. Wrong number must have been taken. Please see previous phone message from earlier today. I cannot see on my phone the phone number for [facility]. Please call and triage message better for me. Perhaps can be addressed without me calling back. Previous message was an FYI that blood pressures are in the 150s. Do they have certain parameters that they are supposed to call on?    Message text

## 2022-09-08 NOTE — TELEPHONE ENCOUNTER
NP from assisted living facility calling to talk to Dr. Velasquez or her care team regarding pt's elevated blood pressure. Requesting a call back today.  Broderick BENZ- [phone]

## 2022-09-08 NOTE — TELEPHONE ENCOUNTER
I called and spoke to Broderick. She is new to patient. She said it was reported to her that pts BP was over 200 this morning. She went to see pt and rechecked it and it was 180/80. Checked again 30 min later and was 130/80. Ellis is managing her own medications and was started on hydralazine BID in the hospital and they are unsure if she is taking it or not. NP was considering possibly increasing this to TID with an additional PRN dose but wanted to touch base with us first. Pt is coming in tomorrow for hospital follow up with Dr LEVIN so she just wanted us to update her tomorrow after the visit if we decide to make any changes with BP meds. They are also having a care conference at her facility tomorrow to address medication administration and other things. Broderick can be reached at [phone] if PCP or Dr LEVIN wants to talk with her.

## 2022-09-09 ENCOUNTER — TELEPHONE (OUTPATIENT)
Dept: FAMILY MEDICINE | Facility: CLINIC | Age: 87
End: 2022-09-09

## 2022-09-09 ENCOUNTER — OFFICE VISIT (OUTPATIENT)
Dept: FAMILY MEDICINE | Facility: CLINIC | Age: 87
End: 2022-09-09
Payer: COMMERCIAL

## 2022-09-09 ENCOUNTER — MEDICAL CORRESPONDENCE (OUTPATIENT)
Dept: HEALTH INFORMATION MANAGEMENT | Facility: CLINIC | Age: 87
End: 2022-09-09

## 2022-09-09 VITALS
DIASTOLIC BLOOD PRESSURE: 57 MMHG | HEART RATE: 74 BPM | SYSTOLIC BLOOD PRESSURE: 110 MMHG | WEIGHT: 144.2 LBS | BODY MASS INDEX: 24 KG/M2 | RESPIRATION RATE: 16 BRPM

## 2022-09-09 DIAGNOSIS — Z09 HOSPITAL DISCHARGE FOLLOW-UP: ICD-10-CM

## 2022-09-09 DIAGNOSIS — R53.1 LEFT-SIDED WEAKNESS: Primary | ICD-10-CM

## 2022-09-09 DIAGNOSIS — N39.0 RECURRENT UTI: ICD-10-CM

## 2022-09-09 DIAGNOSIS — I10 ESSENTIAL HYPERTENSION: ICD-10-CM

## 2022-09-09 DIAGNOSIS — I63.81 THALAMIC STROKE (H): ICD-10-CM

## 2022-09-09 PROCEDURE — 99495 TRANSJ CARE MGMT MOD F2F 14D: CPT | Performed by: FAMILY MEDICINE

## 2022-09-09 RX ORDER — ESTRADIOL 0.1 MG/G
1 CREAM VAGINAL
Qty: 42.5 G | Refills: 1 | Status: SHIPPED | OUTPATIENT
Start: 2022-09-12 | End: 2022-12-19

## 2022-09-09 NOTE — PATIENT INSTRUCTIONS
You are seen today for hospital discharge.    Make sure that you follow-up with neurology.    Submit event monitor to the provided address.  The cardiologist will review and the results will most likely be sent to your primary care physician.      Follow-up with your primary care physician in 1 month.  Bring all your medication including supplements so that the list can be updated.      Instructions for blood pressure medication and measurement.    Take hydralazine 2 times a day.  Ideally this should be 12 hours apart.  Check blood pressure before hydralazine and do not take if blood pressure is less than 120/60 mmHg    Continue with PT/OT                  At your visit today, we discussed your risk for falls and preventive options.    Fall Prevention  Falls often occur due to slipping, tripping or losing your balance. Millions of people fall every year and injure themselves. Here are ways to reduce your risk of falling again.   Think about your fall, was there anything that caused your fall that can be fixed, removed, or replaced?  Make your home safe by keeping walkways clear of objects you may trip over, such as electric cords.  Use non-slip pads under rugs. Don't use area rugs or small throw rugs.  Use non-slip mats in bathtubs and showers.  Install handrails and lights on staircases. The handrails should be on both sides of the stairs.  Don't walk in poorly lit areas.  Don't stand on chairs or wobbly ladders.  Use caution when reaching overhead or looking upward. This position can cause a loss of balance.  Be sure your shoes fit properly, have non-slip bottoms and are in good condition.   Wear shoes both inside and out. Don't go barefoot or wear slippers.  Be cautious when going up and down stairs, curbs, and when walking on uneven sidewalks.  If your balance is poor, consider using a cane or walker.  If your fall was related to alcohol use, stop or limit alcohol intake.   If your fall was related to use of  sleeping medicines, talk to your healthcare provider about this. You may need to reduce your dosage at bedtime if you awaken during the night to go to the bathroom.    To reduce the need for nighttime bathroom trips:  Don't drink fluids for several hours before going to bed  Empty your bladder before going to bed  Men can keep a urinal at the bedside  Stay as active as you can. Balance, flexibility, strength, and endurance all come from exercise. They all play a role in preventing falls. Ask your healthcare provider which types of activity are right for you.  Get your vision checked on a regular basis.  If you have pets, know where they are before you stand up or walk so you don't trip over them.  Use night lights.  Go over all your medicines with a pharmacist or other healthcare provider to see if any of them could make you more likely to fall.  [employer] last reviewed this educational content on 4/1/2018 2000-2021 [employer]. All rights reserved. This information is not intended as a substitute for professional medical care. Always follow your healthcare professional's instructions.

## 2022-09-09 NOTE — PROGRESS NOTES
Assessment & Plan     ICD-10-CM    1. Left-sided weakness  R53.1    2. Essential hypertension  I10    3. Recurrent UTI  N39.0 estradiol (ESTRACE) 0.1 MG/GM vaginal cream   4. Hospital discharge follow-up  Z09    5. Thalamic stroke (H)  I63.9      Medical decision making: Patient is a 89-year-old female with hypertension, osteoporosis, peripheral neuropathy and cognitive decline who presents for a follow-up hospitalization after thalamic stroke  This was seen on MRI and it was considered to be acute versus subacute.  Neurology was consulted and recommended patient have a event monitor which patient is currently wearing.  She is now on aspirin 81 mg.  Patient also had UTI that was treated with fosfomycin.  During the course of hospitalization, she had labile blood pressure and hydralazine twice daily was added to her regimen.    There was some confusion regarding her hydralazine which was explained to the patient's daughter.  Hydralazine should be used twice daily unless blood pressure is less than 120/60.  Currently they were using blood pressure as a criteria to give hydralazine if blood pressure was greater than 120/60 mm    We did discuss her sleep cycle which is somewhat different.  Patient gets up at 3: 30 AM to take a medication and then goes back to sleep.  I am not sure if her blood pressure medications are 12 hours apart.  This was discussed.  She should also bring her current blood pressure and supplement list to PCP at next visit.       MEDICATIONS:  Continue current medications without change  Patient Instructions     You are seen today for hospital discharge.    Make sure that you follow-up with neurology.    Submit event monitor to the provided address.  The cardiologist will review and the results will most likely be sent to your primary care physician.      Follow-up with your primary care physician in 1 month.  Bring all your medication including supplements so that the list can be  updated.      Instructions for blood pressure medication and measurement.    Take hydralazine 2 times a day.  Ideally this should be 12 hours apart.  Check blood pressure before hydralazine and do not take if blood pressure is less than 120/60 mmHg    Continue with PT/OT                  At your visit today, we discussed your risk for falls and preventive options.    Fall Prevention  Falls often occur due to slipping, tripping or losing your balance. Millions of people fall every year and injure themselves. Here are ways to reduce your risk of falling again.   1. Think about your fall, was there anything that caused your fall that can be fixed, removed, or replaced?  2. Make your home safe by keeping walkways clear of objects you may trip over, such as electric cords.  3. Use non-slip pads under rugs. Don't use area rugs or small throw rugs.  4. Use non-slip mats in bathtubs and showers.  5. Install handrails and lights on staircases. The handrails should be on both sides of the stairs.  6. Don't walk in poorly lit areas.  7. Don't stand on chairs or wobbly ladders.  8. Use caution when reaching overhead or looking upward. This position can cause a loss of balance.  9. Be sure your shoes fit properly, have non-slip bottoms and are in good condition.   10. Wear shoes both inside and out. Don't go barefoot or wear slippers.  11. Be cautious when going up and down stairs, curbs, and when walking on uneven sidewalks.  12. If your balance is poor, consider using a cane or walker.  13. If your fall was related to alcohol use, stop or limit alcohol intake.   14. If your fall was related to use of sleeping medicines, talk to your healthcare provider about this. You may need to reduce your dosage at bedtime if you awaken during the night to go to the bathroom.    15. To reduce the need for nighttime bathroom trips:  ? Don't drink fluids for several hours before going to bed  ? Empty your bladder before going to bed  ? Men  can keep a urinal at the bedside  16. Stay as active as you can. Balance, flexibility, strength, and endurance all come from exercise. They all play a role in preventing falls. Ask your healthcare provider which types of activity are right for you.  17. Get your vision checked on a regular basis.  18. If you have pets, know where they are before you stand up or walk so you don't trip over them.  19. Use night lights.  20. Go over all your medicines with a pharmacist or other healthcare provider to see if any of them could make you more likely to fall.  [employer] last reviewed this educational content on 4/1/2018 2000-2021 [employer]. All rights reserved. This information is not intended as a substitute for professional medical care. Always follow your healthcare professional's instructions.            Return in about 4 weeks (around 10/7/2022) for Follow up.    Aman Corral MD  [facility]    Ashleigh Corral is a 89 year old accompanied by her daughter, presenting for the following health issues:  Hospital F/U (Stroke /Discuss blood pressure medication/Having more trouble with their hands, more numbness and weakness. /Not sure who to follow up with after the event monitor )      History of Present Illness       Reason for visit:  Follow up on stroke (Aug 26) - Need to identify and coordinate care going forward around this issue    She eats 2-3 servings of fruits and vegetables daily.She consumes 1 sweetened beverage(s) daily.She exercises with enough effort to increase her heart rate 9 or less minutes per day.  She exercises with enough effort to increase her heart rate 3 or less days per week.   She is taking medications regularly.         Hospital Follow-up Visit:    Hospital/Nursing Home/IP Rehab Facility: [facility]  Date of Admission: 8/29/2022  Date of Discharge: 9/1/2022  Reason(s) for Admission: Stroke     Was your hospitalization  related to COVID-19? No   Problems taking medications regularly:  None  Medication changes since discharge: None  Problems adhering to non-medication therapy:  None    Summary of hospitalization:  [facility] discharge summary reviewed  Diagnostic Tests/Treatments reviewed.  Follow up needed: Neurology follow-up needed, PT OT needed  Other Healthcare Providers Involved in Patient s Care:         Homecare, Physical Therapy and Neurology  Update since discharge: stable.         Post Medication Reconciliation Status: Discharge medications reconciled, continue medications without change      Plan of care communicated with patient and family           Patient Active Problem List   Diagnosis     Hypothyroidism     Major depressive disorder, recurrent episode, in full remission (H)     Idiopathic osteoporosis     Adrenal mass, left (H)     Lumbar stenosis with neurogenic claudication     Essential hypertension     Generalized osteoarthritis of multiple sites     Polyneuropathy, peripheral sensorimotor axonal     Allergic rhinitis     Tinnitus     Macular degeneration mild      Breast implant rupture bilateral      ACP (advance care planning)     Actinic keratosis     Cyst of ovary     Hearing loss     Hyperlipidemia     Malignant neoplasm of skin of parts of face     Neurogenic bladder     Antalgic gait     Fatigue, unspecified type     Cervical spondylosis with myelopathy     Recurrent urinary tract infection     Mobility impaired     Foot drop, right     Cognitive changes     Plantar fasciitis     Right thyroid nodule     Hoarse voice quality     Shortness of breath      Thalamic infarct, acute (H)     Lip numbness     Hypertension, unspecified type     Current Outpatient Medications   Medication     acetaminophen (TYLENOL) 500 MG tablet     alpha lipoic acid 200 mg cap     aspirin (ASA) 81 MG EC tablet     baclofen (LIORESAL) 10 MG tablet     buPROPion (WELLBUTRIN SR) 150 MG 12 hr tablet     Cranberry 400  MG CAPS     diclofenac (VOLTAREN) 1 % topical gel     [START ON 9/12/2022] estradiol (ESTRACE) 0.1 MG/GM vaginal cream     ferrous sulfate (FEROSUL) 325 (65 Fe) MG tablet     FLUoxetine (PROZAC) 10 MG capsule     gabapentin (NEURONTIN) 100 MG capsule     hydrALAZINE (APRESOLINE) 25 MG tablet     levothyroxine (SYNTHROID/LEVOTHROID) 75 MCG tablet     losartan (COZAAR) 50 MG tablet     melatonin 10 mg Tab     memantine (NAMENDA) 10 MG tablet     omega-3 fatty acids 500 mg cap     phenazopyridine (AZO URINARY PAIN RELIEF) 95 MG tablet     STRONTIUM CHLORIDE HEXAHYDRATE (STRONTIUM CHL HEXAHYD, BULK,) 100 % Kailyn     UNABLE TO FIND     VIT C/E/ZN/COPPR/LUTEIN/ZEAXAN (PRESERVISION AREDS 2 ORAL)     melatonin 1 MG TABS tablet     No current facility-administered medications for this visit.       Review of Systems   Constitutional, HEENT, cardiovascular, pulmonary, GI, , musculoskeletal, neuro, skin, endocrine and psych systems are negative, except as otherwise noted.      Objective    /57 (BP Location: Left arm, Patient Position: Sitting, Cuff Size: Adult Regular)   Pulse 74   Resp 16   Wt 65.4 kg (144 lb 3.2 oz)   LMP  (LMP Unknown)   BMI 24.00 kg/m    Body mass index is 24 kg/m .  Physical Exam   GENERAL: alert, no distress, frail and elderly  NECK: no adenopathy, no asymmetry, masses, or scars and thyroid normal to palpation  RESP: lungs clear to auscultation - no rales, rhonchi or wheezes  CV: regular rate and rhythm, normal S1 S2, no S3 or S4, no murmur, click or rub, no peripheral edema and peripheral pulses strong  ABDOMEN: soft, nontender, no hepatosplenomegaly, no masses and bowel sounds normal  MS: no gross musculoskeletal defects noted, no edema    Admission on 08/29/2022, Discharged on 09/01/2022   Component Date Value Ref Range Status     Sodium 08/29/2022 133 (A) 136 - 145 mmol/L Final     Potassium 08/29/2022 4.3  3.5 - 5.0 mmol/L Final     Chloride 08/29/2022 99  98 - 107 mmol/L Final     Carbon  Dioxide (CO2) 08/29/2022 27  22 - 31 mmol/L Final     Anion Gap 08/29/2022 7  5 - 18 mmol/L Final     Urea Nitrogen 08/29/2022 18  8 - 28 mg/dL Final     Creatinine 08/29/2022 0.63  0.60 - 1.10 mg/dL Final     Calcium 08/29/2022 9.1  8.5 - 10.5 mg/dL Final     Glucose 08/29/2022 96  70 - 125 mg/dL Final     Alkaline Phosphatase 08/29/2022 95  45 - 120 U/L Final     AST 08/29/2022 32  0 - 40 U/L Final     ALT 08/29/2022 41  0 - 45 U/L Final     Protein Total 08/29/2022 6.6  6.0 - 8.0 g/dL Final     Albumin 08/29/2022 3.3 (A) 3.5 - 5.0 g/dL Final     Bilirubin Total 08/29/2022 0.3  0.0 - 1.0 mg/dL Final     GFR Estimate 08/29/2022 83  >60 mL/min/1.73m2 Final    Effective December 21, 2021 eGFRcr in adults is calculated using the 2021 CKD-EPI creatinine equation which includes age and gender (Eusebia et al., NEJ, DOI: 10.1056/XWVJit7949781)     Troponin I 08/29/2022 <0.01  0.00 - 0.29 ng/mL Final     CRP 08/29/2022 0.2  0.0 - <0.8 mg/dL Final     Color Urine 08/29/2022 Colorless  Colorless, Straw, Light Yellow, Yellow Final     Appearance Urine 08/29/2022 Clear  Clear Final     Glucose Urine 08/29/2022 Negative  Negative mg/dL Final     Bilirubin Urine 08/29/2022 Negative  Negative Final     Ketones Urine 08/29/2022 Negative  Negative mg/dL Final     Specific Gravity Urine 08/29/2022 1.008  1.001 - 1.030 Final     Blood Urine 08/29/2022 0.06 mg/dL (A) Negative Final     pH Urine 08/29/2022 6.5  5.0 - 7.0 Final     Protein Albumin Urine 08/29/2022 Negative  Negative mg/dL Final     Urobilinogen Urine 08/29/2022 <2.0  <2.0 mg/dL Final     Nitrite Urine 08/29/2022 Positive (A) Negative Final     Leukocyte Esterase Urine 08/29/2022 Negative  Negative Final     Bacteria Urine 08/29/2022 Many (A) None Seen /HPF Final     RBC Urine 08/29/2022 1  <=2 /HPF Final     WBC Urine 08/29/2022 <1  <=5 /HPF Final     Squamous Epithelials Urine 08/29/2022 <1  <=1 /HPF Final     Ventricular Rate 08/29/2022 71  BPM Final     Atrial Rate  08/29/2022 71  BPM Final     IL Interval 08/29/2022 152  ms Final     QRS Duration 08/29/2022 84  ms Final     QT 08/29/2022 394  ms Final     QTc 08/29/2022 428  ms Final     P Axis 08/29/2022 50  degrees Final     R AXIS 08/29/2022 -7  degrees Final     T Axis 08/29/2022 57  degrees Final     Interpretation ECG 08/29/2022    Final                    Value:Sinus rhythm  Possible Inferior infarct , age undetermined  Possible Anterior infarct , age undetermined  Abnormal ECG  When compared with ECG of 23-MAY-2016 14:01,  No significant change was found  Confirmed by SEE ED PROVIDER NOTE FOR, ECG INTERPRETATION ([id number]),  MITA SCHULTZ ([id number]) on 8/29/2022 3:42:29 PM       WBC Count 08/29/2022 7.9  4.0 - 11.0 10e3/uL Final     RBC Count 08/29/2022 3.98  3.80 - 5.20 10e6/uL Final     Hemoglobin 08/29/2022 12.7  11.7 - 15.7 g/dL Final     Hematocrit 08/29/2022 39.2  35.0 - 47.0 % Final     MCV 08/29/2022 99  78 - 100 fL Final     MCH 08/29/2022 31.9  26.5 - 33.0 pg Final     MCHC 08/29/2022 32.4  31.5 - 36.5 g/dL Final     RDW 08/29/2022 12.7  10.0 - 15.0 % Final     Platelet Count 08/29/2022 282  150 - 450 10e3/uL Final     % Neutrophils 08/29/2022 72  % Final     % Lymphocytes 08/29/2022 15  % Final     % Monocytes 08/29/2022 9  % Final     % Eosinophils 08/29/2022 2  % Final     % Basophils 08/29/2022 1  % Final     % Immature Granulocytes 08/29/2022 1  % Final     NRBCs per 100 WBC 08/29/2022 0  <1 /100 Final     Absolute Neutrophils 08/29/2022 5.7  1.6 - 8.3 10e3/uL Final     Absolute Lymphocytes 08/29/2022 1.2  0.8 - 5.3 10e3/uL Final     Absolute Monocytes 08/29/2022 0.7  0.0 - 1.3 10e3/uL Final     Absolute Eosinophils 08/29/2022 0.2  0.0 - 0.7 10e3/uL Final     Absolute Basophils 08/29/2022 0.1  0.0 - 0.2 10e3/uL Final     Absolute Immature Granulocytes 08/29/2022 0.1  <=0.4 10e3/uL Final     Absolute NRBCs 08/29/2022 0.0  10e3/uL Final     Culture 08/29/2022 >100,000 CFU/mL Klebsiella aerogenes (A)   Final    Susceptibilities done on previous cultures     SARS CoV2 PCR 08/29/2022 Negative  Negative Final    NEGATIVE: SARS-CoV-2 (COVID-19) RNA not detected, presumed negative.     Troponin I 08/29/2022 0.01  0.00 - 0.29 ng/mL Final     Hemoglobin A1C 08/29/2022 5.1  <=5.6 % Final      Prediabetes: 5.7 to 6.4%        Diabetes:  >=6.5%     Patients with Hgb F >5%, total bilirubin >10.0 mg/dL, abnormal red cell turnover, severe renal or hepatic disease or malignancy should not have this A1C method used to diagnose or monitor diabetes.      Troponin I 08/30/2022 0.01  0.00 - 0.29 ng/mL Final     Cholesterol 08/30/2022 203 (A) <=199 mg/dL Final     Triglycerides 08/30/2022 138  <=149 mg/dL Final     Direct Measure HDL 08/30/2022 58  >=50 mg/dL Final    HDL Cholesterol Reference Range:     0-2 years:   No reference ranges established for patients under 2 years old  [facility] for lipid analytes.    2-8 years:  Greater than 45 mg/dL     18 years and older:   Female: Greater than or equal to 50 mg/dL   Male:   Greater than or equal to 40 mg/dL     LDL Cholesterol Calculated 08/30/2022 117  <=129 mg/dL Final     WBC Count 08/30/2022 7.2  4.0 - 11.0 10e3/uL Final     RBC Count 08/30/2022 4.30  3.80 - 5.20 10e6/uL Final     Hemoglobin 08/30/2022 13.6  11.7 - 15.7 g/dL Final     Hematocrit 08/30/2022 42.4  35.0 - 47.0 % Final     MCV 08/30/2022 99  78 - 100 fL Final     MCH 08/30/2022 31.6  26.5 - 33.0 pg Final     MCHC 08/30/2022 32.1  31.5 - 36.5 g/dL Final     RDW 08/30/2022 12.8  10.0 - 15.0 % Final     Platelet Count 08/30/2022 269  150 - 450 10e3/uL Final     Sodium 08/30/2022 135 (A) 136 - 145 mmol/L Final     Potassium 08/30/2022 4.3  3.5 - 5.0 mmol/L Final     Chloride 08/30/2022 99  98 - 107 mmol/L Final     Carbon Dioxide (CO2) 08/30/2022 27  22 - 31 mmol/L Final     Anion Gap 08/30/2022 9  5 - 18 mmol/L Final     Urea Nitrogen 08/30/2022 13  8 - 28 mg/dL Final     Creatinine 08/30/2022 0.63  0.60 -  1.10 mg/dL Final     Calcium 08/30/2022 9.2  8.5 - 10.5 mg/dL Final     Glucose 08/30/2022 113  70 - 125 mg/dL Final     GFR Estimate 08/30/2022 83  >60 mL/min/1.73m2 Final    Effective December 21, 2021 eGFRcr in adults is calculated using the 2021 CKD-EPI creatinine equation which includes age and gender (Eusebia cadena al., NE, DOI: 10.1056/JZYDfw5116813)     TSH 08/29/2022 2.34  0.30 - 5.00 uIU/mL Final     Vitamin B12 08/29/2022 721  232 - 1,245 pg/mL Final     Folic Acid 08/30/2022 6.8  4.6 - 34.8 ng/mL Final

## 2022-09-09 NOTE — TELEPHONE ENCOUNTER
Reason for Call:  Appointment Request    Patient requesting this type of appt:  Office visit/follow up     Requested provider: Dr. Velasquez    Reason patient unable to be scheduled: Not within requested timeframe    When does patient want to be seen/preferred time: one month    Comments: Just saw Dr. Corral today and she wants Dr. Velasquez to follow up with her in one month    Could we send this information to you in [facility] or would you prefer to receive a phone call?:   Patient would prefer a phone call   Okay to leave a detailed message?: Yes at Other phone number:  [phone]    Call taken on 9/9/2022 at 4:04 PM by Nae Fraga

## 2022-09-12 ENCOUNTER — MEDICAL CORRESPONDENCE (OUTPATIENT)
Dept: HEALTH INFORMATION MANAGEMENT | Facility: CLINIC | Age: 87
End: 2022-09-12

## 2022-09-12 ENCOUNTER — TELEPHONE (OUTPATIENT)
Dept: FAMILY MEDICINE | Facility: CLINIC | Age: 87
End: 2022-09-12

## 2022-09-12 DIAGNOSIS — I10 ESSENTIAL HYPERTENSION: ICD-10-CM

## 2022-09-12 RX ORDER — HYDRALAZINE HYDROCHLORIDE 25 MG/1
25 TABLET, FILM COATED ORAL 3 TIMES DAILY
Qty: 90 TABLET | Refills: 3 | Status: SHIPPED | OUTPATIENT
Start: 2022-09-12 | End: 2022-09-26

## 2022-09-12 NOTE — TELEPHONE ENCOUNTER
Please have RN triage this.  They recently saw Dr. LEVIN on Friday.  It was instructed to continue the hydralazine twice daily but should hold if blood pressure  <120/60.    in regards to the blood pressures reported were the use after the hydralazine had been given or where they held over the weekend for some reason?

## 2022-09-12 NOTE — TELEPHONE ENCOUNTER
Incoming call from NP at [facility]:  Pt was seen for hospital f/u 9/9, has been having high BP reading over the weekend, today BP is 190/100.   NP would like a call back to discuss/collaborate regarding medication.  Broderick [phone] ok to leave VM

## 2022-09-12 NOTE — TELEPHONE ENCOUNTER
Huddled with Dr. Velasquez. She is changing Ellis to hydralazine 25 mg tid to see if BP comes under better control. Provider updated prescription in chart. Will call to update DEMAR Villafana. Per Dr. Velasquez, Ellis can take her second dose now and get third dose in before bed.    Yvette Key RN

## 2022-09-12 NOTE — TELEPHONE ENCOUNTER
Called and spoke with DEMAR Villafana from [facility]. She states that nursing staff will be taking over med administration for patient moving forward, but patient has been administering her own medication. Ellis states that she did take the hydralazine bid over the weekend. Broderick reports weekend BP readings in the 200s/100s. No acute neuro symptoms but today patient is complaining of a slight headache and feeling fuzzy.     Broderick is available until 4PM today and would like to have a plan in place before she leaves for the day. Is available to consult with PCP if needed. Please advise.    Yvette Key RN

## 2022-09-12 NOTE — TELEPHONE ENCOUNTER
Called and relayed PCP recommendations to DEMAR Villafana at [facility]. Broderick read back medication changes and updated med list was faxed to facility at number provided, [phone].     Yvette Key RN

## 2022-09-15 ENCOUNTER — TELEPHONE (OUTPATIENT)
Dept: NURSING | Facility: CLINIC | Age: 87
End: 2022-09-15

## 2022-09-15 DIAGNOSIS — F33.42 MAJOR DEPRESSIVE DISORDER, RECURRENT EPISODE, IN FULL REMISSION (H): ICD-10-CM

## 2022-09-15 RX ORDER — BUPROPION HYDROCHLORIDE 150 MG/1
TABLET, EXTENDED RELEASE ORAL
Qty: 180 TABLET | Refills: 2 | Status: SHIPPED | OUTPATIENT
Start: 2022-09-15 | End: 2022-10-07

## 2022-09-15 NOTE — TELEPHONE ENCOUNTER
Nurse Triage SBAR    Is this a 2nd Level Triage? YES, LICENSED PRACTITIONER REVIEW IS REQUIRED    Situation: NP calling regarding pt. stating dralazine needs adjustment and family is concerned about mood and feels she is depressed/anxious.     Background: Hydralazine has been adjusted and Bps have been 'better' but continue to be in the 180/190s.    Assessment: NP states her Bps continue to be 180/190s but there are parameters to hold if less than 120/60. Pt did not get it last hs d/t BP of 106/61. HR in 60-90s. Moods have changes recently per family.    Protocol Recommended Disposition:   No disposition on file.    Recommendation: NP wondering if hydralazine should be adjusted and also requesting that she have medication for the mood changes. NP also wondering if labs need to be drawn.    Routed to provider    Does the patient meet one of the following criteria for ADS visit consideration? No     Nae Culver RN  [facility] Nurse Advisor   9/15/2022  9:57 AM

## 2022-09-15 NOTE — TELEPHONE ENCOUNTER
Can increase bupropion 150 from once daily to twice daily. Updated rx in chart.     What time of day is she experiencing the 180s-190s if last night was 106 systolic. Any dizziness? pls get more details

## 2022-09-15 NOTE — TELEPHONE ENCOUNTER
Called Broderick BENZ and relayed MD message.    No further concerns at this time.    Berto Wakefield RN     [facility]

## 2022-09-15 NOTE — TELEPHONE ENCOUNTER
Called Broderick NP to relay provider's message below & get further details regarding patient's BP readings. NP states that the JOSE have not switched to managing patient's meds as patient does this on her own still.    BP readings:  9/15 am 190/91  9/14 am 183/98  9/14 at 6:32pm was 106/61, asymptomatic    BPs are higher in the am & lower in the evening based on BP trends per NP. Unsure how patient takes her BP meds as this is done by patient.    NP also mentioned that patient fell this am with transfer from chair to wc & hit the back of her head with 2/10 headache reported. Tylenol was given. No neuro changes noted. NP would like PCP direction with patient on BP meds as NP has only seen patient for the past couple weeks. PCP is not in clinic today. Will route to PCP to advise.    Marquez Carr RN, BSN  [facility]

## 2022-09-15 NOTE — TELEPHONE ENCOUNTER
Noted about the fall. Update if any neuro changes.  Please inform to change parameters to hold hydralazine at night only if  systolic or less instead of 120.

## 2022-09-16 ENCOUNTER — TELEPHONE (OUTPATIENT)
Dept: FAMILY MEDICINE | Facility: CLINIC | Age: 87
End: 2022-09-16

## 2022-09-16 ENCOUNTER — HOSPITAL ENCOUNTER (EMERGENCY)
Facility: HOSPITAL | Age: 87
Discharge: HOME | End: 2022-09-17
Attending: EMERGENCY MEDICINE | Admitting: EMERGENCY MEDICINE
Payer: COMMERCIAL

## 2022-09-16 ENCOUNTER — APPOINTMENT (OUTPATIENT)
Dept: MRI IMAGING | Facility: HOSPITAL | Age: 87
End: 2022-09-16
Attending: EMERGENCY MEDICINE
Payer: COMMERCIAL

## 2022-09-16 ENCOUNTER — HOSPITAL ENCOUNTER (EMERGENCY)
Facility: CLINIC | Age: 87
End: 2022-09-16
Payer: COMMERCIAL

## 2022-09-16 ENCOUNTER — APPOINTMENT (OUTPATIENT)
Dept: CT IMAGING | Facility: HOSPITAL | Age: 87
End: 2022-09-16
Attending: EMERGENCY MEDICINE
Payer: COMMERCIAL

## 2022-09-16 VITALS
DIASTOLIC BLOOD PRESSURE: 79 MMHG | OXYGEN SATURATION: 96 % | TEMPERATURE: 98.1 F | HEART RATE: 101 BPM | SYSTOLIC BLOOD PRESSURE: 165 MMHG | RESPIRATION RATE: 22 BRPM

## 2022-09-16 DIAGNOSIS — I63.81 RIGHT THALAMIC STROKE (H): ICD-10-CM

## 2022-09-16 DIAGNOSIS — I10 HYPERTENSION, UNSPECIFIED TYPE: ICD-10-CM

## 2022-09-16 DIAGNOSIS — R29.898 WEAKNESS OF BOTH LOWER EXTREMITIES: ICD-10-CM

## 2022-09-16 LAB
ANION GAP SERPL CALCULATED.3IONS-SCNC: 9 MMOL/L (ref 5–18)
APTT PPP: 26 SECONDS (ref 22–38)
BASOPHILS # BLD AUTO: 0.1 10E3/UL (ref 0–0.2)
BASOPHILS NFR BLD AUTO: 1 %
BUN SERPL-MCNC: 21 MG/DL (ref 8–28)
CALCIUM SERPL-MCNC: 9.6 MG/DL (ref 8.5–10.5)
CHLORIDE BLD-SCNC: 102 MMOL/L (ref 98–107)
CO2 SERPL-SCNC: 28 MMOL/L (ref 22–31)
CREAT SERPL-MCNC: 0.71 MG/DL (ref 0.6–1.1)
EOSINOPHIL # BLD AUTO: 0.3 10E3/UL (ref 0–0.7)
EOSINOPHIL NFR BLD AUTO: 4 %
ERYTHROCYTE [DISTWIDTH] IN BLOOD BY AUTOMATED COUNT: 13.2 % (ref 10–15)
GFR SERPL CREATININE-BSD FRML MDRD: 80 ML/MIN/1.73M2
GLUCOSE BLD-MCNC: 91 MG/DL (ref 70–125)
HCT VFR BLD AUTO: 43.3 % (ref 35–47)
HGB BLD-MCNC: 13.8 G/DL (ref 11.7–15.7)
IMM GRANULOCYTES # BLD: 0.1 10E3/UL
IMM GRANULOCYTES NFR BLD: 1 %
INR PPP: 0.91 (ref 0.85–1.15)
LYMPHOCYTES # BLD AUTO: 1.7 10E3/UL (ref 0.8–5.3)
LYMPHOCYTES NFR BLD AUTO: 23 %
MCH RBC QN AUTO: 31.8 PG (ref 26.5–33)
MCHC RBC AUTO-ENTMCNC: 31.9 G/DL (ref 31.5–36.5)
MCV RBC AUTO: 100 FL (ref 78–100)
MONOCYTES # BLD AUTO: 0.7 10E3/UL (ref 0–1.3)
MONOCYTES NFR BLD AUTO: 10 %
NEUTROPHILS # BLD AUTO: 4.4 10E3/UL (ref 1.6–8.3)
NEUTROPHILS NFR BLD AUTO: 61 %
NRBC # BLD AUTO: 0 10E3/UL
NRBC BLD AUTO-RTO: 0 /100
PLATELET # BLD AUTO: 247 10E3/UL (ref 150–450)
POTASSIUM BLD-SCNC: 3.8 MMOL/L (ref 3.5–5)
RBC # BLD AUTO: 4.34 10E6/UL (ref 3.8–5.2)
SODIUM SERPL-SCNC: 139 MMOL/L (ref 136–145)
TROPONIN I SERPL-MCNC: 0.02 NG/ML (ref 0–0.29)
WBC # BLD AUTO: 7.1 10E3/UL (ref 4–11)

## 2022-09-16 PROCEDURE — 93005 ELECTROCARDIOGRAM TRACING: CPT | Performed by: EMERGENCY MEDICINE

## 2022-09-16 PROCEDURE — 255N000002 HC RX 255 OP 636: Performed by: EMERGENCY MEDICINE

## 2022-09-16 PROCEDURE — 82310 ASSAY OF CALCIUM: CPT | Performed by: EMERGENCY MEDICINE

## 2022-09-16 PROCEDURE — A9585 GADOBUTROL INJECTION: HCPCS | Performed by: EMERGENCY MEDICINE

## 2022-09-16 PROCEDURE — 96374 THER/PROPH/DIAG INJ IV PUSH: CPT | Mod: 59

## 2022-09-16 PROCEDURE — 85730 THROMBOPLASTIN TIME PARTIAL: CPT | Performed by: EMERGENCY MEDICINE

## 2022-09-16 PROCEDURE — 85610 PROTHROMBIN TIME: CPT | Performed by: EMERGENCY MEDICINE

## 2022-09-16 PROCEDURE — 36415 COLL VENOUS BLD VENIPUNCTURE: CPT | Performed by: EMERGENCY MEDICINE

## 2022-09-16 PROCEDURE — 70553 MRI BRAIN STEM W/O & W/DYE: CPT

## 2022-09-16 PROCEDURE — 99285 EMERGENCY DEPT VISIT HI MDM: CPT | Mod: 25

## 2022-09-16 PROCEDURE — 250N000011 HC RX IP 250 OP 636: Performed by: EMERGENCY MEDICINE

## 2022-09-16 PROCEDURE — 84484 ASSAY OF TROPONIN QUANT: CPT | Performed by: EMERGENCY MEDICINE

## 2022-09-16 PROCEDURE — 85004 AUTOMATED DIFF WBC COUNT: CPT | Performed by: EMERGENCY MEDICINE

## 2022-09-16 PROCEDURE — 70450 CT HEAD/BRAIN W/O DYE: CPT

## 2022-09-16 RX ORDER — HYDRALAZINE HYDROCHLORIDE 20 MG/ML
5 INJECTION INTRAMUSCULAR; INTRAVENOUS ONCE
Status: COMPLETED | OUTPATIENT
Start: 2022-09-16 | End: 2022-09-16

## 2022-09-16 RX ORDER — GADOBUTROL 604.72 MG/ML
7 INJECTION INTRAVENOUS ONCE
Status: COMPLETED | OUTPATIENT
Start: 2022-09-16 | End: 2022-09-16

## 2022-09-16 RX ADMIN — HYDRALAZINE HYDROCHLORIDE 5 MG: 20 INJECTION, SOLUTION INTRAMUSCULAR; INTRAVENOUS at 18:06

## 2022-09-16 RX ADMIN — GADOBUTROL 7 ML: 604.72 INJECTION INTRAVENOUS at 21:26

## 2022-09-16 ASSESSMENT — ACTIVITIES OF DAILY LIVING (ADL)
ADLS_ACUITY_SCORE: 35

## 2022-09-16 ASSESSMENT — ENCOUNTER SYMPTOMS
CONFUSION: 0
WEAKNESS: 1
CHILLS: 0
FEVER: 0
SPEECH DIFFICULTY: 0
HEADACHES: 1
NUMBNESS: 1

## 2022-09-16 NOTE — TELEPHONE ENCOUNTER
Incoming call from [facility]:  Verbal orders needed. OT 1 time per week for 3 weeks to address ADL retraining, functional transfers, exercise, energy conservation, fall prevention, and COVID education.  Call Back Lolis [phone]  Ok to leave VM

## 2022-09-16 NOTE — ED TRIAGE NOTES
Pt presents from longterm with L arm heaviness + headache x 2 hours. Hx stroke 3 weeks ago. Pt states headache was on R side of head, now on L side. Hypertensive for EMS.     Triage Assessment     Row Name 09/16/22 [id number]       Triage Assessment (Adult)    Airway WDL WDL       Respiratory WDL    Respiratory WDL WDL       Skin Circulation/Temperature WDL    Skin Circulation/Temperature WDL WDL       Cardiac WDL    Cardiac WDL WDL       Peripheral/Neurovascular WDL    Peripheral Neurovascular WDL WDL       Cognitive/Neuro/Behavioral WDL    Cognitive/Neuro/Behavioral WDL WDL

## 2022-09-16 NOTE — ED PROVIDER NOTES
EMERGENCY DEPARTMENT ENCOUNTER      NAME: Ellis Em  AGE: 89 year old female  YOB: 1931  MRN: [id number]  EVALUATION DATE & TIME: 2022  5:22 PM    PCP: Whitney Velasquez    ED PROVIDER: Grady Duong M.D.      Chief Complaint   Patient presents with     Headache     Extremity Weakness         FINAL IMPRESSION:  1.  Extremity weakness.  2.  Exacerbation of chronic hypertension.  3.  Recent right thalamic CVA.      ED COURSE & MEDICAL DECISION MAKIN:34 PM I met with the patient to gather history and to perform my initial exam. We discussed plans for the ED course, including diagnostic testing and treatment. PPE worn: cloth mask & eye protection.  Patient admitted 2022 for right thalamic/internal capsule stroke.  6 mm, no bleeding.  Patient at that time had left facial numbness and left arm and left leg weakness.  Now presenting with those symptoms as well as what appears to be maybe a right facial droop and right leg weakness.  Blood pressure is significantly elevated at 230/112.  8:26 PM I re-evaluated the patient and updated her on lab and imaging results.   11:24 PM I spoke with [facility]. We discussed a plan for the neurologist, Dr. Duarte, and a radiologist to review the MRI results together.  11:39 PM.  Results communicated to the patient and daughter.  Coagulation functions normal.  CBC normal.  Troponin negative.  Chemistries negative.  EKG unremarkable.  Head CT unremarkable.  Head MRI read by the neuroradiologist and they note an evolving right thalamic stroke.  No evidence for new stroke, extension of stroke, or hemorrhagic transformation.  They note a tiny occipital subdural hematoma which was down from 4 mm to 3 mm.  I, myself, reviewed head CT's and had MRIs done in the last 3 months without noting mention of this.  I spoke with the neuroradiologist on tonight who reviewed the films.  He noted patient did have a trace occipital subdural hematoma at the previous  head CT's and head MRI is.  This is now decreased in size.  There is no mass-effect or other pathology.  Patient headache resolved.  Blood pressure down to 165/72.  We will add an extra hydralazine dose as needed systolic blood pressure greater than 1 .  Patient is discharged home.  Patient and daughter in agreement.    Critical care time exclusive of procedures: 30 minutes.    Pertinent Labs & Imaging studies reviewed. (See chart for details)    89 year old female presents to the Emergency Department for evaluation of headache and extremity weakness.    At the conclusion of the encounter I discussed the results of all of the tests and the disposition. The questions were answered. The patient or family acknowledged understanding and was agreeable with the care plan.       MEDICATIONS GIVEN IN THE EMERGENCY:  Medications   hydrALAZINE (APRESOLINE) injection 5 mg (5 mg Intravenous Given 9/16/22 1806)   gadobutrol (GADAVIST) injection 7 mL (7 mLs Intravenous Given 9/16/22 2126)       NEW PRESCRIPTIONS STARTED AT TODAY'S ER VISIT  New Prescriptions    No medications on file          =================================================================    HPI    Patient information was obtained from: patient and patient's jeffy    Use of : N/A         Ellis Em is a 89 year old female with a pertinent history of hyperthyroidism, right thyroid nodule, hyperlipidemia, acute thalamic infarct (8/29/22), HTN, who presents to this ED by EMS for evaluation of headache and extremity weakness. Patient states that she has a headache at the top of her head. She also reports left arm numbness and heaviness, but denies decreased sensation to her left arm. Patient states that she typically uses her arms to get up off of the toilet, and was unable to do so today. Patient reports that her legs are both at baseline strength. Patient's daughter states that she is not confused.    Patient's daughter reports that patient's  "symptoms with her 8/29/22 stroke (3 weeks ago), included left-sided facial numbness, slight left-sided facial droop, left arm and leg weakness, difficulty swallowing. Patient did not have changes in speech or vision 3 weeks ago. Patient also endorses chronic right leg weakness, which onset ~1 year ago.    She does not identify any waxing or waning symptoms otherwise, exacerbating or alleviating features,associated symptoms except as mentioned. She denies any pain related complaints.    REVIEW OF SYSTEMS   Review of Systems   Constitutional: Negative for chills and fever.   Neurological: Positive for weakness (left arm), numbness (left arm) and headaches. Negative for speech difficulty.   Psychiatric/Behavioral: Negative for confusion.   All other systems reviewed and are negative.       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, salivary gland     Cancer of skin     \"half dollar size area removed from scalp\", precancer in abd (gallbladder & 1/2 of stomach removed\"     Carpal tunnel syndrome      Cervical spondylosis with myelopathy 4/25/2013     Chronic pain      Closed fracture of cervical vertebra (H) 10/21/2015     Replacement Utility updated for latest IMO load     Closed fracture of lumbar vertebra (H) 1/21/2015     Replacement Utility updated for latest IMO load     Cyst of spinal meninges      Dermatitis      Disorders of meninges, not elsewhere classified     Created by Conversion      Gastric ulcer 1983    precancerous     Hip fracture (H) 2007     History of adrenal adenoma 5/25/2021     History of appendectomy     1943, 12 years old     History of fractured pelvis 1985     Hyponatremia      Hypothyroidism      Leg pain, bilateral     weakness bilaterally     Major depression      Miscarriage 1962    x2     Neck fracture (H) 09/10/2015     Nephrolithiasis      Osteoarthritis      " Osteoporosis      Panic attack      Peripheral neuropathy      Reactive confusion      Renal insufficiency      Rib fracture 2008    x2, with pelvix fx     Shingles      Status post lumbar laminectomy 11/7/2014     UTI (urinary tract infection)      Varicella 7/14/2004    Overview:  LW Onset:  [id number] ; Varicella Zoster       PAST SURGICAL HISTORY:  Past Surgical History:   Procedure Laterality Date     ABDOMEN SURGERY  06/30/1983    1/2 of stomach organ removed due to precancerous ulcer     APPENDECTOMY  1943     CATARACT EXTRACTION, BILATERAL       CERVICAL LAMINOPLASTY Bilateral 5/2/2013    C1-3 cervical laminoplasty, removal of intracanaalicular extradural ventrally located cyst on 5/2/2013 by Dr. Corbin at [facility] for treatment of spinal cord compression and myelopathy     CHOLECYSTECTOMY  06/30/1983     COMBINED MAMMAPLASTY REVISION W/ ABDOMINOPLASTY  07/1998     DILATION AND CURETTAGE  09/19/1986     ENDOMETRIAL BIOPSY  09/09/1986     FRACTURE SURGERY  1985    Plate in Left hip; pins in then removed from right hip     JOINT REPLACEMENT      left total knee     LUMBAR LAMINECTOMY N/A 11/6/2014    Procedure: BILATERAL DECOMPRESSIVE LAMINECTOMY L1-L5 (REDO L4 L5);  Surgeon: Trice Corbin MD;  Location: [facility];  Service:      MASTECTOMY Bilateral      OTHER SURGICAL HISTORY      partial colectomy     OVARIAN CYST REMOVAL  1943    chocolate cysts removed     AL ARTHROPLASTY TIBIAL PLATEAU Left     Description: Knee Replacement;  Recorded: 05/23/2013;     REVISE RECONSTRUCTED BREAST Bilateral 11/11/1984    with implants, nipple attachment 1/13/1985     ROOT CANAL  09/14/1998     SALIVARY GLAND SURGERY  12/05/1984    salivary gland and neck tumor removed     SKIN CANCER EXCISION  08/15/2003    ear     TOTAL HIP ARTHROPLASTY Bilateral     armature installed     TOTAL KNEE ARTHROPLASTY  10/20/2005     TUBAL LIGATION  1962     TUMOR EXCISION  05/01/2013    in spine     [facility] QUIGLEY  W/O FACETEC FORAMOT/DSKC 1/2 VRT SEG, LUMBAR      Description: Laminectomy Decompressive Up To Two Lumbar Segments;  Recorded: 11/10/2014;           CURRENT MEDICATIONS:    acetaminophen (TYLENOL) 500 MG tablet  alpha lipoic acid 200 mg cap  aspirin (ASA) 81 MG EC tablet  baclofen (LIORESAL) 10 MG tablet  buPROPion (WELLBUTRIN SR) 150 MG 12 hr tablet  Cranberry 400 MG CAPS  diclofenac (VOLTAREN) 1 % topical gel  estradiol (ESTRACE) 0.1 MG/GM vaginal cream  ferrous sulfate (FEROSUL) 325 (65 Fe) MG tablet  FLUoxetine (PROZAC) 10 MG capsule  gabapentin (NEURONTIN) 100 MG capsule  hydrALAZINE (APRESOLINE) 25 MG tablet  levothyroxine (SYNTHROID/LEVOTHROID) 75 MCG tablet  losartan (COZAAR) 50 MG tablet  melatonin 1 MG TABS tablet  melatonin 10 mg Tab  memantine (NAMENDA) 10 MG tablet  omega-3 fatty acids 500 mg cap  phenazopyridine (AZO URINARY PAIN RELIEF) 95 MG tablet  STRONTIUM CHLORIDE HEXAHYDRATE (STRONTIUM CHL HEXAHYD, BULK,) 100 % Kailyn  UNABLE TO FIND  VIT C/E/ZN/COPPR/LUTEIN/ZEAXAN (PRESERVISION AREDS 2 ORAL)        ALLERGIES:  Allergies   Allergen Reactions     Iodinated Contrast Media [Diagnostic X-Ray Materials] Hives     Adhesive [Mecrylate] Unknown     Adhesive tape     Amlodipine Unknown     drowsiness     Asparaginase Unknown     Atorvastatin Unknown     PN: LW Reaction: arthalgia     Ciprofloxacin Unknown     Codeine Unknown     Lactose      Food, okay if in medicine.      Latex Unknown     PN: Converted from LW Latex Sensitivity Flag     Lisinopril Cough     Pravastatin Unknown     Propoxyphene N-Acetaminophen [Propoxyphene N-Apap] Unknown     PN: LW Reaction: hallucinations     Sulfa (Sulfonamide Antibiotics) [Sulfa Drugs] Unknown     Albuterol Unknown     Other reaction(s): Urinary Retention       FAMILY HISTORY:  Family History   Problem Relation Age of Onset     Cancer Mother      Kidney Disease Father      Alcoholism Brother      Diabetes Brother      Dementia Brother        SOCIAL HISTORY:   Social  History     Socioeconomic History     Marital status: Single     Spouse name: None     Number of children: None     Years of education: None     Highest education level: None   Tobacco Use     Smoking status: Never Smoker     Smokeless tobacco: Never Used   Substance and Sexual Activity     Alcohol use: No     Drug use: No   No drugs, alcohol, or tobacco.    VITALS:  BP (!) 187/82   Pulse 72   Temp 98.1  F (36.7  C) (Oral)   Resp 22   LMP  (LMP Unknown)   SpO2 95%     PHYSICAL EXAM    Vital Signs:  BP (!) 187/82   Pulse 72   Temp 98.1  F (36.7  C) (Oral)   Resp 22   LMP  (LMP Unknown)   SpO2 95%   General:  On entering the room she is in no apparent distress.    Neck:  Neck supple with full range of motion and nontender.    Back:  Back and spine are nontender.  No costovertebral angle tenderness.    HEENT:  Oropharynx clear with moist mucous membranes.  HEENT unremarkable.    Pulmonary:  Chest clear to auscultation without rhonchi rales or wheezing.    Cardiovascular:  Cardiac regular rate and rhythm without murmurs rubs or gallops.    Abdomen:  Abdomen soft nontender.  There is no rebound or guarding.    Muskuloskeletal:  she moves all 4 without any difficulty and has normal neurovascular exams.  Extremities without clubbing, cyanosis, or edema.  Legs and calves are nontender.    Neuro:  she is alert and oriented ×3 and moves all extremities symmetrically.    National Institutes of Health Stroke Scale  Exam Interval: Baseline   Score    Level of consciousness: (0)   Alert, keenly responsive    LOC questions: (0)   Answers both questions correctly    LOC commands: (0)   Performs both tasks correctly    Best gaze: (0)   Normal    Visual: (0)   No visual loss    Facial palsy: (1)   Minor paralysis (flat nasolabial fold, smile asymmetry)    Motor arm (left): (1)   Drift    Motor arm (right): (0)   No drift    Motor leg (left): (0)   No drift    Motor leg (right): (2)   Some effort against gravity    Limb  ataxia: (0)   Absent    Sensory: (0)   Normal- no sensory loss    Best language: (0)   Normal- no aphasia    Dysarthria: (0)   Normal    Extinction and inattention: (0)   No abnormality        Total Score:  4   NIH stroke probably not accurate as some of these findings were present at her recent admission also.      Psych:  Normal affect.    Skin:  Unremarkable and warm and dry.       LAB:  All pertinent labs reviewed and interpreted.  Labs Ordered and Resulted from Time of ED Arrival to Time of ED Departure   BASIC METABOLIC PANEL - Normal       Result Value    Sodium 139      Potassium 3.8      Chloride 102      Carbon Dioxide (CO2) 28      Anion Gap 9      Urea Nitrogen 21      Creatinine 0.71      Calcium 9.6      Glucose 91      GFR Estimate 80     TROPONIN I - Normal    Troponin I 0.02     INR - Normal    INR 0.91     PARTIAL THROMBOPLASTIN TIME - Normal    aPTT 26     CBC WITH PLATELETS AND DIFFERENTIAL    WBC Count 7.1      RBC Count 4.34      Hemoglobin 13.8      Hematocrit 43.3            MCH 31.8      MCHC 31.9      RDW 13.2      Platelet Count 247      % Neutrophils 61      % Lymphocytes 23      % Monocytes 10      % Eosinophils 4      % Basophils 1      % Immature Granulocytes 1      NRBCs per 100 WBC 0      Absolute Neutrophils 4.4      Absolute Lymphocytes 1.7      Absolute Monocytes 0.7      Absolute Eosinophils 0.3      Absolute Basophils 0.1      Absolute Immature Granulocytes 0.1      Absolute NRBCs 0.0         RADIOLOGY:  Reviewed all pertinent imaging. Please see official radiology report.  MR Brain w/o & w Contrast   Final Result   IMPRESSION:   1.  Evolving subacute punctate right thalamocapsular infarct. No acute intracranial hemorrhage or interval infarct.   2.  Slightly decreased size of evolving, now subacute left occipitoparietal subdural hematoma measuring 3 mm, previously 4 mm. No significant mass effect or new extra-axial fluid collection.      CT Head w/o Contrast   Final  Result   IMPRESSION:   1.  The previously seen infarct in the right thalamus/internal capsule is better seen on MRI. No hemorrhagic conversion or progressive mass effect.   2.  Findings suggestive of left otomastoiditis.                 EKG:    Normal sinus rhythm 68, borderline, questionable inferior wall MI findings seen previously have resolved..  Questionable LVH pattern.  Very similar to previous EKG August 29, 2022.    I have independently reviewed and interpreted the EKG(s) documented above.    PROCEDURES:   None      I, Elise Gryler, am serving as a scribe to document services personally performed by Dr. Duong based on my observation and the provider's statements to me. I, Grady Duong MD attest that Elise Gryler is acting in a scribe capacity, has observed my performance of the services and has documented them in accordance with my direction.    Grady Duong M.D.  Emergency Medicine  [facility] EMERGENCY DEPARTMENT  [address] [place] [id number]  [phone]  Dept: [phone]       Grady Duong MD  09/16/22 3028

## 2022-09-16 NOTE — ED NOTES
Bed: JNED-20  Expected date: 9/16/22  Expected time: 4:58 PM  Means of arrival:   Comments:  Layla 89 yo F L arm heaviness

## 2022-09-16 NOTE — ED NOTES
"Expected Patient Referral to ED  5:04 PM    Referring Clinic/Provider:  Evonne Duong     Reason for referral/Clinical facts:  Recently hospitalization for stroke. Recently started on new blood pressure meds. New left-sided weakness started \"this afternoon.\" No fevers, no vomiting. Vitals stable.     Recommendations provided:  Send to ED for further evaluation    Caller was informed that this institution does possess the capabilities and/or resources to provide for patient and should be transferred to our facility.    Discussed that if direct admit is sought and any hurdles are encountered, this ED would be happy to see the patient and evaluate.    Informed caller that recommendations provided are recommendations based only on the facts provided and that they responsible to accept or reject the advice, or to seek a formal in person consultation as needed and that this ED will see/treat patient should they arrive.      SERJIO PRIDE MD  [facility] EMERGENCY ROOM  [address] [address]  [phone]       Serjio Pride MD  09/16/22 1709    "

## 2022-09-17 NOTE — DISCHARGE INSTRUCTIONS
Continue your present medications including hydralazine 3 times a day and Cozaar 50 mg twice daily.  During the day if it has been 2 or 3 hours since you took a dose of hydralazine and a random blood pressure is above 190-200 you may take 1 extra dose in the day.  Follow-up with your family doctor or clinic next week.  See them sooner or return if worse or problems.

## 2022-09-19 ENCOUNTER — MEDICAL CORRESPONDENCE (OUTPATIENT)
Dept: HEALTH INFORMATION MANAGEMENT | Facility: CLINIC | Age: 87
End: 2022-09-19

## 2022-09-20 LAB
ATRIAL RATE - MUSE: 68 BPM
DIASTOLIC BLOOD PRESSURE - MUSE: 90 MMHG
INTERPRETATION ECG - MUSE: NORMAL
P AXIS - MUSE: 89 DEGREES
PR INTERVAL - MUSE: 152 MS
QRS DURATION - MUSE: 88 MS
QT - MUSE: 410 MS
QTC - MUSE: 435 MS
R AXIS - MUSE: -6 DEGREES
SYSTOLIC BLOOD PRESSURE - MUSE: 205 MMHG
T AXIS - MUSE: 70 DEGREES
VENTRICULAR RATE- MUSE: 68 BPM

## 2022-09-21 ENCOUNTER — TELEPHONE (OUTPATIENT)
Dept: FAMILY MEDICINE | Facility: CLINIC | Age: 87
End: 2022-09-21

## 2022-09-21 NOTE — TELEPHONE ENCOUNTER
I called back and it went to voicemail.  Lolis is an occupational therapist.  I left her a voicemail stating she could call back with any specific questions.  Thank you.

## 2022-09-21 NOTE — TELEPHONE ENCOUNTER
Provider Lolis transferred through from central scheduling to talk to Dr. Velasquez about patient. Informed provider that Dr. Velasquez is out of the office today but we do have the covering provider that she could talk to. Lolis would like to talk to the covering provider. Message sent to Dr. Henson who is covering for Dr. Velasquez. Please call Lolis at [phone].

## 2022-09-22 ENCOUNTER — LAB REQUISITION (OUTPATIENT)
Dept: LAB | Facility: CLINIC | Age: 87
End: 2022-09-22
Payer: COMMERCIAL

## 2022-09-22 ENCOUNTER — MEDICAL CORRESPONDENCE (OUTPATIENT)
Dept: HEALTH INFORMATION MANAGEMENT | Facility: CLINIC | Age: 87
End: 2022-09-22

## 2022-09-22 ENCOUNTER — TELEPHONE (OUTPATIENT)
Dept: FAMILY MEDICINE | Facility: CLINIC | Age: 87
End: 2022-09-22

## 2022-09-22 DIAGNOSIS — J32.0 CHRONIC MAXILLARY SINUSITIS: Primary | ICD-10-CM

## 2022-09-22 DIAGNOSIS — I10 ESSENTIAL HYPERTENSION: ICD-10-CM

## 2022-09-22 DIAGNOSIS — R29.6 REPEATED FALLS: ICD-10-CM

## 2022-09-22 RX ORDER — HYDROCHLOROTHIAZIDE 25 MG/1
25 TABLET ORAL EVERY MORNING
Qty: 90 TABLET | Refills: 1 | Status: SHIPPED | OUTPATIENT
Start: 2022-09-22 | End: 2022-10-07

## 2022-09-22 RX ORDER — LOSARTAN POTASSIUM 50 MG/1
TABLET ORAL
Qty: 180 TABLET | Refills: 1
Start: 2022-09-22 | End: 2022-10-10 | Stop reason: DRUGHIGH

## 2022-09-22 NOTE — TELEPHONE ENCOUNTER
Offer 3pm on oct 10th to daughter Rosalva for 40 min visit follow up from last visit and ED follow up

## 2022-09-22 NOTE — TELEPHONE ENCOUNTER
Nursing reported was concerned about bps which I addressed with jalen today. Indicated I do not need to call back

## 2022-09-22 NOTE — TELEPHONE ENCOUNTER
First Attempt: LM for patients daughter to call back to schedule. See note from Dr Velasquez for where to schedule.

## 2022-09-22 NOTE — TELEPHONE ENCOUNTER
Reason for Call:  Other call back    Detailed comments: Continues very labile blood pressure reading - on medication and pressures are 236/103 (Consistently high) - considering other medication but would like PCP opinion before administering new medication    Phone Number Patient can be reached at: Other phone number:  [phone]    Best Time: Anytime    Can we leave a detailed message on this number? YES    Call taken on 9/22/2022 at 8:57 AM by Mazin Wakefield

## 2022-09-22 NOTE — TELEPHONE ENCOUNTER
Called and discussed the case with the nurse.  Patient was seen again ER visit last week hydralazine increased from twice daily 25mg 3x daily   Managing own meds (pt) .  Care facility trying to take over it.  Very labile. Consistently in -230s systolic.   During day 110-140s. 9am.  Morning hydralazine 6am.  No reported dizziness.  They come at 6-7pm, if > 120 taking the hydralazine 140s before bed.      We decided that we would add back in hydrochlorothiazide 25 mg in the morning and move her losartan 50 mg twice daily to losartan 100 mg every evening.  Continue 3 times daily hydralazine and hold for blood pressures less than 120.    Will fax orders for BMP for October 5.    Patient will likely see me the following week.    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  Reviewed multiple CT scans.  I did discuss with her its possible that infection could be contributing to her left-sided facial symptoms so we would move forward with treating her with Augmentin for 7 days.      Staff please fax order for BMP attention Jemal    Fax [phone]

## 2022-09-23 ENCOUNTER — MEDICAL CORRESPONDENCE (OUTPATIENT)
Dept: HEALTH INFORMATION MANAGEMENT | Facility: CLINIC | Age: 87
End: 2022-09-23

## 2022-09-26 DIAGNOSIS — I10 ESSENTIAL HYPERTENSION: ICD-10-CM

## 2022-09-26 RX ORDER — HYDRALAZINE HYDROCHLORIDE 25 MG/1
25 TABLET, FILM COATED ORAL 3 TIMES DAILY
Qty: 270 TABLET | Refills: 0 | Status: SHIPPED | OUTPATIENT
Start: 2022-09-26 | End: 2022-10-07

## 2022-09-26 NOTE — TELEPHONE ENCOUNTER
Pharmacy requesting 90 day supply for medication.  Med T'd up.    Please review and send if appropriate.

## 2022-09-29 ENCOUNTER — LAB REQUISITION (OUTPATIENT)
Dept: LAB | Facility: CLINIC | Age: 87
End: 2022-09-29
Payer: COMMERCIAL

## 2022-09-29 DIAGNOSIS — R53.83 OTHER FATIGUE: ICD-10-CM

## 2022-09-29 DIAGNOSIS — E87.1 HYPO-OSMOLALITY AND HYPONATREMIA: ICD-10-CM

## 2022-09-29 LAB
ALBUMIN UR-MCNC: 10 MG/DL
APPEARANCE UR: CLEAR
BILIRUB UR QL STRIP: NEGATIVE
COLOR UR AUTO: ABNORMAL
GLUCOSE UR STRIP-MCNC: NEGATIVE MG/DL
HGB UR QL STRIP: NEGATIVE
HYALINE CASTS: 1 /LPF
KETONES UR STRIP-MCNC: NEGATIVE MG/DL
LEUKOCYTE ESTERASE UR QL STRIP: NEGATIVE
MUCOUS THREADS #/AREA URNS LPF: PRESENT /LPF
NITRATE UR QL: NEGATIVE
PH UR STRIP: 5.5 [PH] (ref 5–7)
RBC URINE: 1 /HPF
SP GR UR STRIP: 1.02 (ref 1–1.03)
SQUAMOUS EPITHELIAL: 1 /HPF
TRANSITIONAL EPI: <1 /HPF
UROBILINOGEN UR STRIP-MCNC: NORMAL MG/DL
WBC URINE: 0 /HPF

## 2022-09-29 PROCEDURE — 81001 URINALYSIS AUTO W/SCOPE: CPT | Mod: ORL | Performed by: NURSE PRACTITIONER

## 2022-10-03 PROCEDURE — 93228 REMOTE 30 DAY ECG REV/REPORT: CPT | Performed by: INTERNAL MEDICINE

## 2022-10-04 LAB
ALBUMIN SERPL BCG-MCNC: 3.5 G/DL (ref 3.5–5.2)
ALP SERPL-CCNC: 76 U/L (ref 35–104)
ALT SERPL W P-5'-P-CCNC: 26 U/L (ref 10–35)
ANION GAP SERPL CALCULATED.3IONS-SCNC: 10 MMOL/L (ref 7–15)
AST SERPL W P-5'-P-CCNC: 23 U/L (ref 10–35)
BILIRUB SERPL-MCNC: 0.2 MG/DL
BUN SERPL-MCNC: 22.3 MG/DL (ref 8–23)
CALCIUM SERPL-MCNC: 8.8 MG/DL (ref 8.2–9.6)
CHLORIDE SERPL-SCNC: 106 MMOL/L (ref 98–107)
CREAT SERPL-MCNC: 0.79 MG/DL (ref 0.51–0.95)
DEPRECATED HCO3 PLAS-SCNC: 24 MMOL/L (ref 22–29)
ERYTHROCYTE [DISTWIDTH] IN BLOOD BY AUTOMATED COUNT: 13.6 % (ref 10–15)
GFR SERPL CREATININE-BSD FRML MDRD: 70 ML/MIN/1.73M2
GLUCOSE SERPL-MCNC: 95 MG/DL (ref 70–99)
HCT VFR BLD AUTO: 39.3 % (ref 35–47)
HGB BLD-MCNC: 12.2 G/DL (ref 11.7–15.7)
MCH RBC QN AUTO: 31.6 PG (ref 26.5–33)
MCHC RBC AUTO-ENTMCNC: 31 G/DL (ref 31.5–36.5)
MCV RBC AUTO: 102 FL (ref 78–100)
PLATELET # BLD AUTO: 247 10E3/UL (ref 150–450)
POTASSIUM SERPL-SCNC: 3.7 MMOL/L (ref 3.4–5.3)
PROT SERPL-MCNC: 5.7 G/DL (ref 6.4–8.3)
RBC # BLD AUTO: 3.86 10E6/UL (ref 3.8–5.2)
SODIUM SERPL-SCNC: 140 MMOL/L (ref 136–145)
WBC # BLD AUTO: 7.8 10E3/UL (ref 4–11)

## 2022-10-04 PROCEDURE — 36415 COLL VENOUS BLD VENIPUNCTURE: CPT | Mod: ORL | Performed by: FAMILY MEDICINE

## 2022-10-04 PROCEDURE — 85027 COMPLETE CBC AUTOMATED: CPT | Mod: ORL | Performed by: FAMILY MEDICINE

## 2022-10-04 PROCEDURE — 80053 COMPREHEN METABOLIC PANEL: CPT | Mod: ORL | Performed by: FAMILY MEDICINE

## 2022-10-04 PROCEDURE — P9604 ONE-WAY ALLOW PRORATED TRIP: HCPCS | Mod: ORL | Performed by: FAMILY MEDICINE

## 2022-10-05 ENCOUNTER — TELEPHONE (OUTPATIENT)
Dept: FAMILY MEDICINE | Facility: CLINIC | Age: 87
End: 2022-10-05

## 2022-10-05 NOTE — TELEPHONE ENCOUNTER
Broderick BENZ calling regarding labs and medication changes.    NP states Labs done yesterday - look okay.    Patient's blood pressures have been running high      Patient has readings of 200/100 in mornings    NP is requesting to have patient's blood pressure medications increased.    NP suggested the following:     Hydralazine 25 to 4 time daily and/or adding    Norvasc 5 mg daily    Broderick Villar NP    [phone]

## 2022-10-06 ENCOUNTER — OFFICE VISIT (OUTPATIENT)
Dept: NEUROLOGY | Facility: CLINIC | Age: 87
End: 2022-10-06
Payer: COMMERCIAL

## 2022-10-06 VITALS
HEART RATE: 72 BPM | DIASTOLIC BLOOD PRESSURE: 59 MMHG | SYSTOLIC BLOOD PRESSURE: 115 MMHG | HEIGHT: 65 IN | BODY MASS INDEX: 23.99 KG/M2 | WEIGHT: 144 LBS

## 2022-10-06 DIAGNOSIS — I63.81 THALAMIC INFARCTION (H): Primary | ICD-10-CM

## 2022-10-06 PROCEDURE — 99215 OFFICE O/P EST HI 40 MIN: CPT

## 2022-10-06 NOTE — TELEPHONE ENCOUNTER
Please let Jemal know' I had seen her labs and agree that they look normal.     Also let her know that she has an allergy on her list to amlodipine.    I am nervous about doing hydralazine 4 times a day because during the day her blood pressure is not as high as just early morning.    I would consider having them continue the 25 mg in the morning and afternoon and increase the evening to 50 mg at bedtime.  Please let me know if she is okay with that plan and if she needs me to send a new prescription

## 2022-10-06 NOTE — TELEPHONE ENCOUNTER
Outgoing Call:  Please see notes below    Dr. Velasquez's message relayed to Jemal.   Jemal verbalized understanding.   Jemal will discuss with pt's family and let our staff know, what family thinks.  Jemal had no further questions or concerns.       Message will be routed to Dr. Velasquez as RUTH RODRIGUES RN  [facility]

## 2022-10-06 NOTE — LETTER
10/6/2022         RE: Ellis Em  [address] [place] [address]   [phone]  [facility] [id number]        Dear Colleague,    Thank you for referring your patient, Ellis Em, to the [facility] [place]. Please see a copy of my visit note below.    __________________________________________________________    ___________________________________  ESTABLISHED PATIENT NEUROLOGY NOTE    DATE OF VISIT: 10/6/2022  MRN: [id number]  PATIENT NAME: Ellis Em  YOB: 1931      Chief Complaint: Patient presents with:  Thalamic infarct: Hospital follow up- family has many questions regarding blood pressure       SUBJECTIVE:                                                      HISTORY OF PRESENT ILLNESS:  Ellis is here for follow up regarding Stroke follow-up    Ellis Em is a 89 year old female with a pertinent history of hyperthyroidism, right thyroid nodule, hyperlipidemia, acute thalamic infarct (8/29/22), and HTN.  She follows Dr. Fam in the clinic for neurocognitive disorder, and sensorimotor polyneuropathy, last seen on 07/22/22.  She presents today for follow up after a thalamic infarct. Per chart review, patient was admitted 8/29/2022 for a 6 mm acute versus subacute thalamic infarct/internal capsule.  Patient at that time had left facial numbness and left arm and left leg weakness. Patient's daughter reports that patient's symptoms with her 8/29/22 stroke, included left-sided facial numbness, slight left-sided facial droop, left arm and leg weakness, difficulty swallowing. Patient did not have changes in speech or vision. Patient also endorses chronic right leg weakness, onset ~1 year ago. She was started on ASA.  Known statin allergy.  Neurology recommending cardiac monitor at discharge to rule out any atrial fibrillation    She presented again on 09/13/2022 with those symptoms as well as what appeared to be a right facial droop..  Blood pressure is significantly elevated at  230/112. Patient reported that she had a headache at the top of her head. She also reported left arm numbness and heaviness, but denies decreased sensation to her left arm. Patient stated that she typically used her arms to get up off of the toilet, and was unable to do so. Patient reported that her legs are both at baseline strength.    Work up included: Coagulation functions normal.  CBC normal.  Troponin negative.  Chemistries negative.  EKG unremarkable.  Echocardiogram showed normal EF of 60%.  Head CT unremarkable. Head MRI read by the neuroradiologist and they note an evolving right thalamic stroke.  No evidence for new stroke, extension of stroke, or hemorrhagic transformation.  They note a tiny occipital subdural hematoma which was down from 4 mm to 3 mm.    ED- 08/27/22- fall at home, head CT unremarkable- UTI positive.     Today 10/06/2022  Ellis arrives today with her daughter Taisha for stroke follow-up.  Ellis describes her stroke as having a headache, left facial numbness and left upper extremity weakness/heaviness.  Taisha speaks for her and her family when she mentions being worried and not knowing what to expect post stroke.   She is concerned her mom has increased fatigue, weakness and elevated blood pressure.  She states that her mom is unable to participate in physical therapy due to being too tired, which is uncommon for her mom. Ellis admits that she has increased daytime sleepiness/fatigue. She states that her symptoms of left facial numbness and left arm heaviness is still present but getting better. She no longer has a headache. Ellis also admits to getting up 3 to 4 times a night (at 10 pm, 12 am, 2 am and sometimes 4 am) to use the bathroom/take metamucil. She states she is able to get herself up in the wheel chair independently. Her daughter was unaware that her mom was getting up on her own already. She felt this was reassuring in her mom getting her energy back but could also be a  contributing factor to her daytime sleepiness.     lElis was also very distraught about the care she is receiving in her assisted living.  She states that they get her up at 5:30 AM and as they are getting her dressed they are taking her blood pressure.  She feels that they are inconsistent with her cares and often bring her medications late.  Taisha agrees that there is inconsistency with her mothers care at facility.  She has brought this up numerous times to management but feels unheard.  They are both very happy with the care they are receiving from a nurse practitioner that visits weekly.  They feel she has been instrumental in getting her mom the care that they desire.    Taisha wants to know if her mother's mood could impact her increased blood pressure.  She is curious if her medications need to be adjusted to better control her mood.  Ellis feels that her mood is better than its been in a long time.  She denies any increased anxiety or depressive states.  She admits that her dreams were more vivid poststroke and now seem to be back to her baseline. Taisha tells me that right after the stroke her mom had little interest in doing things and barely smiled.  In the last week or so she has been closer to her baseline.       Current Prevention Medications:    aspirin (ASA) 81 MG EC tablet  hydrALAZINE (APRESOLINE) 25 MG tablet - PCP is adjusting this medication to control BP.   hydrochlorothiazide (HYDRODIURIL) 25 MG tablet - Stopped due to increased drowsiness  losartan (COZAAR) 50 MG tablet  Allergy to Statins    Perceived Side Effects: No    Medication Notes:   AED Medication Compliance:  compliant   Using a pill box: Care facility set up medications and administers to patient     Psycho-Social History: Ellis Em currently lives [facility]. Highest level of education: Associates Degree in Marketing . Employment status:Retired    Patient does not smoke,no alcohol use.  Currently, patient denies  feeling depressed, denies feeling anhedonia, denies suicidal  thoughts, and denies having feelings of excessive guilt/worthlessness.  We reviewed importance of mental and emotional wellbeing and impact on health.      Current Medications:   acetaminophen (TYLENOL) 500 MG tablet, Take 1,000 mg by mouth every 6 hours  alpha lipoic acid 200 mg cap, [ALPHA LIPOIC ACID 200 MG CAP] Take 1 capsule by mouth 2 (two) times a day.  aspirin (ASA) 81 MG EC tablet, Take 1 tablet (81 mg) by mouth daily  baclofen (LIORESAL) 10 MG tablet, TAKE 1 TABLET BY MOUTH THREE TIMES DAILY (Patient taking differently: Take 10 mg by mouth 2 times daily)  Cranberry 400 MG CAPS, Take 400 mg by mouth daily  diclofenac (VOLTAREN) 1 % topical gel, Apply 4 g topically 4 times daily  estradiol (ESTRACE) 0.1 MG/GM vaginal cream, Place 1 g vaginally twice a week Sundays and Thursdays  ferrous sulfate (FEROSUL) 325 (65 Fe) MG tablet, TAKE 1 TABLET BY MOUTH EVERY DAY WITH BREAKFAST (Patient taking differently: Take 325 mg by mouth daily (with breakfast))  gabapentin (NEURONTIN) 100 MG capsule, TAKE 1 CAPSULE IN THE MORNING, 2 CAPSULES IN THE AFTERNOON AND 1 CAPSULE IN THE EVENING.  hydrALAZINE (APRESOLINE) 25 MG tablet, Take 1 tablet (25 mg) by mouth 3 times daily  losartan (COZAAR) 50 MG tablet, Take 2 tablets by mouth every evening  omega-3 fatty acids 500 mg cap, Take 500 mg by mouth daily  phenazopyridine (AZO URINARY PAIN RELIEF) 95 MG tablet, Take 1 tablet (95 mg) by mouth daily  STRONTIUM CHLORIDE HEXAHYDRATE (STRONTIUM CHL HEXAHYD, BULK,) 100 % Kailyn, [STRONTIUM CHLORIDE HEXAHYDRATE (STRONTIUM CHL HEXAHYD, BULK,) 100 % KAILNY] Use As Directed.  UNABLE TO FIND, Take 1 capsule by mouth 2 times daily MEDICATION NAME: Uric Acid formula, supplement.  VIT C/E/ZN/COPPR/LUTEIN/ZEAXAN (PRESERVISION AREDS 2 ORAL), [VIT C/E/ZN/COPPR/LUTEIN/ZEAXAN (PRESERVISION AREDS 2 ORAL)] Take 1 tablet by mouth 2 (two) times a day.   buPROPion (WELLBUTRIN SR) 150 MG 12 hr  tablet, TAKE 1 TABLET BY MOUTH bid (Patient not taking: Reported on 10/6/2022)  hydrochlorothiazide (HYDRODIURIL) 25 MG tablet, Take 1 tablet (25 mg) by mouth every morning (Patient not taking: Reported on 10/6/2022)  melatonin 1 MG TABS tablet, every 24 hours (Patient not taking: Reported on 10/6/2022)  melatonin 10 mg Tab, [MELATONIN 10 MG TAB] Take 10 mg by mouth bedtime. (Patient not taking: Reported on 10/6/2022)    No current facility-administered medications on file prior to visit.    Past Medical History:   Patient  has a past medical history of Bowel obstruction (H) (1974), C1 cervical fracture (H) (10/12/2015), Cancer (H) (breast, 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, salivary gland), Cancer of skin, Carpal tunnel syndrome, Cervical spondylosis with myelopathy (4/25/2013), Chronic pain, Closed fracture of cervical vertebra (H) (10/21/2015), Closed fracture of lumbar vertebra (H) (1/21/2015), Cyst of spinal meninges, Dermatitis, Disorders of meninges, not elsewhere classified, Gastric ulcer (1983), Hip fracture (H) (2007), History of adrenal adenoma (5/25/2021), History of appendectomy, History of fractured pelvis (1985), Hyponatremia, Hypothyroidism, Leg pain, bilateral, Major depression, Miscarriage (1962), Neck fracture (H) (09/10/2015), Nephrolithiasis, Osteoarthritis, Osteoporosis, Panic attack, Peripheral neuropathy, Reactive confusion, Renal insufficiency, Rib fracture (2008), Shingles, Status post lumbar laminectomy (11/7/2014), UTI (urinary tract infection), and Varicella (7/14/2004).  Surgical History:  She  has a past surgical history that includes Pr Arthroplasty Tibial Plateau (Left); Salivary gland surgery (12/05/1984); Cataract Extraction, Bilateral; Cholecystectomy (06/30/1983); other surgical history; Total Hip Arthroplasty (Bilateral); Ovarian Cyst Removal (1943); Abdomen surgery (06/30/1983); joint replacement; fracture surgery (1985); Cervical Laminoplasty (Bilateral, 5/2/2013); Mastectomy (Bilateral); Lumbar  Laminectomy (N/A, 11/6/2014); QUIGLEY W/O FACETEC FORAMOT/DSKC 1/2 VRT SEG, LUMBAR; appendectomy (1943); tubal ligation (1962); Revise reconstructed breast (Bilateral, 11/11/1984); Endometrial Biopsy (09/09/1986); Dilation and curettage (09/19/1986); Combined Mammaplasty Revision W/ Abdominoplasty (07/1998); Root Canal (09/14/1998); Skin Cancer Excision (08/15/2003); Total Knee Arthroplasty (10/20/2005); and Tumor Excision (05/01/2013).  Family and Social History:  Reviewed, and she  reports that she has never smoked. She has never used smokeless tobacco. She reports that she does not drink alcohol and does not use drugs.  Reviewed, and family history includes Alcoholism in her brother; Cancer in her mother; Dementia in her brother; Diabetes in her brother; Kidney Disease in her father.    RECENT DIAGNOSTIC STUDIES:   Labs:    Coagulation studies:  Recent Labs   Lab Test 09/16/22  1755 08/27/22  1544   INR 0.91 0.91        Lipid panel:  Recent Labs   Lab Test 08/30/22  1030   CHOL 203*   HDL 58      TRIG 138       HbA1C:  Recent Labs   Lab Test 08/29/22  1541   A1C 5.1       Troponin:  No lab results found.  Recent Labs   Lab Test 09/16/22  1755 08/30/22  1030 08/29/22  2349   TROPONINI 0.02 0.01 0.01     No lab results found.    Imaging:   EXAM: MR BRAIN W/O and W CONTRAST  LOCATION: [facility]  DATE/TIME: 9/16/2022 9:43 PM  IMPRESSION:  1.  Evolving subacute punctate right thalamocapsular infarct. No acute intracranial hemorrhage or interval infarct.  2.  Slightly decreased size of evolving, now subacute left occipitoparietal subdural hematoma measuring 3 mm, previously 4 mm. No significant mass effect or new extra-axial fluid collection.  EXAM: CT HEAD W/O CONTRAST  LOCATION: [facility]  DATE/TIME: 9/16/2022 7:56 PM  IMPRESSION:  1.  The previously seen infarct in the right thalamus/internal capsule is better seen on MRI. No hemorrhagic conversion or  progressive mass effect.  2.  Findings suggestive of left otomastoiditis.  1. HEAD MRI WITHOUT AND WITH IV CONTRAST  2. HEAD MRA WITHOUT IV CONTRAST   3. NECK MRA WITHOUT AND WITH IV CONTRAST   8/30/2022 12:27 PM    HEAD MRI:  1.  6 mm acute or early subacute infarct in the right thalamus/internal capsule.  2.  No hemorrhage.  3.  Moderate burden of presumed chronic small vessel ischemic changes.  4.  Mild generalized atrophy.  5.  Chronic left mastoid effusion.  HEAD MRA:  1.  Examination is degraded by motion artifact, but there does appear to be mild to moderate narrowing of the M1 segment of the right middle cerebral artery. This appears progressed versus 2021.   2.  No proximal large vessel occlusion, aneurysm or AVM.  NECK MRA:  1.  No evidence for dissection or hemodynamically significant narrowing in the neck based on NASCET criteria.  EXAM: CT HEAD W/O CONTRAST  LOCATION: [facility]  DATE/TIME: 8/29/2022 4:23 PM                                                           IMPRESSION:  1.  No acute intracranial process.  EXAM: CT HEAD W/O CONTRAST  LOCATION: [facility]  DATE/TIME: 8/27/2022 7:40 PM  INDICATION: Head injury  IMPRESSION:  1.  No acute intracranial process.    Cardiac event monitoring from 9/1/2022 to 9/30/2022 (monitored duration 17d 2h 43m).  Baseline rhythm was sinus rhythm 71bpm.    Reported heart rate range 50 to 120bpm, average 74bpm.  No symptoms recorded.  Automated recordings included sinus rhythm, two isolated PACs.  No tachyarrhythmias.  No atrial fibrillation.  There were no pauses noted.  Supraventricular and ventricular ectopic beat frequency are not reported on this monitoring modality.        REVIEW OF SYSTEMS:                                                      10-point review of systems is negative except as mentioned above in HPI.    EXAM:                                                      Physical Exam:   Vitals: /59  "(BP Location: Left arm, Patient Position: Sitting)   Pulse 72   Ht 1.651 m (5' 5\")   Wt 65.3 kg (144 lb)   LMP  (LMP Unknown)   BMI 23.96 kg/m    BMI= Body mass index is 23.96 kg/m .  GENERAL: NAD.  HEENT: NC/AT.  CV: RRR. S1, S2.   PULM: Non-labored breathing. LS clear blaretally  Neurologic:  MENTAL STATUS: Alert, attentive. Speech is fluent. Normal comprehension. Normal concentration. Adequate fund of knowledge.   CRANIAL NERVES: Visual fields intact to confrontation. Pupils equally, round and reactive to light. Facial sensation and movement normal. EOM full. Hearing intact to conversation with hearing aids in place. Trapezius strength intact. Palate moves symmetrically. Tongue midline.  MOTOR: 5/5 in proximal and distal muscle groups of upper and 4/5 lower left extremity, 2/5 right. Tone and bulk normal.    SENSATION: Normal light touch throughout.   COORDINATION: Normal finger nose finger. Finger tapping normal. Knee heel shin normal left to right only. Right leg weak- prior to stroke  STATION AND GAIT: In wheel chair with chronic right leg weakness, deferred gait for safety     ASSESSMENT and PLAN:                                                      Assessment:    ICD-10-CM    1. Thalamic infarction (H)  I63.81        Ellis Em is a 89 year old female with a history of hyperthyroidism, right thyroid nodule, hyperlipidemia, acute thalamic infarct (8/29/22), and HTN.  She follows Dr. Fam in the clinic for neurocognitive disorder, and sensorimotor polyneuropathy, last seen on 07/22/22.  She presents today for follow up after a thalamic infarct. Patient was admitted 8/29/2022 for a 6 mm acute versus subacute thalamic infarct/internal capsule.  Patient at that time had left facial numbness and left arm and left leg weakness.  Since her stroke, she has had a difficult time controlling her blood pressure.  She also has complaints of increased fatigue and weakness.  We spent quite a bit of time today " discussing expectations post stroke. Ellis was alert and interactive throughout her appointment today.      Dr. Velasquez, her PCP and DEMAR Villafana have been working together to manage Jeans blood pressures. From a note it looks like they last spoke yesterday about increasing her hydralazine to 3 times a day.  They stopped her hydrochlorothiazide due to daytime drowsiness, per Taisha.  They decided against starting Norvasc due to it being listed on her allergy list as causing drowsiness as well.  They have been monitoring her blood pressure closely.    Taisha was surprised that her mom was getting up independently to transfer from her wheelchair multiple times a day.  This helped reassure her that she was building her strength.  She states that she gets up 3-4 times a night.  Once or twice to use the bathroom.  She admits that sometimes she gets up at 4 AM to take her Metamucil.  And then staff comes in at 530 to get her up for the day. This could also contribute to some of her daytime drowsiness.  I encouraged Ellis to limit getting up overnight to once or twice.      We also discussed that if she has increased needs that are not met by an assisted living, another possibility is to move to a nursing home where they provide a higher level of care.  We spent a good amount of time today discussing their displeasure with the current facility she is at.  Since Ellis is more alert and active in the last week, I would encourage her to start physical therapy as well.     We discussed interventions, will plan to see the patient back on 02/14/2023 with Dr. Fam.. Ellis and her daughter Taisha understand and agrees with this plan.     Plan:   --- Goal is to prevent future strokes.  Antiplatelet therapy:aspirin (ASA) 81 MG EC tablet- daily    Main factors that contribute to strokes   Elevated blood Pressure - This is being managed by PCP and with input from an NP that visits Ellis weekly.   BP: 115/59  - Goal <130/80  - Continue  preventive therapy:  hydrALAZINE (APRESOLINE) 25 MG tablet  losartan (COZAAR) 50 MG tablet     Elevated cholesterol levels   LDL: 117  - Goal < 70 mg/dl  - Continue preventive therapy: Allergy to statins.      Diabetes   A1c: 5.1  - Goal: < 5.6%  - Great job    Smoking  - Not a smoker - great!   - Plan: N/A     Lifestyle  - Goal: Eat a well balanced meal and increased physical activity  Personalized goal:  Start PT/OT. Limit the amount of times you get up in the night. Take metamucil with breakfast instead of at 4 am.  Use the bathroom at 10 pm and 2 pm. Try to avoid getting up at midnight. Try to get prolonged amount of sleep of at least 4 to 5 hours during the night.    --- Plan on follow up in the Neurology Clinic on 02/14/2023 with Dr. Fam.  --- Please feel free to reach out if you have any further questions or concerns.  --- Seek immediate medical attention if an emergency arises or if your health becomes progressively worse.     For any acute neurologic deficits she was advised to  go directly to the hospital rather than call the clinic.    It was a pleasure to meet you today!     Total Time: Total time spent for face to face visit, reviewing labs/imaging studies, counseling and coordination of care was: 1 Hour 30 Minutes spent on the date of the encounter doing chart review, review of test results, patient visit, documentation, discussion with other provider(s) and discussion with family       This note was dictated using voice recognition software.  Any grammatical or context distortions are unintentional and inherent to the software.    Nidhi Kay, GUSTAVO, APRN, CNP  [facility]           Again, thank you for allowing me to participate in the care of your patient.        Sincerely,        KIARRA Prince CNP

## 2022-10-06 NOTE — PATIENT INSTRUCTIONS
Plan:   --- Goal is to prevent future strokes.  Antiplatelet therapy:aspirin (ASA) 81 MG EC tablet    Main factors that contribute to strokes   Elevated blood Pressure  BP: 115/59  - Goal <130/80  - Continue preventive therapy:  hydrALAZINE (APRESOLINE) 25 MG tablet  losartan (COZAAR) 50 MG tablet     Elevated cholesterol levels   LDL: 117  - Goal < 70 mg/dl  - Continue preventive therapy: Allergy to statins.      Diabetes   A1c: 5.1  - Goal: < 5.6%  - Continue preventive therapy: Great job    Smoking  - Plan: N/A     Lifestyle  - Goal: Eat a well balanced meal and increased physical activity  Personalized goal: PT/OT, Get more sleep at night.    --- Plan on follow up in the Neurology Clinic on 02/14/2023 with Dr. Fam.  --- Please feel free to reach out if you have any further questions or concerns.  --- Seek immediate medical attention if an emergency arises or if your health becomes progressively worse.     For any acute neurologic deficits she was advised to  go directly to the hospital rather than call the clinic.    It was a pleasure to meet you today!

## 2022-10-06 NOTE — NURSING NOTE
Chief Complaint   Patient presents with     Thalamic infarct     Hospital follow up- family has many questions regarding blood pressure      Joanie Lopez CMA on 10/6/2022 at 1:59 PM

## 2022-10-06 NOTE — PROGRESS NOTES
__________________________________________________________    ___________________________________  ESTABLISHED PATIENT NEUROLOGY NOTE    DATE OF VISIT: 10/6/2022  MRN: [id number]  PATIENT NAME: Ellis Em  YOB: 1931      Chief Complaint: Patient presents with:  Thalamic infarct: Hospital follow up- family has many questions regarding blood pressure       SUBJECTIVE:                                                      HISTORY OF PRESENT ILLNESS:  Ellis is here for follow up regarding Stroke follow-up    Ellis Em is a 89 year old female with a pertinent history of hyperthyroidism, right thyroid nodule, hyperlipidemia, acute thalamic infarct (8/29/22), and HTN.  She follows Dr. Fam in the clinic for neurocognitive disorder, and sensorimotor polyneuropathy, last seen on 07/22/22.  She presents today for follow up after a thalamic infarct. Per chart review, patient was admitted 8/29/2022 for a 6 mm acute versus subacute thalamic infarct/internal capsule.  Patient at that time had left facial numbness and left arm and left leg weakness. Patient's daughter reports that patient's symptoms with her 8/29/22 stroke, included left-sided facial numbness, slight left-sided facial droop, left arm and leg weakness, difficulty swallowing. Patient did not have changes in speech or vision. Patient also endorses chronic right leg weakness, onset ~1 year ago. She was started on ASA.  Known statin allergy.  Neurology recommending cardiac monitor at discharge to rule out any atrial fibrillation    She presented again on 09/13/2022 with those symptoms as well as what appeared to be a right facial droop..  Blood pressure is significantly elevated at 230/112. Patient reported that she had a headache at the top of her head. She also reported left arm numbness and heaviness, but denies decreased sensation to her left arm. Patient stated that she typically used her arms to get up off of the toilet, and was unable to do so.  Patient reported that her legs are both at baseline strength.    Work up included: Coagulation functions normal.  CBC normal.  Troponin negative.  Chemistries negative.  EKG unremarkable.  Echocardiogram showed normal EF of 60%.  Head CT unremarkable. Head MRI read by the neuroradiologist and they note an evolving right thalamic stroke.  No evidence for new stroke, extension of stroke, or hemorrhagic transformation.  They note a tiny occipital subdural hematoma which was down from 4 mm to 3 mm.    ED- 08/27/22- fall at home, head CT unremarkable- UTI positive.     Today 10/06/2022  Ellis arrives today with her daughter Taisha for stroke follow-up.  Ellis describes her stroke as having a headache, left facial numbness and left upper extremity weakness/heaviness.  Taisha speaks for her and her family when she mentions being worried and not knowing what to expect post stroke.   She is concerned her mom has increased fatigue, weakness and elevated blood pressure.  She states that her mom is unable to participate in physical therapy due to being too tired, which is uncommon for her mom. Ellis admits that she has increased daytime sleepiness/fatigue. She states that her symptoms of left facial numbness and left arm heaviness is still present but getting better. She no longer has a headache. Ellis also admits to getting up 3 to 4 times a night (at 10 pm, 12 am, 2 am and sometimes 4 am) to use the bathroom/take metamucil. She states she is able to get herself up in the wheel chair independently. Her daughter was unaware that her mom was getting up on her own already. She felt this was reassuring in her mom getting her energy back but could also be a contributing factor to her daytime sleepiness.     Ellis was also very distraught about the care she is receiving in her assisted living.  She states that they get her up at 5:30 AM and as they are getting her dressed they are taking her blood pressure.  She feels that they are  inconsistent with her cares and often bring her medications late.  Taisha agrees that there is inconsistency with her mothers care at facility.  She has brought this up numerous times to management but feels unheard.  They are both very happy with the care they are receiving from a nurse practitioner that visits weekly.  They feel she has been instrumental in getting her mom the care that they desire.    Taisha wants to know if her mother's mood could impact her increased blood pressure.  She is curious if her medications need to be adjusted to better control her mood.  Ellis feels that her mood is better than its been in a long time.  She denies any increased anxiety or depressive states.  She admits that her dreams were more vivid poststroke and now seem to be back to her baseline. Taisha tells me that right after the stroke her mom had little interest in doing things and barely smiled.  In the last week or so she has been closer to her baseline.       Current Prevention Medications:    aspirin (ASA) 81 MG EC tablet  hydrALAZINE (APRESOLINE) 25 MG tablet - PCP is adjusting this medication to control BP.   hydrochlorothiazide (HYDRODIURIL) 25 MG tablet - Stopped due to increased drowsiness  losartan (COZAAR) 50 MG tablet  Allergy to Statins    Perceived Side Effects: No    Medication Notes:   AED Medication Compliance:  compliant   Using a pill box: Care facility set up medications and administers to patient     Psycho-Social History: Ellis Em currently lives [facility]. Highest level of education: Associates Degree in Marketing . Employment status:Retired    Patient does not smoke,no alcohol use.  Currently, patient denies feeling depressed, denies feeling anhedonia, denies suicidal  thoughts, and denies having feelings of excessive guilt/worthlessness.  We reviewed importance of mental and emotional wellbeing and impact on health.      Current Medications:   acetaminophen (TYLENOL) 500 MG tablet,  Take 1,000 mg by mouth every 6 hours  alpha lipoic acid 200 mg cap, [ALPHA LIPOIC ACID 200 MG CAP] Take 1 capsule by mouth 2 (two) times a day.  aspirin (ASA) 81 MG EC tablet, Take 1 tablet (81 mg) by mouth daily  baclofen (LIORESAL) 10 MG tablet, TAKE 1 TABLET BY MOUTH THREE TIMES DAILY (Patient taking differently: Take 10 mg by mouth 2 times daily)  Cranberry 400 MG CAPS, Take 400 mg by mouth daily  diclofenac (VOLTAREN) 1 % topical gel, Apply 4 g topically 4 times daily  estradiol (ESTRACE) 0.1 MG/GM vaginal cream, Place 1 g vaginally twice a week Sundays and Thursdays  ferrous sulfate (FEROSUL) 325 (65 Fe) MG tablet, TAKE 1 TABLET BY MOUTH EVERY DAY WITH BREAKFAST (Patient taking differently: Take 325 mg by mouth daily (with breakfast))  gabapentin (NEURONTIN) 100 MG capsule, TAKE 1 CAPSULE IN THE MORNING, 2 CAPSULES IN THE AFTERNOON AND 1 CAPSULE IN THE EVENING.  hydrALAZINE (APRESOLINE) 25 MG tablet, Take 1 tablet (25 mg) by mouth 3 times daily  losartan (COZAAR) 50 MG tablet, Take 2 tablets by mouth every evening  omega-3 fatty acids 500 mg cap, Take 500 mg by mouth daily  phenazopyridine (AZO URINARY PAIN RELIEF) 95 MG tablet, Take 1 tablet (95 mg) by mouth daily  STRONTIUM CHLORIDE HEXAHYDRATE (STRONTIUM CHL HEXAHYD, BULK,) 100 % Kailyn, [STRONTIUM CHLORIDE HEXAHYDRATE (STRONTIUM CHL HEXAHYD, BULK,) 100 % KAILYN] Use As Directed.  UNABLE TO FIND, Take 1 capsule by mouth 2 times daily MEDICATION NAME: Uric Acid formula, supplement.  VIT C/E/ZN/COPPR/LUTEIN/ZEAXAN (PRESERVISION AREDS 2 ORAL), [VIT C/E/ZN/COPPR/LUTEIN/ZEAXAN (PRESERVISION AREDS 2 ORAL)] Take 1 tablet by mouth 2 (two) times a day.   buPROPion (WELLBUTRIN SR) 150 MG 12 hr tablet, TAKE 1 TABLET BY MOUTH bid (Patient not taking: Reported on 10/6/2022)  hydrochlorothiazide (HYDRODIURIL) 25 MG tablet, Take 1 tablet (25 mg) by mouth every morning (Patient not taking: Reported on 10/6/2022)  melatonin 1 MG TABS tablet, every 24 hours (Patient not taking:  Reported on 10/6/2022)  melatonin 10 mg Tab, [MELATONIN 10 MG TAB] Take 10 mg by mouth bedtime. (Patient not taking: Reported on 10/6/2022)    No current facility-administered medications on file prior to visit.    Past Medical History:   Patient  has a past medical history of Bowel obstruction (H) (1974), C1 cervical fracture (H) (10/12/2015), Cancer (H) (breast, 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, salivary gland), Cancer of skin, Carpal tunnel syndrome, Cervical spondylosis with myelopathy (4/25/2013), Chronic pain, Closed fracture of cervical vertebra (H) (10/21/2015), Closed fracture of lumbar vertebra (H) (1/21/2015), Cyst of spinal meninges, Dermatitis, Disorders of meninges, not elsewhere classified, Gastric ulcer (1983), Hip fracture (H) (2007), History of adrenal adenoma (5/25/2021), History of appendectomy, History of fractured pelvis (1985), Hyponatremia, Hypothyroidism, Leg pain, bilateral, Major depression, Miscarriage (1962), Neck fracture (H) (09/10/2015), Nephrolithiasis, Osteoarthritis, Osteoporosis, Panic attack, Peripheral neuropathy, Reactive confusion, Renal insufficiency, Rib fracture (2008), Shingles, Status post lumbar laminectomy (11/7/2014), UTI (urinary tract infection), and Varicella (7/14/2004).  Surgical History:  She  has a past surgical history that includes Pr Arthroplasty Tibial Plateau (Left); Salivary gland surgery (12/05/1984); Cataract Extraction, Bilateral; Cholecystectomy (06/30/1983); other surgical history; Total Hip Arthroplasty (Bilateral); Ovarian Cyst Removal (1943); Abdomen surgery (06/30/1983); joint replacement; fracture surgery (1985); Cervical Laminoplasty (Bilateral, 5/2/2013); Mastectomy (Bilateral); Lumbar Laminectomy (N/A, 11/6/2014); QUIGLEY W/O FACETEC FORAMOT/[facility] 1/2 VRT SEG, LUMBAR; appendectomy (1943); tubal ligation (1962); Revise reconstructed breast (Bilateral, 11/11/1984); Endometrial Biopsy (09/09/1986); Dilation and curettage (09/19/1986); Combined Mammaplasty Revision W/  Abdominoplasty (07/1998); Root Canal (09/14/1998); Skin Cancer Excision (08/15/2003); Total Knee Arthroplasty (10/20/2005); and Tumor Excision (05/01/2013).  Family and Social History:  Reviewed, and she  reports that she has never smoked. She has never used smokeless tobacco. She reports that she does not drink alcohol and does not use drugs.  Reviewed, and family history includes Alcoholism in her brother; Cancer in her mother; Dementia in her brother; Diabetes in her brother; Kidney Disease in her father.    RECENT DIAGNOSTIC STUDIES:   Labs:    Coagulation studies:  Recent Labs   Lab Test 09/16/22  1755 08/27/22  1544   INR 0.91 0.91        Lipid panel:  Recent Labs   Lab Test 08/30/22  1030   CHOL 203*   HDL 58      TRIG 138       HbA1C:  Recent Labs   Lab Test 08/29/22  1541   A1C 5.1       Troponin:  No lab results found.  Recent Labs   Lab Test 09/16/22  1755 08/30/22  1030 08/29/22  2349   TROPONINI 0.02 0.01 0.01     No lab results found.    Imaging:   EXAM: MR BRAIN W/O and W CONTRAST  LOCATION: [facility]  DATE/TIME: 9/16/2022 9:43 PM  IMPRESSION:  1.  Evolving subacute punctate right thalamocapsular infarct. No acute intracranial hemorrhage or interval infarct.  2.  Slightly decreased size of evolving, now subacute left occipitoparietal subdural hematoma measuring 3 mm, previously 4 mm. No significant mass effect or new extra-axial fluid collection.  EXAM: CT HEAD W/O CONTRAST  LOCATION: [facility]  DATE/TIME: 9/16/2022 7:56 PM  IMPRESSION:  1.  The previously seen infarct in the right thalamus/internal capsule is better seen on MRI. No hemorrhagic conversion or progressive mass effect.  2.  Findings suggestive of left otomastoiditis.  1. HEAD MRI WITHOUT AND WITH IV CONTRAST  2. HEAD MRA WITHOUT IV CONTRAST   3. NECK MRA WITHOUT AND WITH IV CONTRAST   8/30/2022 12:27 PM    HEAD MRI:  1.  6 mm acute or early subacute infarct in the right  "thalamus/internal capsule.  2.  No hemorrhage.  3.  Moderate burden of presumed chronic small vessel ischemic changes.  4.  Mild generalized atrophy.  5.  Chronic left mastoid effusion.  HEAD MRA:  1.  Examination is degraded by motion artifact, but there does appear to be mild to moderate narrowing of the M1 segment of the right middle cerebral artery. This appears progressed versus 2021.   2.  No proximal large vessel occlusion, aneurysm or AVM.  NECK MRA:  1.  No evidence for dissection or hemodynamically significant narrowing in the neck based on NASCET criteria.  EXAM: CT HEAD W/O CONTRAST  LOCATION: [facility]  DATE/TIME: 8/29/2022 4:23 PM                                                           IMPRESSION:  1.  No acute intracranial process.  EXAM: CT HEAD W/O CONTRAST  LOCATION: [facility]  DATE/TIME: 8/27/2022 7:40 PM  INDICATION: Head injury  IMPRESSION:  1.  No acute intracranial process.    Cardiac event monitoring from 9/1/2022 to 9/30/2022 (monitored duration 17d 2h 43m).  Baseline rhythm was sinus rhythm 71bpm.    Reported heart rate range 50 to 120bpm, average 74bpm.  No symptoms recorded.  Automated recordings included sinus rhythm, two isolated PACs.  No tachyarrhythmias.  No atrial fibrillation.  There were no pauses noted.  Supraventricular and ventricular ectopic beat frequency are not reported on this monitoring modality.        REVIEW OF SYSTEMS:                                                      10-point review of systems is negative except as mentioned above in HPI.    EXAM:                                                      Physical Exam:   Vitals: /59 (BP Location: Left arm, Patient Position: Sitting)   Pulse 72   Ht 1.651 m (5' 5\")   Wt 65.3 kg (144 lb)   LMP  (LMP Unknown)   BMI 23.96 kg/m    BMI= Body mass index is 23.96 kg/m .  GENERAL: NAD.  HEENT: NC/AT.  CV: RRR. S1, S2.   PULM: Non-labored breathing. LS clear " blaretally  Neurologic:  MENTAL STATUS: Alert, attentive. Speech is fluent. Normal comprehension. Normal concentration. Adequate fund of knowledge.   CRANIAL NERVES: Visual fields intact to confrontation. Pupils equally, round and reactive to light. Facial sensation and movement normal. EOM full. Hearing intact to conversation with hearing aids in place. Trapezius strength intact. Palate moves symmetrically. Tongue midline.  MOTOR: 5/5 in proximal and distal muscle groups of upper and 4/5 lower left extremity, 2/5 right. Tone and bulk normal.    SENSATION: Normal light touch throughout.   COORDINATION: Normal finger nose finger. Finger tapping normal. Knee heel shin normal left to right only. Right leg weak- prior to stroke  STATION AND GAIT: In wheel chair with chronic right leg weakness, deferred gait for safety     ASSESSMENT and PLAN:                                                      Assessment:    ICD-10-CM    1. Thalamic infarction (H)  I63.81        Ellis Em is a 89 year old female with a history of hyperthyroidism, right thyroid nodule, hyperlipidemia, acute thalamic infarct (8/29/22), and HTN.  She follows Dr. Fam in the clinic for neurocognitive disorder, and sensorimotor polyneuropathy, last seen on 07/22/22.  She presents today for follow up after a thalamic infarct. Patient was admitted 8/29/2022 for a 6 mm acute versus subacute thalamic infarct/internal capsule.  Patient at that time had left facial numbness and left arm and left leg weakness.  Since her stroke, she has had a difficult time controlling her blood pressure.  She also has complaints of increased fatigue and weakness.  We spent quite a bit of time today discussing expectations post stroke. Ellis was alert and interactive throughout her appointment today.      Dr. Velasquez, her PCP and DEMAR Villafana have been working together to manage Jeans blood pressures. From a note it looks like they last spoke yesterday about increasing her  hydralazine to 3 times a day.  They stopped her hydrochlorothiazide due to daytime drowsiness, per Taisha.  They decided against starting Norvasc due to it being listed on her allergy list as causing drowsiness as well.  They have been monitoring her blood pressure closely.    Taisha was surprised that her mom was getting up independently to transfer from her wheelchair multiple times a day.  This helped reassure her that she was building her strength.  She states that she gets up 3-4 times a night.  Once or twice to use the bathroom.  She admits that sometimes she gets up at 4 AM to take her Metamucil.  And then staff comes in at 530 to get her up for the day. This could also contribute to some of her daytime drowsiness.  I encouraged Ellis to limit getting up overnight to once or twice.      We also discussed that if she has increased needs that are not met by an assisted living, another possibility is to move to a nursing home where they provide a higher level of care.  We spent a good amount of time today discussing their displeasure with the current facility she is at.  Since Ellis is more alert and active in the last week, I would encourage her to start physical therapy as well.     We discussed interventions, will plan to see the patient back on 02/14/2023 with Dr. Fam.. Ellis and her daughter Taisha understand and agrees with this plan.     Plan:   --- Goal is to prevent future strokes.  Antiplatelet therapy:aspirin (ASA) 81 MG EC tablet- daily    Main factors that contribute to strokes   Elevated blood Pressure - This is being managed by PCP and with input from an NP that visits Ellis weekly.   BP: 115/59  - Goal <130/80  - Continue preventive therapy:  hydrALAZINE (APRESOLINE) 25 MG tablet  losartan (COZAAR) 50 MG tablet     Elevated cholesterol levels   LDL: 117  - Goal < 70 mg/dl  - Continue preventive therapy: Allergy to statins.      Diabetes   A1c: 5.1  - Goal: < 5.6%  - Great job    Smoking  - Not a  smoker - great!   - Plan: N/A     Lifestyle  - Goal: Eat a well balanced meal and increased physical activity  Personalized goal:  Start PT/OT. Limit the amount of times you get up in the night. Take metamucil with breakfast instead of at 4 am.  Use the bathroom at 10 pm and 2 pm. Try to avoid getting up at midnight. Try to get prolonged amount of sleep of at least 4 to 5 hours during the night.    --- Plan on follow up in the Neurology Clinic on 02/14/2023 with Dr. Fam.  --- Please feel free to reach out if you have any further questions or concerns.  --- Seek immediate medical attention if an emergency arises or if your health becomes progressively worse.     For any acute neurologic deficits she was advised to  go directly to the hospital rather than call the clinic.    It was a pleasure to meet you today!     Total Time: Total time spent for face to face visit, reviewing labs/imaging studies, counseling and coordination of care was: 1 Hour 30 Minutes spent on the date of the encounter doing chart review, review of test results, patient visit, documentation, discussion with other provider(s) and discussion with family       This note was dictated using voice recognition software.  Any grammatical or context distortions are unintentional and inherent to the software.    Nidhi Kay, DNP, APRN, CNP  [facility]

## 2022-10-07 DIAGNOSIS — I10 ESSENTIAL HYPERTENSION: ICD-10-CM

## 2022-10-07 DIAGNOSIS — M62.838 MUSCLE SPASM: ICD-10-CM

## 2022-10-07 DIAGNOSIS — F33.42 MAJOR DEPRESSIVE DISORDER, RECURRENT EPISODE, IN FULL REMISSION (H): ICD-10-CM

## 2022-10-07 RX ORDER — LOSARTAN POTASSIUM 50 MG/1
TABLET ORAL
Qty: 180 TABLET | Refills: 1 | OUTPATIENT
Start: 2022-10-07

## 2022-10-07 RX ORDER — HYDRALAZINE HYDROCHLORIDE 25 MG/1
TABLET, FILM COATED ORAL
Qty: 360 TABLET | Refills: 3 | Status: SHIPPED | OUTPATIENT
Start: 2022-10-07 | End: 2022-10-10

## 2022-10-07 RX ORDER — BACLOFEN 10 MG/1
TABLET ORAL
Qty: 270 TABLET | Refills: 1 | Status: SHIPPED | OUTPATIENT
Start: 2022-10-07 | End: 2022-10-07

## 2022-10-07 NOTE — TELEPHONE ENCOUNTER
Please send updated medication list for nurse to [phone]. Per last conversation with the home nurse Dr. Velasquez suggested giving the 25MG tablet morning and afternoon and bumping the dosage up to 50MG in the evening because of jose alejandro BP in the morning. Nurse Villafana is fine with this plan and would like it to be increased and a new script with new wording sent to the pharmacy.

## 2022-10-07 NOTE — TELEPHONE ENCOUNTER
"Routing refill request to provider for review/approval because:  Pt should have supply on hand for the next 5 months. Refill declined due to being filled on 9/22/2022.     Outpatient Medication Detail     Disp Refills Start End EVELYN   losartan (COZAAR) 50 MG tablet 180 tablet 1 9/22/2022  No   Sig: Take 2 tablets by mouth every evening   Class: No Print Out   Order: [id number]         Requested Prescriptions   Pending Prescriptions Disp Refills     losartan (COZAAR) 50 MG tablet [Pharmacy Med Name: LOSARTAN POTASSIUM 50 MG TAB] 180 tablet 1     Sig: TAKE 1 TABLET BY MOUTH TWICE A DAY       Angiotensin-II Receptors Passed - 10/7/2022  2:26 AM        Passed - Last blood pressure under 140/90 in past 12 months     BP Readings from Last 3 Encounters:   10/06/22 115/59   09/16/22 (!) 165/79   09/09/22 110/57                 Passed - Recent (12 mo) or future (30 days) visit within the authorizing provider's specialty     Patient has had an office visit with the authorizing provider or a provider within the authorizing providers department within the previous 12 mos or has a future within next 30 days. See \"Patient Info\" tab in inbasket, or \"Choose Columns\" in Meds & Orders section of the refill encounter.              Passed - Medication is active on med list        Passed - Patient is age 18 or older        Passed - No active pregnancy on record        Passed - Normal serum creatinine on file in past 12 months     Recent Labs   Lab Test 10/04/22  1148   CR 0.79       Ok to refill medication if creatinine is low          Passed - Normal serum potassium on file in past 12 months     Recent Labs   Lab Test 10/04/22  1148   POTASSIUM 3.7                    Passed - No positive pregnancy test in past 12 months             Caterina Kelley RN 10/07/22 10:36 AM  "

## 2022-10-07 NOTE — TELEPHONE ENCOUNTER
Spoke to Broderick and she said she does the baclofen 1 tab BID.     For the bupropion she is just taking 150mg once daily. The family decided they did not want to increase it to BID so I pended the Rx to update that.    She is not taking melatonin so I took that off her list.

## 2022-10-10 ENCOUNTER — OFFICE VISIT (OUTPATIENT)
Dept: FAMILY MEDICINE | Facility: CLINIC | Age: 87
End: 2022-10-10
Payer: COMMERCIAL

## 2022-10-10 VITALS
RESPIRATION RATE: 18 BRPM | OXYGEN SATURATION: 98 % | BODY MASS INDEX: 24.06 KG/M2 | SYSTOLIC BLOOD PRESSURE: 143 MMHG | HEART RATE: 69 BPM | TEMPERATURE: 98.7 F | WEIGHT: 144.6 LBS | DIASTOLIC BLOOD PRESSURE: 74 MMHG

## 2022-10-10 DIAGNOSIS — R53.83 FATIGUE, UNSPECIFIED TYPE: ICD-10-CM

## 2022-10-10 DIAGNOSIS — R41.89 COGNITIVE CHANGES: ICD-10-CM

## 2022-10-10 DIAGNOSIS — F33.42 MAJOR DEPRESSIVE DISORDER, RECURRENT EPISODE, IN FULL REMISSION (H): ICD-10-CM

## 2022-10-10 DIAGNOSIS — R06.83 SNORING: ICD-10-CM

## 2022-10-10 DIAGNOSIS — I63.81 THALAMIC INFARCT, ACUTE (H): Primary | ICD-10-CM

## 2022-10-10 DIAGNOSIS — K59.09 CHRONIC CONSTIPATION: ICD-10-CM

## 2022-10-10 DIAGNOSIS — R09.89 LABILE HYPERTENSION: ICD-10-CM

## 2022-10-10 PROCEDURE — 99215 OFFICE O/P EST HI 40 MIN: CPT | Performed by: FAMILY MEDICINE

## 2022-10-10 RX ORDER — BUPROPION HYDROCHLORIDE 150 MG/1
150 TABLET, EXTENDED RELEASE ORAL DAILY
Qty: 90 TABLET | Refills: 3 | Status: SHIPPED | OUTPATIENT
Start: 2022-10-10 | End: 2022-12-05

## 2022-10-10 RX ORDER — PUMPKIN SEED EXTRACT/SOY GERM 300 MG
1 CAPSULE ORAL DAILY
COMMUNITY
End: 2022-10-10 | Stop reason: ALTCHOICE

## 2022-10-10 RX ORDER — AMLODIPINE BESYLATE 5 MG/1
5 TABLET ORAL AT BEDTIME
Qty: 30 TABLET | Refills: 1 | Status: SHIPPED | OUTPATIENT
Start: 2022-10-10 | End: 2022-11-09

## 2022-10-10 RX ORDER — HYDRALAZINE HYDROCHLORIDE 25 MG/1
TABLET, FILM COATED ORAL
Qty: 90 TABLET | Refills: 1
Start: 2022-10-10 | End: 2022-11-09

## 2022-10-10 RX ORDER — PHENAZOPYRIDINE HYDROCHLORIDE 95 MG/1
190 TABLET ORAL 3 TIMES DAILY
COMMUNITY
End: 2022-11-07

## 2022-10-10 RX ORDER — BACLOFEN 10 MG/1
10 TABLET ORAL 2 TIMES DAILY
Qty: 180 TABLET | Refills: 3 | Status: SHIPPED | OUTPATIENT
Start: 2022-10-10

## 2022-10-10 RX ORDER — LOSARTAN POTASSIUM 100 MG/1
100 TABLET ORAL AT BEDTIME
Qty: 90 TABLET | Refills: 3
Start: 2022-10-10 | End: 2022-10-18

## 2022-10-10 RX ORDER — LOSARTAN POTASSIUM 100 MG/1
1 TABLET ORAL DAILY
COMMUNITY
Start: 2022-10-06 | End: 2022-10-10

## 2022-10-10 NOTE — PATIENT INSTRUCTIONS
Will continue Losartan 100mg at night for blood pressure  Will retrial amlodipine (previously made you sleepy 2018) at bedtime for blood pressure. - 5 mg low dose every evening. Room to increase dose if needed.   Will stop Hydralazine unless blood pressure is > 180- may give 25mg tablet up to 3x daily.     Continue metamucil in the afternoon or morning. Goal is no BM middle of night.     Can podiatry see patient for heels. If they need referral let me know.    Continue acupuncture for hands.     Flu shot at your building.     Continue estrogen as discussed.

## 2022-10-10 NOTE — PROGRESS NOTES
Assessment & Plan     ER follow up/ weakness/ Thalamic infarct, acute (H)  Reviewed hospital discharge as well as emergency room discharge and office visit notes with neurology.  At this time goal is getting blood pressure under good control.  Continue aspirin and statin therapy.  Working with physical therapy and compared to last week strength and weakness seems to slightly be improved.  Reviewed event monitor with daughter and indicated no cardiac component to recent stroke.  Also no indication for atrial fibrillation which they were not sure about in the hospital.    Labile hypertension  Discussed with daughter that I would like to see her medication regimen more simplified and to get rid of the hydralazine.  We will use only to treat for severe range blood pressures but otherwise they are willing to retrial amlodipine.  We talked about how this medication can cause peripheral swelling and apparently that was the intolerance she had last time.   apparently she also complained of fatigue the last time on this medication- if taken at bedtime it may be more tolerable.  We talked about appropriate way of taking the blood pressure and that I would reach out to the facility to sure that they are taking an appropriate blood pressure rather than checking it while she is getting dressed in the morning and moving about.  Mornings are when she has the most pain and also anxiousness due to the change in her routine.  For now because her morning blood pressures are so high we will continue the losartan 100 mg at night but we always could consider splitting the dose back to 50 mg twice a day in the future.  - amLODIPine (NORVASC) 5 MG tablet; Take 1 tablet (5 mg) by mouth At Bedtime For blood pressure  - hydrALAZINE (APRESOLINE) 25 MG tablet; Discontinue hydralazine scheduled. Only give 25mg if systolic BP > 180 , 3x daily    Snoring  It was recommended to have her get a sleep evaluation.  Family does endorse snoring.  Might  benefit from CPAP.  She is up frequently through the night which likely contributes to her daytime somnolence.  See below  - Adult Sleep Eval & Management  Referral; Future    Major depressive disorder, recurrent episode, in full remission (H)   she will continue fluoxetine 30 mg daily.  At one point the family had called asking us to increase the dose of bupropion to 150 mg twice daily but we went back down to once daily for fear that that could be contributing to elevated blood pressure readings.  Depressive symptoms appear to be stable    Fatigue, unspecified type  Could likely be multifactorial due to medications, age, recent hospitalization for eval make infarct but the biggest concern is that she is waking up frequently throughout the night with a very atypical regimen of taking her Metamucil and timing of having a bowel movement.  We talked about this in the past and made recommendations for adjusting the timing of when she takes her Metamucil so that she is having a bowel movement earlier in the day.  Also recommended to follow-up for sleep study.  Continue ferrous sulfate for history of poor oral intake and iron deficiency.      Cognitive changes  Over the last couple years she has had a decline in her cognitive function and currently is on donepezil managed by neurology and she is tolerating    Chronic constipation  We discussed adjusting her Metamucil to taking at a different time of day as noted above.      Review of external notes as documented elsewhere in note  Assessment requiring an independent historian(s) - family - Daughters  Ordering of each unique test  50 minutes spent on the date of the encounter doing chart review, history and exam, documentation and further activities per the note        See Patient Instructions  Patient Instructions   Will continue Losartan 100mg at night for blood pressure  Will retrial amlodipine (previously made you sleepy 2018) at bedtime for blood pressure. -  5 mg low dose every evening. Room to increase dose if needed.   Will stop Hydralazine unless blood pressure is > 180- may give 25mg tablet up to 3x daily.     Continue metamucil in the afternoon or morning. Goal is no BM middle of night.     Can podiatry see patient for heels. If they need referral let me know.    Continue acupuncture for hands.     Flu shot at your building.     Continue estrogen as discussed.       No follow-ups on file.    Whitney Velasquez MD  [facility]    Ashleigh Corral is a 89 year old accompanied by her daughter, presenting for the following health issues:  Hospital F/U (Follow up on blood pressures and some of the information on the discharge papers. )      HPI     Here with daughter Taisha      [facility] Follow-up Visit:    Hospital/Nursing Home/IP Rehab Facility: [facility]  Date of Admission: 09/16/2022  Date of Discharge:09/16/2022  Reason(s) for Admission: Extremity weakness, exacerbation of chronic hypertension, recent right thalamic CVA    Was your hospitalization related to COVID-19? No   Problems taking medications regularly:  Managed by care facility  Medication changes since discharge: noted in chart  Problems adhering to non-medication therapy:  None    Summary of hospitalization:  [facility] discharge summary reviewed  Diagnostic Tests/Treatments reviewed.  Follow up needed: follow up labile blood pressures   Other Healthcare Providers Involved in Patient s Care:         neurology  Update since discharge: improved.     Post Medication Reconciliation Status: Discharge medications reconciled and changed, see notes/orders      Plan of care communicated with patient     Reviewed most recent events.  Wakes up at 2 am. Metamucil at supper time  430 pm. 630/7 pm blood pressure check and put in bed. Falls asleep. Awake 11pm BM. If doesn't listen to body stays sleep until 2-3 am , unless could ignore it.   Wants  to be able to use the bathroom in middle of night without assistance because of risk of fall.   Blood pressure while getting dressed or tell her   2 weeks didn't have wellbutrin -   Was sleepy for a period of time (hydrochlorothiazide added and thought was causing sleepy) .    They hydralazine should be all or none.   Sometimes they take bp before using bathroom   Lately taking metamucil 12. Has to have BM daily or aches     Some wonder if has some underlying sleep apnea. Snores and sleeps on back.     Heels and fingers ache lately.   Schedule podiatry visit.      Review of Systems      Complete ROS reviewed in HPI or otherwise negative         Objective    BP (!) 143/74   Pulse 69   Temp 98.7  F (37.1  C) (Oral)   Resp 18   Wt 65.6 kg (144 lb 9.6 oz)   LMP  (LMP Unknown)   SpO2 98%   BMI 24.06 kg/m    Body mass index is 24.06 kg/m .  Physical Exam   GENERAL: frail but well appearing.   NECK: no adenopathy, no asymmetry, masses, or scars and thyroid normal to palpation  RESP: lungs clear to auscultation - no rales, rhonchi or wheezes    CV: regular rate and rhythm, normal S1 S2, no S3 or S4, no murmur, click or rub, no peripheral edema and peripheral pulses strong     MS: no gross musculoskeletal defects noted, no edema  SKIN: no suspicious lesions or rashes  NEURO: Normal strength and tone, mentation intact and speech normal  PSYCH: mentation appears normal, affect normal

## 2022-10-11 ENCOUNTER — TELEPHONE (OUTPATIENT)
Dept: FAMILY MEDICINE | Facility: CLINIC | Age: 87
End: 2022-10-11

## 2022-10-11 NOTE — TELEPHONE ENCOUNTER
Nurse Jemal is calling to clarify medication changes from yesterday's appointment with Dr. Velasquez. She stated she needs clarification as to what she wants given for the Hydralazine. Do you want them to ONLY give this when it goes over 180 for her blood pressure? Or is it just needed? Please re-write directions clearer and send updated medication list with directions to the fax number listed below.  Fax: [phone]      Bottle directions state this now:  Summary: Discontinue hydralazine scheduled. Only give 25mg if systolic BP > 180 , 3x daily, Disp-90 tablet

## 2022-10-11 NOTE — TELEPHONE ENCOUNTER
I will call to clarify with Jemal that she will only get the medication when her blood pressure is over 180 systolic

## 2022-10-14 NOTE — TELEPHONE ENCOUNTER
Jemal called me back today about Ellis. She is very sorry because she has been back and forth with you and apologized to you. Jemal stated that she is needing clarification/help with what to do. She stated that at her facility she is at she cannot be given medications over the weekend as PRN. She said it is very clear that if her BP is over 180 to give a tablet to patient but they don't have nurse staff on the weekend to administer medications and are unable to give on a PRN basis.   She said her BP is still occasionally in the 180-190's but it is slowly coming down and she does not want to have to change this completley again. Wants to know what to do because the facility is requesting she goes back to a regimen with medications like 2-3 pills a day.   Please Advise

## 2022-10-17 NOTE — TELEPHONE ENCOUNTER
Doing better. They dont like prn dosing hydralazine (facility)    Pressures improved. AM and afternoon 170s-180s. 120s-130s other times of day   Still getting hydralazine AM bp 180 consistently  in AM       Plan today is to bump up Norvasc to 10 mg and completely hold the hydralazine.  Jemal will notify me on Monday of how her pressures have been and if needing to call sooner with any concerns she will do so for any elevated pressures where we have to bring back in the hydralazine

## 2022-10-17 NOTE — TELEPHONE ENCOUNTER
Incoming call from Jemal at [facility], calling to follow up about pt's blood purssure and BP medications. Would like a call back at earliest convienience. [phone] ok to leave VM

## 2022-10-22 PROBLEM — I10 HYPERTENSION, UNSPECIFIED TYPE: Status: RESOLVED | Noted: 2022-08-29 | Resolved: 2022-10-22

## 2022-10-22 PROBLEM — R09.89 LABILE HYPERTENSION: Status: ACTIVE | Noted: 2022-10-22

## 2022-10-22 PROBLEM — R06.83 SNORING: Status: ACTIVE | Noted: 2022-10-22

## 2022-10-22 PROBLEM — K59.09 CHRONIC CONSTIPATION: Status: ACTIVE | Noted: 2022-10-22

## 2022-10-24 ENCOUNTER — TELEPHONE (OUTPATIENT)
Dept: FAMILY MEDICINE | Facility: CLINIC | Age: 87
End: 2022-10-24

## 2022-10-24 NOTE — TELEPHONE ENCOUNTER
Incoming call from Pt's assisted living:  After BP meds were adjusted pt continues to have elevated BP reading in mornings  Oct 24: 6am 180/94, afternoon reading improved to 151/93  October 23: 6 am 180/94, afternoon reading 168/85  October 19: 6am 203/97, afternoon 159/87  RN at Assisted living wondering if they should continue to adjust BP meds as needed or if anything else is advised.  Call back Jemal [phone]

## 2022-10-25 NOTE — TELEPHONE ENCOUNTER
Let jalen know they can increase the amlodipine now to 10mg at bedtime and update me in another week

## 2022-10-25 NOTE — TELEPHONE ENCOUNTER
Called and spoke with Jemal and relayed PCP recommendations. Jemal states that amlodipine was already increased to 10 mg on 10/17. Below is the documentation of that change from 10/11 telephone visit. Please advise.        Yvette Key RN

## 2022-10-26 NOTE — TELEPHONE ENCOUNTER
Jemal called back this morning, I informed her that Dr. Velasquez did not address the message yet from yesterday. She wanted to let us know her BP this morning was 192/90.

## 2022-11-03 ENCOUNTER — TELEPHONE (OUTPATIENT)
Dept: NURSING | Facility: CLINIC | Age: 87
End: 2022-11-03

## 2022-11-03 NOTE — TELEPHONE ENCOUNTER
Nurse practitioner is calling to update patient's blood pressures.    This morning  6 am 170/72  Yesterday evening 151/88  Yesterday afternoon 173/87  Yesterday morning 187/84  2 days ago evening 127/64  10-31 at 6am 172/ 75    Patient is stable otherwise please call if any adjustments are needed for patient's blood pressure medication.    Marjorie Ramos RN on 11/3/2022 at 12:03 PM

## 2022-11-07 ENCOUNTER — TELEPHONE (OUTPATIENT)
Dept: FAMILY MEDICINE | Facility: CLINIC | Age: 87
End: 2022-11-07

## 2022-11-07 DIAGNOSIS — R09.89 LABILE HYPERTENSION: ICD-10-CM

## 2022-11-07 DIAGNOSIS — K59.00 CONSTIPATION, UNSPECIFIED CONSTIPATION TYPE: Primary | ICD-10-CM

## 2022-11-07 NOTE — TELEPHONE ENCOUNTER
Jemal Freiats, NP called and wanted to reconcile our med list to match what they have been doing at facility. The following are what they have at the facility that differs from med list that we have:    Acetaminophen 1000mg orally TID    Amlodipine (10mg) at bedtime    Azo dual protection + capsule daily    Diclofenac 4g topically once a day PRN    Hydralazine 25 mg at bedtime (hold if BP <120/80)    Omega 3 fatty acid 1290-2 softgels daily.    Jemal stated that family would like us to update med list so that the [facility] med list is current with facility. I'm unable to do that without PCP input.    Please advise,    Berto Wakefield RN     [facility]

## 2022-11-09 RX ORDER — AMLODIPINE BESYLATE 10 MG/1
10 TABLET ORAL AT BEDTIME
Qty: 90 TABLET | Refills: 3 | Status: SHIPPED | OUTPATIENT
Start: 2022-11-09

## 2022-11-09 RX ORDER — PUMPKIN SEED EXTRACT/SOY GERM 300 MG
1 CAPSULE ORAL DAILY
Qty: 90 CAPSULE | Refills: 4
Start: 2022-11-09 | End: 2023-01-19

## 2022-11-09 RX ORDER — CLONIDINE HYDROCHLORIDE 0.1 MG/1
0.1 TABLET ORAL AT BEDTIME
Qty: 90 TABLET | Refills: 4 | Status: SHIPPED | OUTPATIENT
Start: 2022-11-09 | End: 2022-11-17

## 2022-11-09 NOTE — TELEPHONE ENCOUNTER
When spoke to jalen last week, they were giving pt her night time meds early at 12pm. We were going to reeval In 1 week once getting in evening

## 2022-11-10 NOTE — TELEPHONE ENCOUNTER
I discussed with Jemal BENZ today that moving forward since evening pressures are still high, I am going to cancel the hydralazine and replace with clonidine 0.1mg at bedtime to start.   Will update med list now. Please fax updated med list.

## 2022-11-13 ENCOUNTER — LAB REQUISITION (OUTPATIENT)
Dept: LAB | Facility: CLINIC | Age: 87
End: 2022-11-13
Payer: COMMERCIAL

## 2022-11-13 DIAGNOSIS — I10 ESSENTIAL (PRIMARY) HYPERTENSION: ICD-10-CM

## 2022-11-13 RX ORDER — HYDRALAZINE HYDROCHLORIDE 25 MG/1
TABLET, FILM COATED ORAL
Qty: 30 TABLET | Refills: 0 | OUTPATIENT
Start: 2022-11-13

## 2022-11-13 NOTE — TELEPHONE ENCOUNTER
Outpatient Medication Detail     Disp Refills Start End EVELYN   hydrALAZINE (APRESOLINE) 25 MG tablet (Discontinued) 90 tablet 1 10/10/2022 11/9/2022 --   Sig: Discontinue hydralazine scheduled. Only give 25mg if systolic BP > 180 , 3x daily   Class: No Print Out   Order: [id number]

## 2022-11-16 PROCEDURE — P9603 ONE-WAY ALLOW PRORATED MILES: HCPCS | Mod: ORL | Performed by: NURSE PRACTITIONER

## 2022-11-16 PROCEDURE — 36415 COLL VENOUS BLD VENIPUNCTURE: CPT | Mod: ORL | Performed by: NURSE PRACTITIONER

## 2022-11-16 PROCEDURE — 80053 COMPREHEN METABOLIC PANEL: CPT | Mod: ORL | Performed by: NURSE PRACTITIONER

## 2022-11-16 PROCEDURE — 84443 ASSAY THYROID STIM HORMONE: CPT | Mod: ORL | Performed by: NURSE PRACTITIONER

## 2022-11-16 PROCEDURE — 85027 COMPLETE CBC AUTOMATED: CPT | Mod: ORL | Performed by: NURSE PRACTITIONER

## 2022-11-17 ENCOUNTER — NURSE TRIAGE (OUTPATIENT)
Dept: NURSING | Facility: CLINIC | Age: 87
End: 2022-11-17

## 2022-11-17 DIAGNOSIS — R09.89 LABILE HYPERTENSION: ICD-10-CM

## 2022-11-17 LAB
ALBUMIN SERPL BCG-MCNC: 3.4 G/DL (ref 3.5–5.2)
ALP SERPL-CCNC: 85 U/L (ref 35–104)
ALT SERPL W P-5'-P-CCNC: 42 U/L (ref 10–35)
ANION GAP SERPL CALCULATED.3IONS-SCNC: 13 MMOL/L (ref 7–15)
AST SERPL W P-5'-P-CCNC: 44 U/L (ref 10–35)
BILIRUB SERPL-MCNC: 0.2 MG/DL
BUN SERPL-MCNC: 20.5 MG/DL (ref 8–23)
CALCIUM SERPL-MCNC: 8.8 MG/DL (ref 8.2–9.6)
CHLORIDE SERPL-SCNC: 101 MMOL/L (ref 98–107)
CREAT SERPL-MCNC: 0.67 MG/DL (ref 0.51–0.95)
DEPRECATED HCO3 PLAS-SCNC: 22 MMOL/L (ref 22–29)
ERYTHROCYTE [DISTWIDTH] IN BLOOD BY AUTOMATED COUNT: 13.6 % (ref 10–15)
GFR SERPL CREATININE-BSD FRML MDRD: 82 ML/MIN/1.73M2
GLUCOSE SERPL-MCNC: 117 MG/DL (ref 70–99)
HCT VFR BLD AUTO: 36.8 % (ref 35–47)
HGB BLD-MCNC: 11.2 G/DL (ref 11.7–15.7)
MCH RBC QN AUTO: 32.2 PG (ref 26.5–33)
MCHC RBC AUTO-ENTMCNC: 30.4 G/DL (ref 31.5–36.5)
MCV RBC AUTO: 106 FL (ref 78–100)
PLATELET # BLD AUTO: 226 10E3/UL (ref 150–450)
POTASSIUM SERPL-SCNC: 4.4 MMOL/L (ref 3.4–5.3)
PROT SERPL-MCNC: 5.5 G/DL (ref 6.4–8.3)
RBC # BLD AUTO: 3.48 10E6/UL (ref 3.8–5.2)
SODIUM SERPL-SCNC: 136 MMOL/L (ref 136–145)
TSH SERPL DL<=0.005 MIU/L-ACNC: 1.88 UIU/ML (ref 0.3–4.2)
WBC # BLD AUTO: 6.6 10E3/UL (ref 4–11)

## 2022-11-17 RX ORDER — CLONIDINE HYDROCHLORIDE 0.2 MG/1
0.2 TABLET ORAL AT BEDTIME
Qty: 90 TABLET | Refills: 3
Start: 2022-11-17 | End: 2022-12-21

## 2022-11-17 NOTE — TELEPHONE ENCOUNTER
Yamel, Nurse Practitioner calling from care facility. Usually has check in with Dr Velasquez. Wants Dr to review labs done yesterday. Family is wondering about increasing depression med. B/p readings: this am 155/78, yesterday 172/75, the day before 157/79.  Since last adjustment highest was on 11/13 186/95. Please call her with questions:  [phone].  Melinda Escalona RN  [facility]    Reason for Disposition    Nursing judgment    Additional Information    Negative: Nursing judgment    Negative: Nursing judgment    Negative: Nursing judgment    Protocols used: INFORMATION ONLY CALL - NO TRIAGE-A-[place]

## 2022-11-17 NOTE — TELEPHONE ENCOUNTER
Spoke with jalen. Pt doesn't report being depressed, just frustrated with morning cares. She is going to talk to family. Could consider increase fluoxetine to 40mg but holding for now. We will increase clonidine at night to 0.2mg

## 2022-11-18 DIAGNOSIS — I10 ESSENTIAL HYPERTENSION: ICD-10-CM

## 2022-11-18 RX ORDER — LOSARTAN POTASSIUM 50 MG/1
TABLET ORAL
Qty: 180 TABLET | Refills: 1 | OUTPATIENT
Start: 2022-11-18

## 2022-11-19 NOTE — TELEPHONE ENCOUNTER
Outpatient Medication Detail     Disp Refills Start End EVELYN   losartan (COZAAR) 50 MG tablet (Discontinued) 180 tablet 1 9/22/2022 10/10/2022 No   Sig: Take 2 tablets by mouth every evening   Patient not taking: Reported on 10/10/2022        Class: No Print Out   Reason for Discontinue: Dose adjustment   Order: [id number]

## 2022-12-03 DIAGNOSIS — F33.42 MAJOR DEPRESSIVE DISORDER, RECURRENT EPISODE, IN FULL REMISSION (H): ICD-10-CM

## 2022-12-04 DIAGNOSIS — I10 ESSENTIAL HYPERTENSION: ICD-10-CM

## 2022-12-05 RX ORDER — BUPROPION HYDROCHLORIDE 150 MG/1
150 TABLET, EXTENDED RELEASE ORAL DAILY
Qty: 90 TABLET | Refills: 2 | Status: SHIPPED | OUTPATIENT
Start: 2022-12-05

## 2022-12-05 RX ORDER — LOSARTAN POTASSIUM 50 MG/1
TABLET ORAL
Qty: 180 TABLET | Refills: 1 | OUTPATIENT
Start: 2022-12-05

## 2022-12-05 NOTE — TELEPHONE ENCOUNTER
Outpatient Medication Detail     Disp Refills Start End EVELYN   losartan (COZAAR) 50 MG tablet (Discontinued) 180 tablet 1 9/22/2022 10/10/2022 No   Sig: Take 2 tablets by mouth every evening   Patient not taking: Reported on 10/10/2022        Class: No Print Out   Reason for Discontinue: Dose adjustment   Order: [id number]

## 2022-12-06 ENCOUNTER — LAB REQUISITION (OUTPATIENT)
Dept: LAB | Facility: CLINIC | Age: 87
End: 2022-12-06
Payer: COMMERCIAL

## 2022-12-06 ENCOUNTER — VIRTUAL VISIT (OUTPATIENT)
Dept: SLEEP MEDICINE | Facility: CLINIC | Age: 87
End: 2022-12-06
Attending: FAMILY MEDICINE
Payer: COMMERCIAL

## 2022-12-06 VITALS — HEIGHT: 65 IN | WEIGHT: 147 LBS | BODY MASS INDEX: 24.49 KG/M2

## 2022-12-06 DIAGNOSIS — R06.81 WITNESSED EPISODE OF APNEA: Primary | ICD-10-CM

## 2022-12-06 DIAGNOSIS — N39.0 URINARY TRACT INFECTION, SITE NOT SPECIFIED: ICD-10-CM

## 2022-12-06 DIAGNOSIS — I10 ESSENTIAL HYPERTENSION: ICD-10-CM

## 2022-12-06 DIAGNOSIS — R06.83 SNORING: ICD-10-CM

## 2022-12-06 DIAGNOSIS — G47.10 HYPERSOMNIA: ICD-10-CM

## 2022-12-06 PROCEDURE — 99204 OFFICE O/P NEW MOD 45 MIN: CPT | Mod: 95 | Performed by: INTERNAL MEDICINE

## 2022-12-06 ASSESSMENT — SLEEP AND FATIGUE QUESTIONNAIRES
HOW LIKELY ARE YOU TO NOD OFF OR FALL ASLEEP WHILE SITTING AND TALKING TO SOMEONE: SLIGHT CHANCE OF DOZING
HOW LIKELY ARE YOU TO NOD OFF OR FALL ASLEEP WHILE WATCHING TV: HIGH CHANCE OF DOZING
HOW LIKELY ARE YOU TO NOD OFF OR FALL ASLEEP IN A CAR, WHILE STOPPED FOR A FEW MINUTES IN TRAFFIC: SLIGHT CHANCE OF DOZING
HOW LIKELY ARE YOU TO NOD OFF OR FALL ASLEEP WHILE SITTING QUIETLY AFTER LUNCH WITHOUT ALCOHOL: HIGH CHANCE OF DOZING
HOW LIKELY ARE YOU TO NOD OFF OR FALL ASLEEP WHILE SITTING INACTIVE IN A PUBLIC PLACE: MODERATE CHANCE OF DOZING
HOW LIKELY ARE YOU TO NOD OFF OR FALL ASLEEP WHILE LYING DOWN TO REST IN THE AFTERNOON WHEN CIRCUMSTANCES PERMIT: HIGH CHANCE OF DOZING
HOW LIKELY ARE YOU TO NOD OFF OR FALL ASLEEP WHILE SITTING AND READING: MODERATE CHANCE OF DOZING
HOW LIKELY ARE YOU TO NOD OFF OR FALL ASLEEP WHEN YOU ARE A PASSENGER IN A CAR FOR AN HOUR WITHOUT A BREAK: SLIGHT CHANCE OF DOZING

## 2022-12-06 NOTE — PROGRESS NOTES
Ellis is a 89 year old who is being evaluated via a billable video visit.      How would you like to obtain your AVS? MyChart  If the video visit is dropped, the invitation should be resent by: Send to e-mail at: [email or web address]  Will anyone else be joining your video visit? No    Lavern Pearl, Viraj Facilitator/LPN    Video-Visit Details    Video Start Time: 2:56 PM    Type of service:  Video Visit    Video End Time:3:17 PM    Originating Location (pt. Location): Home    Distant Location (provider location):  Off-site    Platform used for Video Visit: AmWell     Additional 15 minutes on the date of service was spent performing the following:    -Preparing to see the patient  -Obtaining and/or reviewing separately obtained history   -Ordering medications, tests, or procedures   -Documenting clinical information in the electronic or other health record     Thank you for the opportunity to participate in the care of  Ellis Em.    Assessment and Plan:    In summary Ellis Em is a 89 year old year old female here for sleep disturbance.  1. Witness apnea/Hypersomnia/Snoring/HTN   Ellis Em has high risk for obstructive sleep apnea based on the history of witnessed apnea, snoring and a crowded airway. I educated the patient on the underlying pathophysiology of obstructive sleep apnea. We reviewed the risks associated with sleep apnea, including increased cardiovascular risk and overall death. We talked about treatments briefly. I recommend getting an baseline nocturnal polysomnography with TCM. The patient should return to the clinic to discuss results and treatment option in a patient-centered approach.    Lab reviewed: Discussed with patient.    History of present illness:    She is a 89 year old female who comes to the virtual clinic with a chief complaint of excessive daytime sleepiness that is been going on for more than a year.  The patient suffered a stroke on August 2022.  The patient's other  providers wanted to get ruled out for obstructive sleep apnea as a cause of her daytime sleepiness.  The patient has been observed as having pauses in her breathing during sleep followed by loud snoring.  Her blood pressure is also uncontrolled despite being on medication.     Sleep-Wake Cycle:    TIME IN BED:    1) Work/School Days:    Do you work or go to school? No   What time do you usually get into bed? 7:00pm   About how long does it take you to fall asleep? 5 minutes   How often do you have trouble falling asleep? 0   How often do you wake up during the night? 3-4   Do you work days/evenings/nights/rotating shifts?    What wakes you up at night? Snorting self awake    Pain    Use the bathroom    Anxiety    Nightmares    Uncertain   How often do you have trouble falling back to sleep? 2   About how long does it take to fall back to sleep? 10 minutes   What do you usually do if you have trouble getting back to sleep?    What time do you usually get out of bed to start your day? 5:30am   Do you use an alarm? No   2) Weekends/Non-work Days/All Other Days    What time do you usually get into bed? 7:00pm   About how long does it take you to fall asleep? 5 minutes   What time do you usually get out of bed to start your day? 5:30am   Do you use an alarm? No   SLEEP NEED    On average, about how much sleep do you think you get? 4-5   About how much sleep do you think you need? 7-8   SLEEP POSITION    Which sleep positions do you prefer? Back   Do you do any of the following activities in bed?    How often do you take a nap on purpose? 3x a day   About how long are your naps? 1-3 hours   Do you feel better after naps? Yes   How often do you doze off unintentionally? 1-2   Have you ever had a driving accident or near-miss due to sleepiness/drowsiness? No   SLEEP DISRUPTIONS:    1) Breathing/Snoring:    Do you snore? Yes   Do other people complain about your snoring? Yes   Have you been told you stop breathing in your  "sleep? Yes   Do you have issues with any of the following? Morning mouth dryness    Heartburn or reflux at night    Getting up to urinate more than once       AUDIE:  AUDIE Total Score: 16  Total score - [place]: 16 (12/6/2022  1:00 PM)    Patient told to return in one week after the sleep study is interpreted.    Patient Active Problem List   Diagnosis     Hypothyroidism     Major depressive disorder, recurrent episode, in full remission (H)     Idiopathic osteoporosis     Adrenal mass, left (H)     Lumbar stenosis with neurogenic claudication     Essential hypertension     Generalized osteoarthritis of multiple sites     Polyneuropathy, peripheral sensorimotor axonal     Allergic rhinitis     Tinnitus     Macular degeneration mild      Breast implant rupture bilateral      ACP (advance care planning)     Actinic keratosis     Cyst of ovary     Hearing loss     Hyperlipidemia     Malignant neoplasm of skin of parts of face     Neurogenic bladder     Antalgic gait     Fatigue, unspecified type     Cervical spondylosis with myelopathy     Recurrent urinary tract infection     Mobility impaired     Foot drop, right     Cognitive changes     Plantar fasciitis     Right thyroid nodule     Hoarse voice quality     Shortness of breath      Thalamic infarct, acute (H)     Lip numbness     Labile hypertension     Snoring     Chronic constipation     Past Medical History:   Diagnosis Date     Bowel obstruction (H) 1974     C1 cervical fracture (H) 10/12/2015     Cancer (H) breast, 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, salivary gland     Cancer of skin     \"half dollar size area removed from scalp\", precancer in abd (gallbladder & 1/2 of stomach removed\"     Carpal tunnel syndrome      Cervical spondylosis with myelopathy 4/25/2013     Chronic pain      Closed fracture of cervical vertebra (H) 10/21/2015     Replacement Utility updated for latest IMO load     Closed fracture of lumbar vertebra (H) 1/21/2015     Replacement Utility updated for latest " IMO load     Cyst of spinal meninges      Dermatitis      Disorders of meninges, not elsewhere classified     Created by Conversion      Gastric ulcer 1983    precancerous     Hip fracture (H) 2007     History of adrenal adenoma 5/25/2021     History of appendectomy     1943, 12 years old     History of fractured pelvis 1985     Hyponatremia      Hypothyroidism      Leg pain, bilateral     weakness bilaterally     Major depression      Miscarriage 1962    x2     Neck fracture (H) 09/10/2015     Nephrolithiasis      Osteoarthritis      Osteoporosis      Panic attack      Peripheral neuropathy      Reactive confusion      Renal insufficiency      Rib fracture 2008    x2, with pelvix fx     Shingles      Status post lumbar laminectomy 11/7/2014     UTI (urinary tract infection)      Varicella 7/14/2004    Overview:  LW Onset:  [id number] ; Varicella Zoster     Past Surgical History:   Procedure Laterality Date     ABDOMEN SURGERY  06/30/1983    1/2 of stomach organ removed due to precancerous ulcer     APPENDECTOMY  1943     CATARACT EXTRACTION, BILATERAL       CERVICAL LAMINOPLASTY Bilateral 5/2/2013    C1-3 cervical laminoplasty, removal of intracanaalicular extradural ventrally located cyst on 5/2/2013 by Dr. Corbin at [facility] for treatment of spinal cord compression and myelopathy     CHOLECYSTECTOMY  06/30/1983     COMBINED MAMMAPLASTY REVISION W/ ABDOMINOPLASTY  07/1998     DILATION AND CURETTAGE  09/19/1986     ENDOMETRIAL BIOPSY  09/09/1986     FRACTURE SURGERY  1985    Plate in Left hip; pins in then removed from right hip     JOINT REPLACEMENT      left total knee     LUMBAR LAMINECTOMY N/A 11/6/2014    Procedure: BILATERAL DECOMPRESSIVE LAMINECTOMY L1-L5 (REDO L4 L5);  Surgeon: Trice Corbin MD;  Location: [facility] OR;  Service:      MASTECTOMY Bilateral      OTHER SURGICAL HISTORY      partial colectomy     OVARIAN CYST REMOVAL  1943    chocolate cysts removed     SD  ARTHROPLASTY TIBIAL PLATEAU Left     Description: Knee Replacement;  Recorded: 05/23/2013;     REVISE RECONSTRUCTED BREAST Bilateral 11/11/1984    with implants, nipple attachment 1/13/1985     ROOT CANAL  09/14/1998     SALIVARY GLAND SURGERY  12/05/1984    salivary gland and neck tumor removed     SKIN CANCER EXCISION  08/15/2003    ear     TOTAL HIP ARTHROPLASTY Bilateral     armature installed     TOTAL KNEE ARTHROPLASTY  10/20/2005     TUBAL LIGATION  1962     TUMOR EXCISION  05/01/2013    in spine     Z QUIGLEY W/O FACETEC FORAMOT/DSKC 1/2 VRT SEG, LUMBAR      Description: Laminectomy Decompressive Up To Two Lumbar Segments;  Recorded: 11/10/2014;     Current Outpatient Medications   Medication Sig Dispense Refill     acetaminophen (TYLENOL) 500 MG tablet Take 1,000 mg by mouth 3 times daily       alpha lipoic acid 200 mg cap [ALPHA LIPOIC ACID 200 MG CAP] Take 1 capsule by mouth 2 (two) times a day.       amLODIPine (NORVASC) 10 MG tablet Take 1 tablet (10 mg) by mouth At Bedtime For blood pressure 90 tablet 3     aspirin (ASA) 81 MG EC tablet Take 1 tablet (81 mg) by mouth daily 30 tablet 0     baclofen (LIORESAL) 10 MG tablet Take 1 tablet (10 mg) by mouth 2 times daily 180 tablet 3     buPROPion (WELLBUTRIN SR) 150 MG 12 hr tablet Take 1 tablet (150 mg) by mouth daily 90 tablet 2     cloNIDine (CATAPRES) 0.2 MG tablet Take 1 tablet (0.2 mg) by mouth At Bedtime 90 tablet 3     Cranberry 400 MG CAPS Take 30,000 mg by mouth daily       diclofenac (VOLTAREN) 1 % topical gel Apply 4 g topically daily as needed 350 g 0     estradiol (ESTRACE) 0.1 MG/GM vaginal cream Place 1 g vaginally twice a week Sundays and Thursdays 42.5 g 1     ferrous sulfate (FEROSUL) 325 (65 Fe) MG tablet TAKE 1 TABLET BY MOUTH EVERY DAY WITH BREAKFAST (Patient taking differently: Take 325 mg by mouth daily (with breakfast)) 90 tablet 3     FLUoxetine (PROZAC) 10 MG capsule TAKE 3 CAPS (30MG) BY MOUTHONCE DAILY 270 capsule 4     gabapentin  (NEURONTIN) 100 MG capsule TAKE 1 CAP BY MOUTH TWICE ADAY IN THE MORNING AND EVENING; TAKE 2 CAPS (200MG) BY MOUTH IN THE AFTERNOON 360 capsule 1     Lactobacillus (AZO DUAL PROTECTION) CAPS Take 1 capsule by mouth daily 90 capsule 4     levothyroxine (SYNTHROID/LEVOTHROID) 75 MCG tablet Take 1 tablet (75 mcg) by mouth daily 90 tablet 4     losartan (COZAAR) 100 MG tablet Take 1 tablet (100 mg) by mouth At Bedtime 90 tablet 4     memantine (NAMENDA) 10 MG tablet TAKE 1 TAB BY MOUTH TWICE ADAY 60 tablet 8     METAMUCIL FIBER PO        omega-3 fatty acids 500 mg cap Take 2 capsules by mouth daily Omega 3 fatty acid 1290-2 softgels daily. (could not find specific dose in chart)        STRONTIUM CHLORIDE HEXAHYDRATE (STRONTIUM CHL HEXAHYD, BULK,) 100 % Kailyn Take 680 mg by mouth every evening       UNABLE TO FIND Take 1 capsule by mouth 2 times daily MEDICATION NAME: Uric Acid formula, supplement.       VIT C/E/ZN/COPPR/LUTEIN/ZEAXAN (PRESERVISION AREDS 2 ORAL) [VIT C/E/ZN/COPPR/LUTEIN/ZEAXAN (PRESERVISION AREDS 2 ORAL)] Take 1 tablet by mouth 2 (two) times a day.        Iodinated contrast media [diagnostic x-ray materials], Adhesive [mecrylate], Asparaginase, Atorvastatin, Ciprofloxacin, Codeine, Lactose, Latex, Lisinopril, Pravastatin, Propoxyphene n-acetaminophen [propoxyphene n-apap], Sulfa (sulfonamide antibiotics) [sulfa drugs], and Albuterol  Social History     Socioeconomic History     Marital status: Single     Spouse name: Not on file     Number of children: Not on file     Years of education: Not on file     Highest education level: Not on file   Occupational History     Not on file   Tobacco Use     Smoking status: Never     Smokeless tobacco: Never   Substance and Sexual Activity     Alcohol use: No     Drug use: No     Sexual activity: Not on file   Other Topics Concern     Not on file   Social History Narrative     Not on file     Social Determinants of Health     Financial Resource Strain: Not on file    Food Insecurity: Not on file   Transportation Needs: Not on file   Physical Activity: Not on file   Stress: Not on file   Social Connections: Not on file   Intimate Partner Violence: Not on file   Housing Stability: Not on file     Family History   Problem Relation Age of Onset     Cancer Mother      Kidney Disease Father      Alcoholism Brother      Diabetes Brother      Dementia Brother         Physical Exam:  GEN: NAD,   Head: Normocephalic.  EYES: EOMI  ENT: Oropharynx is clear, Luque class 4+ airway.   Psych: normal mood, normal affect  Snore test:(-)     Labs/Studies:     No results found for: PH, PHARTERIAL, PO2, [id number], SAT, PCO2, HCO3, BASEEXCESS, PEARL, BEB  Lab Results   Component Value Date    TSH 1.88 11/16/2022    TSH 2.34 08/29/2022     Lab Results   Component Value Date     (H) 11/16/2022    GLC 95 10/04/2022     Lab Results   Component Value Date    HGB 11.2 (L) 11/16/2022    HGB 12.2 10/04/2022     Lab Results   Component Value Date    BUN 20.5 11/16/2022    BUN 22.3 10/04/2022    CR 0.67 11/16/2022    CR 0.79 10/04/2022     Lab Results   Component Value Date    AST 44 (H) 11/16/2022    AST 23 10/04/2022    ALT 42 (H) 11/16/2022    ALT 26 10/04/2022    ALKPHOS 85 11/16/2022    ALKPHOS 76 10/04/2022    BILITOTAL 0.2 11/16/2022    BILITOTAL 0.2 10/04/2022     No results found for: UAMP, UBARB, BENZODIAZEUR, UCANN, UCOC, OPIT, UPCP    Recent Labs   Lab Test 11/16/22  1406 10/04/22  1148    140   POTASSIUM 4.4 3.7   CHLORIDE 101 106   CO2 22 24   ANIONGAP 13 10   * 95   BUN 20.5 22.3   CR 0.67 0.79   LORENZA 8.8 8.8       Ferritin   Date Value Ref Range Status   05/17/2021 28 10 - 130 ng/mL Final       Joni Raya DO  Board Certified in Internal Medicine and Sleep Medicine    (Note created with Dragon voice recognition and unintended spelling errors and word substitutions may occur)

## 2022-12-06 NOTE — PATIENT INSTRUCTIONS
Patient education: What is a sleep study?     What is a sleep study? -- A sleep study is a test that measures how well you sleep and checks for sleep problems. For some sleep studies, you stay overnight in a sleep lab at a hospital or sleep center.     What happens during a sleep study? -- Before you go to sleep, a technician attaches small, sticky patches called  electrodes  to your head, chest, and legs. He or she will also place a small tube beneath your nose and might wrap 1 or 2 belts around your chest.   Each of these items has wires that connect to monitors. The monitors record your movement, brain activity, breathing, and other body functions while you sleep.  If you have a history of trouble falling asleep, your doctor might prescribe a medicine to help you fall asleep in the lab. If you have never taken the medicine before, your doctor might ask you take it on a night before your sleep study to see how it affects you.   Why might my doctor order a sleep study? -- Your doctor will order a sleep study if he or she thinks you have sleep apnea or a different condition that makes you:   ?Have sudden jerking leg movements while you sleep, called  periodic limb movements.    ?Feel very sleepy during the day and fall asleep all of a sudden, called  narcolepsy.    ?Have trouble falling asleep or staying asleep over a long period of time, called  chronic insomnia.    ?Do odd things while you sleep, such as walking.  How should I prepare for a sleep study? -- On the day of your sleep study, you should:   ?Avoid alcohol   ?Avoid drinking coffee, tea, sodas, and other drinks that have caffeine in the afternoon and evening   ?Take all of your regular medicines     The cost of care estimate line is [phone]. They are able to give the patient an estimate of the charges and also an estimate of their insurance coverage/patient responsibility.   After your sleep study is performed, please call us at [phone] or  [phone]  to schedule for a follow up to review the results of the sleep study.    Please bring one tab of low dose melatonin 3 mg or less to the night of the study.    Melatonin intake is completely voluntary.    You may take own melatonin after arrival to sleep center. Do not drive or operate machinery after intake of melatonin.     Please make every effort you can to sleep on your back with one pillow behind your head.    Please also call your insurance company next week to see if your sleep study is approved and find out your co-pay.

## 2022-12-08 ENCOUNTER — LAB REQUISITION (OUTPATIENT)
Dept: LAB | Facility: CLINIC | Age: 87
End: 2022-12-08
Payer: COMMERCIAL

## 2022-12-08 DIAGNOSIS — N39.0 URINARY TRACT INFECTION, SITE NOT SPECIFIED: ICD-10-CM

## 2022-12-08 LAB
ALBUMIN UR-MCNC: 20 MG/DL
APPEARANCE UR: ABNORMAL
BACTERIA #/AREA URNS HPF: ABNORMAL /HPF
BILIRUB UR QL STRIP: NEGATIVE
CAOX CRY #/AREA URNS HPF: ABNORMAL /HPF
COLOR UR AUTO: YELLOW
GLUCOSE UR STRIP-MCNC: NEGATIVE MG/DL
HGB UR QL STRIP: NEGATIVE
KETONES UR STRIP-MCNC: ABNORMAL MG/DL
LEUKOCYTE ESTERASE UR QL STRIP: ABNORMAL
MUCOUS THREADS #/AREA URNS LPF: PRESENT /LPF
NITRATE UR QL: POSITIVE
PH UR STRIP: 5.5 [PH] (ref 5–7)
RBC URINE: 1 /HPF
SP GR UR STRIP: 1.03 (ref 1–1.03)
SQUAMOUS EPITHELIAL: <1 /HPF
UROBILINOGEN UR STRIP-MCNC: NORMAL MG/DL
WBC URINE: 10 /HPF

## 2022-12-08 PROCEDURE — 81001 URINALYSIS AUTO W/SCOPE: CPT | Mod: ORL

## 2022-12-08 PROCEDURE — 87086 URINE CULTURE/COLONY COUNT: CPT | Mod: ORL | Performed by: NURSE PRACTITIONER

## 2022-12-08 PROCEDURE — 87086 URINE CULTURE/COLONY COUNT: CPT | Mod: ORL

## 2022-12-08 PROCEDURE — 81001 URINALYSIS AUTO W/SCOPE: CPT | Mod: ORL | Performed by: NURSE PRACTITIONER

## 2022-12-11 LAB — BACTERIA UR CULT: ABNORMAL

## 2022-12-12 ENCOUNTER — TELEPHONE (OUTPATIENT)
Dept: SLEEP MEDICINE | Facility: CLINIC | Age: 87
End: 2022-12-12

## 2022-12-12 DIAGNOSIS — R06.81 WITNESSED EPISODE OF APNEA: Primary | ICD-10-CM

## 2022-12-12 DIAGNOSIS — G47.10 HYPERSOMNIA: ICD-10-CM

## 2022-12-12 DIAGNOSIS — I10 ESSENTIAL HYPERTENSION: ICD-10-CM

## 2022-12-12 DIAGNOSIS — R06.83 SNORING: ICD-10-CM

## 2022-12-12 NOTE — TELEPHONE ENCOUNTER
Reason for Call: Call back    Detailed comments: Pt daughter Rosalva called in today and scheduled pt's sleep study. Pt daughter Rosalva stated that herself or her sister Taisha requested to be with at the sleep study. Pt has hard time with unusual places and would be more comfortable with having a familiar face and also has some physical needs and daughters are familiar. Also patient goes to bed around 7pm and would like to check in earlier for sleep study. Please contact Rosalva for follow up      Phone Number Patient can be reached at: Cell number on file:    Telephone Information:   Mobile [phone]       Best Time: Anytime `    Can we leave a detailed message on this number? YES    Call taken on 12/12/2022 at 10:06 AM by Cecile Carty

## 2022-12-13 ENCOUNTER — TELEPHONE (OUTPATIENT)
Dept: FAMILY MEDICINE | Facility: CLINIC | Age: 87
End: 2022-12-13

## 2022-12-13 NOTE — TELEPHONE ENCOUNTER
Incoming call from RN at [facility].:  Pt developed rash on lower back from cesdinir, she was taking for UTI, Will discontinue cesdinir and will start macrobid 100MG 2 times per day for 5 days and will apply a topical cream for rash. Rash does not appear to be shingles.  Call back Krista [phone] ok to leave VM

## 2022-12-17 ENCOUNTER — LAB REQUISITION (OUTPATIENT)
Dept: LAB | Facility: CLINIC | Age: 87
End: 2022-12-17
Payer: COMMERCIAL

## 2022-12-17 DIAGNOSIS — I10 ESSENTIAL (PRIMARY) HYPERTENSION: ICD-10-CM

## 2022-12-21 LAB
ALBUMIN SERPL BCG-MCNC: 3.8 G/DL (ref 3.5–5.2)
ALP SERPL-CCNC: 84 U/L (ref 35–104)
ALT SERPL W P-5'-P-CCNC: 31 U/L (ref 10–35)
ANION GAP SERPL CALCULATED.3IONS-SCNC: 12 MMOL/L (ref 7–15)
AST SERPL W P-5'-P-CCNC: 32 U/L (ref 10–35)
BILIRUB SERPL-MCNC: 0.3 MG/DL
BUN SERPL-MCNC: 17.9 MG/DL (ref 8–23)
CALCIUM SERPL-MCNC: 9.4 MG/DL (ref 8.2–9.6)
CHLORIDE SERPL-SCNC: 101 MMOL/L (ref 98–107)
CREAT SERPL-MCNC: 0.58 MG/DL (ref 0.51–0.95)
DEPRECATED HCO3 PLAS-SCNC: 24 MMOL/L (ref 22–29)
GFR SERPL CREATININE-BSD FRML MDRD: 85 ML/MIN/1.73M2
GLUCOSE SERPL-MCNC: 90 MG/DL (ref 70–99)
POTASSIUM SERPL-SCNC: 4.2 MMOL/L (ref 3.4–5.3)
PROT SERPL-MCNC: 6.4 G/DL (ref 6.4–8.3)
SODIUM SERPL-SCNC: 137 MMOL/L (ref 136–145)

## 2022-12-21 PROCEDURE — 80053 COMPREHEN METABOLIC PANEL: CPT | Mod: ORL | Performed by: NURSE PRACTITIONER

## 2022-12-21 PROCEDURE — 36415 COLL VENOUS BLD VENIPUNCTURE: CPT | Mod: ORL | Performed by: NURSE PRACTITIONER

## 2022-12-21 PROCEDURE — 80053 COMPREHEN METABOLIC PANEL: CPT | Mod: ORL

## 2022-12-21 PROCEDURE — 36415 COLL VENOUS BLD VENIPUNCTURE: CPT | Mod: ORL

## 2022-12-21 PROCEDURE — P9603 ONE-WAY ALLOW PRORATED MILES: HCPCS | Mod: ORL | Performed by: NURSE PRACTITIONER

## 2022-12-21 PROCEDURE — P9603 ONE-WAY ALLOW PRORATED MILES: HCPCS | Mod: ORL

## 2022-12-21 NOTE — TELEPHONE ENCOUNTER
Left a message for patients daughter to contact sleep clinic to discuss rescheduling psg. Patient

## 2023-01-02 NOTE — TELEPHONE ENCOUNTER
Patient has been scheduled, her daughter will be staying the night to assist with patient care during the sleep study. Bed has been held for this visit.  Reba Ramirez MA

## 2023-01-06 ENCOUNTER — MYC MEDICAL ADVICE (OUTPATIENT)
Dept: FAMILY MEDICINE | Facility: CLINIC | Age: 88
End: 2023-01-06

## 2023-01-06 NOTE — TELEPHONE ENCOUNTER
Daughter was calling to check on the status of the message.    Advised that the clinic does have the message and are working on the the issue.    Daughter Rosalva asked that she be called as she handles all of patient's issues for her as it is hard for the patient to hear over the phone.    Daughter can also check my chart if that works for the clinic.    Lavern Kendrick RN  Veteran Nurse Advisor  2:02 PM  1/6/2023

## 2023-01-06 NOTE — TELEPHONE ENCOUNTER
Dr. Angulo,    Okay to schedule in reserved spot to establish care?    Berto Wakefield RN     [facility]

## 2023-01-10 NOTE — TELEPHONE ENCOUNTER
Ok to be seen for establishing care. Need 40 minutes please. I have a few slots still open this Thursday.

## 2023-01-10 NOTE — TELEPHONE ENCOUNTER
Attempted to call patient's daughter Rosalva to assist with scheduling, left message for return call to clinic. Please assist in scheduling a 40-minute establish care visit with Dr. Angulo when they return call - okay to use held spot per provider documentation below.     Yvette Key RN

## 2023-01-10 NOTE — TELEPHONE ENCOUNTER
If daughter calls back: I am now full for this week. She can establish care with another provider who is accepting new patients otherwise she would have to look at next week, I think I have reserved slot for next Friday. 40 minutes please patient is quite complicated. Pls let daughter know I'm working part time only 2 days a week so i'm very limited and may not be a great match for patient. Dr. Wetzel and Dr. Madrid are more available than me.

## 2023-01-10 NOTE — TELEPHONE ENCOUNTER
Per chart review, patient has been scheduled with Dr. Angulo for 1/19 to establish care. Writer will close this encounter.    Yvette Key RN

## 2023-01-11 DIAGNOSIS — D50.0 IRON DEFICIENCY ANEMIA DUE TO CHRONIC BLOOD LOSS: ICD-10-CM

## 2023-01-11 DIAGNOSIS — I63.81 THALAMIC STROKE (H): ICD-10-CM

## 2023-01-12 ENCOUNTER — LAB REQUISITION (OUTPATIENT)
Dept: LAB | Facility: CLINIC | Age: 88
End: 2023-01-12
Payer: COMMERCIAL

## 2023-01-12 DIAGNOSIS — R30.0 DYSURIA: ICD-10-CM

## 2023-01-12 LAB
ALBUMIN UR-MCNC: NEGATIVE MG/DL
APPEARANCE UR: CLEAR
BACTERIA #/AREA URNS HPF: ABNORMAL /HPF
BILIRUB UR QL STRIP: NEGATIVE
COLOR UR AUTO: ABNORMAL
GLUCOSE UR STRIP-MCNC: NEGATIVE MG/DL
HGB UR QL STRIP: NEGATIVE
KETONES UR STRIP-MCNC: NEGATIVE MG/DL
LEUKOCYTE ESTERASE UR QL STRIP: ABNORMAL
NITRATE UR QL: POSITIVE
PH UR STRIP: 5.5 [PH] (ref 5–7)
RBC URINE: 1 /HPF
SP GR UR STRIP: 1.01 (ref 1–1.03)
SQUAMOUS EPITHELIAL: <1 /HPF
TRANSITIONAL EPI: <1 /HPF
UROBILINOGEN UR STRIP-MCNC: NORMAL MG/DL
WBC URINE: 20 /HPF

## 2023-01-12 PROCEDURE — 87086 URINE CULTURE/COLONY COUNT: CPT | Mod: ORL | Performed by: INTERNAL MEDICINE

## 2023-01-12 PROCEDURE — 81001 URINALYSIS AUTO W/SCOPE: CPT | Mod: ORL | Performed by: INTERNAL MEDICINE

## 2023-01-12 NOTE — TELEPHONE ENCOUNTER
"Routing refill request to provider for review/approval because:    Former patient of Whitney Velasquez & has not established care with another provider.  Please assign refill request to covering provider per Clinic standard process.      ferosul Last Written Prescription Date:  4/3/22  Last Fill Quantity: 90,  # refills: 3      ASA Last Written Prescription Date:  9/2/22  Last Fill Quantity: 30,  # refills: 0        Last office visit provider:  10/10/22     Requested Prescriptions   Pending Prescriptions Disp Refills     FEROSUL 325 (65 Fe) MG tablet [Pharmacy Med Name: FERROUS SULFATE 325MG TABLET] 30 tablet 0     Sig: TAKE 1 TAB BY MOUTH ONCE DAILY WITH BREAKFAST       Iron Supplements Passed - 1/11/2023 12:17 PM        Passed - Patient is 12 years of age or older        Passed - Recent (12 mo) or future (30 days) visit within the authorizing provider's specialty     Patient has had an office visit with the authorizing provider or a provider within the authorizing providers department within the previous 12 mos or has a future within next 30 days. See \"Patient Info\" tab in inbasket, or \"Choose Columns\" in Meds & Orders section of the refill encounter.              Passed - Hgb OR Hct on record within the past 12 mos.     Patient need only have had a HGB or HCT on file in the past 12 mos. That result does not need to be normal.    Recent Labs   Lab Test 11/16/22  1406 10/04/22  1148 09/16/22  1755   HGB 11.2* 12.2 13.8     Recent Labs   Lab Test 11/16/22  1406 10/04/22  1148 09/16/22  1755   HCT 36.8 39.3 43.3       Please verify a HGB or HCT has been checked SINCE THE LAST DOSE CHANGE.            Passed - Medication is active on med list           ASPIRIN LOW DOSE 81 MG EC tablet [Pharmacy Med Name: ASPIRIN 81MG EC TABLET] 30 tablet 0     Sig: TAKE 1 TAB BY MOUTH ONCE DAILY       Analgesics (Non-Narcotic Tylenol and ASA Only) Passed - 1/11/2023 12:17 PM        Passed - Recent (12 mo) or future (30 days) visit " "within the authorizing provider's specialty     Patient has had an office visit with the authorizing provider or a provider within the authorizing providers department within the previous 12 mos or has a future within next 30 days. See \"Patient Info\" tab in inbasket, or \"Choose Columns\" in Meds & Orders section of the refill encounter.              Passed - Patient is age 20 years or older     If ASA is flagged for ages under 20 years old. Forward to provider for confirmation Ryes Syndrome is not a concern.              Passed - Medication is active on med list             Lavern Kendrick RN 01/12/23 2:34 PM  "

## 2023-01-13 RX ORDER — FERROUS SULFATE 325(65) MG
325 TABLET ORAL
Qty: 90 TABLET | Refills: 1 | Status: SHIPPED | OUTPATIENT
Start: 2023-01-13

## 2023-01-13 RX ORDER — ASPIRIN 81 MG/1
TABLET, COATED ORAL
Qty: 30 TABLET | Refills: 0 | Status: SHIPPED | OUTPATIENT
Start: 2023-01-13

## 2023-01-14 LAB — BACTERIA UR CULT: ABNORMAL

## 2023-01-19 ENCOUNTER — OFFICE VISIT (OUTPATIENT)
Dept: FAMILY MEDICINE | Facility: CLINIC | Age: 88
End: 2023-01-19
Payer: COMMERCIAL

## 2023-01-19 VITALS
DIASTOLIC BLOOD PRESSURE: 81 MMHG | HEIGHT: 65 IN | HEART RATE: 70 BPM | RESPIRATION RATE: 18 BRPM | WEIGHT: 143 LBS | OXYGEN SATURATION: 97 % | BODY MASS INDEX: 23.82 KG/M2 | SYSTOLIC BLOOD PRESSURE: 170 MMHG

## 2023-01-19 DIAGNOSIS — R27.9 DISCOORDINATION: ICD-10-CM

## 2023-01-19 DIAGNOSIS — R09.89 LABILE HYPERTENSION: Primary | ICD-10-CM

## 2023-01-19 DIAGNOSIS — E03.9 HYPOTHYROIDISM, UNSPECIFIED TYPE: ICD-10-CM

## 2023-01-19 DIAGNOSIS — M62.81 MUSCLE WEAKNESS (GENERALIZED): ICD-10-CM

## 2023-01-19 DIAGNOSIS — F33.42 MAJOR DEPRESSIVE DISORDER, RECURRENT EPISODE, IN FULL REMISSION (H): ICD-10-CM

## 2023-01-19 DIAGNOSIS — I63.81 THALAMIC STROKE (H): ICD-10-CM

## 2023-01-19 DIAGNOSIS — R53.1 LEFT-SIDED WEAKNESS: ICD-10-CM

## 2023-01-19 DIAGNOSIS — E27.8 ADRENAL MASS, LEFT (H): ICD-10-CM

## 2023-01-19 DIAGNOSIS — M21.371 FOOT DROP, RIGHT: ICD-10-CM

## 2023-01-19 DIAGNOSIS — M25.50 ARTHRALGIA, UNSPECIFIED JOINT: ICD-10-CM

## 2023-01-19 PROCEDURE — 99215 OFFICE O/P EST HI 40 MIN: CPT | Performed by: FAMILY MEDICINE

## 2023-01-19 PROCEDURE — 96127 BRIEF EMOTIONAL/BEHAV ASSMT: CPT | Performed by: FAMILY MEDICINE

## 2023-01-19 RX ORDER — SPIRONOLACTONE 25 MG/1
25 TABLET ORAL DAILY
Qty: 90 TABLET | Refills: 1 | Status: SHIPPED | OUTPATIENT
Start: 2023-01-19

## 2023-01-19 ASSESSMENT — PATIENT HEALTH QUESTIONNAIRE - PHQ9
SUM OF ALL RESPONSES TO PHQ QUESTIONS 1-9: 1
10. IF YOU CHECKED OFF ANY PROBLEMS, HOW DIFFICULT HAVE THESE PROBLEMS MADE IT FOR YOU TO DO YOUR WORK, TAKE CARE OF THINGS AT HOME, OR GET ALONG WITH OTHER PEOPLE: NOT DIFFICULT AT ALL
SUM OF ALL RESPONSES TO PHQ QUESTIONS 1-9: 1

## 2023-01-19 NOTE — PROGRESS NOTES
Assessment & Plan     Labile hypertension  Reviewed BP. Hydrochlorothiazide caused side effects but unclear what - there was report of drowsiness. Pt doesn't remember. Will trial spironolactone. Hydralazine was discharged a while ago. Lives in assisted living. BMP to be done in 2 weeks to ensure no electrolyte abnormality. Discussed she will greatly benefit from seeing [facility] physicians given her limited mobility and increased need for care. Daughter Rosalva will check out [facility]. Also getting sleep study which will help with BP if AMBAR is diagnosed and treated.   - spironolactone (ALDACTONE) 25 MG tablet  Dispense: 90 tablet; Refill: 1  - Basic metabolic panel  (Ca, Cl, CO2, Creat, Gluc, K, Na, BUN)    Thalamic stroke (H)  Arthralgia, unspecified joint  Left-sided weakness  Discoordination  Muscle weakness (generalized)  Foot drop, right  Seeing neurology.  On memantine  - Wheelchair Order for DME - ONLY FOR DME    Major depressive disorder, recurrent episode, in full remission (H)  Noted.  Fluoxetine 30 mg daily.  Continue with bupropion 150 mg 2 times daily    Adrenal mass, left (H)  Noted. Will follow.         Hypothyroidism, unspecified type  Noted       Return in about 3 months (around 4/19/2023) for Follow up, with me, in person.      I spent 45 minutes on this encounter, including collecting history from patient and reviewing records from previous providers, examining the patient, explaining and counseling the issues enumerated in the Assessment and Plan (patient given a copy), ordering indicated tests, ordering prescriptions.       Deysi Angulo MD  [facility]    Ashleigh Corral is a 89 year old accompanied by her daughter, presenting for the following health issues:  Establish Care      History of Present Illness       Reason for visit:  Establish primary care  wheelchair order  BP    She eats 2-3 servings of fruits and vegetables daily.She consumes 1 sweetened  "beverage(s) daily.She exercises with enough effort to increase her heart rate 9 or less minutes per day.  She exercises with enough effort to increase her heart rate 3 or less days per week.   She is taking medications regularly.    Today's PHQ-9         PHQ-9 Total Score: 1    PHQ-9 Q9 Thoughts of better off dead/self-harm past 2 weeks :   Not at all    How difficult have these problems made it for you to do your work, take care of things at home, or get along with other people: Not difficult at all      Reviewed BP log    Review of Systems   Constitutional, HEENT, cardiovascular, pulmonary, gi and gu systems are negative, except as otherwise noted.      Objective    BP (!) 170/81 (BP Location: Left arm, Patient Position: Sitting, Cuff Size: Adult Regular)   Pulse 70   Resp 18   Ht 1.651 m (5' 5\")   Wt 64.9 kg (143 lb)   LMP  (LMP Unknown)   SpO2 97%   BMI 23.80 kg/m    Body mass index is 23.8 kg/m .  Physical Exam   GENERAL: healthy, alert and no distress  NECK: no adenopathy, no asymmetry, masses, or scars and thyroid normal to palpation  RESP: lungs clear to auscultation - no rales, rhonchi or wheezes  CV: regular rate and rhythm, normal S1 S2, no S3 or S4, no murmur, click or rub  ABDOMEN: soft, nontender, no hepatosplenomegaly, no masses and bowel sounds normal  MS: sitting in wheelchair.   NEURO: mentation intact and speech normal  PSYCH: appearing tired.                     "

## 2023-01-30 ENCOUNTER — TRANSFERRED RECORDS (OUTPATIENT)
Dept: HEALTH INFORMATION MANAGEMENT | Facility: CLINIC | Age: 88
End: 2023-01-30

## 2023-02-12 PROBLEM — Z86.73 HISTORY OF STROKE: Status: ACTIVE | Noted: 2022-08-29

## 2023-02-12 PROBLEM — R41.9 NEUROCOGNITIVE DISORDER: Status: ACTIVE | Noted: 2023-02-12

## 2023-02-12 NOTE — PROGRESS NOTES
NEUROLOGY FOLLOW UP VISIT  NOTE       [facility]  [address] [place] [address]  Tel: [phone]  Fax: [phone]  [email or web address]     Ellis Em,  1931, MRN [id number]  PCP: Deysi Angulo  Date: 2023      ASSESSMENT & PLAN     Visit Diagnosis  1.  Polyneuropathy, peripheral sensorimotor axonal  2. History Rt thalamic stroke  3. Neurocognitive disorder     H/O right thalamic infarct  89-year-old female with history of HTN, HLD, osteoporosis, cervical stenosis s/p C1-C2 laminoplasty, sensorimotor polyneuropathy and neurocognitive decline who was admitted to the hospital with right thalamic infarct and returns for follow-up.  She continues to have left-sided numbness.  I have recommended:    1.  Continue aspirin 81 mg daily  2.  Her LDL is 117 and although statin is listed as allergy she is not sure if she had true allergy.  I am giving her a trial of Crestor 5 mg daily with a goal of LDL between 40-70.  If she develops any myalgia we will discontinue Crestor  3.  Her blood pressure is running somewhat higher and I would recommend keeping it to 120-130/80  4.  Continue physical therapy  5.  Follow-up will be in 6    Sensorimotor polyneuropathy  Patient developed sudden decline in her ability to walk with increased weakness in the right leg.  MRI lumbar spine showed multilevel radiculopathy most pronounced at the right side L3-L4.  EMG suggested length dependent mixed sensorimotor polyneuropathy.  Due to her age she was not interested in epidural injection or any surgical intervention.  Currently she is on gabapentin and wakes up in the middle of the night with worsening symptoms.  I have recommended:    1.  Increase gabapentin to 200 mg in the morning, 100 mg at noon and 200 at bedtime  2.  She is struggling with insomnia and I have also added melatonin 3 mg at bedtime    Neurocognitive disorder  She was initially evaluated in 2021 for  sudden decline in her cognition. MRI of the brain shows small vessel ischemic disease and she scored 20/30 on MoCA but subsequently in July 2022 scored 29/30.  She was initially started on Aricept that she could not tolerate and subsequently was switched to Namenda.    Thank you again for this referral, please feel free to contact me if you have any questions.    Yoni Fam MD  [facility]  (Formerly, [facility]     HISTORY OF PRESENT ILLNESS     Patient is a 89-year-old female with history of HTN, HLD, osteoporosis, cervical stenosis s/p C1-C2 laminoplasty, sensorimotor polyneuropathy and neurocognitive decline last seen on 7/22/2022 who returns for follow-up.  Since her last visit she was admitted to the hospital in August 2022 for right thalamic infarct.  She had recurrent symptoms and was seen again in September that included MRI brain that showed evolving stroke.  She was seen by the nurse practitioner in October due to worsening headache and reported inability to participate in physical therapy.  Patient was continued on aspirin but was not started on statin as she has allergies.  Her LDL checked on 8/30/2022 was 117.  Although atorvastatin is listed as allergy patient is not sure what side effects she had.  She continues to struggle with her blood pressure and is somewhat frustrated as she has different physicians but recently transferred her care to [facility] and is hopeful she will have adequate control of her blood pressure.    Prior to that CVA patient was started on Aricept for neurocognitive decline but could not tolerate and was switched to Namenda.  She also has length dependent mixed sensorimotor polyneuropathy and MRI also showed multilevel radiculopathy most pronounced at L3-L4 but she was not interested in any epidural injection and therapy was recommended.  Neuropathy is controlled with gabapentin but at times she wake up in  the middle of the night with burning tingling sensation.  She claims she takes Tylenol that seems to help     PROBLEM LIST   Patient Active Problem List   Diagnosis Code     Hypothyroidism E03.9     Major depressive disorder, recurrent episode, in full remission (H) F33.42     Idiopathic osteoporosis M81.8     Adrenal mass, left (H) E27.8     Lumbar stenosis with neurogenic claudication M48.062     Essential hypertension I10     Generalized osteoarthritis of multiple sites M15.9     Polyneuropathy, peripheral sensorimotor axonal G60.8     Allergic rhinitis J30.9     Tinnitus H93.19     Macular degeneration mild  H35.30     Breast implant rupture bilateral  T85.43XA     ACP (advance care planning) Z71.89     Actinic keratosis L57.0     Cyst of ovary N83.209     Hearing loss H91.90     Hyperlipidemia E78.5     Malignant neoplasm of skin of parts of face C44.309     Neurogenic bladder N31.9     Antalgic gait R26.89     Fatigue, unspecified type R53.83     Cervical spondylosis with myelopathy M47.12     Recurrent urinary tract infection N39.0     Mobility impaired Z74.09     Foot drop, right M21.371     Cognitive changes R41.89     Plantar fasciitis M72.2     Right thyroid nodule E04.1     Hoarse voice quality R49.0     Shortness of breath  R06.02     H/O Rt thalamic stroke Z86.73     Lip numbness R20.0     Labile hypertension R09.89     Snoring R06.83     Chronic constipation K59.09     Neurocognitive disorder R41.9         PAST MEDICAL & SURGICAL HISTORY     Past Medical History:   Patient  has a past medical history of Bowel obstruction (H) (1974), C1 cervical fracture (H) (10/12/2015), Cancer (H) (breast, 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, salivary gland), Cancer of skin, Carpal tunnel syndrome, Cervical spondylosis with myelopathy (4/25/2013), Chronic pain, Closed fracture of cervical vertebra (H) (10/21/2015), Closed fracture of lumbar vertebra (H) (1/21/2015), Cyst of spinal meninges, Dermatitis, Disorders of meninges, not elsewhere  classified, Gastric ulcer (1983), Hip fracture (H) (2007), History of adrenal adenoma (5/25/2021), History of appendectomy, History of fractured pelvis (1985), Hyponatremia, Hypothyroidism, Leg pain, bilateral, Major depression, Miscarriage (1962), Neck fracture (H) (09/10/2015), Nephrolithiasis, Osteoarthritis, Osteoporosis, Panic attack, Peripheral neuropathy, Reactive confusion, Renal insufficiency, Rib fracture (2008), Shingles, Status post lumbar laminectomy (11/7/2014), UTI (urinary tract infection), and Varicella (7/14/2004).    Surgical History:  She  has a past surgical history that includes Pr Arthroplasty Tibial Plateau (Left); Salivary gland surgery (12/05/1984); Cataract Extraction, Bilateral; Cholecystectomy (06/30/1983); other surgical history; Total Hip Arthroplasty (Bilateral); Ovarian Cyst Removal (1943); Abdomen surgery (06/30/1983); joint replacement; fracture surgery (1985); Cervical Laminoplasty (Bilateral, 5/2/2013); Mastectomy (Bilateral); Lumbar Laminectomy (N/A, 11/6/2014); QUIGLEY W/O FACETEC FORAMOT/[facility] 1/2 VRT SEG, LUMBAR; appendectomy (1943); tubal ligation (1962); Revise reconstructed breast (Bilateral, 11/11/1984); Endometrial Biopsy (09/09/1986); Dilation and curettage (09/19/1986); Combined Mammaplasty Revision W/ Abdominoplasty (07/1998); Root Canal (09/14/1998); Skin Cancer Excision (08/15/2003); Total Knee Arthroplasty (10/20/2005); and Tumor Excision (05/01/2013).     SOCIAL HISTORY     Reviewed, and she  reports that she has never smoked. She has never used smokeless tobacco. She reports that she does not drink alcohol and does not use drugs.     FAMILY HISTORY     Reviewed, and family history includes Alcoholism in her brother; Cancer in her mother; Dementia in her brother; Diabetes in her brother; Kidney Disease in her father.     ALLERGIES     Allergies   Allergen Reactions     Iodinated Contrast Media [Diagnostic X-Ray Materials] Hives     Adhesive [Mecrylate] Unknown      Adhesive tape     Asparaginase Unknown     Atorvastatin Unknown     PN: LW Reaction: arthalgia     Ciprofloxacin Unknown     Codeine Unknown     Lactose      Food, okay if in medicine.      Latex Unknown     PN: Converted from LW Latex Sensitivity Flag     Lisinopril Cough     Pravastatin Unknown     Propoxyphene N-Acetaminophen [Propoxyphene N-Apap] Unknown     PN: LW Reaction: hallucinations     Sulfa (Sulfonamide Antibiotics) [Sulfa Drugs] Unknown     Albuterol Unknown     Other reaction(s): Urinary Retention         REVIEW OF SYSTEMS     A 12 point review of system was performed and was negative except as outlined in the history of present illness.     HOME MEDICATIONS     Current Outpatient Rx   Medication Sig Dispense Refill     ACETAMINOPHEN EXTRA STRENGTH 500 MG tablet TAKE 2 TABS (1,000MG) BY MOUTH EVERY 6 HOURS 240 tablet 4     amLODIPine (NORVASC) 10 MG tablet Take 1 tablet (10 mg) by mouth At Bedtime For blood pressure 90 tablet 3     ASPIRIN LOW DOSE 81 MG EC tablet TAKE 1 TAB BY MOUTH ONCE DAILY 30 tablet 0     baclofen (LIORESAL) 10 MG tablet Take 1 tablet (10 mg) by mouth 2 times daily 180 tablet 3     buPROPion (WELLBUTRIN SR) 150 MG 12 hr tablet Take 1 tablet (150 mg) by mouth daily (Patient taking differently: Take 150 mg by mouth 2 times daily) 90 tablet 2     cloNIDine (CATAPRES) 0.2 MG tablet TAKE 1 TAB BY MOUTH AT BEDTIME. HOLD IF SBP LESS THAN 120/80. 90 tablet 0     diclofenac (VOLTAREN) 1 % topical gel Apply 4 g topically daily as needed 350 g 0     estradiol (ESTRACE) 0.1 MG/GM vaginal cream INSERT 1 GRAM VAGINALLY ON THR AND SUN 42.5 g 3     ferrous sulfate (FEROSUL) 325 (65 Fe) MG tablet Take 1 tablet (325 mg) by mouth daily (with breakfast) 90 tablet 1     FLUoxetine (PROZAC) 10 MG capsule TAKE 3 CAPS (30MG) BY MOUTHONCE DAILY 270 capsule 4     gabapentin (NEURONTIN) 100 MG capsule TAKE 1 CAP BY MOUTH TWICE ADAY IN THE MORNING AND EVENING; TAKE 2 CAPS (200MG) BY MOUTH IN THE AFTERNOON  "360 capsule 1     levothyroxine (SYNTHROID/LEVOTHROID) 75 MCG tablet Take 1 tablet (75 mcg) by mouth daily 90 tablet 4     losartan (COZAAR) 100 MG tablet Take 1 tablet (100 mg) by mouth At Bedtime 90 tablet 4     memantine (NAMENDA) 10 MG tablet TAKE 1 TAB BY MOUTH TWICE ADAY 60 tablet 8     METAMUCIL FIBER PO        spironolactone (ALDACTONE) 25 MG tablet Take 1 tablet (25 mg) by mouth daily 90 tablet 1         PHYSICAL EXAM     Vital signs  /58   Pulse 68   Ht 1.651 m (5' 5\")   Wt 64.9 kg (143 lb)   LMP  (LMP Unknown)   BMI 23.80 kg/m      Weight:   143 lbs 0 oz    Patient is alert and oriented x4 in no acute distress. Vital signs were reviewed and are documented in electronic medical record. Neck was supple, no carotid bruits, thyromegaly, JVD, or lymphadenopathy was noted.   NEUROLOGY EXAM:    Patient s speech was normal with no aphasia or dysarthria. Mentation, and affect were also normal.      Cranial nerves II -XII were intact except she is hard of hearing    Patient has decreased mass with normal tone strength in upper extremity 5/5 in the right lower extremity 4+/5 in the left lower extremity 5 -/5    Sensation was decreased to light touch pinprick below mid thighs    Reflexes were 1+ symmetrical in upper extremity absent in the lower extremity    minimal dysmetria noted on finger-nose testing    Gait testing was deferred as patient is wheelchair-bound     PERTINENT DIAGNOSTIC STUDIES     Following studies were reviewed:     CT HEAD & CERVICAL SPINE 5/20/2021  HEAD CT:  1.  No hemorrhage, mass, or mass effect.  2.  Ectasia of right supraclinoid ICA.  3.  Moderate presumed chronic small vessel ischemia.  4.  Moderate atrophy.     CERVICAL SPINE CT:  1.  No definite fracture.  2.  New moderate left paracentral disc protrusion at C3-C4.  3.  Underlying degenerative and postoperative changes, as described.     MRI BRAIN 9/16/2022  1.  Evolving subacute punctate right thalamocapsular infarct. No " acute intracranial hemorrhage or interval infarct.  2.  Slightly decreased size of evolving, now subacute left occipitoparietal subdural hematoma measuring 3 mm, previously 4 mm. No significant mass effect or new extra-axial fluid collection.    MRI BRAIN, MRA BRAIN 8/30/2022  HEAD MRI:  1.  6 mm acute or early subacute infarct in the right thalamus/internal capsule.  2.  No hemorrhage.  3.  Moderate burden of presumed chronic small vessel ischemic changes.  4.  Mild generalized atrophy.  5.  Chronic left mastoid effusion.     HEAD MRA:  1.  Examination is degraded by motion artifact, but there does appear to be mild to moderate narrowing of the M1 segment of the right middle cerebral artery. This appears progressed versus 2021.   2.  No proximal large vessel occlusion, aneurysm or AVM.     NECK MRA:  1.  No evidence for dissection or hemodynamically significant narrowing in the neck based on NASCET criteria.    MRI, MRA BRAIN 5/29/2021  HEAD MRI:   1.  No acute infarct, acute intracranial hemorrhage, or intracranial mass.  2.  Mild to moderate burden presumed sequela of chronic small vessel ischemic disease involving the cerebral white matter and aníbal.  3.  Complete opacification of the left mastoid air cells and middle ear cavity.     HEAD MRA:   1.  No aneurysm, high flow AVM or significant stenosis identified.     NECK MRA:  1.  No significant stenosis of the bilateral internal carotid arteries based on NASCET criteria.     MRI LUMBAR SPINE 5/29/2021  1.  Moderate to advanced multilevel lumbar spondylitic changes with evidence of prior laminectomies at L1-L2 through L5-S1. There is no significant spinal canal stenosis in the lumbar region.  2.  At L5-S1, there is moderate right and severe left neural foraminal stenosis.  3.  At L4-L5, there is severe right neural foraminal stenosis with deformity of the exiting right L4 nerve root.  4.  At L3-L4, there is a moderate circumferential disc bulge with severe  bilateral neural foraminal stenosis and right lateral recess narrowing.  5.  At L1-L2, there is severe bilateral neural foraminal stenosis.  6.  Moderate rightward convex curvature of the lumbar spine with the apex at L2.     MRI CERVICAL SPINE 10/16/2020  1.  Postsurgical changes C1 and C2 laminoplasty with hardware in the left and associated susceptibility. This is better appreciated on prior cervical spine CT 05/04/2016.  2.  Interbody fusion at C4/C5 with associated kyphosis, unchanged.  3.  Small broad bar disc osteophyte complexes contributing to mild spinal canal narrowing at C3/C4-C6/C7. No significant spinal canal narrowing at any other level within the cervical spine.  4.  Multilevel uncovertebral joint disease and facet arthropathy with mild multilevel neural foraminal narrowing at C3/C4-C5/C6 and T1/T2.     EMG 7/1/2021  This is an abnormal study.  1.  Reduced and absent sensory amplitudes in both lower extremities symmetrically.  These finding may represent either a length dependent large fiber relatively symmetric sensorimotor axonal polyneuropathy or alternatively can be seen as an age-related change for a person in this age group  2.  Superimposed chronic L4-S1 polyradiculopathy is suspected with ongoing denervation noted in extensor hallucis longus muscle which could be secondary to radiculopathy versus active polyneuropathy.  Correlation with lumbar spine imaging is recommended    30-DAY EVENT MONITOR 8/29/2022  Cardiac event monitoring from 9/1/2022 to 9/30/2022 (monitored duration 17d 2h 43m).  Baseline rhythm was sinus rhythm 71bpm.    Reported heart rate range 50 to 120bpm, average 74bpm.  No symptoms recorded.  Automated recordings included sinus rhythm, two isolated PACs.  No tachyarrhythmias.  No atrial fibrillation.  There were no pauses noted.  Supraventricular and ventricular ectopic beat frequency are not reported on this monitoring modality.    ECHOCARDIOGRAM 8/29/2022  1. Normal left  ventricular size and systolic performance with a visually  estimated ejection fraction of 60%.  2. There is mild concentric increase in left ventricular wall thickness.  3. No significant valvular heart disease is identified on this study.  4. Normal right ventricular size and systolic performance.  5. There is mild to moderate left atrial enlargement.     When compared to the prior real-time echocardiogram dated 25 March 2022, there  has been little appreciable interval change.     PERTINENT LABS  Following labs were reviewed:  Lab Requisition on 01/12/2023   Component Date Value     Color Urine 01/12/2023 Light Yellow      Appearance Urine 01/12/2023 Clear      Glucose Urine 01/12/2023 Negative      Bilirubin Urine 01/12/2023 Negative      Ketones Urine 01/12/2023 Negative      Specific Gravity Urine 01/12/2023 1.012      Blood Urine 01/12/2023 Negative      pH Urine 01/12/2023 5.5      Protein Albumin Urine 01/12/2023 Negative      Urobilinogen Urine 01/12/2023 Normal      Nitrite Urine 01/12/2023 Positive (A)      Leukocyte Esterase Urine 01/12/2023 Small (A)      Bacteria Urine 01/12/2023 Few (A)      RBC Urine 01/12/2023 1      WBC Urine 01/12/2023 20 (H)      Squamous Epithelials Uri* 01/12/2023 <1      Transitional Epithelials* 01/12/2023 <1      Culture 01/12/2023 >100,000 CFU/mL Klebsiella aerogenes (A)    Lab Requisition on 12/21/2022   Component Date Value     Sodium 12/21/2022 137      Potassium 12/21/2022 4.2      Chloride 12/21/2022 101      Carbon Dioxide (CO2) 12/21/2022 24      Anion Gap 12/21/2022 12      Urea Nitrogen 12/21/2022 17.9      Creatinine 12/21/2022 0.58      Calcium 12/21/2022 9.4      Glucose 12/21/2022 90      Alkaline Phosphatase 12/21/2022 84      AST 12/21/2022 32      ALT 12/21/2022 31      Protein Total 12/21/2022 6.4      Albumin 12/21/2022 3.8      Bilirubin Total 12/21/2022 0.3      GFR Estimate 12/21/2022 85    Lab Requisition on 12/08/2022   Component Date Value     Color  Urine 12/08/2022 Yellow      Appearance Urine 12/08/2022 Slightly Cloudy (A)      Glucose Urine 12/08/2022 Negative      Bilirubin Urine 12/08/2022 Negative      Ketones Urine 12/08/2022 Trace (A)      Specific Gravity Urine 12/08/2022 1.026      Blood Urine 12/08/2022 Negative      pH Urine 12/08/2022 5.5      Protein Albumin Urine 12/08/2022 20 (A)      Urobilinogen Urine 12/08/2022 Normal      Nitrite Urine 12/08/2022 Positive (A)      Leukocyte Esterase Urine 12/08/2022 Small (A)      Bacteria Urine 12/08/2022 Few (A)      Mucus Urine 12/08/2022 Present (A)      Calcium Oxalate Crystals* 12/08/2022 Moderate (A)      RBC Urine 12/08/2022 1      WBC Urine 12/08/2022 10 (H)      Squamous Epithelials Uri* 12/08/2022 <1      Culture 12/08/2022 50,000-100,000 CFU/mL Klebsiella aerogenes (A)          Total time spent for face to face visit, reviewing labs/imaging studies, counseling and coordination of care was: 45 Minutes spent on the date of the encounter doing chart review, review of outside records, review of test results, interpretation of tests, patient visit, documentation and discussion with family       This note was dictated using voice recognition software.  Any grammatical or context distortions are unintentional and inherent to the software.    No orders of the defined types were placed in this encounter.     New Prescriptions    No medications on file     Modified Medications    No medications on file

## 2023-02-14 ENCOUNTER — OFFICE VISIT (OUTPATIENT)
Dept: NEUROLOGY | Facility: CLINIC | Age: 88
End: 2023-02-14
Attending: PHYSICIAN ASSISTANT
Payer: COMMERCIAL

## 2023-02-14 VITALS
HEIGHT: 65 IN | WEIGHT: 143 LBS | SYSTOLIC BLOOD PRESSURE: 124 MMHG | BODY MASS INDEX: 23.82 KG/M2 | HEART RATE: 68 BPM | DIASTOLIC BLOOD PRESSURE: 58 MMHG

## 2023-02-14 DIAGNOSIS — G60.8 POLYNEUROPATHY, PERIPHERAL SENSORIMOTOR AXONAL: Primary | ICD-10-CM

## 2023-02-14 DIAGNOSIS — I63.81 THALAMIC INFARCT, ACUTE (H): ICD-10-CM

## 2023-02-14 DIAGNOSIS — Z86.73 HISTORY OF STROKE: ICD-10-CM

## 2023-02-14 DIAGNOSIS — R41.9 NEUROCOGNITIVE DISORDER: ICD-10-CM

## 2023-02-14 DIAGNOSIS — M25.511 ACUTE PAIN OF RIGHT SHOULDER: ICD-10-CM

## 2023-02-14 PROCEDURE — 99215 OFFICE O/P EST HI 40 MIN: CPT | Performed by: PSYCHIATRY & NEUROLOGY

## 2023-02-14 RX ORDER — GABAPENTIN 100 MG/1
CAPSULE ORAL
Qty: 450 CAPSULE | Refills: 3 | Status: SHIPPED | OUTPATIENT
Start: 2023-02-14

## 2023-02-14 RX ORDER — ROSUVASTATIN CALCIUM 5 MG/1
5 TABLET, COATED ORAL DAILY
Qty: 30 TABLET | Refills: 11 | Status: SHIPPED | OUTPATIENT
Start: 2023-02-14

## 2023-02-14 RX ORDER — LANOLIN ALCOHOL/MO/W.PET/CERES
3 CREAM (GRAM) TOPICAL
Qty: 30 TABLET | Refills: 11 | Status: SHIPPED | OUTPATIENT
Start: 2023-02-14

## 2023-02-14 NOTE — NURSING NOTE
Chief Complaint   Patient presents with     neurocognitive disorder     Follow up     Lorin Gomez on 2/14/2023 at 9:27 AM

## 2023-02-14 NOTE — LETTER
2023         RE: Ellis Em  [address] [place] [address]   [phone]  [facility] [address]        Dear Colleague,    Thank you for referring your patient, Ellis Em, to the [facility] [place]. Please see a copy of my visit note below.    NEUROLOGY FOLLOW UP VISIT  NOTE       [facility] [place]  [address] [place] [address]  Tel: [phone]  Fax: [phone]  [email or web address]     Ellis Em,  1931, MRN [id number]  PCP: Deysi Angulo  Date: 2023      ASSESSMENT & PLAN     Visit Diagnosis  1.  Polyneuropathy, peripheral sensorimotor axonal  2. History Rt thalamic stroke  3. Neurocognitive disorder     H/O right thalamic infarct  89-year-old female with history of HTN, HLD, osteoporosis, cervical stenosis s/p C1-C2 laminoplasty, sensorimotor polyneuropathy and neurocognitive decline who was admitted to the hospital with right thalamic infarct and returns for follow-up.  She continues to have left-sided numbness.  I have recommended:    1.  Continue aspirin 81 mg daily  2.  Her LDL is 117 and although statin is listed as allergy she is not sure if she had true allergy.  I am giving her a trial of Crestor 5 mg daily with a goal of LDL between 40-70.  If she develops any myalgia we will discontinue Crestor  3.  Her blood pressure is running somewhat higher and I would recommend keeping it to 120-130/80  4.  Continue physical therapy  5.  Follow-up will be in 6    Sensorimotor polyneuropathy  Patient developed sudden decline in her ability to walk with increased weakness in the right leg.  MRI lumbar spine showed multilevel radiculopathy most pronounced at the right side L3-L4.  EMG suggested length dependent mixed sensorimotor polyneuropathy.  Due to her age she was not interested in epidural injection or any surgical intervention.  Currently she is on gabapentin and wakes up in the middle of the night with worsening  symptoms.  I have recommended:    1.  Increase gabapentin to 200 mg in the morning, 100 mg at noon and 200 at bedtime  2.  She is struggling with insomnia and I have also added melatonin 3 mg at bedtime    Neurocognitive disorder  She was initially evaluated in July 2021 for sudden decline in her cognition. MRI of the brain shows small vessel ischemic disease and she scored 20/30 on MoCA but subsequently in July 2022 scored 29/30.  She was initially started on Aricept that she could not tolerate and subsequently was switched to Namenda.    Thank you again for this referral, please feel free to contact me if you have any questions.    Yoni Fam MD  [facility]  (Formerly, [facility]     HISTORY OF PRESENT ILLNESS     Patient is a 89-year-old female with history of HTN, HLD, osteoporosis, cervical stenosis s/p C1-C2 laminoplasty, sensorimotor polyneuropathy and neurocognitive decline last seen on 7/22/2022 who returns for follow-up.  Since her last visit she was admitted to the hospital in August 2022 for right thalamic infarct.  She had recurrent symptoms and was seen again in September that included MRI brain that showed evolving stroke.  She was seen by the nurse practitioner in October due to worsening headache and reported inability to participate in physical therapy.  Patient was continued on aspirin but was not started on statin as she has allergies.  Her LDL checked on 8/30/2022 was 117.  Although atorvastatin is listed as allergy patient is not sure what side effects she had.  She continues to struggle with her blood pressure and is somewhat frustrated as she has different physicians but recently transferred her care to [facility] and is hopeful she will have adequate control of her blood pressure.    Prior to that CVA patient was started on Aricept for neurocognitive decline but could not tolerate and was switched to Namenda.  She also  has length dependent mixed sensorimotor polyneuropathy and MRI also showed multilevel radiculopathy most pronounced at L3-L4 but she was not interested in any epidural injection and therapy was recommended.  Neuropathy is controlled with gabapentin but at times she wake up in the middle of the night with burning tingling sensation.  She claims she takes Tylenol that seems to help     PROBLEM LIST   Patient Active Problem List   Diagnosis Code     Hypothyroidism E03.9     Major depressive disorder, recurrent episode, in full remission (H) F33.42     Idiopathic osteoporosis M81.8     Adrenal mass, left (H) E27.8     Lumbar stenosis with neurogenic claudication M48.062     Essential hypertension I10     Generalized osteoarthritis of multiple sites M15.9     Polyneuropathy, peripheral sensorimotor axonal G60.8     Allergic rhinitis J30.9     Tinnitus H93.19     Macular degeneration mild  H35.30     Breast implant rupture bilateral  T85.43XA     ACP (advance care planning) Z71.89     Actinic keratosis L57.0     Cyst of ovary N83.209     Hearing loss H91.90     Hyperlipidemia E78.5     Malignant neoplasm of skin of parts of face C44.309     Neurogenic bladder N31.9     Antalgic gait R26.89     Fatigue, unspecified type R53.83     Cervical spondylosis with myelopathy M47.12     Recurrent urinary tract infection N39.0     Mobility impaired Z74.09     Foot drop, right M21.371     Cognitive changes R41.89     Plantar fasciitis M72.2     Right thyroid nodule E04.1     Hoarse voice quality R49.0     Shortness of breath  R06.02     H/O Rt thalamic stroke Z86.73     Lip numbness R20.0     Labile hypertension R09.89     Snoring R06.83     Chronic constipation K59.09     Neurocognitive disorder R41.9         PAST MEDICAL & SURGICAL HISTORY     Past Medical History:   Patient  has a past medical history of Bowel obstruction (H) (1974), C1 cervical fracture (H) (10/12/2015), Cancer (H) (breast, 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, salivary gland), Cancer  of skin, Carpal tunnel syndrome, Cervical spondylosis with myelopathy (4/25/2013), Chronic pain, Closed fracture of cervical vertebra (H) (10/21/2015), Closed fracture of lumbar vertebra (H) (1/21/2015), Cyst of spinal meninges, Dermatitis, Disorders of meninges, not elsewhere classified, Gastric ulcer (1983), Hip fracture (H) (2007), History of adrenal adenoma (5/25/2021), History of appendectomy, History of fractured pelvis (1985), Hyponatremia, Hypothyroidism, Leg pain, bilateral, Major depression, Miscarriage (1962), Neck fracture (H) (09/10/2015), Nephrolithiasis, Osteoarthritis, Osteoporosis, Panic attack, Peripheral neuropathy, Reactive confusion, Renal insufficiency, Rib fracture (2008), Shingles, Status post lumbar laminectomy (11/7/2014), UTI (urinary tract infection), and Varicella (7/14/2004).    Surgical History:  She  has a past surgical history that includes Pr Arthroplasty Tibial Plateau (Left); Salivary gland surgery (12/05/1984); Cataract Extraction, Bilateral; Cholecystectomy (06/30/1983); other surgical history; Total Hip Arthroplasty (Bilateral); Ovarian Cyst Removal (1943); Abdomen surgery (06/30/1983); joint replacement; fracture surgery (1985); Cervical Laminoplasty (Bilateral, 5/2/2013); Mastectomy (Bilateral); Lumbar Laminectomy (N/A, 11/6/2014); QUIGLEY W/O FACETEC FORAMOT/[facility]KC 1/2 VRT SEG, LUMBAR; appendectomy (1943); tubal ligation (1962); Revise reconstructed breast (Bilateral, 11/11/1984); Endometrial Biopsy (09/09/1986); Dilation and curettage (09/19/1986); Combined Mammaplasty Revision W/ Abdominoplasty (07/1998); Root Canal (09/14/1998); Skin Cancer Excision (08/15/2003); Total Knee Arthroplasty (10/20/2005); and Tumor Excision (05/01/2013).     SOCIAL HISTORY     Reviewed, and she  reports that she has never smoked. She has never used smokeless tobacco. She reports that she does not drink alcohol and does not use drugs.     FAMILY HISTORY     Reviewed, and family history includes  Alcoholism in her brother; Cancer in her mother; Dementia in her brother; Diabetes in her brother; Kidney Disease in her father.     ALLERGIES     Allergies   Allergen Reactions     Iodinated Contrast Media [Diagnostic X-Ray Materials] Hives     Adhesive [Mecrylate] Unknown     Adhesive tape     Asparaginase Unknown     Atorvastatin Unknown     PN: LW Reaction: arthalgia     Ciprofloxacin Unknown     Codeine Unknown     Lactose      Food, okay if in medicine.      Latex Unknown     PN: Converted from LW Latex Sensitivity Flag     Lisinopril Cough     Pravastatin Unknown     Propoxyphene N-Acetaminophen [Propoxyphene N-Apap] Unknown     PN: LW Reaction: hallucinations     Sulfa (Sulfonamide Antibiotics) [Sulfa Drugs] Unknown     Albuterol Unknown     Other reaction(s): Urinary Retention         REVIEW OF SYSTEMS     A 12 point review of system was performed and was negative except as outlined in the history of present illness.     HOME MEDICATIONS     Current Outpatient Rx   Medication Sig Dispense Refill     ACETAMINOPHEN EXTRA STRENGTH 500 MG tablet TAKE 2 TABS (1,000MG) BY MOUTH EVERY 6 HOURS 240 tablet 4     amLODIPine (NORVASC) 10 MG tablet Take 1 tablet (10 mg) by mouth At Bedtime For blood pressure 90 tablet 3     ASPIRIN LOW DOSE 81 MG EC tablet TAKE 1 TAB BY MOUTH ONCE DAILY 30 tablet 0     baclofen (LIORESAL) 10 MG tablet Take 1 tablet (10 mg) by mouth 2 times daily 180 tablet 3     buPROPion (WELLBUTRIN SR) 150 MG 12 hr tablet Take 1 tablet (150 mg) by mouth daily (Patient taking differently: Take 150 mg by mouth 2 times daily) 90 tablet 2     cloNIDine (CATAPRES) 0.2 MG tablet TAKE 1 TAB BY MOUTH AT BEDTIME. HOLD IF SBP LESS THAN 120/80. 90 tablet 0     diclofenac (VOLTAREN) 1 % topical gel Apply 4 g topically daily as needed 350 g 0     estradiol (ESTRACE) 0.1 MG/GM vaginal cream INSERT 1 GRAM VAGINALLY ON THR AND SUN 42.5 g 3     ferrous sulfate (FEROSUL) 325 (65 Fe) MG tablet Take 1 tablet (325 mg) by  "mouth daily (with breakfast) 90 tablet 1     FLUoxetine (PROZAC) 10 MG capsule TAKE 3 CAPS (30MG) BY MOUTHONCE DAILY 270 capsule 4     gabapentin (NEURONTIN) 100 MG capsule TAKE 1 CAP BY MOUTH TWICE ADAY IN THE MORNING AND EVENING; TAKE 2 CAPS (200MG) BY MOUTH IN THE AFTERNOON 360 capsule 1     levothyroxine (SYNTHROID/LEVOTHROID) 75 MCG tablet Take 1 tablet (75 mcg) by mouth daily 90 tablet 4     losartan (COZAAR) 100 MG tablet Take 1 tablet (100 mg) by mouth At Bedtime 90 tablet 4     memantine (NAMENDA) 10 MG tablet TAKE 1 TAB BY MOUTH TWICE ADAY 60 tablet 8     METAMUCIL FIBER PO        spironolactone (ALDACTONE) 25 MG tablet Take 1 tablet (25 mg) by mouth daily 90 tablet 1         PHYSICAL EXAM     Vital signs  /58   Pulse 68   Ht 1.651 m (5' 5\")   Wt 64.9 kg (143 lb)   LMP  (LMP Unknown)   BMI 23.80 kg/m      Weight:   143 lbs 0 oz    Patient is alert and oriented x4 in no acute distress. Vital signs were reviewed and are documented in electronic medical record. Neck was supple, no carotid bruits, thyromegaly, JVD, or lymphadenopathy was noted.   NEUROLOGY EXAM:    Patient s speech was normal with no aphasia or dysarthria. Mentation, and affect were also normal.      Cranial nerves II -XII were intact except she is hard of hearing    Patient has decreased mass with normal tone strength in upper extremity 5/5 in the right lower extremity 4+/5 in the left lower extremity 5 -/5    Sensation was decreased to light touch pinprick below mid thighs    Reflexes were 1+ symmetrical in upper extremity absent in the lower extremity    minimal dysmetria noted on finger-nose testing    Gait testing was deferred as patient is wheelchair-bound     PERTINENT DIAGNOSTIC STUDIES     Following studies were reviewed:     CT HEAD & CERVICAL SPINE 5/20/2021  HEAD CT:  1.  No hemorrhage, mass, or mass effect.  2.  Ectasia of right supraclinoid ICA.  3.  Moderate presumed chronic small vessel ischemia.  4.  Moderate " atrophy.     CERVICAL SPINE CT:  1.  No definite fracture.  2.  New moderate left paracentral disc protrusion at C3-C4.  3.  Underlying degenerative and postoperative changes, as described.     MRI BRAIN 9/16/2022  1.  Evolving subacute punctate right thalamocapsular infarct. No acute intracranial hemorrhage or interval infarct.  2.  Slightly decreased size of evolving, now subacute left occipitoparietal subdural hematoma measuring 3 mm, previously 4 mm. No significant mass effect or new extra-axial fluid collection.    MRI BRAIN, MRA BRAIN 8/30/2022  HEAD MRI:  1.  6 mm acute or early subacute infarct in the right thalamus/internal capsule.  2.  No hemorrhage.  3.  Moderate burden of presumed chronic small vessel ischemic changes.  4.  Mild generalized atrophy.  5.  Chronic left mastoid effusion.     HEAD MRA:  1.  Examination is degraded by motion artifact, but there does appear to be mild to moderate narrowing of the M1 segment of the right middle cerebral artery. This appears progressed versus 2021.   2.  No proximal large vessel occlusion, aneurysm or AVM.     NECK MRA:  1.  No evidence for dissection or hemodynamically significant narrowing in the neck based on NASCET criteria.    MRI, MRA BRAIN 5/29/2021  HEAD MRI:   1.  No acute infarct, acute intracranial hemorrhage, or intracranial mass.  2.  Mild to moderate burden presumed sequela of chronic small vessel ischemic disease involving the cerebral white matter and aníbal.  3.  Complete opacification of the left mastoid air cells and middle ear cavity.     HEAD MRA:   1.  No aneurysm, high flow AVM or significant stenosis identified.     NECK MRA:  1.  No significant stenosis of the bilateral internal carotid arteries based on NASCET criteria.     MRI LUMBAR SPINE 5/29/2021  1.  Moderate to advanced multilevel lumbar spondylitic changes with evidence of prior laminectomies at L1-L2 through L5-S1. There is no significant spinal canal stenosis in the lumbar  region.  2.  At L5-S1, there is moderate right and severe left neural foraminal stenosis.  3.  At L4-L5, there is severe right neural foraminal stenosis with deformity of the exiting right L4 nerve root.  4.  At L3-L4, there is a moderate circumferential disc bulge with severe bilateral neural foraminal stenosis and right lateral recess narrowing.  5.  At L1-L2, there is severe bilateral neural foraminal stenosis.  6.  Moderate rightward convex curvature of the lumbar spine with the apex at L2.     MRI CERVICAL SPINE 10/16/2020  1.  Postsurgical changes C1 and C2 laminoplasty with hardware in the left and associated susceptibility. This is better appreciated on prior cervical spine CT 05/04/2016.  2.  Interbody fusion at C4/C5 with associated kyphosis, unchanged.  3.  Small broad bar disc osteophyte complexes contributing to mild spinal canal narrowing at C3/C4-C6/C7. No significant spinal canal narrowing at any other level within the cervical spine.  4.  Multilevel uncovertebral joint disease and facet arthropathy with mild multilevel neural foraminal narrowing at C3/C4-C5/C6 and T1/T2.     EMG 7/1/2021  This is an abnormal study.  1.  Reduced and absent sensory amplitudes in both lower extremities symmetrically.  These finding may represent either a length dependent large fiber relatively symmetric sensorimotor axonal polyneuropathy or alternatively can be seen as an age-related change for a person in this age group  2.  Superimposed chronic L4-S1 polyradiculopathy is suspected with ongoing denervation noted in extensor hallucis longus muscle which could be secondary to radiculopathy versus active polyneuropathy.  Correlation with lumbar spine imaging is recommended    30-DAY EVENT MONITOR 8/29/2022  Cardiac event monitoring from 9/1/2022 to 9/30/2022 (monitored duration 17d 2h 43m).  Baseline rhythm was sinus rhythm 71bpm.    Reported heart rate range 50 to 120bpm, average 74bpm.  No symptoms recorded.  Automated  recordings included sinus rhythm, two isolated PACs.  No tachyarrhythmias.  No atrial fibrillation.  There were no pauses noted.  Supraventricular and ventricular ectopic beat frequency are not reported on this monitoring modality.    ECHOCARDIOGRAM 8/29/2022  1. Normal left ventricular size and systolic performance with a visually  estimated ejection fraction of 60%.  2. There is mild concentric increase in left ventricular wall thickness.  3. No significant valvular heart disease is identified on this study.  4. Normal right ventricular size and systolic performance.  5. There is mild to moderate left atrial enlargement.     When compared to the prior real-time echocardiogram dated 25 March 2022, there  has been little appreciable interval change.     PERTINENT LABS  Following labs were reviewed:  Lab Requisition on 01/12/2023   Component Date Value     Color Urine 01/12/2023 Light Yellow      Appearance Urine 01/12/2023 Clear      Glucose Urine 01/12/2023 Negative      Bilirubin Urine 01/12/2023 Negative      Ketones Urine 01/12/2023 Negative      Specific Gravity Urine 01/12/2023 1.012      Blood Urine 01/12/2023 Negative      pH Urine 01/12/2023 5.5      Protein Albumin Urine 01/12/2023 Negative      Urobilinogen Urine 01/12/2023 Normal      Nitrite Urine 01/12/2023 Positive (A)      Leukocyte Esterase Urine 01/12/2023 Small (A)      Bacteria Urine 01/12/2023 Few (A)      RBC Urine 01/12/2023 1      WBC Urine 01/12/2023 20 (H)      Squamous Epithelials Uri* 01/12/2023 <1      Transitional Epithelials* 01/12/2023 <1      Culture 01/12/2023 >100,000 CFU/mL Klebsiella aerogenes (A)    Lab Requisition on 12/21/2022   Component Date Value     Sodium 12/21/2022 137      Potassium 12/21/2022 4.2      Chloride 12/21/2022 101      Carbon Dioxide (CO2) 12/21/2022 24      Anion Gap 12/21/2022 12      Urea Nitrogen 12/21/2022 17.9      Creatinine 12/21/2022 0.58      Calcium 12/21/2022 9.4      Glucose 12/21/2022 90       Alkaline Phosphatase 12/21/2022 84      AST 12/21/2022 32      ALT 12/21/2022 31      Protein Total 12/21/2022 6.4      Albumin 12/21/2022 3.8      Bilirubin Total 12/21/2022 0.3      GFR Estimate 12/21/2022 85    Lab Requisition on 12/08/2022   Component Date Value     Color Urine 12/08/2022 Yellow      Appearance Urine 12/08/2022 Slightly Cloudy (A)      Glucose Urine 12/08/2022 Negative      Bilirubin Urine 12/08/2022 Negative      Ketones Urine 12/08/2022 Trace (A)      Specific Gravity Urine 12/08/2022 1.026      Blood Urine 12/08/2022 Negative      pH Urine 12/08/2022 5.5      Protein Albumin Urine 12/08/2022 20 (A)      Urobilinogen Urine 12/08/2022 Normal      Nitrite Urine 12/08/2022 Positive (A)      Leukocyte Esterase Urine 12/08/2022 Small (A)      Bacteria Urine 12/08/2022 Few (A)      Mucus Urine 12/08/2022 Present (A)      Calcium Oxalate Crystals* 12/08/2022 Moderate (A)      RBC Urine 12/08/2022 1      WBC Urine 12/08/2022 10 (H)      Squamous Epithelials Uri* 12/08/2022 <1      Culture 12/08/2022 50,000-100,000 CFU/mL Klebsiella aerogenes (A)          Total time spent for face to face visit, reviewing labs/imaging studies, counseling and coordination of care was: 45 Minutes spent on the date of the encounter doing chart review, review of outside records, review of test results, interpretation of tests, patient visit, documentation and discussion with family       This note was dictated using voice recognition software.  Any grammatical or context distortions are unintentional and inherent to the software.    No orders of the defined types were placed in this encounter.     New Prescriptions    No medications on file     Modified Medications    No medications on file                     Again, thank you for allowing me to participate in the care of your patient.        Sincerely,        Yoni Fam MD

## 2023-02-17 ENCOUNTER — MYC MEDICAL ADVICE (OUTPATIENT)
Dept: NEUROLOGY | Facility: CLINIC | Age: 88
End: 2023-02-17
Payer: COMMERCIAL

## 2023-02-17 DIAGNOSIS — F33.42 MAJOR DEPRESSIVE DISORDER, RECURRENT EPISODE, IN FULL REMISSION (H): Primary | ICD-10-CM

## 2023-02-27 ENCOUNTER — LAB REQUISITION (OUTPATIENT)
Dept: LAB | Facility: CLINIC | Age: 88
End: 2023-02-27
Payer: COMMERCIAL

## 2023-02-27 DIAGNOSIS — E03.9 HYPOTHYROIDISM, UNSPECIFIED: ICD-10-CM

## 2023-02-27 DIAGNOSIS — I10 ESSENTIAL (PRIMARY) HYPERTENSION: ICD-10-CM

## 2023-02-27 DIAGNOSIS — D50.9 IRON DEFICIENCY ANEMIA, UNSPECIFIED: ICD-10-CM

## 2023-02-28 LAB
ALBUMIN SERPL BCG-MCNC: 3.6 G/DL (ref 3.5–5.2)
ALP SERPL-CCNC: 66 U/L (ref 35–104)
ALT SERPL W P-5'-P-CCNC: 45 U/L (ref 10–35)
ANION GAP SERPL CALCULATED.3IONS-SCNC: 11 MMOL/L (ref 7–15)
AST SERPL W P-5'-P-CCNC: 35 U/L (ref 10–35)
BILIRUB SERPL-MCNC: 0.2 MG/DL
BUN SERPL-MCNC: 26.3 MG/DL (ref 8–23)
CALCIUM SERPL-MCNC: 8.5 MG/DL (ref 8.2–9.6)
CHLORIDE SERPL-SCNC: 104 MMOL/L (ref 98–107)
CREAT SERPL-MCNC: 0.69 MG/DL (ref 0.51–0.95)
DEPRECATED HCO3 PLAS-SCNC: 22 MMOL/L (ref 22–29)
FERRITIN SERPL-MCNC: 104 NG/ML (ref 11–328)
GFR SERPL CREATININE-BSD FRML MDRD: 81 ML/MIN/1.73M2
GLUCOSE SERPL-MCNC: 73 MG/DL (ref 70–99)
HGB BLD-MCNC: 11.4 G/DL (ref 11.7–15.7)
POTASSIUM SERPL-SCNC: 4.6 MMOL/L (ref 3.4–5.3)
PROT SERPL-MCNC: 5.7 G/DL (ref 6.4–8.3)
SODIUM SERPL-SCNC: 137 MMOL/L (ref 136–145)
TSH SERPL DL<=0.005 MIU/L-ACNC: 2.19 UIU/ML (ref 0.3–4.2)

## 2023-02-28 PROCEDURE — P9604 ONE-WAY ALLOW PRORATED TRIP: HCPCS | Mod: ORL | Performed by: FAMILY MEDICINE

## 2023-02-28 PROCEDURE — 84443 ASSAY THYROID STIM HORMONE: CPT | Mod: ORL

## 2023-02-28 PROCEDURE — 36415 COLL VENOUS BLD VENIPUNCTURE: CPT | Mod: ORL

## 2023-02-28 PROCEDURE — 80053 COMPREHEN METABOLIC PANEL: CPT | Mod: ORL

## 2023-02-28 PROCEDURE — 84443 ASSAY THYROID STIM HORMONE: CPT | Mod: ORL | Performed by: FAMILY MEDICINE

## 2023-02-28 PROCEDURE — P9604 ONE-WAY ALLOW PRORATED TRIP: HCPCS | Mod: ORL

## 2023-02-28 PROCEDURE — 82728 ASSAY OF FERRITIN: CPT | Mod: ORL | Performed by: FAMILY MEDICINE

## 2023-02-28 PROCEDURE — 80053 COMPREHEN METABOLIC PANEL: CPT | Mod: ORL | Performed by: FAMILY MEDICINE

## 2023-02-28 PROCEDURE — 36415 COLL VENOUS BLD VENIPUNCTURE: CPT | Mod: ORL | Performed by: FAMILY MEDICINE

## 2023-02-28 PROCEDURE — 85018 HEMOGLOBIN: CPT | Mod: ORL | Performed by: FAMILY MEDICINE

## 2023-02-28 PROCEDURE — 85018 HEMOGLOBIN: CPT | Mod: ORL

## 2023-02-28 PROCEDURE — 82728 ASSAY OF FERRITIN: CPT | Mod: ORL

## 2023-03-03 ENCOUNTER — MEDICAL CORRESPONDENCE (OUTPATIENT)
Dept: HEALTH INFORMATION MANAGEMENT | Facility: CLINIC | Age: 88
End: 2023-03-03

## 2023-03-06 DIAGNOSIS — R09.89 LABILE HYPERTENSION: ICD-10-CM

## 2023-03-07 PROCEDURE — 87086 URINE CULTURE/COLONY COUNT: CPT | Mod: ORL | Performed by: FAMILY MEDICINE

## 2023-03-07 PROCEDURE — 81001 URINALYSIS AUTO W/SCOPE: CPT | Mod: ORL | Performed by: FAMILY MEDICINE

## 2023-03-08 NOTE — TELEPHONE ENCOUNTER
Patient daughter states that she is now following with a provider at [facility]. She has already met with them once. Patient daughter states that they changed a lot of patient's medication, so she is unsure what patient is taking currently. She will let the pharmacy know of the change in provider also.     Please disregard refill request.

## 2023-03-08 NOTE — TELEPHONE ENCOUNTER
Please contact patient's daughter and see if the patient is following with clinic still or if they decided to go with [facility],  Medication refill requested for clonidine but only once daily-question why this is not being done twice daily as this could be leading to labile blood pressure control.

## 2023-03-08 NOTE — TELEPHONE ENCOUNTER
"Former patient of Dr. Velasquez & has not established care with another provider.  Please assign refill request to covering provider per clinic standard process.      Routing refill request to provider for review/approval because:  Blood pressure failed    Last Written Prescription Date:  12/21/2022  Last Fill Quantity: 90,  # refills: 0   Last office visit provider:  10/10/2022     Requested Prescriptions   Pending Prescriptions Disp Refills     cloNIDine (CATAPRES) 0.2 MG tablet [Pharmacy Med Name: CLONIDINE 0.2MG TABLET] 90 tablet 0     Sig: TAKE 1 TAB BY MOUTH AT BEDTIME. HOLD IF SBP LESS THAN 120/80.       Central Acting Antiadrenergic Agents Passed - 3/8/2023 12:13 PM        Passed - Blood pressure under 140/90 in past 12 months     BP Readings from Last 3 Encounters:   02/14/23 124/58   01/19/23 (!) 170/81   10/10/22 (!) 143/74                 Passed - Patient is 6 years of age or older        Passed - Recent (12 mo) or future (30 days) visit within the authorizing provider's specialty     Patient has had an office visit with the authorizing provider or a provider within the authorizing providers department within the previous 12 mos or has a future within next 30 days. See \"Patient Info\" tab in inbasket, or \"Choose Columns\" in Meds & Orders section of the refill encounter.              Passed - Medication is active on med list        Passed - Patient not pregnant        Passed - Normal serum creatinine on file within past 12 months     Recent Labs   Lab Test 02/28/23  1105   CR 0.69       Ok to refill medication if creatinine is low          Passed - No positive pregnancy test on file in past 12 months       Antiadrenergic Antihypertensives Passed - 3/8/2023 12:13 PM        Passed - Blood pressure less than 140/90 in past 6 months     BP Readings from Last 3 Encounters:   02/14/23 124/58   01/19/23 (!) 170/81   10/10/22 (!) 143/74                 Passed - Medication is active on med list        Passed - Patient " "is age 18 or older        Passed - No active pregnancy on record        Passed - Normal serum creatinine on file in past 12 months     Recent Labs   Lab Test 02/28/23  1105   CR 0.69       Ok to refill medication if creatinine is low          Passed - No positive pregnancy test within past 12 months        Passed - Recent (6 mo) or future (30 days) visit within the authorizing provider's specialty     Patient had office visit in the last 6 months or has a visit in the next 30 days with authorizing provider or within the authorizing provider's specialty.  See \"Patient Info\" tab in inbasket, or \"Choose Columns\" in Meds & Orders section of the refill encounter.                 Conchita Morin RN 03/08/23 12:13 PM  "

## 2023-03-09 DIAGNOSIS — R09.89 LABILE HYPERTENSION: ICD-10-CM

## 2023-03-09 RX ORDER — CLONIDINE HYDROCHLORIDE 0.2 MG/1
TABLET ORAL
Qty: 90 TABLET | Refills: 0 | OUTPATIENT
Start: 2023-03-09

## 2023-03-10 RX ORDER — CLONIDINE HYDROCHLORIDE 0.2 MG/1
TABLET ORAL
Qty: 90 TABLET | Refills: 0 | OUTPATIENT
Start: 2023-03-10

## 2023-03-12 DIAGNOSIS — R09.89 LABILE HYPERTENSION: ICD-10-CM

## 2023-03-12 RX ORDER — CLONIDINE HYDROCHLORIDE 0.2 MG/1
TABLET ORAL
Qty: 90 TABLET | Refills: 3 | Status: SHIPPED | OUTPATIENT
Start: 2023-03-12

## 2023-03-12 NOTE — TELEPHONE ENCOUNTER
"Last Written Prescription Date:  12/21/22  Last Fill Quantity: 90,  # refills: 0   Last office visit provider:  1/19/23     Requested Prescriptions   Pending Prescriptions Disp Refills     cloNIDine (CATAPRES) 0.2 MG tablet [Pharmacy Med Name: CLONIDINE 0.2MG TABLET] 90 tablet 0     Sig: TAKE 1 TAB BY MOUTH AT BEDTIME. HOLD IF SBP LESS THAN 120/80.       Central Acting Antiadrenergic Agents Passed - 3/12/2023 11:55 AM        Passed - Blood pressure under 140/90 in past 12 months     BP Readings from Last 3 Encounters:   02/14/23 124/58   01/19/23 (!) 170/81   10/10/22 (!) 143/74                 Passed - Patient is 6 years of age or older        Passed - Recent (12 mo) or future (30 days) visit within the authorizing provider's specialty     Patient has had an office visit with the authorizing provider or a provider within the authorizing providers department within the previous 12 mos or has a future within next 30 days. See \"Patient Info\" tab in inbasket, or \"Choose Columns\" in Meds & Orders section of the refill encounter.              Passed - Medication is active on med list        Passed - Patient not pregnant        Passed - Normal serum creatinine on file within past 12 months     Recent Labs   Lab Test 02/28/23  1105   CR 0.69       Ok to refill medication if creatinine is low          Passed - No positive pregnancy test on file in past 12 months       Antiadrenergic Antihypertensives Passed - 3/12/2023 11:55 AM        Passed - Blood pressure less than 140/90 in past 6 months     BP Readings from Last 3 Encounters:   02/14/23 124/58   01/19/23 (!) 170/81   10/10/22 (!) 143/74                 Passed - Medication is active on med list        Passed - Patient is age 18 or older        Passed - No active pregnancy on record        Passed - Normal serum creatinine on file in past 12 months     Recent Labs   Lab Test 02/28/23  1105   CR 0.69       Ok to refill medication if creatinine is low          Passed - No " "positive pregnancy test within past 12 months        Passed - Recent (6 mo) or future (30 days) visit within the authorizing provider's specialty     Patient had office visit in the last 6 months or has a visit in the next 30 days with authorizing provider or within the authorizing provider's specialty.  See \"Patient Info\" tab in inbasket, or \"Choose Columns\" in Meds & Orders section of the refill encounter.                 ANGELITA PALM RN 03/12/23 2:58 PM  "

## 2023-03-21 ENCOUNTER — LAB REQUISITION (OUTPATIENT)
Dept: LAB | Facility: CLINIC | Age: 88
End: 2023-03-21
Payer: COMMERCIAL

## 2023-03-21 LAB
ALBUMIN UR-MCNC: 20 MG/DL
APPEARANCE UR: CLEAR
BACTERIA #/AREA URNS HPF: ABNORMAL /HPF
BILIRUB UR QL STRIP: NEGATIVE
COLOR UR AUTO: YELLOW
GLUCOSE UR STRIP-MCNC: NEGATIVE MG/DL
HGB UR QL STRIP: NEGATIVE
KETONES UR STRIP-MCNC: NEGATIVE MG/DL
LEUKOCYTE ESTERASE UR QL STRIP: ABNORMAL
MUCOUS THREADS #/AREA URNS LPF: PRESENT /LPF
NITRATE UR QL: NEGATIVE
PH UR STRIP: 5.5 [PH] (ref 5–7)
RBC URINE: <1 /HPF
SP GR UR STRIP: 1.02 (ref 1–1.03)
SQUAMOUS EPITHELIAL: 2 /HPF
UROBILINOGEN UR STRIP-MCNC: NORMAL MG/DL
WBC URINE: 2 /HPF

## 2023-03-21 PROCEDURE — 81001 URINALYSIS AUTO W/SCOPE: CPT | Mod: ORL | Performed by: FAMILY MEDICINE

## 2023-03-23 ENCOUNTER — OFFICE VISIT (OUTPATIENT)
Dept: NEUROLOGY | Facility: CLINIC | Age: 88
End: 2023-03-23
Attending: PSYCHIATRY & NEUROLOGY
Payer: COMMERCIAL

## 2023-03-23 VITALS — SYSTOLIC BLOOD PRESSURE: 131 MMHG | HEART RATE: 71 BPM | DIASTOLIC BLOOD PRESSURE: 73 MMHG

## 2023-03-23 DIAGNOSIS — F33.42 MAJOR DEPRESSIVE DISORDER, RECURRENT EPISODE, IN FULL REMISSION (H): ICD-10-CM

## 2023-03-23 PROCEDURE — 99205 OFFICE O/P NEW HI 60 MIN: CPT | Performed by: PSYCHIATRY & NEUROLOGY
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
## 2023-03-23 NOTE — NURSING NOTE
Chief Complaint   Patient presents with     Establish Care     Med management  Referred by WILFREDO Melara on 3/23/2023 at 10:14 AM

## 2023-03-23 NOTE — PATIENT INSTRUCTIONS
I am going to increase the patient's Prozac to 20 mg a day.  I will continue with her Wellbutrin and Aricept.  She should return to this clinic in 2 to 3 months for medication check and both she and her daughter agreed to contact us before then with any questions or concerns.

## 2023-03-27 DIAGNOSIS — F33.42 MAJOR DEPRESSIVE DISORDER, RECURRENT EPISODE, IN FULL REMISSION (H): Primary | ICD-10-CM

## 2023-03-27 NOTE — TELEPHONE ENCOUNTER
Per Dr. Cullen's notes from the pt's last visit:     Plan:  I am going to increase the patient's Prozac to 20 mg a day.  We will continue with the Wellbutrin as ordered and she will continue on her Aricept as per her neurologist.  She will return to this clinic in 2 to 3 months for medication check and agrees to contact us before then with any questions or concerns.    I left message for the daughter to call back (consent to speak in chart).    WILFREDO Donnelly on 3/27/2023 at 2:38 PM

## 2023-03-27 NOTE — TELEPHONE ENCOUNTER
[facility] Call Center    Phone Message    May a detailed message be left on voicemail: yes     Reason for Call: Other: Patient is taking Prozac 30 mg once a day.  Patient's daughter said the medication list says she should be on 10 mg of Prozac.  Please call daughterTaisha to verify dosage.     Action Taken: Message routed to:  [facility]    Travel Screening: Not Applicable

## 2023-03-28 RX ORDER — FLUOXETINE 40 MG/1
40 CAPSULE ORAL DAILY
Qty: 30 CAPSULE | Refills: 4 | Status: SHIPPED | OUTPATIENT
Start: 2023-03-28

## 2023-03-28 NOTE — TELEPHONE ENCOUNTER
Dr. Cullen,     Daughter Taisha calling wanting to clarify orders on her medication for Prozac.  Pt had a visit with you on 3/23/23 and in your notes, you were going to increase the patient's Prozac to 20 mg a day and that you were going to send the pt a new Rx.    I looked at the pt's chart and looks like no prescription was sent.    Per daughter Taisha, maybe you read it wrong but pt has been taking Prozac 10 MG- take 3 tablets by mouth daily (total of 30 MG).    How much should the pt be taking daily? Taisha wants you to clarify this and send a new Rx to the pt's pharmacy.    Please advise. Thank you.    WILFREDO Donnelly on 3/28/2023 at 9:12 AM

## 2023-03-28 NOTE — TELEPHONE ENCOUNTER
Left a detailed message for the daughter that per Dr. Cullen, increase the Prozac to 40 mg a day and that he sent a new Rx for FLUoxetine (PROZAC) 40 MG capsule, Take 1 capsule (40 mg) by mouth daily - Oral to [facility].      WILFREDO Donnelly on 3/28/2023 at 11:13 AM

## 2023-03-28 NOTE — TELEPHONE ENCOUNTER
Per Dr. Cullen's message:  I am sorry, can you please increase the Prozac to 40 mg a day.  Thank you.     Dr. Cullen,    To do the dose change, please sign the prescription.    Medication T'd for review and signature    WILFREDO Donnelly on 3/28/2023 at 9:27 AM

## 2023-04-30 ENCOUNTER — HEALTH MAINTENANCE LETTER (OUTPATIENT)
Age: 88
End: 2023-04-30

## 2023-05-15 ENCOUNTER — LAB REQUISITION (OUTPATIENT)
Dept: LAB | Facility: CLINIC | Age: 88
End: 2023-05-15
Payer: COMMERCIAL

## 2023-05-15 DIAGNOSIS — E03.9 HYPOTHYROIDISM, UNSPECIFIED: ICD-10-CM

## 2023-05-15 DIAGNOSIS — I10 ESSENTIAL (PRIMARY) HYPERTENSION: ICD-10-CM

## 2023-05-16 LAB
ALBUMIN SERPL BCG-MCNC: 3.7 G/DL (ref 3.5–5.2)
ALP SERPL-CCNC: 72 U/L (ref 35–104)
ALT SERPL W P-5'-P-CCNC: 45 U/L (ref 10–35)
ANION GAP SERPL CALCULATED.3IONS-SCNC: 15 MMOL/L (ref 7–15)
AST SERPL W P-5'-P-CCNC: 31 U/L (ref 10–35)
BILIRUB SERPL-MCNC: 0.3 MG/DL
BUN SERPL-MCNC: 23.3 MG/DL (ref 8–23)
CALCIUM SERPL-MCNC: 9 MG/DL (ref 8.2–9.6)
CHLORIDE SERPL-SCNC: 103 MMOL/L (ref 98–107)
CREAT SERPL-MCNC: 0.78 MG/DL (ref 0.51–0.95)
DEPRECATED HCO3 PLAS-SCNC: 20 MMOL/L (ref 22–29)
ERYTHROCYTE [DISTWIDTH] IN BLOOD BY AUTOMATED COUNT: 12.9 % (ref 10–15)
GFR SERPL CREATININE-BSD FRML MDRD: 71 ML/MIN/1.73M2
GLUCOSE SERPL-MCNC: 137 MG/DL (ref 70–99)
HCT VFR BLD AUTO: 39.3 % (ref 35–47)
HGB BLD-MCNC: 11.8 G/DL (ref 11.7–15.7)
MCH RBC QN AUTO: 32.2 PG (ref 26.5–33)
MCHC RBC AUTO-ENTMCNC: 30 G/DL (ref 31.5–36.5)
MCV RBC AUTO: 107 FL (ref 78–100)
PLATELET # BLD AUTO: 226 10E3/UL (ref 150–450)
POTASSIUM SERPL-SCNC: 4.2 MMOL/L (ref 3.4–5.3)
PROT SERPL-MCNC: 6 G/DL (ref 6.4–8.3)
RBC # BLD AUTO: 3.67 10E6/UL (ref 3.8–5.2)
SODIUM SERPL-SCNC: 138 MMOL/L (ref 136–145)
TSH SERPL DL<=0.005 MIU/L-ACNC: 3.39 UIU/ML (ref 0.3–4.2)
WBC # BLD AUTO: 7 10E3/UL (ref 4–11)

## 2023-05-16 PROCEDURE — P9604 ONE-WAY ALLOW PRORATED TRIP: HCPCS | Mod: ORL

## 2023-05-16 PROCEDURE — 85027 COMPLETE CBC AUTOMATED: CPT | Mod: ORL

## 2023-05-16 PROCEDURE — 36415 COLL VENOUS BLD VENIPUNCTURE: CPT | Mod: ORL

## 2023-05-16 PROCEDURE — 80053 COMPREHEN METABOLIC PANEL: CPT | Mod: ORL

## 2023-05-16 PROCEDURE — 84443 ASSAY THYROID STIM HORMONE: CPT | Mod: ORL

## 2023-05-23 NOTE — TELEPHONE ENCOUNTER
I spoke with Jemal and we are going to add hydralazine 25 mg back in and continue amlodipine 10 mg at night. The hydralazine is just at night and will hold with parameters if blood pressure less than 120. She ll update me next week  Order placed  Patient advised   No further questions

## 2023-05-30 NOTE — PATIENT INSTRUCTIONS
Dear Ellis Em    After reviewing your responses, I've been able to diagnose you with a urinary tract infection, which is a common infection of the bladder with bacteria.  This is not a sexually transmitted infection, though urinating immediately after intercourse can help prevent infections.  Drinking lots of fluids is also helpful to clear your current infection and prevent the next one.      I have sent a prescription for antibiotics to your pharmacy to treat this infection.    It is important that you take all of your prescribed medication even if your symptoms are improving after a few doses.  Taking all of your medicine helps prevent the symptoms from returning.     If your symptoms worsen, you develop pain in your back or stomach, develop fevers, or are not improving in 5 days, please contact your primary care provider for an appointment or visit any of our convenient Walk-in or Urgent Care Centers to be seen, which can be found on our website here.    Thanks again for choosing us as your health care partner,    Juana Henson MD  
No - the patient is unable to be screened due to medical condition

## 2023-06-05 ENCOUNTER — TELEPHONE (OUTPATIENT)
Dept: NEUROLOGY | Facility: CLINIC | Age: 88
End: 2023-06-05
Payer: COMMERCIAL

## 2023-06-05 NOTE — TELEPHONE ENCOUNTER
[facility] Call Center    Phone Message    May a detailed message be left on voicemail: yes     Reason for Call: Other: Pinky from [employer] would like to speak to doctor regarding pt's blood pressure.     Please follow up with Pinky Soriano [phone].      Action Taken: Message routed to: [facility]    Travel Screening: Not Applicable    Simón Fontanez on 6/5/2023 at 9:37 AM   - Neurology

## 2023-06-06 NOTE — TELEPHONE ENCOUNTER
Patients NP Cecilia reports that she d/c'd patients spirolactone as pt was having some orthostatic hypotension and dizziness.     She reports that pt has labile BP- seems to range from -170, usually 150s in AM/HS, lower around noon.     When patients BP is more consistently around 120-130 she has increased dizziness and symptoms.     Do you feel location of stroke could be contributing to this? Do you have any recommendations? Should she continue to take losartan, clonidine, and amlodipine?     Dalton Velasquez, RN, BSN  [facility]

## 2023-06-06 NOTE — TELEPHONE ENCOUNTER
I would not be too concerned about this blood pressure range.  Dizziness is a very common symptom in patient's age group and not necessarily related to stroke.  I would not recommend any change in the dose of antihypertensive

## 2023-06-06 NOTE — TELEPHONE ENCOUNTER
LM asking Tete to return call to discuss patients blood pressure further.     Dalton Velasquez, RN, BSN  [facility]

## 2023-06-07 DIAGNOSIS — R41.9 NEUROCOGNITIVE DISORDER: ICD-10-CM

## 2023-06-07 NOTE — TELEPHONE ENCOUNTER
RAJAT Epstein with MD message below. Encouraged call back if she has any additional questions or concerns.     Dalton Velasquez RN, BSN  [facility]

## 2023-06-08 RX ORDER — MEMANTINE HYDROCHLORIDE 10 MG/1
TABLET ORAL
Qty: 60 TABLET | Refills: 7 | Status: SHIPPED | OUTPATIENT
Start: 2023-06-08

## 2023-06-08 NOTE — TELEPHONE ENCOUNTER
"Last Written Prescription Date:  10/06/22  Last Fill Quantity: 60,  # refills: 8   Last office visit provider:  1/19/23 w/ Dr Angulo      Requested Prescriptions   Pending Prescriptions Disp Refills     memantine (NAMENDA) 10 MG tablet [Pharmacy Med Name: MEMANTINE HCL 10MG TABLET] 60 tablet 8     Sig: TAKE 1 TAB BY MOUTH TWICE A DAY       Miscellaneous Dementia Agents Passed - 6/7/2023 12:54 PM        Passed - Recent (12 mo) or future (30 days) visit within the authorizing provider's specialty     Patient has had an office visit with the authorizing provider or a provider within the authorizing providers department within the previous 12 mos or has a future within next 30 days. See \"Patient Info\" tab in inbasket, or \"Choose Columns\" in Meds & Orders section of the refill encounter.              Passed - Medication is active on med list        Passed - Patient is 18 years of age or older             Alicja Pekc RN 06/08/23 10:16 AM  "

## 2023-06-26 ENCOUNTER — LAB REQUISITION (OUTPATIENT)
Dept: LAB | Facility: CLINIC | Age: 88
End: 2023-06-26
Payer: COMMERCIAL

## 2023-06-26 DIAGNOSIS — N39.0 URINARY TRACT INFECTION, SITE NOT SPECIFIED: ICD-10-CM

## 2023-06-26 LAB
ALBUMIN UR-MCNC: NEGATIVE MG/DL
APPEARANCE UR: ABNORMAL
BACTERIA #/AREA URNS HPF: ABNORMAL /HPF
BILIRUB UR QL STRIP: NEGATIVE
COLOR UR AUTO: ABNORMAL
GLUCOSE UR STRIP-MCNC: NEGATIVE MG/DL
HGB UR QL STRIP: NEGATIVE
HYALINE CASTS: 1 /LPF
KETONES UR STRIP-MCNC: NEGATIVE MG/DL
LEUKOCYTE ESTERASE UR QL STRIP: NEGATIVE
MUCOUS THREADS #/AREA URNS LPF: PRESENT /LPF
NITRATE UR QL: POSITIVE
PH UR STRIP: 5.5 [PH] (ref 5–7)
RBC URINE: 0 /HPF
SP GR UR STRIP: 1.01 (ref 1–1.03)
SQUAMOUS EPITHELIAL: 2 /HPF
UROBILINOGEN UR STRIP-MCNC: NORMAL MG/DL
WBC URINE: 6 /HPF

## 2023-06-26 PROCEDURE — 81001 URINALYSIS AUTO W/SCOPE: CPT | Mod: ORL

## 2023-06-26 PROCEDURE — 87086 URINE CULTURE/COLONY COUNT: CPT | Mod: ORL

## 2023-06-29 LAB
BACTERIA UR CULT: ABNORMAL
BACTERIA UR CULT: ABNORMAL

## 2023-07-24 ENCOUNTER — LAB REQUISITION (OUTPATIENT)
Dept: LAB | Facility: CLINIC | Age: 88
End: 2023-07-24
Payer: COMMERCIAL

## 2023-07-24 DIAGNOSIS — E03.9 HYPOTHYROIDISM, UNSPECIFIED: ICD-10-CM

## 2023-07-25 LAB — TSH SERPL DL<=0.005 MIU/L-ACNC: 2.46 UIU/ML (ref 0.3–4.2)

## 2023-07-25 PROCEDURE — 36415 COLL VENOUS BLD VENIPUNCTURE: CPT | Mod: ORL | Performed by: FAMILY MEDICINE

## 2023-07-25 PROCEDURE — P9604 ONE-WAY ALLOW PRORATED TRIP: HCPCS | Mod: ORL | Performed by: FAMILY MEDICINE

## 2023-07-25 PROCEDURE — 84443 ASSAY THYROID STIM HORMONE: CPT | Mod: ORL | Performed by: FAMILY MEDICINE

## 2023-08-15 PROBLEM — R41.9 NEUROCOGNITIVE DISORDER: Status: ACTIVE | Noted: 2022-03-07

## 2023-08-15 PROBLEM — R20.0 LIP NUMBNESS: Status: RESOLVED | Noted: 2022-08-29 | Resolved: 2023-08-15

## 2023-08-15 PROBLEM — G81.94 LEFT HEMIPARESIS (H): Status: ACTIVE | Noted: 2023-02-21

## 2023-08-15 PROBLEM — R06.83 SNORING: Status: RESOLVED | Noted: 2022-10-22 | Resolved: 2023-08-15

## 2023-08-15 PROBLEM — D50.9 IRON DEFICIENCY ANEMIA, UNSPECIFIED IRON DEFICIENCY ANEMIA TYPE: Status: ACTIVE | Noted: 2023-03-21

## 2023-08-15 PROBLEM — N39.0 RECURRENT URINARY TRACT INFECTION: Status: RESOLVED | Noted: 2021-10-05 | Resolved: 2023-08-15

## 2023-08-15 PROBLEM — R53.83 FATIGUE, UNSPECIFIED TYPE: Status: RESOLVED | Noted: 2021-05-25 | Resolved: 2023-08-15

## 2023-09-18 ENCOUNTER — TRANSFERRED RECORDS (OUTPATIENT)
Dept: HEALTH INFORMATION MANAGEMENT | Facility: CLINIC | Age: 88
End: 2023-09-18
Payer: COMMERCIAL

## 2023-12-04 ENCOUNTER — TRANSFERRED RECORDS (OUTPATIENT)
Dept: HEALTH INFORMATION MANAGEMENT | Facility: CLINIC | Age: 88
End: 2023-12-04
Payer: COMMERCIAL

## 2024-01-10 ENCOUNTER — LAB REQUISITION (OUTPATIENT)
Dept: LAB | Facility: CLINIC | Age: 89
End: 2024-01-10
Payer: COMMERCIAL

## 2024-01-10 DIAGNOSIS — N39.0 URINARY TRACT INFECTION, SITE NOT SPECIFIED: ICD-10-CM

## 2024-01-10 LAB
ALBUMIN UR-MCNC: NEGATIVE MG/DL
AMORPH CRY #/AREA URNS HPF: ABNORMAL /HPF
APPEARANCE UR: ABNORMAL
BACTERIA #/AREA URNS HPF: ABNORMAL /HPF
BILIRUB UR QL STRIP: NEGATIVE
COLOR UR AUTO: ABNORMAL
GLUCOSE UR STRIP-MCNC: NEGATIVE MG/DL
HGB UR QL STRIP: NEGATIVE
KETONES UR STRIP-MCNC: NEGATIVE MG/DL
LEUKOCYTE ESTERASE UR QL STRIP: NEGATIVE
MUCOUS THREADS #/AREA URNS LPF: PRESENT /LPF
NITRATE UR QL: NEGATIVE
PH UR STRIP: 5.5 [PH] (ref 5–7)
RBC URINE: <1 /HPF
SP GR UR STRIP: 1.01 (ref 1–1.03)
SQUAMOUS EPITHELIAL: 2 /HPF
UROBILINOGEN UR STRIP-MCNC: NORMAL MG/DL
WBC URINE: 2 /HPF

## 2024-01-10 PROCEDURE — 81001 URINALYSIS AUTO W/SCOPE: CPT | Mod: ORL | Performed by: FAMILY MEDICINE

## 2024-01-14 ENCOUNTER — LAB REQUISITION (OUTPATIENT)
Dept: LAB | Facility: CLINIC | Age: 89
End: 2024-01-14
Payer: COMMERCIAL

## 2024-01-14 DIAGNOSIS — N39.0 URINARY TRACT INFECTION, SITE NOT SPECIFIED: ICD-10-CM

## 2024-01-14 LAB
ALBUMIN UR-MCNC: 10 MG/DL
APPEARANCE UR: ABNORMAL
BACTERIA #/AREA URNS HPF: ABNORMAL /HPF
BILIRUB UR QL STRIP: NEGATIVE
CAOX CRY #/AREA URNS HPF: ABNORMAL /HPF
COLOR UR AUTO: YELLOW
GLUCOSE UR STRIP-MCNC: NEGATIVE MG/DL
HGB UR QL STRIP: NEGATIVE
HYALINE CASTS: 5 /LPF
KETONES UR STRIP-MCNC: NEGATIVE MG/DL
LEUKOCYTE ESTERASE UR QL STRIP: ABNORMAL
MUCOUS THREADS #/AREA URNS LPF: PRESENT /LPF
NITRATE UR QL: POSITIVE
PH UR STRIP: 5.5 [PH] (ref 5–7)
RBC URINE: 0 /HPF
SP GR UR STRIP: 1.01 (ref 1–1.03)
SQUAMOUS EPITHELIAL: 4 /HPF
UROBILINOGEN UR STRIP-MCNC: NORMAL MG/DL
WBC URINE: 7 /HPF

## 2024-01-14 PROCEDURE — 87086 URINE CULTURE/COLONY COUNT: CPT | Mod: ORL | Performed by: FAMILY MEDICINE

## 2024-01-14 PROCEDURE — 81001 URINALYSIS AUTO W/SCOPE: CPT | Mod: ORL | Performed by: FAMILY MEDICINE

## 2024-01-14 PROCEDURE — 87186 SC STD MICRODIL/AGAR DIL: CPT | Mod: ORL | Performed by: FAMILY MEDICINE

## 2024-01-15 LAB — BACTERIA UR CULT: ABNORMAL

## 2024-02-05 ENCOUNTER — LAB REQUISITION (OUTPATIENT)
Dept: LAB | Facility: CLINIC | Age: 89
End: 2024-02-05
Payer: COMMERCIAL

## 2024-02-05 DIAGNOSIS — R09.89 OTHER SPECIFIED SYMPTOMS AND SIGNS INVOLVING THE CIRCULATORY AND RESPIRATORY SYSTEMS: ICD-10-CM

## 2024-02-05 DIAGNOSIS — D50.9 IRON DEFICIENCY ANEMIA, UNSPECIFIED: ICD-10-CM

## 2024-02-06 LAB
ANION GAP SERPL CALCULATED.3IONS-SCNC: 10 MMOL/L (ref 7–15)
BASOPHILS # BLD AUTO: 0 10E3/UL (ref 0–0.2)
BASOPHILS NFR BLD AUTO: 1 %
BUN SERPL-MCNC: 23.1 MG/DL (ref 8–23)
CALCIUM SERPL-MCNC: 9 MG/DL (ref 8.2–9.6)
CHLORIDE SERPL-SCNC: 104 MMOL/L (ref 98–107)
CREAT SERPL-MCNC: 0.64 MG/DL (ref 0.51–0.95)
DEPRECATED HCO3 PLAS-SCNC: 24 MMOL/L (ref 22–29)
EGFRCR SERPLBLD CKD-EPI 2021: 82 ML/MIN/1.73M2
EOSINOPHIL # BLD AUTO: 0.3 10E3/UL (ref 0–0.7)
EOSINOPHIL NFR BLD AUTO: 5 %
ERYTHROCYTE [DISTWIDTH] IN BLOOD BY AUTOMATED COUNT: 13.2 % (ref 10–15)
GLUCOSE SERPL-MCNC: 86 MG/DL (ref 70–99)
HCT VFR BLD AUTO: 35 % (ref 35–47)
HGB BLD-MCNC: 11 G/DL (ref 11.7–15.7)
IMM GRANULOCYTES # BLD: 0.1 10E3/UL
IMM GRANULOCYTES NFR BLD: 1 %
LYMPHOCYTES # BLD AUTO: 1.1 10E3/UL (ref 0.8–5.3)
LYMPHOCYTES NFR BLD AUTO: 15 %
MCH RBC QN AUTO: 32.5 PG (ref 26.5–33)
MCHC RBC AUTO-ENTMCNC: 31.4 G/DL (ref 31.5–36.5)
MCV RBC AUTO: 104 FL (ref 78–100)
MONOCYTES # BLD AUTO: 0.7 10E3/UL (ref 0–1.3)
MONOCYTES NFR BLD AUTO: 10 %
NEUTROPHILS # BLD AUTO: 4.8 10E3/UL (ref 1.6–8.3)
NEUTROPHILS NFR BLD AUTO: 68 %
NRBC # BLD AUTO: 0 10E3/UL
NRBC BLD AUTO-RTO: 0 /100
PLATELET # BLD AUTO: 236 10E3/UL (ref 150–450)
POTASSIUM SERPL-SCNC: 4.2 MMOL/L (ref 3.4–5.3)
RBC # BLD AUTO: 3.38 10E6/UL (ref 3.8–5.2)
SODIUM SERPL-SCNC: 138 MMOL/L (ref 135–145)
WBC # BLD AUTO: 7.1 10E3/UL (ref 4–11)

## 2024-02-06 PROCEDURE — 80048 BASIC METABOLIC PNL TOTAL CA: CPT | Mod: ORL | Performed by: FAMILY MEDICINE

## 2024-02-06 PROCEDURE — 85025 COMPLETE CBC W/AUTO DIFF WBC: CPT | Mod: ORL | Performed by: FAMILY MEDICINE

## 2024-02-06 PROCEDURE — 36415 COLL VENOUS BLD VENIPUNCTURE: CPT | Mod: ORL | Performed by: FAMILY MEDICINE

## 2024-03-01 ENCOUNTER — MEDICAL CORRESPONDENCE (OUTPATIENT)
Dept: HEALTH INFORMATION MANAGEMENT | Facility: CLINIC | Age: 89
End: 2024-03-01
Payer: COMMERCIAL

## 2024-03-05 ENCOUNTER — TRANSFERRED RECORDS (OUTPATIENT)
Dept: HEALTH INFORMATION MANAGEMENT | Facility: CLINIC | Age: 89
End: 2024-03-05
Payer: COMMERCIAL

## 2024-03-14 ENCOUNTER — LAB REQUISITION (OUTPATIENT)
Dept: LAB | Facility: CLINIC | Age: 89
End: 2024-03-14
Payer: COMMERCIAL

## 2024-03-14 DIAGNOSIS — R30.0 DYSURIA: ICD-10-CM

## 2024-03-14 LAB
ALBUMIN UR-MCNC: 100 MG/DL
AMORPH CRY #/AREA URNS HPF: ABNORMAL /HPF
APPEARANCE UR: ABNORMAL
BACTERIA #/AREA URNS HPF: ABNORMAL /HPF
BILIRUB UR QL STRIP: NEGATIVE
COLOR UR AUTO: YELLOW
GLUCOSE UR STRIP-MCNC: NEGATIVE MG/DL
HGB UR QL STRIP: ABNORMAL
KETONES UR STRIP-MCNC: ABNORMAL MG/DL
LEUKOCYTE ESTERASE UR QL STRIP: NEGATIVE
MUCOUS THREADS #/AREA URNS LPF: PRESENT /LPF
NITRATE UR QL: POSITIVE
PH UR STRIP: 5.5 [PH] (ref 5–7)
RBC URINE: 1 /HPF
SP GR UR STRIP: 1.03 (ref 1–1.03)
SQUAMOUS EPITHELIAL: 2 /HPF
UROBILINOGEN UR STRIP-MCNC: NORMAL MG/DL
WBC URINE: 6 /HPF

## 2024-03-14 PROCEDURE — 87086 URINE CULTURE/COLONY COUNT: CPT | Mod: ORL

## 2024-03-14 PROCEDURE — 81001 URINALYSIS AUTO W/SCOPE: CPT | Mod: ORL

## 2024-03-15 LAB — BACTERIA UR CULT: NORMAL

## 2024-04-28 ENCOUNTER — HEALTH MAINTENANCE LETTER (OUTPATIENT)
Age: 89
End: 2024-04-28

## 2024-06-03 ENCOUNTER — LAB REQUISITION (OUTPATIENT)
Dept: LAB | Facility: CLINIC | Age: 89
End: 2024-06-03
Payer: OTHER MISCELLANEOUS

## 2024-06-03 DIAGNOSIS — N39.0 URINARY TRACT INFECTION, SITE NOT SPECIFIED: ICD-10-CM

## 2024-06-03 LAB
ALBUMIN UR-MCNC: 50 MG/DL
ALBUMIN UR-MCNC: 50 MG/DL
APPEARANCE UR: CLEAR
APPEARANCE UR: CLEAR
BACTERIA #/AREA URNS HPF: ABNORMAL /HPF
BACTERIA #/AREA URNS HPF: ABNORMAL /HPF
BILIRUB UR QL STRIP: NEGATIVE
BILIRUB UR QL STRIP: NEGATIVE
COLOR UR AUTO: ABNORMAL
COLOR UR AUTO: ABNORMAL
GLUCOSE UR STRIP-MCNC: NEGATIVE MG/DL
GLUCOSE UR STRIP-MCNC: NEGATIVE MG/DL
HGB UR QL STRIP: NEGATIVE
HGB UR QL STRIP: NEGATIVE
HYALINE CASTS: 3 /LPF
HYALINE CASTS: 3 /LPF
KETONES UR STRIP-MCNC: NEGATIVE MG/DL
KETONES UR STRIP-MCNC: NEGATIVE MG/DL
LEUKOCYTE ESTERASE UR QL STRIP: NEGATIVE
LEUKOCYTE ESTERASE UR QL STRIP: NEGATIVE
NITRATE UR QL: NEGATIVE
NITRATE UR QL: NEGATIVE
PH UR STRIP: 5.5 [PH] (ref 5–7)
PH UR STRIP: 5.5 [PH] (ref 5–7)
RBC URINE: 1 /HPF
RBC URINE: 1 /HPF
SP GR UR STRIP: 1.01 (ref 1–1.03)
SP GR UR STRIP: 1.01 (ref 1–1.03)
SQUAMOUS EPITHELIAL: 1 /HPF
SQUAMOUS EPITHELIAL: 1 /HPF
UROBILINOGEN UR STRIP-MCNC: NORMAL MG/DL
UROBILINOGEN UR STRIP-MCNC: NORMAL MG/DL
WBC URINE: 1 /HPF
WBC URINE: 1 /HPF

## 2024-06-03 PROCEDURE — 81001 URINALYSIS AUTO W/SCOPE: CPT | Mod: ORL

## 2024-06-15 ENCOUNTER — LAB REQUISITION (OUTPATIENT)
Dept: LAB | Facility: CLINIC | Age: 89
End: 2024-06-15
Payer: OTHER MISCELLANEOUS

## 2024-06-15 DIAGNOSIS — N39.0 URINARY TRACT INFECTION, SITE NOT SPECIFIED: ICD-10-CM

## 2024-06-15 LAB
ALBUMIN UR-MCNC: 20 MG/DL
APPEARANCE UR: CLEAR
BACTERIA #/AREA URNS HPF: ABNORMAL /HPF
BILIRUB UR QL STRIP: NEGATIVE
COLOR UR AUTO: ABNORMAL
GLUCOSE UR STRIP-MCNC: NEGATIVE MG/DL
HGB UR QL STRIP: NEGATIVE
KETONES UR STRIP-MCNC: NEGATIVE MG/DL
LEUKOCYTE ESTERASE UR QL STRIP: ABNORMAL
NITRATE UR QL: NEGATIVE
PH UR STRIP: 5.5 [PH] (ref 5–7)
RBC URINE: 1 /HPF
SP GR UR STRIP: 1.01 (ref 1–1.03)
SQUAMOUS EPITHELIAL: 2 /HPF
TRANSITIONAL EPI: <1 /HPF
UROBILINOGEN UR STRIP-MCNC: NORMAL MG/DL
WBC CLUMPS #/AREA URNS HPF: PRESENT /HPF
WBC URINE: 14 /HPF

## 2024-06-15 PROCEDURE — 87086 URINE CULTURE/COLONY COUNT: CPT | Mod: ORL

## 2024-06-15 PROCEDURE — 81001 URINALYSIS AUTO W/SCOPE: CPT | Mod: ORL | Performed by: PHYSICIAN ASSISTANT

## 2024-06-15 PROCEDURE — 87186 SC STD MICRODIL/AGAR DIL: CPT | Mod: ORL | Performed by: PHYSICIAN ASSISTANT

## 2024-06-15 PROCEDURE — 87086 URINE CULTURE/COLONY COUNT: CPT | Mod: ORL | Performed by: PHYSICIAN ASSISTANT

## 2024-06-15 PROCEDURE — 81001 URINALYSIS AUTO W/SCOPE: CPT | Mod: ORL

## 2024-06-18 LAB — BACTERIA UR CULT: ABNORMAL

## 2024-07-06 ENCOUNTER — LAB REQUISITION (OUTPATIENT)
Dept: LAB | Facility: CLINIC | Age: 89
End: 2024-07-06
Payer: OTHER MISCELLANEOUS

## 2024-07-06 DIAGNOSIS — N39.0 URINARY TRACT INFECTION, SITE NOT SPECIFIED: ICD-10-CM

## 2024-07-06 LAB
ALBUMIN UR-MCNC: 30 MG/DL
APPEARANCE UR: ABNORMAL
BACTERIA #/AREA URNS HPF: ABNORMAL /HPF
BILIRUB UR QL STRIP: NEGATIVE
COLOR UR AUTO: ABNORMAL
GLUCOSE UR STRIP-MCNC: NEGATIVE MG/DL
HGB UR QL STRIP: NEGATIVE
KETONES UR STRIP-MCNC: NEGATIVE MG/DL
LEUKOCYTE ESTERASE UR QL STRIP: NEGATIVE
MUCOUS THREADS #/AREA URNS LPF: PRESENT /LPF
NITRATE UR QL: NEGATIVE
PH UR STRIP: 6.5 [PH] (ref 5–7)
RBC URINE: 0 /HPF
SP GR UR STRIP: 1.01 (ref 1–1.03)
SQUAMOUS EPITHELIAL: 6 /HPF
UROBILINOGEN UR STRIP-MCNC: NORMAL MG/DL
WBC URINE: 0 /HPF

## 2024-07-06 PROCEDURE — 81001 URINALYSIS AUTO W/SCOPE: CPT | Mod: ORL | Performed by: FAMILY MEDICINE

## 2024-07-06 PROCEDURE — 87086 URINE CULTURE/COLONY COUNT: CPT | Mod: ORL | Performed by: FAMILY MEDICINE

## 2024-07-06 PROCEDURE — 87186 SC STD MICRODIL/AGAR DIL: CPT | Mod: ORL | Performed by: FAMILY MEDICINE

## 2024-07-08 LAB
BACTERIA UR CULT: ABNORMAL
BACTERIA UR CULT: ABNORMAL

## 2024-09-11 ENCOUNTER — LAB REQUISITION (OUTPATIENT)
Dept: LAB | Facility: CLINIC | Age: 89
End: 2024-09-11
Payer: OTHER MISCELLANEOUS

## 2024-09-11 DIAGNOSIS — R32 UNSPECIFIED URINARY INCONTINENCE: ICD-10-CM

## 2024-09-11 LAB
ALBUMIN UR-MCNC: 70 MG/DL
APPEARANCE UR: ABNORMAL
BACTERIA #/AREA URNS HPF: ABNORMAL /HPF
BILIRUB UR QL STRIP: NEGATIVE
COLOR UR AUTO: YELLOW
GLUCOSE UR STRIP-MCNC: NEGATIVE MG/DL
HGB UR QL STRIP: ABNORMAL
KETONES UR STRIP-MCNC: NEGATIVE MG/DL
LEUKOCYTE ESTERASE UR QL STRIP: ABNORMAL
MUCOUS THREADS #/AREA URNS LPF: PRESENT /LPF
NITRATE UR QL: POSITIVE
PH UR STRIP: 5.5 [PH] (ref 5–7)
RBC URINE: 8 /HPF
SP GR UR STRIP: 1.04 (ref 1–1.03)
SQUAMOUS EPITHELIAL: 2 /HPF
UROBILINOGEN UR STRIP-MCNC: NORMAL MG/DL
WBC URINE: 33 /HPF

## 2024-09-11 PROCEDURE — 81001 URINALYSIS AUTO W/SCOPE: CPT | Mod: ORL

## 2024-09-11 PROCEDURE — 87186 SC STD MICRODIL/AGAR DIL: CPT | Mod: ORL | Performed by: FAMILY MEDICINE

## 2024-09-11 PROCEDURE — 81001 URINALYSIS AUTO W/SCOPE: CPT | Mod: ORL | Performed by: FAMILY MEDICINE

## 2024-09-11 PROCEDURE — 87186 SC STD MICRODIL/AGAR DIL: CPT | Mod: ORL

## 2024-09-15 LAB
BACTERIA UR CULT: ABNORMAL
BACTERIA UR CULT: ABNORMAL

## 2024-09-16 ENCOUNTER — TRANSFERRED RECORDS (OUTPATIENT)
Dept: HEALTH INFORMATION MANAGEMENT | Facility: CLINIC | Age: 89
End: 2024-09-16
Payer: OTHER MISCELLANEOUS

## 2024-10-12 ENCOUNTER — LAB REQUISITION (OUTPATIENT)
Dept: LAB | Facility: CLINIC | Age: 89
End: 2024-10-12
Payer: OTHER MISCELLANEOUS

## 2024-10-12 DIAGNOSIS — R32 UNSPECIFIED URINARY INCONTINENCE: ICD-10-CM

## 2024-10-12 LAB
ALBUMIN UR-MCNC: 30 MG/DL
APPEARANCE UR: ABNORMAL
BACTERIA #/AREA URNS HPF: ABNORMAL /HPF
BILIRUB UR QL STRIP: NEGATIVE
COLOR UR AUTO: YELLOW
GLUCOSE UR STRIP-MCNC: NEGATIVE MG/DL
HGB UR QL STRIP: NEGATIVE
KETONES UR STRIP-MCNC: NEGATIVE MG/DL
LEUKOCYTE ESTERASE UR QL STRIP: ABNORMAL
MUCOUS THREADS #/AREA URNS LPF: PRESENT /LPF
NITRATE UR QL: POSITIVE
PH UR STRIP: 5.5 [PH] (ref 5–7)
RBC URINE: 3 /HPF
SP GR UR STRIP: 1.02 (ref 1–1.03)
SQUAMOUS EPITHELIAL: 3 /HPF
UROBILINOGEN UR STRIP-MCNC: NORMAL MG/DL
WBC CLUMPS #/AREA URNS HPF: PRESENT /HPF
WBC URINE: 51 /HPF

## 2024-10-12 PROCEDURE — 87086 URINE CULTURE/COLONY COUNT: CPT | Mod: ORL | Performed by: FAMILY MEDICINE

## 2024-10-12 PROCEDURE — 81001 URINALYSIS AUTO W/SCOPE: CPT | Mod: ORL

## 2024-10-12 PROCEDURE — 81001 URINALYSIS AUTO W/SCOPE: CPT | Mod: ORL | Performed by: FAMILY MEDICINE

## 2024-10-12 PROCEDURE — 87086 URINE CULTURE/COLONY COUNT: CPT | Mod: ORL

## 2024-10-13 LAB — BACTERIA UR CULT: NORMAL

## 2024-10-22 ENCOUNTER — LAB REQUISITION (OUTPATIENT)
Dept: LAB | Facility: CLINIC | Age: 89
End: 2024-10-22
Payer: OTHER MISCELLANEOUS

## 2024-10-22 DIAGNOSIS — R32 UNSPECIFIED URINARY INCONTINENCE: ICD-10-CM

## 2024-10-22 LAB
ALBUMIN UR-MCNC: NEGATIVE MG/DL
AMORPH CRY #/AREA URNS HPF: ABNORMAL /HPF
APPEARANCE UR: ABNORMAL
BACTERIA #/AREA URNS HPF: ABNORMAL /HPF
BILIRUB UR QL STRIP: NEGATIVE
COLOR UR AUTO: ABNORMAL
GLUCOSE UR STRIP-MCNC: NEGATIVE MG/DL
HGB UR QL STRIP: NEGATIVE
KETONES UR STRIP-MCNC: NEGATIVE MG/DL
LEUKOCYTE ESTERASE UR QL STRIP: ABNORMAL
MUCOUS THREADS #/AREA URNS LPF: PRESENT /LPF
NITRATE UR QL: NEGATIVE
PH UR STRIP: 5.5 [PH] (ref 5–7)
RBC URINE: 1 /HPF
SP GR UR STRIP: 1.01 (ref 1–1.03)
SQUAMOUS EPITHELIAL: 3 /HPF
TRANSITIONAL EPI: <1 /HPF
UROBILINOGEN UR STRIP-MCNC: NORMAL MG/DL
WBC URINE: 14 /HPF

## 2024-10-22 PROCEDURE — 87186 SC STD MICRODIL/AGAR DIL: CPT | Mod: ORL

## 2024-10-22 PROCEDURE — 81001 URINALYSIS AUTO W/SCOPE: CPT | Mod: ORL

## 2024-10-22 PROCEDURE — 87186 SC STD MICRODIL/AGAR DIL: CPT | Mod: ORL | Performed by: FAMILY MEDICINE

## 2024-10-22 PROCEDURE — 81001 URINALYSIS AUTO W/SCOPE: CPT | Mod: ORL | Performed by: FAMILY MEDICINE

## 2024-10-22 PROCEDURE — 81001 URINALYSIS AUTO W/SCOPE: CPT | Mod: ORL | Performed by: PHYSICIAN ASSISTANT

## 2024-10-24 LAB
BACTERIA UR CULT: ABNORMAL
BACTERIA UR CULT: ABNORMAL

## 2025-01-07 NOTE — TELEPHONE ENCOUNTER
Should pt be seen in ER if getting worse? No openings today here    comfortable clothing.  If you are potentially going to have a cast or brace bring clothing that will fit over them.                                                                                                                          In case of illness - If you have cold or flu like symptoms (high fever, runny nose, sore throat, cough, etc.) rash, nausea, vomiting, loose stools, and/or recent contact with someone who has a contagious disease (chicken pox, measles, etc.) Please call your doctor before coming to the hospital.     Day of Surgery/Procedure:    As a patient at [facility] you can expect quality medical and nursing care that is centered on your individual needs.  Our goal is to make your surgical experience as comfortable as possible    .  Transportation After Your Surgery/Procedure:    You will need a friend or family member to drive you home after your procedure.  Your  must be 18 years of age or older and able to sign off on your discharge instructions.  A taxi cab or any other form of public transportation is not acceptable.  Your friend or family member must stay at the hospital throughout your procedure.    Someone must remain with you for the first 24 hours after your surgery if you receive anesthesia or medication.  If you do not have someone to stay with you, your procedure may be cancelled.      If you have any other questions regarding your procedure or the day of surgery, please call [phone].    Instructions reviewed with patient via phone.  Patient verbalized understanding.  _________________________  ____________________________  Signature (Patient)              Signature (Provider) & date

## 2025-02-18 ENCOUNTER — MEDICAL CORRESPONDENCE (OUTPATIENT)
Dept: HEALTH INFORMATION MANAGEMENT | Facility: CLINIC | Age: OVER 89
End: 2025-02-18
Payer: OTHER MISCELLANEOUS

## 2025-05-11 ENCOUNTER — HEALTH MAINTENANCE LETTER (OUTPATIENT)
Age: OVER 89
End: 2025-05-11

## 2025-06-04 NOTE — PROGRESS NOTES
"GYNECOLOGY PROGRESS NOTE        CC:  see below. New patient visit. Patient moved to the area about 1 year ago. She is here today due to needing refills for her combination OCPs. Patient has a Hx of irregular menses. She had her children and her menses were normal for a period of time and also irregular at times also. She had sonograms done and was told they sonograms were normal. She had menses then spotting between her menses. She had heavy bleeding with clots. She had an IUD placed in 2021 but she would continue to have spotting between her menses. She was then given combination OCPS with the Mirena in place to help with the bleeding between her menses. She had been on both Loryna and Using Mirena for 1.5 years. She takes the active pills for 3 months and then the placebos 1 time every 3 months. She has migraines when she had her menses that lasts for \"days\". Her last several menses have been very short or no bleeding at all. Pap is current.   Chief Complaint   Patient presents with    Contraception     Katharina is a 47 yo new pt here today to establish care and discuss Rx for OCP. Pt moved to the area approx 1 year ago and she will be out of current Rx soon. Pt takes Loryna 3 mg/0.2 mg continously for 3 full months and then takes the inactive pills for 1 week. She reports when she takes the inactive pills she gets a cont. Migraine. Pt also has an mirena IUD placed approx. 2 -3 years ago. She uses both for AUB.        HPI:  Tatianna Rosenthal is here for a routine GYN examination.        ROS:  GI - no blood in BMs  URO - no hematuria  GYN - no AUB or vaginal discharge  PSYCH - mood OK        PHYSICAL EXAM:  /68   Wt 69.4 kg (153 lb)   GEN:  A&O, NAD  DERM:  no hirsutism or acne   PSYCH:  normal affect, non-anxious      IMPRESSION/PLAN:  A: bleeding with IUD in place and between menses with OCP use.   Plan: 1. Sonogram due to bleeding. 2. Stop combination OCPS, will try progestin only pills to stop menses till she " Assessment & Plan:  1. Urinary frequency  UA normal today. Since several UTIs in past few months and some occasional bladder symptoms without infection, we will have Ellis see urology. Culture sent and we will watch for results and change plan as necessary.  - Urinalysis-UC if Indicated  - Ambulatory referral to Urology      There are no Patient Instructions on file for this visit.    Orders Placed This Encounter   Procedures     Ambulatory referral to Urology     Referral Priority:   Routine     Referral Type:   Consultation     Referral Reason:   Evaluation and Treatment     Requested Specialty:   Urology     Number of Visits Requested:   1     Medications Discontinued During This Encounter   Medication Reason     KRILL OIL ORAL Therapy completed         Chief Complaint:   Chief Complaint   Patient presents with     Urinary Tract Infection     persistent UTI       History of Present Illness:  Ellis is an 85 year old female here today with continuing UTI symptoms. She was seen 10 days ago with UTI, and given abx. Burning resolved, but frequency has remained. She comes in today for repeat UA. She also mentions today that she has had a slight cough and some PND. Cough is dry except for twice over the past 2 days she has had two coughing episodes that were productive. No fevers or chills, no SOB. She doesn't feel well, tired, but cough is a tickle in the throat, doesn't feel it in the lungs.     Review of Systems:  All other systems are negative except as noted above.    PFSH:  Reviewed and updated.    Tobacco Use:  History   Smoking Status     Never Smoker   Smokeless Tobacco     Never Used       Vitals:  Vitals:    01/27/17 1606   BP: 140/72   Patient Site: Left Arm   Patient Position: Sitting   Cuff Size: Adult Regular   Pulse: 70   Resp: 14   Temp: 98.2  F (36.8  C)   TempSrc: Oral     Wt Readings from Last 3 Encounters:   01/17/17 164 lb (74.4 kg)   01/03/17 164 lb 9.6 oz (74.7 kg)   11/28/16 170 lb (77.1 kg)        Physical Exam:  Constitutional:  Reveals an alert, cooperative, 85 year old female in no acute distress.  Vitals:  Per nursing notes.  Eyes: PERRLA, Fundoscopic exam WNL  Ears:  TMs clear bilaterally  Oropharynx:  Without erythema, posterior nasal drainage or thrush.  Neck:  Supple, mild anterior lymphadenopathy,  thyroid not palpable.  Cardiac:  Regular rate and rhythm without murmurs, rubs, or gallops. Carotids without bruits. Legs without edema.   Lungs: Clear.  Respiratory effort normal.  Abdomen: No CVA tenderness.    Data Reviewed:  Additional History from Old Records or Another Person Summarized (2 total): None.     Decision to Obtain Extra information (1 total): None.     Radiology Tests Summarized and Ordered (XRAY/CT/MRI/DXA) (1 total): None.    Labs Reviewed and Ordered (1 total): UA, UC    Medicine Tests Summarized and Ordered (EKG/ECHO/COLONOSCOPY/EGD) (1 total): None.    Independent Review of EKG or X-Ray (2 each): None.    The visit lasted a total of 25 minutes face to face with the patient. Over 50% of the time was spent counseling and educating the patient about plan of care.    Medications:  Current Outpatient Prescriptions   Medication Sig Dispense Refill     alpha lipoic acid 200 mg cap Take 1 capsule by mouth 2 (two) times a day.       amLODIPine (NORVASC) 5 MG tablet TAKE 1 TABLET BY MOUTH TWICE DAILY 180 tablet 3     baclofen (LIORESAL) 10 MG tablet TAKE 1 TABLET BY MOUTH THREE TIMES DAILY 270 tablet 1     buPROPion (WELLBUTRIN SR) 150 MG 12 hr tablet TAKE 1 TABLET BY MOUTH DAILY. 90 tablet 0     CALCIUM CARBONATE/VITAMIN D3 (LIQUID CALCIUM WITH VITAMIN D ORAL) Take by mouth.       FLUoxetine (PROZAC) 10 MG capsule Take 3 capsules by mouth daily (pharmacy - discontinue the 20mg caps and 40mg caps on file) 90 capsule 2     gabapentin (NEURONTIN) 100 MG capsule TAKE 1-2 CAPSULES BY MOUTH THREE TIMES DAILY 180 capsule 5     HERBAL COMPLEX NO.205 (TOTAL BODY CLEANSE ORAL) Take 1 capsule by  has the hysteroscopic exam is done.   Problem List Items Addressed This Visit    None      Pap and HPV normal in 2024, no Hx of HGSIL,   Mammogram up to date and normal.        CARLOS Goode-SANDRA   "mouth 2 (two) times a day. Contains vitamin C 100 mg, folic acid 400 mcg, tart cherry, celery, etc.       LECITHIN MISC Use As Directed.       levothyroxine (SYNTHROID, LEVOTHROID) 75 MCG tablet Take 1 tablet (75 mcg total) by mouth daily. 90 tablet 3     losartan (COZAAR) 50 MG tablet TAKE 1 TABLET(50 MG) BY MOUTH DAILY 90 tablet 0     melatonin 10 mg Tab Take 10 mg by mouth bedtime.       omeprazole (PRILOSEC) 20 MG capsule TAKE ONE CAPSULE BY MOUTH DAILY 90 capsule 3     pen needle, diabetic (BD ULTRA-FINE PHILLIP PEN NEEDLES) 32 gauge x 5/32\" Ndle One daily 100 each 3     STRONTIUM CHLORIDE HEXAHYDRATE (STRONTIUM CHL HEXAHYD, BULK,) 100 % Kailyn Use As Directed.       teriparatide (FORTEO) 20 mcg/dose - 600 mcg/2.4 mL injection Inject 0.08 mL (20 mcg total) under the skin daily. 7.2 mL 3     UNABLE TO FIND Take 2 capsules by mouth every morning. Med Name: Actalin Thyroid Booster. Contains iodine.        UNABLE TO FIND Med Name: Saccharomyces Boulardii + MOS.  Probiotic       UNABLE TO FIND Med Name: MentaFit       VIT C/E/ZN/COPPR/LUTEIN/ZEAXAN (PRESERVISION AREDS 2 ORAL) Take 1 tablet by mouth 2 (two) times a day.        UNABLE TO FIND Apply topically 3 (three) times a day as needed. Med Name: Arthritis Pain Cream. Contains lidocaine.       No current facility-administered medications for this visit.        Total Data Points: 0    KIARRA Bolanos, CNP    This note has been dictated using voice recognition software. Any grammatical or context distortions are unintentional and inherent to the software      "